# Patient Record
Sex: MALE | Race: WHITE | Employment: OTHER | URBAN - METROPOLITAN AREA
[De-identification: names, ages, dates, MRNs, and addresses within clinical notes are randomized per-mention and may not be internally consistent; named-entity substitution may affect disease eponyms.]

---

## 2022-02-06 ENCOUNTER — HOSPITAL ENCOUNTER (EMERGENCY)
Age: 77
Discharge: HOME | End: 2022-02-06
Attending: EMERGENCY MEDICINE
Payer: MEDICARE

## 2022-02-06 VITALS
HEIGHT: 66 IN | BODY MASS INDEX: 35.03 KG/M2 | SYSTOLIC BLOOD PRESSURE: 140 MMHG | RESPIRATION RATE: 20 BRPM | DIASTOLIC BLOOD PRESSURE: 75 MMHG | WEIGHT: 218 LBS | TEMPERATURE: 97.7 F | OXYGEN SATURATION: 97 % | HEART RATE: 69 BPM

## 2022-02-06 DIAGNOSIS — E11.49 OTHER DIABETIC NEUROLOGICAL COMPLICATION ASSOCIATED WITH TYPE 2 DIABETES MELLITUS (HCC): ICD-10-CM

## 2022-02-06 DIAGNOSIS — Z76.0 ENCOUNTER FOR MEDICATION REFILL: Primary | ICD-10-CM

## 2022-02-06 PROCEDURE — 99283 EMERGENCY DEPT VISIT LOW MDM: CPT

## 2022-02-06 RX ORDER — AMLODIPINE BESYLATE 2.5 MG/1
2.5 TABLET ORAL DAILY
COMMUNITY

## 2022-02-06 RX ORDER — TRAMADOL HYDROCHLORIDE 50 MG/1
50 TABLET ORAL 3 TIMES DAILY PRN
COMMUNITY
End: 2022-02-06 | Stop reason: ALTCHOICE

## 2022-02-06 RX ORDER — TRAMADOL HYDROCHLORIDE 50 MG/1
50 TABLET ORAL EVERY 8 HOURS PRN
Qty: 21 TABLET | Refills: 0 | Status: SHIPPED | OUTPATIENT
Start: 2022-02-06 | End: 2022-02-10 | Stop reason: SDUPTHER

## 2022-02-06 RX ORDER — ASPIRIN 81 MG/1
81 TABLET ORAL DAILY
COMMUNITY

## 2022-02-06 RX ORDER — TIZANIDINE 2 MG/1
2 TABLET ORAL 3 TIMES DAILY PRN
COMMUNITY
End: 2022-02-10 | Stop reason: SDUPTHER

## 2022-02-06 ASSESSMENT — ENCOUNTER SYMPTOMS
EYES NEGATIVE: 1
WHEEZING: 0
TROUBLE SWALLOWING: 0
ALLERGIC/IMMUNOLOGIC NEGATIVE: 1
NAUSEA: 0
VOMITING: 0
RHINORRHEA: 0
SHORTNESS OF BREATH: 0
BACK PAIN: 1
ABDOMINAL PAIN: 0

## 2022-02-06 ASSESSMENT — PAIN SCALES - GENERAL
PAINLEVEL_OUTOF10: 8
PAINLEVEL_OUTOF10: 8

## 2022-02-06 ASSESSMENT — PAIN DESCRIPTION - LOCATION
LOCATION: LEG
LOCATION: LEG

## 2022-02-06 ASSESSMENT — PAIN DESCRIPTION - PAIN TYPE
TYPE: CHRONIC PAIN
TYPE: CHRONIC PAIN

## 2022-02-06 ASSESSMENT — PAIN DESCRIPTION - DESCRIPTORS: DESCRIPTORS: ACHING

## 2022-02-06 ASSESSMENT — PAIN DESCRIPTION - ORIENTATION: ORIENTATION: LEFT;RIGHT

## 2022-02-06 NOTE — ED PROVIDER NOTES
[facility] ED  eMERGENCY dEPARTMENT eNCOUnter      Pt Name: Amrit Romero  MRN: [id number]  [place] 1945  Date of evaluation: 2/6/2022  Provider: Sarika Figueroa MD    [address] [place] [address]       Chief Complaint   Patient presents with    Medication Refill     has leg pain, ran out of his tramadol, wants refill         HISTORY OF PRESENT ILLNESS   (Location/Symptom, Timing/Onset,Context/Setting, Quality, Duration, Modifying Factors, Severity)  Note limiting factors. Amrit Romero is a 77 y.o. male who presents to the emergency department complaint of needing a refill on her tramadol. Patient recently moved with family from [facility]. Patient does take routine tramadol, 50 mg p.o. 3 times daily. Patient admits that he is out in his chronic neuropathic pain is being significantly worse. Patient denies arianna shortness of breath, nausea vomiting. Patient denies acute symptomatologies otherwise. Patient is he has been on a number of different medications for his neuropathic pain patient was diabetes however Ultram seems to be the medication that works the best for him. Patient is seeking refill of medication allowing him time to establish with his new primary care physician tomorrow, and arrange for medication refills and the like. HPI    NursingNotes were reviewed. REVIEW OF SYSTEMS    (2-9 systems for level 4, 10 or more for level 5)     Review of Systems   Constitutional: Negative for activity change and chills. HENT: Negative for congestion, ear pain, rhinorrhea and trouble swallowing. Eyes: Negative. Respiratory: Negative for shortness of breath and wheezing. Cardiovascular: Negative for chest pain and leg swelling. Gastrointestinal: Negative for abdominal pain, nausea and vomiting. Endocrine: Negative. Genitourinary: Negative for dysuria, frequency and hematuria. Musculoskeletal: Positive for arthralgias, back pain and myalgias. Negative for gait problem and neck pain. Skin: Negative. Allergic/Immunologic: Negative. Neurological: Negative for seizures, syncope, speech difficulty and light-headedness. Patient complains of the burning neuropathic pain associate with his diabetes bilateral lower extremities. Hematological: Negative. Psychiatric/Behavioral: Negative for hallucinations, self-injury and suicidal ideas. Except as noted above the remainder of the review of systems was reviewed and negative. PAST MEDICAL HISTORY     Past Medical History:   Diagnosis Date    CAD (coronary artery disease)     Chronic kidney disease     [facility]     Diabetes mellitus [facility]     Diabetic neuropathy (HCC)     Hyperlipidemia     Hypertension          SURGICALHISTORY       Past Surgical History:   Procedure Laterality Date    COLON SURGERY      CORONARY ANGIOPLASTY WITH STENT PLACEMENT      CORONARY ARTERY BYPASS GRAFT      PACEMAKER PLACEMENT           CURRENT MEDICATIONS       Previous Medications    AMLODIPINE (NORVASC) 2.5 MG TABLET    Take 2.5 mg by mouth daily    ASPIRIN 81 MG EC TABLET    Take 81 mg by mouth daily    METFORMIN (GLUCOPHAGE) 500 MG TABLET    Take 500 mg by mouth daily (with breakfast)    TIZANIDINE (ZANAFLEX) 2 MG TABLET    Take 2 mg by mouth 3 times daily as needed       ALLERGIES     Penicillins    FAMILY HISTORY     History reviewed. No pertinent family history.        SOCIAL HISTORY       Social History     Socioeconomic History    Marital status: Legally      Spouse name: None    Number of children: None    Years of education: None    Highest education level: None   Occupational History    None   Tobacco Use    Smoking status: Never Smoker    Smokeless tobacco: Never Used   Vaping Use    Vaping Use: Never used   Substance and Sexual Activity    Alcohol use: Yes     Comment: rare    Drug use: Never    Sexual activity: None   Other Topics Concern    None   Social round, and reactive to light. Cardiovascular:      Rate and Rhythm: Normal rate and regular rhythm. Heart sounds: No friction rub. No gallop. Pulmonary:      Effort: Pulmonary effort is normal. No respiratory distress. Breath sounds: Normal breath sounds. Abdominal:      General: Abdomen is flat. There is no distension. Tenderness: There is no abdominal tenderness. Musculoskeletal:         General: No swelling or tenderness. Normal range of motion. Cervical back: Normal range of motion. No rigidity. Right lower leg: Edema present. Left lower leg: Edema present. Skin:     General: Skin is warm. Capillary Refill: Capillary refill takes less than 2 seconds. Coloration: Skin is not jaundiced or pale. Neurological:      General: No focal deficit present. Mental Status: He is alert and oriented to person, place, and time. Cranial Nerves: No cranial nerve deficit. Sensory: No sensory deficit. Psychiatric:         Mood and Affect: Mood normal.         Behavior: Behavior normal.         Thought Content: Thought content normal.         DIAGNOSTIC RESULTS     EKG: All EKG's are interpreted by the Emergency Department Physician who either signs or Co-signsthis chart in the absence of a cardiologist.    -    RADIOLOGY:   Nanine Hasten such as CT, Ultrasound and MRI are read by the radiologist. Plain radiographic images are visualized and preliminarily interpreted by the emergency physician with the below findings:    -    Interpretation per the Radiologist below, if available at the time ofthis note:    No orders to display         ED BEDSIDE ULTRASOUND:   Performed by ED Physician - none    LABS:  Labs Reviewed - No data to display    All other labs were within normal range or not returned as of this dictation.     EMERGENCY DEPARTMENT COURSE and DIFFERENTIAL DIAGNOSIS/MDM:   Vitals:    Vitals:    02/06/22 1619   BP: (!) 140/75   Pulse: 69   Resp: 20 Temp: 97.7 °F (36.5 °C)   TempSrc: Oral   SpO2: 97%   Weight: 218 lb (98.9 kg)   Height: 5' 6\" (1.676 m)       Given chronic nature patient symptomatologies, recent move, plan close outpatient follow-up, I see no reason not to honor gentleman's request for refill of medication. Will provide 7 days worth of therapy. Precautions given. Patient understands nature of medication very seriously and closely. Patient return should conditions worsen in any capacity. This time patient is denying other acute symptomatology such as chest pain shortness of breath or the like. Patient very appreciative of care and refill. Patient will follow up with primary care physician. Patient return should condition worsen. MDM    CRITICAL CARE TIME   Total Critical Care time was - minutes, excluding separately reportableprocedures. There was a high probability of clinicallysignificant/life threatening deterioration in the patient's condition which required my urgent intervention.  -    CONSULTS:  None    PROCEDURES:  Unless otherwise noted below, none     Procedures    FINAL IMPRESSION      1. Encounter for medication refill    2. Other diabetic neurological complication associated with type 2 diabetes mellitus ([facility]        DISPOSITION/PLAN   DISPOSITION Decision To Discharge 02/06/2022 04:45:54 PM      PATIENT REFERRED TO:  please keep your new PCP follow up appointment tomorrow            DISCHARGE MEDICATIONS:  New Prescriptions    TRAMADOL (ULTRAM) 50 MG TABLET    Take 1 tablet by mouth every 8 hours as needed for Pain for up to 7 days.           (Please note that portions of this note were completed with a voice recognitionprogram.  Efforts were made to edit the dictations but occasionally words are mis-transcribed.)    Anshul Oates MD (electronically signed)  Attending Emergency Physician          Anshul Oates MD  02/06/22 5359

## 2022-02-06 NOTE — ED TRIAGE NOTES
Patient presents with complaints of bilateral leg pain. Patient reports he just moved here and ran out of his tramadol and is having increased pain. Patient has a history of diabetic neuropathy. Patient ambulatory with cane on arrival. No distress noted in triage.

## 2022-02-10 ENCOUNTER — OFFICE VISIT (OUTPATIENT)
Dept: PRIMARY CARE CLINIC | Age: 77
End: 2022-02-10
Payer: MEDICARE

## 2022-02-10 VITALS
HEIGHT: 69 IN | SYSTOLIC BLOOD PRESSURE: 122 MMHG | OXYGEN SATURATION: 98 % | TEMPERATURE: 97.9 F | WEIGHT: 224 LBS | RESPIRATION RATE: 18 BRPM | HEART RATE: 73 BPM | DIASTOLIC BLOOD PRESSURE: 62 MMHG | BODY MASS INDEX: 33.18 KG/M2

## 2022-02-10 DIAGNOSIS — I25.10 ATHEROSCLEROSIS OF CORONARY ARTERY WITHOUT ANGINA PECTORIS, UNSPECIFIED VESSEL OR LESION TYPE, UNSPECIFIED WHETHER NATIVE OR TRANSPLANTED HEART: ICD-10-CM

## 2022-02-10 DIAGNOSIS — Z00.00 HEALTH CARE MAINTENANCE: ICD-10-CM

## 2022-02-10 DIAGNOSIS — E11.42 DIABETIC POLYNEUROPATHY ASSOCIATED WITH TYPE 2 DIABETES MELLITUS (HCC): ICD-10-CM

## 2022-02-10 DIAGNOSIS — M54.9 CHRONIC BACK PAIN, UNSPECIFIED BACK LOCATION, UNSPECIFIED BACK PAIN LATERALITY: Primary | ICD-10-CM

## 2022-02-10 DIAGNOSIS — M47.812 CERVICAL SPONDYLOSIS: ICD-10-CM

## 2022-02-10 DIAGNOSIS — J44.9 CHRONIC OBSTRUCTIVE PULMONARY DISEASE, UNSPECIFIED COPD TYPE (HCC): ICD-10-CM

## 2022-02-10 DIAGNOSIS — K46.1: ICD-10-CM

## 2022-02-10 DIAGNOSIS — G89.29 CHRONIC BACK PAIN, UNSPECIFIED BACK LOCATION, UNSPECIFIED BACK PAIN LATERALITY: Primary | ICD-10-CM

## 2022-02-10 DIAGNOSIS — Z00.00 ROUTINE GENERAL MEDICAL EXAMINATION AT A HEALTH CARE FACILITY: Primary | ICD-10-CM

## 2022-02-10 DIAGNOSIS — B37.2 CANDIDAL INTERTRIGO: ICD-10-CM

## 2022-02-10 PROBLEM — E11.9 TYPE 2 DIABETES MELLITUS WITHOUT COMPLICATION (HCC): Status: ACTIVE | Noted: 2022-02-10

## 2022-02-10 PROBLEM — Z95.0 PRESENCE OF PERMANENT CARDIAC PACEMAKER: Status: ACTIVE | Noted: 2019-09-10

## 2022-02-10 PROBLEM — I45.10 RIGHT BUNDLE BRANCH BLOCK: Status: ACTIVE | Noted: 2022-02-10

## 2022-02-10 PROBLEM — M15.9 GENERALIZED OSTEOARTHRITIS: Status: ACTIVE | Noted: 2019-09-10

## 2022-02-10 PROBLEM — G47.33 OBSTRUCTIVE SLEEP APNEA OF ADULT: Status: ACTIVE | Noted: 2019-09-10

## 2022-02-10 PROBLEM — K76.0 STEATOSIS OF LIVER: Status: ACTIVE | Noted: 2022-02-10

## 2022-02-10 PROBLEM — E66.01 MORBID OBESITY (HCC): Status: ACTIVE | Noted: 2019-09-10

## 2022-02-10 PROBLEM — H91.93 BILATERAL HEARING LOSS: Status: ACTIVE | Noted: 2019-09-10

## 2022-02-10 PROBLEM — I25.2 OLD MYOCARDIAL INFARCTION: Status: ACTIVE | Noted: 2019-09-10

## 2022-02-10 PROCEDURE — 3017F COLORECTAL CA SCREEN DOC REV: CPT | Performed by: INTERNAL MEDICINE

## 2022-02-10 PROCEDURE — 3046F HEMOGLOBIN A1C LEVEL >9.0%: CPT | Performed by: INTERNAL MEDICINE

## 2022-02-10 PROCEDURE — 2022F DILAT RTA XM EVC RTNOPTHY: CPT | Performed by: INTERNAL MEDICINE

## 2022-02-10 PROCEDURE — G8417 CALC BMI ABV UP PARAM F/U: HCPCS | Performed by: INTERNAL MEDICINE

## 2022-02-10 PROCEDURE — G8427 DOCREV CUR MEDS BY ELIG CLIN: HCPCS | Performed by: INTERNAL MEDICINE

## 2022-02-10 PROCEDURE — G8484 FLU IMMUNIZE NO ADMIN: HCPCS | Performed by: INTERNAL MEDICINE

## 2022-02-10 PROCEDURE — 3023F SPIROM DOC REV: CPT | Performed by: INTERNAL MEDICINE

## 2022-02-10 PROCEDURE — G0438 PPPS, INITIAL VISIT: HCPCS | Performed by: INTERNAL MEDICINE

## 2022-02-10 PROCEDURE — 1123F ACP DISCUSS/DSCN MKR DOCD: CPT | Performed by: INTERNAL MEDICINE

## 2022-02-10 PROCEDURE — 4040F PNEUMOC VAC/ADMIN/RCVD: CPT | Performed by: INTERNAL MEDICINE

## 2022-02-10 PROCEDURE — 99204 OFFICE O/P NEW MOD 45 MIN: CPT | Performed by: INTERNAL MEDICINE

## 2022-02-10 PROCEDURE — 1036F TOBACCO NON-USER: CPT | Performed by: INTERNAL MEDICINE

## 2022-02-10 RX ORDER — NYSTATIN 100000 U/G
OINTMENT TOPICAL
COMMUNITY
Start: 2021-11-08 | End: 2022-02-10 | Stop reason: ALTCHOICE

## 2022-02-10 RX ORDER — GLIPIZIDE 5 MG/1
TABLET ORAL
COMMUNITY
Start: 2021-11-08 | End: 2022-02-10

## 2022-02-10 RX ORDER — CLOBETASOL PROPIONATE 0.5 MG/G
OINTMENT TOPICAL
Qty: 45 G | Refills: 3 | Status: SHIPPED | OUTPATIENT
Start: 2022-02-10

## 2022-02-10 RX ORDER — KETOCONAZOLE 20 MG/G
CREAM TOPICAL
Qty: 30 G | Refills: 5 | Status: SHIPPED | OUTPATIENT
Start: 2022-02-10

## 2022-02-10 RX ORDER — NEOMYCIN SULFATE, POLYMYXIN B SULFATE AND HYDROCORTISONE 10; 3.5; 1 MG/ML; MG/ML; [USP'U]/ML
SUSPENSION/ DROPS AURICULAR (OTIC)
COMMUNITY
End: 2022-02-10 | Stop reason: ALTCHOICE

## 2022-02-10 RX ORDER — ATORVASTATIN CALCIUM 40 MG/1
TABLET, FILM COATED ORAL
COMMUNITY
End: 2022-02-10 | Stop reason: SDUPTHER

## 2022-02-10 RX ORDER — TRIAMCINOLONE ACETONIDE 1 MG/G
CREAM TOPICAL
COMMUNITY
Start: 2021-11-19 | End: 2022-02-10 | Stop reason: ALTCHOICE

## 2022-02-10 RX ORDER — LEVOFLOXACIN 750 MG/1
TABLET ORAL
COMMUNITY
Start: 2022-01-21

## 2022-02-10 RX ORDER — TRAMADOL HYDROCHLORIDE 50 MG/1
50 TABLET ORAL EVERY 8 HOURS PRN
Qty: 15 TABLET | Refills: 0 | Status: SHIPPED | OUTPATIENT
Start: 2022-02-10 | End: 2022-02-21 | Stop reason: SDUPTHER

## 2022-02-10 RX ORDER — KETOCONAZOLE 20 MG/G
CREAM TOPICAL
COMMUNITY
Start: 2022-02-04 | End: 2022-02-10 | Stop reason: SDUPTHER

## 2022-02-10 RX ORDER — TIZANIDINE 2 MG/1
2 TABLET ORAL EVERY 8 HOURS PRN
Qty: 30 TABLET | Refills: 0 | Status: SHIPPED | OUTPATIENT
Start: 2022-02-10

## 2022-02-10 RX ORDER — NITROGLYCERIN 0.4 MG/1
TABLET SUBLINGUAL
COMMUNITY
Start: 2022-02-04

## 2022-02-10 RX ORDER — FERROUS SULFATE 325(65) MG
TABLET ORAL
COMMUNITY

## 2022-02-10 RX ORDER — METRONIDAZOLE 500 MG/1
TABLET ORAL
COMMUNITY
Start: 2022-01-21

## 2022-02-10 RX ORDER — ATORVASTATIN CALCIUM 40 MG/1
TABLET, FILM COATED ORAL
Qty: 90 TABLET | Refills: 3 | Status: SHIPPED | OUTPATIENT
Start: 2022-02-10

## 2022-02-10 SDOH — ECONOMIC STABILITY: FOOD INSECURITY: WITHIN THE PAST 12 MONTHS, YOU WORRIED THAT YOUR FOOD WOULD RUN OUT BEFORE YOU GOT MONEY TO BUY MORE.: NEVER TRUE

## 2022-02-10 SDOH — ECONOMIC STABILITY: TRANSPORTATION INSECURITY
IN THE PAST 12 MONTHS, HAS THE LACK OF TRANSPORTATION KEPT YOU FROM MEDICAL APPOINTMENTS OR FROM GETTING MEDICATIONS?: NO

## 2022-02-10 SDOH — ECONOMIC STABILITY: FOOD INSECURITY: WITHIN THE PAST 12 MONTHS, THE FOOD YOU BOUGHT JUST DIDN'T LAST AND YOU DIDN'T HAVE MONEY TO GET MORE.: NEVER TRUE

## 2022-02-10 SDOH — ECONOMIC STABILITY: TRANSPORTATION INSECURITY
IN THE PAST 12 MONTHS, HAS LACK OF TRANSPORTATION KEPT YOU FROM MEETINGS, WORK, OR FROM GETTING THINGS NEEDED FOR DAILY LIVING?: NO

## 2022-02-10 ASSESSMENT — PATIENT HEALTH QUESTIONNAIRE - PHQ9
1. LITTLE INTEREST OR PLEASURE IN DOING THINGS: 0
2. FEELING DOWN, DEPRESSED OR HOPELESS: 0
SUM OF ALL RESPONSES TO PHQ9 QUESTIONS 1 & 2: 0
SUM OF ALL RESPONSES TO PHQ QUESTIONS 1-9: 0
1. LITTLE INTEREST OR PLEASURE IN DOING THINGS: 0
SUM OF ALL RESPONSES TO PHQ9 QUESTIONS 1 & 2: 0
2. FEELING DOWN, DEPRESSED OR HOPELESS: 0
SUM OF ALL RESPONSES TO PHQ QUESTIONS 1-9: 0

## 2022-02-10 ASSESSMENT — LIFESTYLE VARIABLES
AUDIT TOTAL SCORE: INCOMPLETE
AUDIT-C TOTAL SCORE: INCOMPLETE
HOW OFTEN DO YOU HAVE A DRINK CONTAINING ALCOHOL: 0
HOW OFTEN DO YOU HAVE A DRINK CONTAINING ALCOHOL: NEVER

## 2022-02-10 ASSESSMENT — ENCOUNTER SYMPTOMS
DIARRHEA: 0
BACK PAIN: 1
WHEEZING: 0
NAUSEA: 0
SHORTNESS OF BREATH: 0
VOMITING: 0
COUGH: 0
ABDOMINAL PAIN: 0

## 2022-02-10 ASSESSMENT — SOCIAL DETERMINANTS OF HEALTH (SDOH): HOW HARD IS IT FOR YOU TO PAY FOR THE VERY BASICS LIKE FOOD, HOUSING, MEDICAL CARE, AND HEATING?: NOT HARD AT ALL

## 2022-02-10 NOTE — PROGRESS NOTES
Subjective:      Patient ID: Solis Puri is a 77 y.o. male    New pt  Med refills  HPI    Patient presents to establish care with new PCP. Previous PCP was Dr. Robina Abebe in [place]  PMHx: chronic back pain, diabetic neuropathy, abd hernia with gangrene, s/p large bowel resection. Hx CAD s/p CABG, COPD   Current meds: tramadol, metformin, Lipitor  Last colonoscopy: 3 weeks ago at [facility]      Last tetanus shot: 2012         Hx zoster vaccination in 2021 in [place]      CC: just completed antibiotic for appendicitis. Claims nonsurgical alternative due to complicated GI hx.      Back pain controlled with tramadol. Will refer to pain mx. Hx COPD. No longer on Trelegy due to side effect. Preferred use of Advair. Needs o2 more frequently now. No increased SOB or wheezing, no cough.    Past Medical History:   Diagnosis Date    CAD (coronary artery disease)     Chronic kidney disease     [facility]     Diabetes mellitus ([facility]     Diabetic neuropathy ([facility]     Hyperlipidemia     Hypertension      Past Surgical History:   Procedure Laterality Date    COLON SURGERY      CORONARY ANGIOPLASTY WITH STENT PLACEMENT      CORONARY ARTERY BYPASS GRAFT      PACEMAKER PLACEMENT       Social History     Socioeconomic History    Marital status: Legally      Spouse name: Not on file    Number of children: Not on file    Years of education: Not on file    Highest education level: Not on file   Occupational History    Not on file   Tobacco Use    Smoking status: Never Smoker    Smokeless tobacco: Never Used   Vaping Use    Vaping Use: Never used   Substance and Sexual Activity    Alcohol use: Yes     Comment: rare    Drug use: Never    Sexual activity: Not on file   Other Topics Concern    Not on file   Social History Narrative    Not on file     Social Determinants of Health     Financial Resource Strain: Low Risk     Difficulty of Paying Living Expenses: Not hard at all   Food Insecurity: No Food Insecurity    Worried About [address] [place] [address] in the Last Year: Never true    Ciaran of Food in the Last Year: Never true   Transportation Needs: No Transportation Needs    Lack of Transportation (Medical): No    Lack of Transportation (Non-Medical): No   Physical Activity:     Days of Exercise per Week: Not on file    Minutes of Exercise per Session: Not on file   Stress:     Feeling of Stress : Not on file   Social Connections:     Frequency of Communication with Friends and Family: Not on file    Frequency of Social Gatherings with Friends and Family: Not on file    Attends Judaism Services: Not on file    Active Member of [address] [place] [address] or Organizations: Not on file    Attends Club or Organization Meetings: Not on file    Marital Status: Not on file   Intimate Partner Violence:     Fear of Current or Ex-Partner: Not on file    Emotionally Abused: Not on file    Physically Abused: Not on file    Sexually Abused: Not on file   Housing Stability:     Unable to Pay for Housing in the Last Year: Not on file    Number of [place] in the Last Year: Not on file    Unstable Housing in the Last Year: Not on file     History reviewed. No pertinent family history.   Allergies:  Penicillins and Iv [iodides]  Patient Active Problem List   Diagnosis    COPD (chronic obstructive pulmonary disease) ([facility]    Presence of permanent cardiac pacemaker    Atherosclerosis of coronary artery without angina pectoris    Diabetic peripheral neuropathy (HCC)    Obstructive sleep apnea of adult    Type 2 diabetes mellitus without complication (HCC)    Steatosis of liver    Right bundle branch block    Old myocardial infarction    Morbid obesity ([facility]    Generalized osteoarthritis    Bilateral hearing loss     Current Outpatient Medications on File Prior to Visit   Medication Sig Dispense Refill    nitroGLYCERIN (NITROSTAT) 0.4 MG SL tablet       metFORMIN (GLUCOPHAGE) 500 MG tablet Take 500 mg by mouth daily (with breakfast)      amLODIPine (NORVASC) 2.5 MG tablet Take 2.5 mg by mouth daily      aspirin 81 MG EC tablet Take 81 mg by mouth daily      ferrous sulfate (IRON 325) 325 (65 Fe) MG tablet ferrous sulfate 325 mg (65 mg iron) tablet   Take 1 tablet every day by oral route for 30 days.  levoFLOXacin (LEVAQUIN) 750 MG tablet TAKE 1 TABLET BY MOUTH EVERY DAY      metroNIDAZOLE (FLAGYL) 500 MG tablet TAKE 1 TABLET BY MOUTH EVERY 8 HOURS FOR 10 DAYS       No current facility-administered medications on file prior to visit. Review of Systems   Constitutional: Negative for chills, diaphoresis, fatigue and fever. HENT: Negative for congestion, ear discharge and ear pain. Respiratory: Negative for cough, shortness of breath and wheezing. Cardiovascular: Negative for chest pain. Gastrointestinal: Negative for abdominal pain, diarrhea, nausea and vomiting. Endocrine: Negative for cold intolerance and heat intolerance. Genitourinary: Negative for dysuria and frequency. Musculoskeletal: Positive for back pain. Neurological: Negative for dizziness and light-headedness. Objective:   /62   Pulse 73   Temp 97.9 °F (36.6 °C)   Resp 18   Ht 5' 9\" (1.753 m)   Wt 224 lb (101.6 kg)   SpO2 98%   BMI 33.08 kg/m²     Physical Exam  Constitutional:       General: He is not in acute distress. Appearance: He is not diaphoretic. Comments: NC in place   Cardiovascular:      Rate and Rhythm: Normal rate and regular rhythm. Pulses: Normal pulses. Heart sounds: Normal heart sounds, S1 normal and S2 normal.   Pulmonary:      Effort: Pulmonary effort is normal. No respiratory distress. Breath sounds: Normal breath sounds. No wheezing or rales. Comments: Well-healed longitudinal midline scar from to manubrium to xiphoid process    Chest:      Chest wall: No tenderness.    Abdominal:      General: Bowel sounds are normal.      Hernia: A hernia (in the right upper to lower quadrant ) is present. Comments: Well-healed longitudinal abd scar from epigastric to suprapubic region   Neurological:      Mental Status: He is alert. Assessment:       Diagnosis Orders   1. Chronic back pain, unspecified back location, unspecified back pain laterality  Lincoln Mccoy MD, Pain Management, [place]   2. Diabetic polyneuropathy associated with type 2 diabetes mellitus ([facility]  Hemoglobin A1C    Lipid Panel    Veronica Velasquez DPM, Podiatry, [place]    traMADol (ULTRAM) 50 MG tablet    atorvastatin (LIPITOR) 40 MG tablet    await A1c   3. Health care maintenance  CBC Auto Differential    Comprehensive Metabolic Panel    TSH with Reflex    Hepatitis C Antibody    HIV Screen    PSA Screening   4. Non-recurrent abdominal hernia with gangrene, unspecified hernia type  Jarad Peterson7Ranjith MD, Gastroenterology, Mila Cisneros MD, General Surgery, [facility]   5. Candidal intertrigo  clobetasol (TEMOVATE) 0.05 % ointment    ketoconazole (NIZORAL) 2 % cream   6. Cervical spondylosis  tiZANidine (ZANAFLEX) 2 MG tablet   7. Chronic obstructive pulmonary disease, unspecified COPD type ([facility]  Nathaneil Harada, MD, Pulmonology, [facility]   8.  Atherosclerosis of coronary artery without angina pectoris, unspecified vessel or lesion type, unspecified whether native or transplanted heart Stable Veronica Kasper DO, Cardiology, [place]     Plan:      Orders Placed This Encounter   Procedures    CBC Auto Differential     Standing Status:   Future     Standing Expiration Date:   2/10/2023    Comprehensive Metabolic Panel     Standing Status:   Future     Standing Expiration Date:   2/10/2023    TSH with Reflex     Standing Status:   Future     Standing Expiration Date:   2/10/2023    Hemoglobin A1C     Standing Status:   Future     Standing Expiration Date:   2/10/2023    Lipid Panel     Standing Status:   Future Standing Expiration Date:   2/10/2023     Order Specific Question:   Is Patient Fasting?/# of Hours     Answer:   yes    Hepatitis C Antibody     Standing Status:   Future     Standing Expiration Date:   2/10/2023    HIV Screen     Standing Status:   Future     Standing Expiration Date:   2/10/2023    PSA Screening     Standing Status:   Future     Standing Expiration Date:   2/10/2023    Nazario Jones MD, Pain Management, [place]     Referral Priority:   Routine     Referral Type:   Eval and Treat     Referral Reason:   Specialty Services Required     Referred to Provider:   Nyasia Carlos MD     Requested Specialty:   Physical Medicine and Rehab     Number of Visits Requested:   [address] [place], [place], [address]     Referral Priority:   Routine     Referral Type:   Eval and Treat     Referral Reason:   Specialty Services Required     Referred to Provider:   Liz Casanova DPM     Requested Specialty:   Podiatry     Number of Visits Requested:   [facility], [facility], Gastroenterology, [facility]     Referral Priority:   Routine     Referral Type:   Eval and Treat     Referral Reason:   Specialty Services Required     Referred to Provider:   Terrie Haynes MD     Requested Specialty:   Gastroenterology     Number of Visits Requested:   Humphrey Rahman MD, General Surgery, [place]     Referral Priority:   Routine     Referral Type:   Eval and Treat     Referral Reason:   Specialty Services Required     Referred to Provider:   Kaden Butts MD     Requested Specialty:   General Surgery     Number of Visits Requested:   Tom Diallo DO, Cardiology, [place]     Referral Priority:   Routine     Referral Type:   Eval and Treat     Referral Reason:   Specialty Services Required     Referred to Provider:   Julienne Clancy DO     Requested Specialty:   Cardiology     Number of Visits Requested:   Bud Collado MD, Pulmonology, [place]     Referral Priority:   Routine     Referral Type:   Eval and Treat     Referral Reason:   Specialty Services Required     Referred to Provider:   Ryland Sever, MD     Requested Specialty:   Pulmonology     Number of Visits Requested:   1      Return in about 1 month (around 3/10/2022) for follow up, with PCP.

## 2022-02-10 NOTE — PATIENT INSTRUCTIONS
Personalized Preventive Plan for Ana Maria Quinones - 2/10/2022  Medicare offers a range of preventive health benefits. Some of the tests and screenings are paid in full while other may be subject to a deductible, co-insurance, and/or copay. Some of these benefits include a comprehensive review of your medical history including lifestyle, illnesses that may run in your family, and various assessments and screenings as appropriate. After reviewing your medical record and screening and assessments performed today your provider may have ordered immunizations, labs, imaging, and/or referrals for you. A list of these orders (if applicable) as well as your Preventive Care list are included within your After Visit Summary for your review. Other Preventive Recommendations:    · A preventive eye exam performed by an eye specialist is recommended every 1-2 years to screen for glaucoma; cataracts, macular degeneration, and other eye disorders. · A preventive dental visit is recommended every 6 months. · Try to get at least 150 minutes of exercise per week or 10,000 steps per day on a pedometer . · Order or download the FREE \"Exercise & Physical Activity: Your Everyday Guide\" from [employer]. Call 2-813.347.4444 or search [employer] online. · You need 9164-5773 mg of calcium and 6774-7643 IU of vitamin D per day. It is possible to meet your calcium requirement with diet alone, but a vitamin D supplement is usually necessary to meet this goal.  · When exposed to the sun, use a sunscreen that protects against both UVA and UVB radiation with an SPF of 30 or greater. Reapply every 2 to 3 hours or after sweating, drying off with a towel, or swimming. · Always wear a seat belt when traveling in a car. Always wear a helmet when riding a bicycle or motorcycle.

## 2022-02-10 NOTE — PROGRESS NOTES
Medicare Annual Wellness Visit  Name: Ronni Sanchez Date: 2/10/2022   MRN: [id number] Sex: Male   Age: 77 y.o. Ethnicity: Non- / Non    : 1945 Race: White (non-)      Erum Gutierrez is here for Christiano BARAHONA    Screenings for behavioral, psychosocial and functional/safety risks, and cognitive dysfunction are all negative except as indicated below. These results, as well as other patient data from the [address] [facility] [address] [facility] [address] form, are documented in Flowsheets linked to this Encounter. Allergies   Allergen Reactions    Penicillins Hives and Rash     Other reaction(s): Generalized rash, Respiratory distress, Urticaria, Syncope, Syncope, Unknown    Iv [Iodides] Hives, Itching and Rash         Prior to Visit Medications    Medication Sig Taking? Authorizing Provider   ferrous sulfate (IRON 325) 325 (65 Fe) MG tablet ferrous sulfate 325 mg (65 mg iron) tablet   Take 1 tablet every day by oral route for 30 days. Yes Historical Provider, MD   levoFLOXacin (LEVAQUIN) 750 MG tablet TAKE 1 TABLET BY MOUTH EVERY DAY Yes Historical Provider, MD   metroNIDAZOLE (FLAGYL) 500 MG tablet TAKE 1 TABLET BY MOUTH EVERY 8 HOURS FOR 10 DAYS Yes Historical Provider, MD   nitroGLYCERIN (NITROSTAT) 0.4 MG SL tablet  Yes Historical Provider, MD   metFORMIN (GLUCOPHAGE) 500 MG tablet Take 500 mg by mouth daily (with breakfast) Yes Historical Provider, MD   amLODIPine (NORVASC) 2.5 MG tablet Take 2.5 mg by mouth daily Yes Historical Provider, MD   aspirin 81 MG EC tablet Take 81 mg by mouth daily Yes Historical Provider, MD   traMADol (ULTRAM) 50 MG tablet Take 1 tablet by mouth every 8 hours as needed for Pain for up to 7 days. Roseanne Levy MD   tiZANidine (ZANAFLEX) 2 MG tablet Take 1 tablet by mouth every 8 hours as needed (neck pain)  Roseanne Levy MD   clobetasol (TEMOVATE) 0.05 % ointment Apply topically 2 times daily for 2 weeks for rash.  Do not use on face or neck  Roseanne Levy MD   ketoconazole ([place]) clubbing or edema  Musculoskeletal: normal range of motion, no joint swelling, deformity or tenderness  Neurologic: reflexes normal and symmetric, no cranial nerve deficit, gait, coordination and speech normal  Right abd reducible hernia  Abdomen: mass palpable- soft reducible nontender right abd mass  Patient's complete Health Risk Assessment and screening values have been reviewed and are found in Flowsheets. The following problems were reviewed today and where indicated follow up appointments were made and/or referrals ordered. Positive Risk Factor Screenings with Interventions:              General Health and ACP:  General  In general, how would you say your health is?: Very Good  In the past 7 days, have you experienced any of the following?  New or Increased Pain, New or Increased Fatigue, Loneliness, Social Isolation, Stress or Anger?: (!) New or Increased Pain  Do you get the social and emotional support that you need?: Yes  Do you have a Living Will?: (!) No  Advance Directives     Power of  Living Will ACP-Advance Directive ACP-Power of     Not on File Not on File Not on File Not on File      General Health Risk Interventions:  · Pain issues: pain management referral ordered    Health Habits/Nutrition:  Health Habits/Nutrition  Do you exercise for at least 20 minutes 2-3 times per week?: (!) No  Have you lost any weight without trying in the past 3 months?: (!) Yes  Do you eat only one meal per day?: No  Have you seen the dentist within the past year?: N/A - wear dentures     Health Habits/Nutrition Interventions:  · Inadequate physical activity:  back pain prevents     Hearing/Vision:  No exam data present  Hearing/Vision  Do you or your family notice any trouble with your hearing that hasn't been managed with hearing aids?: No  Do you have difficulty driving, watching TV, or doing any of your daily activities because of your eyesight?: No  Have you had an eye exam within the past year?: (!) No  Hearing/Vision Interventions:  · Hearing concerns:  will follow for hearing aid        Personalized Preventive Plan   Current Health Maintenance Status  Immunization History   Administered Date(s) Administered    Influenza Virus Vaccine 10/22/2007, 10/31/2008, 10/01/2009, 09/01/2014, 09/01/2017, 10/01/2020, 07/01/2021    Pneumococcal Conjugate 13-valent ([id number]) 09/01/2016    Pneumococcal Polysaccharide ([id number]) 09/01/2015    Td vaccine (adult) 01/01/2012, 06/01/2012    Zoster Recombinant (Shingrix) 06/11/2018        Health Maintenance   Topic Date Due    Hepatitis C screen  Never done    COVID-19 Vaccine (1) Never done    Lipid screen  Never done    Depression Screen  Never done    Colon cancer screen colonoscopy  Never done    DTaP/Tdap/Td vaccine (1 - Tdap) 06/02/2012    Shingles Vaccine (3 of 3) 08/06/2018    Annual Wellness Visit (AWV)  Never done    Flu vaccine  Completed    Pneumococcal 65+ years Vaccine  Completed    Hepatitis A vaccine  Aged Out    Hepatitis B vaccine  Aged Out    Hib vaccine  Aged Out    Meningococcal (ACWY) vaccine  Aged Out     Recommendations for [employer] Due: see orders and patient instructions/AVS.  . Recommended screening schedule for the next 5-10 years is provided to the patient in written form: see Patient Instructions/AVS.    Nataliia Adam was seen today for medicare awv.     Diagnoses and all orders for this visit:    Routine general medical examination at a health care facility

## 2022-02-21 ENCOUNTER — TELEPHONE (OUTPATIENT)
Dept: PRIMARY CARE CLINIC | Age: 77
End: 2022-02-21

## 2022-02-21 DIAGNOSIS — E11.42 DIABETIC POLYNEUROPATHY ASSOCIATED WITH TYPE 2 DIABETES MELLITUS (HCC): ICD-10-CM

## 2022-02-21 RX ORDER — TRAMADOL HYDROCHLORIDE 50 MG/1
50 TABLET ORAL EVERY 8 HOURS PRN
Qty: 20 TABLET | Refills: 0 | Status: SHIPPED | OUTPATIENT
Start: 2022-02-21 | End: 2022-03-03 | Stop reason: SDUPTHER

## 2022-02-21 NOTE — TELEPHONE ENCOUNTER
Daughter Koki Rodriguez is calling in regards to her father. States they talked with Pain Management (Dr. Wilfred Escalante office) Patient unable to get in to be seen until 3/17/22. They told patient to contact you in regards to refills on his pain medication (Tramadol)  This is the earliest appt they have daughter said. According to looking at the notes and referral, they tried reaching out to the patient numerous times and no answer or call back. They also state that the patient is unable to be seen until this date because he is moving from [place] here.   Please advise if you can refill medication until her is seen by Dr. Oniel Pat on [address] in [facility] [place]

## 2022-02-23 ENCOUNTER — TELEPHONE (OUTPATIENT)
Dept: PAIN MANAGEMENT | Age: 77
End: 2022-02-23

## 2022-02-23 NOTE — TELEPHONE ENCOUNTER
Received call from Dr. Brayden Alicia office asking if Dr. Carolyn Crowder would be able to see patient earlier than scheduled 3/17/22. No available appts, how does Dr. Carolyn Crowder advise?

## 2022-02-23 NOTE — TELEPHONE ENCOUNTER
Call placed to Dr. Lakeshia Eller office, they state no other appt's available with the other physician's but they are sending a message to Dr. Lakeshia lEler in regards to seeing him sooner and than they will reach out to the patient with a sooner appt if so    vm also left for patient in regards to the above

## 2022-02-28 ENCOUNTER — TELEPHONE (OUTPATIENT)
Dept: PRIMARY CARE CLINIC | Age: 77
End: 2022-02-28

## 2022-02-28 NOTE — TELEPHONE ENCOUNTER
PT WAS CALLED AGAIN AND LMVM. DR. Negro Chua OFFICE WAS ALSO CALLED [phone] AND LM WITH  . IF PT CALLS BACK, PLEASE MOVE UP PT'S APPT WITH DR. Marco A Reyes.

## 2022-03-02 DIAGNOSIS — E11.42 DIABETIC POLYNEUROPATHY ASSOCIATED WITH TYPE 2 DIABETES MELLITUS (HCC): ICD-10-CM

## 2022-03-02 NOTE — TELEPHONE ENCOUNTER
His daughter Danna Nieot said he has appointment with pain management on 3/13/22. He will be out of medication tomorrow evening.

## 2022-03-03 RX ORDER — TRAMADOL HYDROCHLORIDE 50 MG/1
50 TABLET ORAL EVERY 8 HOURS PRN
Qty: 20 TABLET | Refills: 0 | Status: SHIPPED | OUTPATIENT
Start: 2022-03-03 | End: 2022-03-10

## 2022-03-15 DIAGNOSIS — E11.49 OTHER DIABETIC NEUROLOGICAL COMPLICATION ASSOCIATED WITH TYPE 2 DIABETES MELLITUS (HCC): ICD-10-CM

## 2022-03-15 NOTE — TELEPHONE ENCOUNTER
requesting medication refill. Please approve or deny this request.    Rx requested:  Requested Prescriptions     Pending Prescriptions Disp Refills    traMADol (ULTRAM) 50 MG tablet 21 tablet 0     Sig: Take 1 tablet by mouth every 8 hours as needed for Pain for up to 7 days.          Last Office Visit:   2/10/2022      Next Visit Date:  Future Appointments   Date Time Provider Jin Mercadoi   3/17/2022 10:00 AM Robin De La Torre MD [facility] [place]   3/22/2022  3:00 PM Brianna Mann MD [address]   3/29/2022  1:45 PM Dalila Chen MD [address]   4/29/2022  9:00 AM Trevor Parker DO [facility]

## 2022-03-16 RX ORDER — TRAMADOL HYDROCHLORIDE 50 MG/1
50 TABLET ORAL EVERY 8 HOURS PRN
Qty: 21 TABLET | Refills: 0 | OUTPATIENT
Start: 2022-03-16 | End: 2022-03-23

## 2022-03-16 NOTE — TELEPHONE ENCOUNTER
Patient's daughter Fawad Bowling is calling back regarding this refill. He has appointment with pain clinic tomorrow.

## 2022-03-16 NOTE — TELEPHONE ENCOUNTER
family called back again and when told message that needs to get this medication from pain management become highly upset on the phone. States he has been without this medication for 2 days and doesn't have an appt until 10 am on Thursday 3/17/22. If another script can't get called in for him than they will be looking for another physician.

## 2022-03-16 NOTE — TELEPHONE ENCOUNTER
They told me the pain mx appt was for 3/13  Refill with pain mx.  I have done several refills already

## 2022-03-17 ENCOUNTER — INITIAL CONSULT (OUTPATIENT)
Dept: PAIN MANAGEMENT | Age: 77
End: 2022-03-17
Payer: MEDICARE

## 2022-03-17 VITALS
HEIGHT: 66 IN | BODY MASS INDEX: 35.03 KG/M2 | OXYGEN SATURATION: 96 % | RESPIRATION RATE: 92 BRPM | TEMPERATURE: 98 F | WEIGHT: 218 LBS

## 2022-03-17 DIAGNOSIS — G89.29 CHRONIC BILATERAL LOW BACK PAIN WITHOUT SCIATICA: Primary | ICD-10-CM

## 2022-03-17 DIAGNOSIS — G89.29 CHRONIC PAIN OF LEFT KNEE: ICD-10-CM

## 2022-03-17 DIAGNOSIS — E66.01 SEVERE OBESITY (BMI 35.0-39.9) WITH COMORBIDITY (HCC): ICD-10-CM

## 2022-03-17 DIAGNOSIS — E11.42 DIABETIC PERIPHERAL NEUROPATHY (HCC): ICD-10-CM

## 2022-03-17 DIAGNOSIS — F11.90 CHRONIC, CONTINUOUS USE OF OPIOIDS: ICD-10-CM

## 2022-03-17 DIAGNOSIS — M54.2 NECK PAIN: ICD-10-CM

## 2022-03-17 DIAGNOSIS — M79.605 BILATERAL LEG PAIN: ICD-10-CM

## 2022-03-17 DIAGNOSIS — M54.50 CHRONIC BILATERAL LOW BACK PAIN WITHOUT SCIATICA: Primary | ICD-10-CM

## 2022-03-17 DIAGNOSIS — Z51.81 ENCOUNTER FOR MONITORING OPIOID MAINTENANCE THERAPY: ICD-10-CM

## 2022-03-17 DIAGNOSIS — Z79.891 ENCOUNTER FOR MONITORING OPIOID MAINTENANCE THERAPY: ICD-10-CM

## 2022-03-17 DIAGNOSIS — R20.0 BILATERAL HAND NUMBNESS: ICD-10-CM

## 2022-03-17 DIAGNOSIS — M25.562 CHRONIC PAIN OF LEFT KNEE: ICD-10-CM

## 2022-03-17 DIAGNOSIS — F11.99 OPIOID USE, UNSPECIFIED WITH UNSPECIFIED OPIOID-INDUCED DISORDER (HCC): ICD-10-CM

## 2022-03-17 DIAGNOSIS — M79.604 BILATERAL LEG PAIN: ICD-10-CM

## 2022-03-17 DIAGNOSIS — M40.204 KYPHOSIS OF THORACIC REGION, UNSPECIFIED KYPHOSIS TYPE: ICD-10-CM

## 2022-03-17 PROCEDURE — 3017F COLORECTAL CA SCREEN DOC REV: CPT | Performed by: PHYSICAL MEDICINE & REHABILITATION

## 2022-03-17 PROCEDURE — 1123F ACP DISCUSS/DSCN MKR DOCD: CPT | Performed by: PHYSICAL MEDICINE & REHABILITATION

## 2022-03-17 PROCEDURE — G8484 FLU IMMUNIZE NO ADMIN: HCPCS | Performed by: PHYSICAL MEDICINE & REHABILITATION

## 2022-03-17 PROCEDURE — 99204 OFFICE O/P NEW MOD 45 MIN: CPT | Performed by: PHYSICAL MEDICINE & REHABILITATION

## 2022-03-17 PROCEDURE — 2022F DILAT RTA XM EVC RTNOPTHY: CPT | Performed by: PHYSICAL MEDICINE & REHABILITATION

## 2022-03-17 PROCEDURE — 4040F PNEUMOC VAC/ADMIN/RCVD: CPT | Performed by: PHYSICAL MEDICINE & REHABILITATION

## 2022-03-17 PROCEDURE — 1036F TOBACCO NON-USER: CPT | Performed by: PHYSICAL MEDICINE & REHABILITATION

## 2022-03-17 PROCEDURE — 3046F HEMOGLOBIN A1C LEVEL >9.0%: CPT | Performed by: PHYSICAL MEDICINE & REHABILITATION

## 2022-03-17 PROCEDURE — G8417 CALC BMI ABV UP PARAM F/U: HCPCS | Performed by: PHYSICAL MEDICINE & REHABILITATION

## 2022-03-17 PROCEDURE — G8427 DOCREV CUR MEDS BY ELIG CLIN: HCPCS | Performed by: PHYSICAL MEDICINE & REHABILITATION

## 2022-03-17 RX ORDER — NALOXONE HYDROCHLORIDE 4 MG/.1ML
1 SPRAY NASAL PRN
Qty: 1 EACH | Refills: 0 | Status: SHIPPED | OUTPATIENT
Start: 2022-03-17

## 2022-03-17 RX ORDER — PERPHENAZINE 16 MG
600 TABLET ORAL DAILY
Qty: 30 CAPSULE | Refills: 0 | Status: SHIPPED | OUTPATIENT
Start: 2022-03-17 | End: 2022-07-06 | Stop reason: SDUPTHER

## 2022-03-17 RX ORDER — TRAMADOL HYDROCHLORIDE 50 MG/1
50 TABLET ORAL 3 TIMES DAILY PRN
Qty: 90 TABLET | Refills: 0 | Status: SHIPPED | OUTPATIENT
Start: 2022-03-17 | End: 2022-04-20 | Stop reason: SDUPTHER

## 2022-03-17 RX ORDER — GABAPENTIN 300 MG/1
300 CAPSULE ORAL NIGHTLY
Qty: 30 CAPSULE | Refills: 0 | Status: SHIPPED | OUTPATIENT
Start: 2022-03-17 | End: 2022-07-06 | Stop reason: SDUPTHER

## 2022-03-17 RX ORDER — TRAMADOL HYDROCHLORIDE 50 MG/1
50 TABLET ORAL 3 TIMES DAILY PRN
Qty: 30 TABLET | Refills: 0 | Status: SHIPPED | OUTPATIENT
Start: 2022-03-17 | End: 2022-03-17

## 2022-03-17 ASSESSMENT — ENCOUNTER SYMPTOMS
CONSTIPATION: 0
DIARRHEA: 0
SHORTNESS OF BREATH: 0
NAUSEA: 0
BACK PAIN: 1

## 2022-03-17 NOTE — PROGRESS NOTES
Marisela Ge  ([id number])    3/17/2022    Subjective:     Marisela Ge is 77 y.o. male who complains today of:    Chief Complaint   Patient presents with    Back Pain       Marisela Ge is a 77 y.o. male who presents for evaluation by request of Dr. Pia Carbajal for chronic back pain. He is accompanied by Pilar Mitchell, his daughter, whom he permits to be present during the history and exam.    He has struggled with pain for over 10 years. He denies any immediately-preceding traumatic or inciting events. He has been previously evaluated by Dr Aj Frost whose records are reviewed below. He describes pain located in both sides of his low back. Pain is a constant ache and is currently a 4/10 and gets up to a 8/10 at its worst and goes down to a 3/10 at its best. Pain is worse with poor sleep on his old mattress. Pain is better with pain medicine. Pain is located 50% on the right and 50% on the left. Pain is located 20% in the back and 80% in the legs. His worse pain is in both feet. \"The bottom of my feet feel like I'm walking on cardboard. \" He has pain in both feet which he attributes to his diabetic neuropathy pain. His pain gets to a 10/10. Worse with walking. Better with rest. Constant ache for over 10 years. Left knee pain has been a constant ache for over 1 year, worse with \"turning it the wrong way\", is a 8/10, gets to a 10/10, better with a brace. Neck pain is located in both sides of his neck, gets to a 8/10, worse with turning his neck, better with rest, constant ache for over 1 year. There are no other associated symptoms or contextual factors. He denies any classic radicular symptoms, new weakness, saddle anesthesia, bowel or bladder dysfunction, or falls.         He denies any numbness, tingling, weakness, bowel or bladder dysfunction, saddle anesthesia, falls, history of cancer, unexplained weight loss, persistent night pain and sweats, fever, IV drug abuse, immunocompromise, chronic prednisone or antibiotic use, or any other red flag symptoms. Mood is down, denies any suicidal or homicidal ideation. Sleep is poor, awakes fatigued. He has tried:  Home exercise program with minimal relief    Diagnostic testing previously performed includes XRs    Medications tried include:  Acetaminophen with minimal relief for over 3 months  Ibuprofen with minimal relief for over 3 months  Tramadol 50 mg prescribed 11/19/2021 [facility], 6 prescriptions sent 11/19/2021. \"With the Tramadol I feel like a new man. \" The patient clarifies he has been on Tramadol 50 mg TID chronically over the last 2 years while living in [place]. Gabapentin min relief, doesn't recall dose. Lyrica smallest dose, min relief  Never tried Cymbalta    Allergies, Medications, Past Medical History, Family History, Social History, Work History, and Review of Systems reviewed below     + Coronary disease status post coronary artery bypass grafting, 7 stents, pacemaker, on Aspirin 81 mg  + COPD  + Abdominal hernia with gangrene status post large bowel resection  + Diabetes mellitus   + Liver and kidney cysts  + MORENA on CPAP    No Seizures, Epilepsy or Brain Surgery     Spends his time:  He moved from [place], got stuck there right before the pandemic, was paying $3,000/month for two furnished apartment. Gloria's  has family here. Spent 34 years in the [employer], he is tired of moving. He served in the [employer]. He used to enjoy playing softball, ride motorcycles [employer].      Allergies:  Penicillins and Iv [iodides]    Past Medical History:   Diagnosis Date    CAD (coronary artery disease)     Chronic kidney disease     [facility]     Diabetes mellitus [facility]     Diabetic neuropathy [facility] [facility]     Hyperlipidemia     Hypertension      Past Surgical History:   Procedure Laterality Date    COLON SURGERY      CORONARY ANGIOPLASTY WITH STENT PLACEMENT      CORONARY ARTERY BYPASS GRAFT      PACEMAKER PLACEMENT       History reviewed. No pertinent family history. Social History     Socioeconomic History    Marital status: Legally      Spouse name: Not on file    Number of children: Not on file    Years of education: Not on file    Highest education level: Not on file   Occupational History    Not on file   Tobacco Use    Smoking status: Never Smoker    Smokeless tobacco: Never Used   Vaping Use    Vaping Use: Never used   Substance and Sexual Activity    Alcohol use: Yes     Comment: rare    Drug use: Never    Sexual activity: Not on file   Other Topics Concern    Not on file   Social History Narrative    Not on file     Social Determinants of Health     Financial Resource Strain: Low Risk     Difficulty of Paying Living Expenses: Not hard at all   Food Insecurity: No Food Insecurity    Worried About Running Out of Food in the Last Year: Never true    Ciaran of Food in the Last Year: Never true   Transportation Needs: No Transportation Needs    Lack of Transportation (Medical): No    Lack of Transportation (Non-Medical):  No   Physical Activity:     Days of Exercise per Week: Not on file    Minutes of Exercise per Session: Not on file   Stress:     Feeling of Stress : Not on file   Social Connections:     Frequency of Communication with Friends and Family: Not on file    Frequency of Social Gatherings with Friends and Family: Not on file    Attends Evangelical Services: Not on file    Active Member of Clubs or Organizations: Not on file    Attends Club or Organization Meetings: Not on file    Marital Status: Not on file   Intimate Partner Violence:     Fear of Current or Ex-Partner: Not on file    Emotionally Abused: Not on file    Physically Abused: Not on file    Sexually Abused: Not on file   Housing Stability:     Unable to Pay for Housing in the Last Year: Not on file    Number of [place] in the Last Year: Not on file    Unstable Housing in the Last Year: Not on file       Current Outpatient Medications on File Prior to Visit   Medication Sig Dispense Refill    ferrous sulfate (IRON 325) 325 (65 Fe) MG tablet ferrous sulfate 325 mg (65 mg iron) tablet   Take 1 tablet every day by oral route for 30 days.  levoFLOXacin (LEVAQUIN) 750 MG tablet TAKE 1 TABLET BY MOUTH EVERY DAY      metroNIDAZOLE (FLAGYL) 500 MG tablet TAKE 1 TABLET BY MOUTH EVERY 8 HOURS FOR 10 DAYS      nitroGLYCERIN (NITROSTAT) 0.4 MG SL tablet       tiZANidine (ZANAFLEX) 2 MG tablet Take 1 tablet by mouth every 8 hours as needed (neck pain) 30 tablet 0    clobetasol (TEMOVATE) 0.05 % ointment Apply topically 2 times daily for 2 weeks for rash. Do not use on face or neck 45 g 3    ketoconazole (NIZORAL) 2 % cream APPLY TO THE skin rash twice daily FOR 4 weeks 30 g 5    atorvastatin (LIPITOR) 40 MG tablet TAKE 1 TABLET BY MOUTH ONCE DAILY AT BEDTIME 90 tablet 3    metFORMIN (GLUCOPHAGE) 500 MG tablet Take 500 mg by mouth daily (with breakfast)      amLODIPine (NORVASC) 2.5 MG tablet Take 2.5 mg by mouth daily      aspirin 81 MG EC tablet Take 81 mg by mouth daily       No current facility-administered medications on file prior to visit. Review of Systems   Constitutional: Negative for fever. HENT: Negative for hearing loss. Respiratory: Negative for shortness of breath. Gastrointestinal: Negative for constipation, diarrhea and nausea. Genitourinary: Negative for difficulty urinating. Musculoskeletal: Positive for back pain. Negative for neck pain. Skin: Negative for rash. Neurological: Negative for headaches. Hematological: Does not bruise/bleed easily. Psychiatric/Behavioral: Negative for sleep disturbance.          Objective:     Vitals:  Temp 98 °F (36.7 °C) (Infrared)   Resp (!) 92   Ht 5' 6\" (1.676 m)   Wt 218 lb (98.9 kg)   SpO2 96%   BMI 35.19 kg/m² Pain Score:   9      Exam performed under Coronavirus precautions  Gen: No acute distress  Neck: Grossly symmetric without any significant thyromegaly or masses appreciated. Eyes: No scleral icterus or lid lag appreciated bilaterally. Irises without gross defects bilaterally. HEENT: Hearing impaired bilaterally. Normocephalic, external ears and visible portions of nose and mouth atraumatic. Lymph: No gross neck or axillary lymphadenopathy  Cardio: No significant lower extremity edema, pulses intact without significant digit ischemia. Abd: No gross masses or large hernias appreciated. Skin: Visualized skin without any dermatomal rashes or sores. Palpation free of any tightening or subcutaneous nodules. MSK: Gait is antalgic. No significant upper limb digit ischemia appreciated. Psych: Pleasant and cooperative with the history and exam. Mood and Affect normal. Appropriately dressed with good eye contact. Judgement and insight normal. Recent and remote memory intact. Alert and Oriented x3. Neuro: Cranial nerves II-XII grossly intact. No significant pathologic reflexes appreciated. Rises from a seated to standing position with moderate difficulty. Gait is antalgic. +sp cane    Heel and toe walk intact. Lumbar flexion to 90 degrees, extension to 0 degrees. Limited lumbar spine range of motion. Rotation and extension reproduces axial low back pain. Other facet provocative maneuvers are positive. No gross step offs noted. Tenderness to palpation over the mid to low lumbar spinous processes and bilateral lumbar paraspinals from L2 down to the sacrum. No tenderness over bilateral PSIS. No tenderness over bilateral greater trochanters. No tenderness over bilateral deep gluteal regions. Sensation grossly intact in both legs except for stocking paresthesias  Reflexes and strength functional for ambulation, no abnormal reflexes appreciated on exam today  Strength greater than 3/5 bilateral legs  Straight leg raise negative bilaterally.     Sensation intact in both arms except for glove paresthesias  Reflexes and strength functional for arm use, no abnormal reflexes appreciated on exam today  Strength greater than 3/5 in both arms  Spurling's negative on exam today      Thoracic kyphosis  Stocking paresthesias  Glove paresthesias  Tender left knee medial joint line with varus laxity on exam today  Limited cervical spine range of motion          Outside record review:  Review of the original consultation request reveals no specific diagnostic requests or clinical concerns aside from chronic back pain that require particular attention. There are no suggested, requested, or specified tests to be ordered or any prior diagnostics performed that require follow-up or further investigation. Dr. Heri Shelton 2/10/2022: New patient. Previously established in [place]. Past medical history chronic back pain, diabetic neuropathy, abdominal hernia with gangrene status post large bowel resection, coronary artery disease status post bypass, COPD. Medications tramadol. Neck pain control with tramadol, refer to pain management. Labs 1/21/2022:  Platelets 048 normal  Creatinine 1.58 high    No spine or joint imaging available for review at the time of the clinical encounter      -After careful consideration, treatment with opioid medications was offered. Pt has comorbidities that limit the safe use of non-opioid analgesics. Discussed the risks, side effects, and symptoms that would warrant urgent or emergent physician evaluation. Discussed that we will only prescribe opioids at the lowest effective dose for the shortest duration required to reduce pain while focusing on maintaining function. Appropriate diagnoses for treatment with opioids will be listed in the full assessment note in diagnosis section. Duration of opioid treatment will be for the shortest duration as possible or for one month, whichever is shorter.  Discussed the principles of opioid tolerance as well as the concerns regarding opioid diversion, misuse, and abuse. Discussed the risks of respiratory depression and death while on opioid medications, especially when combined with other sedative substances. Discussed the patient's responsibility to safely store and dispose of opioid pain medications, preferably store in a locked and secure location and dispose of at routine law enforcement supervised prescription medication disposal events. Opioid prescriptions will be accompanied by prescription of Naloxone. Discussed the patient's responsibility to obtain evaluation with a specialist or surgeon in the area of the body affected by the pain. Discussed the elevated risks of morbidity and mortality while on opioid analgesics.  -Discussed the clinic's controlled substance agreement/NDP in great detail. All questions were answered. Pt expressed complete understanding and explicit agreement. He will sign this form today.  -Will obtain a urine drug screen today. Pt denies any illicit substances. Tramadol used 2 days ago  -OARRS 3/17/2022 was reviewed    Controlled Substances Monitoring: Periodic Controlled Substance Monitoring: Possible medication side effects, risk of tolerance/dependence & alternative treatments discussed. ,No signs of potential drug abuse or diversion identified. ,Assessed functional status. ,Obtaining appropriate analgesic effect of treatment. ,Random urine drug screen sent today.  Gopi Sandy MD)     Family history of alcohol abuse 0  Family history of illegal drug abuse 0  Family history of prescription drug abuse 0    Personal history of alcohol abuse/DUI 0  Personal history of illegal drug abuse 0  Personal history of prescription drug abuse 0    Age between 17-45 0    History of preadolescent sexual abuse 0    Personal history of obsessive compulsive disorder 0  Personal history of attention deficit disorder 0  Personal history of bipolar disorder 0  Personal history of schizophrenia 0  Personal history of depression 0    Score = 0, low risk  Assessment:      Diagnosis Orders   1. Chronic bilateral low back pain without sciatica  Ambulatory referral to Family Practice    XR LUMBAR SPINE (2-3 VIEWS)    [facility]    Urine Drug Screen    traMADol (ULTRAM) 50 MG tablet    gabapentin (NEURONTIN) 300 MG capsule    DISCONTINUED: traMADol (ULTRAM) 50 MG tablet   2. Severe obesity (BMI 35.0-39. 9) with comorbidity ([address]     3. Neck pain  XR CERVICAL SPINE (2-3 VIEWS)    [facility]    traMADol (ULTRAM) 50 MG tablet    gabapentin (NEURONTIN) 300 MG capsule    DISCONTINUED: traMADol (ULTRAM) 50 MG tablet   4. Chronic pain of left knee  XR KNEE LEFT (3 VIEWS)    [facility]    traMADol (ULTRAM) 50 MG tablet    MN ARTHROCENTESIS ASPIR&/INJ MAJOR JT/BURSA W/O US    gabapentin (NEURONTIN) 300 MG capsule    DISCONTINUED: traMADol (ULTRAM) 50 MG tablet   5. Bilateral leg pain  EMG    gabapentin (NEURONTIN) 300 MG capsule   6. Bilateral hand numbness  EMG    gabapentin (NEURONTIN) 300 MG capsule   7. Kyphosis of thoracic region, unspecified kyphosis type  XR THORACIC SPINE (3 VIEWS)    traMADol (ULTRAM) 50 MG tablet    DISCONTINUED: traMADol (ULTRAM) 50 MG tablet   8. Chronic, continuous use of opioids  naloxone 4 MG/0.1ML LIQD nasal spray    traMADol (ULTRAM) 50 MG tablet    DISCONTINUED: traMADol (ULTRAM) 50 MG tablet   9. Encounter for monitoring opioid maintenance therapy  traMADol (ULTRAM) 50 MG tablet    DISCONTINUED: traMADol (ULTRAM) 50 MG tablet   10. Opioid use, unspecified with unspecified opioid-induced disorder     11. Diabetic peripheral neuropathy (HCC)  gabapentin (NEURONTIN) 300 MG capsule    Alpha-Lipoic Acid 600 MG CAPS       Plan:     Periodic Controlled Substance Monitoring: Possible medication side effects, risk of tolerance/dependence & alternative treatments discussed. ,No signs of potential drug abuse or diversion identified. ,Assessed functional status. ,Obtaining appropriate analgesic effect of treatment. ,Random urine drug screen sent today. Saud Theodore MD)    Orders Placed This Encounter   Medications    DISCONTD: traMADol (ULTRAM) 50 MG tablet     Sig: Take 1 tablet by mouth 3 times daily as needed for Pain for up to 30 days. Dispense:  30 tablet     Refill:  0     Reduce doses taken as pain becomes manageable    naloxone 4 MG/0.1ML LIQD nasal spray     Si spray by Nasal route as needed for Opioid Reversal     Dispense:  1 each     Refill:  0    traMADol (ULTRAM) 50 MG tablet     Sig: Take 1 tablet by mouth 3 times daily as needed for Pain for up to 30 days. Dispense:  90 tablet     Refill:  0     Reduce doses taken as pain becomes manageable    gabapentin (NEURONTIN) 300 MG capsule     Sig: Take 1 capsule by mouth nightly for 30 days.      Dispense:  30 capsule     Refill:  0    Alpha-Lipoic Acid 600 MG CAPS     Sig: Take 600 mg by mouth daily     Dispense:  30 capsule     Refill:  0       Orders Placed This Encounter   Procedures    XR KNEE LEFT (3 VIEWS)     Standing Status:   Future     Standing Expiration Date:   6/15/2022     Order Specific Question:   Reason for exam:     Answer:   Evaluate for osteoarthritis    XR CERVICAL SPINE (2-3 VIEWS)     Standing Status:   Future     Standing Expiration Date:   3/17/2023     Order Specific Question:   Reason for exam:     Answer:   Please evaluate for the presence of cervical facet arthropathy    XR LUMBAR SPINE (2-3 VIEWS)     Standing Status:   Future     Standing Expiration Date:   3/17/2023     Order Specific Question:   Reason for exam:     Answer:   Please evaluate for the presence of lumbar facet arthropathy    XR THORACIC SPINE (3 VIEWS)     Standing Status:   Future     Standing Expiration Date:   3/17/2023     Order Specific Question:   Reason for exam:     Answer:   Evaluate for facet arthropathy or fracture    Urine Drug Screen    Ambulatory referral to Family Practice     Referral Priority:   Routine     Referral Type:   Eval and Treat     Referred to Provider:   Mary Carmen Madera MD     Number of Visits Requested:   [address] [facility]     Referral Priority:   Routine     Referral Type:   Eval and Treat     Referral Reason:   Specialty Services Required     Requested Specialty:   Physical Therapy     Number of Visits Requested:   1    EMG     Standing Status:   Future     Standing Expiration Date:   3/17/2023     Order Specific Question:   Which body part? Answer:   Bilateral lower extremities    EMG     Standing Status:   Future     Standing Expiration Date:   3/17/2023     Order Specific Question:   Which body part? Answer:   Bilateral Upper Extremities    DC ARTHROCENTESIS ASPIR&/INJ MAJOR JT/BURSA W/O US     -Left knee corticosteroid joint inj under XR with Dr Niecy Lovelace. +IV DYE ALLERGY, +Asa 81 mg okay, no antibiotics, +diabetes mellitus, no osteoporosis, no bleeding or platelet dysfunction, allergies reviewed, 15 minute procedure.      Standing Status:   Future     Standing Expiration Date:   6/15/2022       -PT for low back pain, left knee pain, neck pain  -XR LS Spine AP LAT to evaluate for lumbar facet arthropathy  -XR C Spine ap lat eval facet arthropathy  -XR L Knee eval osteoarthritis   -XR T Spine ap lat eval kyphosis ?fracture  -EMG B LE eval bilateral leg pain  -EMG B UE eval bilateral hand numbness  -Consideration for MRI C/T/LS Spine without contrast may be given if his symptoms do not improve with conservative treatment  -Lidocaine 4% ointment topical BID prn #1 tube one refill start 3/17/2022   -No NSAIDs given coronary artery disease history  -Re trial Gabapentin 300 mg by mouth every evening #30 no refills start 3/17/2022.  -Consideration for Lyrica and/or Cymbalta may be given  -Alpha Lipoic acid 600 mg daily #30 no ref start 3/17/2022   -Will hold on Tizanidine at this time  -Continue Tramadol 50 mg TID prn #90 no ref start 3/17-4/16/22. OARRS reviewed on 3/17/2022   -Naloxone sent on 3/17/22. MME 15  -Consideration for Bilateral Lumbar L3/4/5 and Cervical C5/6/7 medial branch blocks may be given after image review.   -Left knee corticosteroid joint inj under XR with Dr Brett Franklin. +IV DYE ALLERGY, +Asa 81 mg okay, no antibiotics, +diabetes mellitus, no osteoporosis, no bleeding or platelet dysfunction, allergies reviewed, 15 minute procedure. Consider 3 mg betamethasone.   -Benefit of pain relief outweigh risks of elevated sugars given diabetic status      Controlled Substance Monitoring:    Acute and Chronic Pain Monitoring:   RX Monitoring 3/17/2022   Periodic Controlled Substance Monitoring Possible medication side effects, risk of tolerance/dependence & alternative treatments discussed. ;No signs of potential drug abuse or diversion identified. ;Assessed functional status. ;Obtaining appropriate analgesic effect of treatment. ;Random urine drug screen sent today. Discussed the risks, side effects, and symptoms that would warrant urgent or emergent physician evaluation of all medications prescribed today. Discussed the risks of the above recommended procedures including but not limited to bleeding, infection, worsened pain, damage to surrounding structures, side effects, toxicity, allergic reactions to medications used, immune and stress-response dysfunction, fat necrosis, decreased bone mineralization, cartilage loss, increased fracture risk, avascular necrosis, skin pigmentation changes, blood sugar elevation, need for surgery, premature damage or degeneration of the joint, as well as catastrophic injury such as vision loss, paralysis, stroke, bowel or bladder incontinence, ventilator dependence, loss of use of the joint and/or extremity, and death. Discussed the risks, benefits, alternative procedures, and alternatives to the procedure including no procedure at all.   Discussed that we cannot undo any permanent neurologic or orthopaedic damage or change the course of any underlying disease. The patient appears to be a good candidate for the above recommended procedures, but no guarantees expressed or implied are given regarding the outcome of any procedure. After thorough discussion, patient expressed complete understanding and willingness to proceed. Discussed the risks of the above recommended procedures including but not limited to bleeding, infection, worsened pain, damage to surrounding structures, side effects, toxicity, allergic reactions to medications used, immune and stress-response dysfunction, fat necrosis, skin pigmentation changes, blood sugar elevation, headache, vision changes, need for surgery, as well as catastrophic injury such as vision loss, paralysis, stroke, spinal cord and/or plexus infarction or injury, intrathecal injection, spinal cord puncture, arachnoiditis, discitis, bowel or bladder incontinence, ventilator dependence, loss of use of the arms and/or legs, and death. Discussed the risks, benefits, alternative procedures, and alternatives to the procedure including no procedure at all. Discussed that we cannot undo any permanent neurologic damage or change the course of any underlying disease. The patient appears to be a good candidate for the above recommended procedures, but no guarantees expressed or implied are given regarding the outcome of any procedure. After thorough discussion, patient expressed understanding and willingness to proceed. Provided education and counseling regarding the diagnosis, prognosis, and treatment options. All questions were answered. Encouraged him to follow-up with his primary care physician and/or specialists as required for his overall health and management of his comorbidities as well as any new positive symptoms mentioned in review of systems above. Care was provided within the definitions and limitations of our specialty practice.  Encouraged lifestyle interventions including healthy habits, lifestyle changes, regular aerobic exercise and appropriate weight maintenance as advised by their primary care physician or cardiovascular health provider. Discussed well care and disease prevention/maintenance. Anatomic spine model was used to illustrate pathology. All recommendations for therapy are provided to improve function with activities of daily living, decrease pain, and help develop an exercise program. All recommendations for medications are meant to help decrease pain, improve function with activities of daily living, maintain compliance with home exercise program, and improve quality of life. All recommendations for therapeutic injections are meant to help decrease pain, improve function with activities of daily living, maintain compliance with home exercise program, improve quality of life, and decrease reliance upon oral medications. All recommendations for diagnostic injections are meant to help assess the hypothesis that the targeted structure is a significant pain generator that limits the patient's function, causes pain, and reduces his quality of life. Encouraged compliance with his home exercise program. Recommended compliance with physical therapy program as outlined above. Discussed the elevated risks of excessive sedation while on pain medications. Advised him against driving or operating heavy machinery or performing any activities where he may harm himself or others while on pain medications. Particular caution was emphasized especially during dose adjustments and medication changes. Discussed the elevated risks of respiratory depression and death while on opioid medications, especially when combined with other sedative substances. Discussed side effects of opioids including, but not limited to, itching, constipation, nausea, and vomiting.  The patient does not demonstrate any overt signs of alcohol or drug abuse, and there are no potential contraindications to the use of controlled substances. The relevant previous medical records were reviewed. The patient continues to make good-aki efforts to reduce and eliminate use of opioids through our comprehensive multidisciplinary pain treatment program.    Discussed the risks of temporary disability, permanent disability, morbidity, and mortality with poorly-managed or undiagnosed medical conditions and comorbidities. Emphasized the importance of timely medical evaluation and treatment as previously recommended by us or other medical professionals. Risks of not pursing these recommendations were emphasized. The patient was offered a treatment at our facility. The physician and patient have discussed in detail the risk of exposure to and/or potential harm posed by the COVID-19 virus with having office visits and procedures at this time versus the risk of delaying the visits and procedures. It is not possible to know either the risk of delaying the visits or procedure or chance of getting an infection with perfect accuracy, but a joint decision was made between the patient and the physician to proceed at this time with the scheduled visits and procedures. Advised him that any lab testing, imaging, or other diagnostic test results are best discussed in person in the office so that we can provide a clear explanation of their significance and best treatment based upon these results. It is his responsibility to make and keep a follow up appointment to discuss these test results in person to discuss the significance of the findings and appropriate follow-up steps. He expressed complete understanding and agreement with the entire plan as outlined above. Portions of this note may have been typed, auto-populated, dictated or transcribed by voice recognition resulting in errors, omissions, or close substitutions which may be missed despite careful proofreading.  Please contact the author for any questions or concerns. Thank you Dr. Anay Cheema for the opportunity to participate in this patient's care. If you have any questions or concerns, please do not hesitate to contact us. Follow up:  Return in about 1 month (around 4/17/2022) for reassessment of pain and symptoms, EMG Internal, P.T. Internal Ref.     Kari Amaya MD

## 2022-03-17 NOTE — TELEPHONE ENCOUNTER
I have given him this medication more time than I wanted to.    Each time they promised there was an appt coming up with pain management  Last time they said the pain mx appt was on 3/13, now it's 3/17  Let them look for another physician as they choose

## 2022-03-18 NOTE — TELEPHONE ENCOUNTER
Behavior. I have refilled tramadol 3 times an he keeps asking for more. He changed had told me the pain mx appt was for 3/13, then asked me to refill tramadol because appt was 3/17. He threatened to find another provider.

## 2022-03-19 LAB
6-ACETYLMORPHINE, UR: NORMAL
AMPHETAMINE SCREEN, URINE: NORMAL
BARBITURATE SCREEN, URINE: NORMAL
BENZODIAZEPINE SCREEN, URINE: NORMAL
CANNABINOID SCREEN URINE: NORMAL
COCAINE METABOLITE, URINE: NORMAL
CREATININE URINE: NORMAL
EDDP, URINE: NORMAL
ETHANOL URINE: NORMAL
MDMA URINE: NORMAL
METHADONE SCREEN, URINE: NORMAL
METHAMPHETAMINE, URINE: NORMAL
OPIATES, URINE: NORMAL
OXYCODONE: NORMAL
PCP: NORMAL
PH, URINE: NORMAL
PHENCYCLIDINE, URINE: NORMAL
PROPOXYPHENE, URINE: NORMAL
TRICYCLIC ANTIDEPRESSANTS, UR: NORMAL

## 2022-04-20 DIAGNOSIS — M54.50 CHRONIC BILATERAL LOW BACK PAIN WITHOUT SCIATICA: ICD-10-CM

## 2022-04-20 DIAGNOSIS — Z51.81 ENCOUNTER FOR MONITORING OPIOID MAINTENANCE THERAPY: ICD-10-CM

## 2022-04-20 DIAGNOSIS — M54.2 NECK PAIN: ICD-10-CM

## 2022-04-20 DIAGNOSIS — M40.204 KYPHOSIS OF THORACIC REGION, UNSPECIFIED KYPHOSIS TYPE: ICD-10-CM

## 2022-04-20 DIAGNOSIS — F11.90 CHRONIC, CONTINUOUS USE OF OPIOIDS: ICD-10-CM

## 2022-04-20 DIAGNOSIS — G89.29 CHRONIC BILATERAL LOW BACK PAIN WITHOUT SCIATICA: ICD-10-CM

## 2022-04-20 DIAGNOSIS — G89.29 CHRONIC PAIN OF LEFT KNEE: ICD-10-CM

## 2022-04-20 DIAGNOSIS — Z79.891 ENCOUNTER FOR MONITORING OPIOID MAINTENANCE THERAPY: ICD-10-CM

## 2022-04-20 DIAGNOSIS — M25.562 CHRONIC PAIN OF LEFT KNEE: ICD-10-CM

## 2022-04-20 NOTE — TELEPHONE ENCOUNTER
Requested Prescriptions     Pending Prescriptions Disp Refills    traMADol (ULTRAM) 50 MG tablet 90 tablet 0     Sig: Take 1 tablet by mouth 3 times daily as needed for Pain for up to 30 days.        Patient last seen on:  3/17  Date of last surgery:  n/a  Date of last refill:  3/17  Pain level:  n/a  Patient complaining of:  PT is asking for refill  Future appts: 5/23

## 2022-04-21 RX ORDER — TRAMADOL HYDROCHLORIDE 50 MG/1
50 TABLET ORAL 3 TIMES DAILY PRN
Qty: 90 TABLET | Refills: 0 | Status: SHIPPED | OUTPATIENT
Start: 2022-04-21 | End: 2022-06-01 | Stop reason: SDUPTHER

## 2022-05-10 ENCOUNTER — TELEPHONE (OUTPATIENT)
Dept: PRIMARY CARE CLINIC | Age: 77
End: 2022-05-10

## 2022-05-31 ENCOUNTER — COMMUNITY OUTREACH (OUTPATIENT)
Dept: PRIMARY CARE CLINIC | Age: 77
End: 2022-05-31

## 2022-05-31 NOTE — PROGRESS NOTES
Patient's HM shows they are overdue for Colorectal Screening. Apozy and  files searched without success. No results to attach to order nor HM updated. No upcoming apt. Sent pt [employer] message  Called pt twice; No answer; LDVM in regards to scheduling for annual testing.

## 2022-06-01 DIAGNOSIS — Z79.891 ENCOUNTER FOR MONITORING OPIOID MAINTENANCE THERAPY: ICD-10-CM

## 2022-06-01 DIAGNOSIS — M40.204 KYPHOSIS OF THORACIC REGION, UNSPECIFIED KYPHOSIS TYPE: ICD-10-CM

## 2022-06-01 DIAGNOSIS — Z51.81 ENCOUNTER FOR MONITORING OPIOID MAINTENANCE THERAPY: ICD-10-CM

## 2022-06-01 DIAGNOSIS — G89.29 CHRONIC BILATERAL LOW BACK PAIN WITHOUT SCIATICA: ICD-10-CM

## 2022-06-01 DIAGNOSIS — M54.50 CHRONIC BILATERAL LOW BACK PAIN WITHOUT SCIATICA: ICD-10-CM

## 2022-06-01 DIAGNOSIS — F11.90 CHRONIC, CONTINUOUS USE OF OPIOIDS: ICD-10-CM

## 2022-06-01 DIAGNOSIS — G89.29 CHRONIC PAIN OF LEFT KNEE: ICD-10-CM

## 2022-06-01 DIAGNOSIS — M25.562 CHRONIC PAIN OF LEFT KNEE: ICD-10-CM

## 2022-06-01 DIAGNOSIS — M54.2 NECK PAIN: ICD-10-CM

## 2022-06-01 RX ORDER — TRAMADOL HYDROCHLORIDE 50 MG/1
50 TABLET ORAL 3 TIMES DAILY PRN
Qty: 90 TABLET | Refills: 0 | Status: SHIPPED | OUTPATIENT
Start: 2022-06-01 | End: 2022-07-06 | Stop reason: SDUPTHER

## 2022-06-01 NOTE — TELEPHONE ENCOUNTER
Please call patient and inform she needs to be seen monthly if she wishes to continue Tramadol RX. No further RF until OV. Thanks.

## 2022-06-01 NOTE — TELEPHONE ENCOUNTER
Requested Prescriptions     Pending Prescriptions Disp Refills    traMADol (ULTRAM) 50 MG tablet 90 tablet 0     Sig: Take 1 tablet by mouth 3 times daily as needed for Pain for up to 30 days.        Patient last seen on:  3/17/22  Date of last surgery:  n/a  Date of last refill:  4/21/22  Pain level:  n/a  Patient complaining of:  Pt asks for rx  Future appts: 7/6/22

## 2022-06-14 ENCOUNTER — COMMUNITY OUTREACH (OUTPATIENT)
Dept: PRIMARY CARE CLINIC | Age: 77
End: 2022-06-14

## 2022-07-06 ENCOUNTER — OFFICE VISIT (OUTPATIENT)
Dept: PAIN MANAGEMENT | Age: 77
End: 2022-07-06
Payer: MEDICARE

## 2022-07-06 VITALS
SYSTOLIC BLOOD PRESSURE: 138 MMHG | DIASTOLIC BLOOD PRESSURE: 82 MMHG | TEMPERATURE: 97.1 F | WEIGHT: 217 LBS | HEIGHT: 66 IN | BODY MASS INDEX: 34.87 KG/M2

## 2022-07-06 DIAGNOSIS — E11.42 DIABETIC PERIPHERAL NEUROPATHY (HCC): ICD-10-CM

## 2022-07-06 DIAGNOSIS — G89.29 CHRONIC PAIN OF LEFT KNEE: ICD-10-CM

## 2022-07-06 DIAGNOSIS — M54.2 NECK PAIN: ICD-10-CM

## 2022-07-06 DIAGNOSIS — G89.29 CHRONIC BILATERAL LOW BACK PAIN WITHOUT SCIATICA: ICD-10-CM

## 2022-07-06 DIAGNOSIS — F11.90 CHRONIC, CONTINUOUS USE OF OPIOIDS: ICD-10-CM

## 2022-07-06 DIAGNOSIS — M79.605 BILATERAL LEG PAIN: ICD-10-CM

## 2022-07-06 DIAGNOSIS — M79.604 BILATERAL LEG PAIN: ICD-10-CM

## 2022-07-06 DIAGNOSIS — M40.204 KYPHOSIS OF THORACIC REGION, UNSPECIFIED KYPHOSIS TYPE: ICD-10-CM

## 2022-07-06 DIAGNOSIS — M54.50 CHRONIC BILATERAL LOW BACK PAIN WITHOUT SCIATICA: ICD-10-CM

## 2022-07-06 DIAGNOSIS — R20.0 BILATERAL HAND NUMBNESS: ICD-10-CM

## 2022-07-06 DIAGNOSIS — M25.562 CHRONIC PAIN OF LEFT KNEE: ICD-10-CM

## 2022-07-06 DIAGNOSIS — Z79.891 ENCOUNTER FOR MONITORING OPIOID MAINTENANCE THERAPY: ICD-10-CM

## 2022-07-06 DIAGNOSIS — Z51.81 ENCOUNTER FOR MONITORING OPIOID MAINTENANCE THERAPY: ICD-10-CM

## 2022-07-06 PROCEDURE — 2022F DILAT RTA XM EVC RTNOPTHY: CPT | Performed by: NURSE PRACTITIONER

## 2022-07-06 PROCEDURE — 1036F TOBACCO NON-USER: CPT | Performed by: NURSE PRACTITIONER

## 2022-07-06 PROCEDURE — G8427 DOCREV CUR MEDS BY ELIG CLIN: HCPCS | Performed by: NURSE PRACTITIONER

## 2022-07-06 PROCEDURE — 3017F COLORECTAL CA SCREEN DOC REV: CPT | Performed by: NURSE PRACTITIONER

## 2022-07-06 PROCEDURE — 99214 OFFICE O/P EST MOD 30 MIN: CPT | Performed by: NURSE PRACTITIONER

## 2022-07-06 PROCEDURE — G8417 CALC BMI ABV UP PARAM F/U: HCPCS | Performed by: NURSE PRACTITIONER

## 2022-07-06 PROCEDURE — 1123F ACP DISCUSS/DSCN MKR DOCD: CPT | Performed by: NURSE PRACTITIONER

## 2022-07-06 PROCEDURE — 3046F HEMOGLOBIN A1C LEVEL >9.0%: CPT | Performed by: NURSE PRACTITIONER

## 2022-07-06 RX ORDER — GABAPENTIN 300 MG/1
300 CAPSULE ORAL NIGHTLY
Qty: 30 CAPSULE | Refills: 0 | Status: SHIPPED | OUTPATIENT
Start: 2022-07-06 | End: 2022-08-05

## 2022-07-06 RX ORDER — PERPHENAZINE 16 MG
600 TABLET ORAL DAILY
Qty: 30 CAPSULE | Refills: 0 | Status: SHIPPED | OUTPATIENT
Start: 2022-07-06 | End: 2022-08-05

## 2022-07-06 RX ORDER — TRAMADOL HYDROCHLORIDE 50 MG/1
50 TABLET ORAL 3 TIMES DAILY PRN
Qty: 90 TABLET | Refills: 0 | Status: SHIPPED | OUTPATIENT
Start: 2022-07-06 | End: 2022-08-05

## 2022-07-06 ASSESSMENT — ENCOUNTER SYMPTOMS: BACK PAIN: 1

## 2022-07-06 NOTE — PROGRESS NOTES
Patient: Tracy Lynn  YOB: 1945  Date: 7/6/22        Subjective:     Tracy Lynn is a 77 y.o. male who complains today of:    Chief Complaint   Patient presents with    Hand Pain    Knee Pain     Left     Neck Pain         Allergies:  Penicillins and Iv [iodides]    Past Medical History:   Diagnosis Date    CAD (coronary artery disease)     Chronic kidney disease     [facility]     [facility]     Diabetic neuropathy ([facility]     Hyperlipidemia     Hypertension      Past Surgical History:   Procedure Laterality Date    COLON SURGERY      CORONARY ANGIOPLASTY WITH STENT PLACEMENT      CORONARY ARTERY BYPASS GRAFT      PACEMAKER PLACEMENT       History reviewed. No pertinent family history. Social History     Socioeconomic History    Marital status: Legally      Spouse name: Not on file    Number of children: Not on file    Years of education: Not on file    Highest education level: Not on file   Occupational History    Not on file   Tobacco Use    Smoking status: Former Smoker    Smokeless tobacco: Never Used   Vaping Use    Vaping Use: Never used   Substance and Sexual Activity    Alcohol use: Yes     Comment: rare    Drug use: Never    Sexual activity: Not on file   Other Topics Concern    Not on file   Social History Narrative    Not on file     Social Determinants of Health     Financial Resource Strain: Low Risk     Difficulty of Paying Living Expenses: Not hard at all   Food Insecurity: No Food Insecurity    Worried About Running Out of Food in the Last Year: Never true    Ciaran of Food in the Last Year: Never true   Transportation Needs: No Transportation Needs    Lack of Transportation (Medical): No    Lack of Transportation (Non-Medical):  No   Physical Activity:     Days of Exercise per Week: Not on file    Minutes of Exercise per Session: Not on file   Stress:     Feeling of Stress : Not on file   Social Connections:     Frequency of Communication with Friends and Family: Not on file    Frequency of Social Gatherings with Friends and Family: Not on file    Attends Church Services: Not on file    Active Member of Clubs or Organizations: Not on file    Attends Club or Organization Meetings: Not on file    Marital Status: Not on file   Intimate Partner Violence:     Fear of Current or Ex-Partner: Not on file    Emotionally Abused: Not on file    Physically Abused: Not on file    Sexually Abused: Not on file   Housing Stability:     Unable to Pay for Housing in the Last Year: Not on file    Number of [place] in the Last Year: Not on file    Unstable Housing in the Last Year: Not on file       Current Outpatient Medications on File Prior to Visit   Medication Sig Dispense Refill    naloxone 4 MG/0.1ML LIQD nasal spray 1 spray by Nasal route as needed for Opioid Reversal 1 each 0    ferrous sulfate (IRON 325) 325 (65 Fe) MG tablet ferrous sulfate 325 mg (65 mg iron) tablet   Take 1 tablet every day by oral route for 30 days.  levoFLOXacin (LEVAQUIN) 750 MG tablet TAKE 1 TABLET BY MOUTH EVERY DAY      metroNIDAZOLE (FLAGYL) 500 MG tablet TAKE 1 TABLET BY MOUTH EVERY 8 HOURS FOR 10 DAYS      nitroGLYCERIN (NITROSTAT) 0.4 MG SL tablet       tiZANidine (ZANAFLEX) 2 MG tablet Take 1 tablet by mouth every 8 hours as needed (neck pain) 30 tablet 0    clobetasol (TEMOVATE) 0.05 % ointment Apply topically 2 times daily for 2 weeks for rash.  Do not use on face or neck 45 g 3    ketoconazole (NIZORAL) 2 % cream APPLY TO THE skin rash twice daily FOR 4 weeks 30 g 5    atorvastatin (LIPITOR) 40 MG tablet TAKE 1 TABLET BY MOUTH ONCE DAILY AT BEDTIME 90 tablet 3    metFORMIN (GLUCOPHAGE) 500 MG tablet Take 500 mg by mouth daily (with breakfast)      amLODIPine (NORVASC) 2.5 MG tablet Take 2.5 mg by mouth daily      aspirin 81 MG EC tablet Take 81 mg by mouth daily       No current facility-administered medications on file prior to visit. Milligrams twice daily 42-year-old male here for follow-up for chronic back pain. He is accompanied by Amina hKan, his daughter, whom he permits to be present during the history and exam. He had an initial evaluation with Dr. Tracie Shields on 3/17/2022. His pain has been for more than 10 years on both sides of his low back. He does have leg pain. Complains of left knee pain, no surgery. Neck pain and hand pain. He has shrapnel to his left great toe. He and his daughter state they were not aware that they had to come every month and also the appointments for the knee injection and EMG were unknown. They say they have never been to pain management and does not not understand how it works. I reviewed and they have full understanding. He takes tramadol 50 mg 3 times daily. He states without this he would not be able to function. He was also prescribed gabapentin at his initial consult as well as alpha lipoic acid but they were not aware these prescriptions were sent so have not been tried. However, he states gabapentin has not previously worked for him. He walks with a cane. Medications tried include:  Acetaminophen with minimal relief for over 3 months  Ibuprofen with minimal relief for over 3 months  [facility] sent [facility]/19/2021. \"With the Tramadol I feel like a new man. \" The patient clarifies he has been on Tramadol 50 mg TID chronically over the last 2 years while living in [place]. Gabapentin min relief, doesn't recall dose.    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 Gloria's  has family here. Spent 34 years in the [employer], he is tired of moving. He served in the [employer]. He used to enjoy playing softball, ride motorcycles [employer].        Hand Pain     Knee Pain     Neck Pain         Review of Systems   Musculoskeletal: Positive for arthralgias, back pain and neck pain. Objective:     Vitals:  /82 (Site: Right Upper Arm, Position: Sitting)   Temp 97.1 °F (36.2 °C)   Ht 5' 6\" (1.676 m)   Wt 217 lb (98.4 kg)   BMI 35.02 kg/m² Pain Score:   7    Physical Exam  Vitals reviewed. Constitutional:       Appearance: He is well-developed. HENT:      Head: Normocephalic. Nose: Nose normal.   Pulmonary:      Effort: Pulmonary effort is normal.   Musculoskeletal:      Cervical back: Neck supple. Tenderness present. Pain with movement present. Decreased range of motion. Lumbar back: No tenderness. Decreased range of motion. Negative right straight leg raise test and negative left straight leg raise test.   Lymphadenopathy:      Cervical: No cervical adenopathy. Skin:     General: Skin is warm and dry. Findings: No erythema or rash. Neurological:      Mental Status: He is alert and oriented to person, place, and time. Cranial Nerves: No cranial nerve deficit. Deep Tendon Reflexes: Reflexes are normal and symmetric. Reflexes normal.   Psychiatric:         Mood and Affect: Mood normal.         Behavior: Behavior normal.         Thought Content: Thought content normal.         Judgment: Judgment normal.            Assessment:        Diagnosis Orders   1.  Chronic bilateral low back pain without sciatica  traMADol (ULTRAM) 50 MG tablet    gabapentin (NEURONTIN) 300 MG capsule   2. Neck pain  traMADol (ULTRAM) 50 MG tablet    gabapentin (NEURONTIN) 300 MG capsule   3. Chronic pain of left knee  traMADol (ULTRAM) 50 MG tablet    gabapentin (NEURONTIN) 300 MG capsule   4. Kyphosis of thoracic region, unspecified kyphosis type  traMADol (ULTRAM) 50 MG tablet   5. Chronic, continuous use of opioids  traMADol (ULTRAM) 50 MG tablet   6. Encounter for monitoring opioid maintenance therapy  traMADol (ULTRAM) 50 MG tablet   7. Bilateral leg pain  gabapentin (NEURONTIN) 300 MG capsule   8. Bilateral hand numbness  gabapentin (NEURONTIN) 300 MG capsule   9. Diabetic peripheral neuropathy (HCC)  gabapentin (NEURONTIN) 300 MG capsule    Alpha-Lipoic Acid 600 MG CAPS       Plan:     Periodic Controlled Substance Monitoring: Possible medication side effects, risk of tolerance/dependence & alternative treatments discussed. ,No signs of potential drug abuse or diversion identified. ,Assessed functional status. Mayte Dowell, APRN - CNP)    Orders Placed This Encounter   Medications    traMADol (ULTRAM) 50 MG tablet     Sig: Take 1 tablet by mouth 3 times daily as needed for Pain for up to 30 days. Dispense:  90 tablet     Refill:  0     Reduce doses taken as pain becomes manageable    gabapentin (NEURONTIN) 300 MG capsule     Sig: Take 1 capsule by mouth nightly for 30 days. Dispense:  30 capsule     Refill:  0    Alpha-Lipoic Acid 600 MG CAPS     Sig: Take 600 mg by mouth daily     Dispense:  30 capsule     Refill:  0       No orders of the defined types were placed in this encounter.    -he has previously failed knee steroids to left knee, consider gel  -he will get xrays today that were ordered in March. We will also schedule his EMGs and left knee inejction. He states his diabetes is well controlled.   -Refill tramadol 50 mg 3 times daily as needed pain, #90, fill date 7/6/2022  -We will send in gabapentin 300 mg nightly, #30  -Resend alpha lipoic acid 600 mg daily, #30    Discussed options with the patient today. Anatomic model pathology was shown and reviewed with pt. All questions were answered. Relevant imaging reviewed, NDP reviewed and pain generators reviewed. Pt verbalized understanding and agrees with above plan. Will continue medications as they do help pt function with ADL and improve quality of life. Pt understands potential side effects from opioids such as constipation, dry mouth, dizziness, opioid use disorder, and overdose. Pt has failed non opioid therapy and decision was made to prescribe opioids to help with daily function and improve quality of life. Discussed the principles of opioid tolerance as well as the concerns regarding opioid diversion, misuse, and abuse. Discussed the risks of respiratory depression and death while on pain medications, especially when combined with other sedative substances. Discussed the elevated risks of excessive sedation while on pain medications. Advised him against driving or operating heavy machinery or performing any activities where he may harm himself or others while on pain medications. Particular caution was emphasized especially during dose adjustments and medication changes. [place]ARRS was reviewed. UTOX from 3/17/22 appropriate; ORT score = 0  Daily MME 15  Narcan prescribed 3/2022 and understands the proper use in the event of an overdose. This NP saw pt under direct supervision of Dr Laith Raymundo. Controlled Substances Monitoring: Periodic Controlled Substance Monitoring: Possible medication side effects, risk of tolerance/dependence & alternative treatments discussed. ,No signs of potential drug abuse or diversion identified. ,Assessed functional status. LUI Brown - CNP)        Follow up:  Return in about 4 weeks (around 8/3/2022) for review meds and reassess pain.     LUI Miller - CNP

## 2022-07-07 DIAGNOSIS — M54.2 NECK PAIN: ICD-10-CM

## 2022-07-07 DIAGNOSIS — M54.50 CHRONIC BILATERAL LOW BACK PAIN WITHOUT SCIATICA: ICD-10-CM

## 2022-07-07 DIAGNOSIS — M40.204 KYPHOSIS OF THORACIC REGION, UNSPECIFIED KYPHOSIS TYPE: ICD-10-CM

## 2022-07-07 DIAGNOSIS — G89.29 CHRONIC PAIN OF LEFT KNEE: ICD-10-CM

## 2022-07-07 DIAGNOSIS — G89.29 CHRONIC BILATERAL LOW BACK PAIN WITHOUT SCIATICA: ICD-10-CM

## 2022-07-07 DIAGNOSIS — M25.562 CHRONIC PAIN OF LEFT KNEE: ICD-10-CM

## 2022-07-21 ENCOUNTER — TELEPHONE (OUTPATIENT)
Dept: PAIN MANAGEMENT | Age: 77
End: 2022-07-21

## 2022-07-21 NOTE — TELEPHONE ENCOUNTER
PER SM Discharged from practice due to excessive No shows- Patient Dc letter mailed. All future appointments cancelled.

## 2022-07-28 ENCOUNTER — TELEPHONE (OUTPATIENT)
Dept: PRIMARY CARE CLINIC | Age: 77
End: 2022-07-28

## 2022-08-27 ENCOUNTER — HOSPITAL ENCOUNTER (EMERGENCY)
Age: 77
Discharge: HOME | End: 2022-08-27
Payer: MEDICARE

## 2022-08-27 VITALS
BODY MASS INDEX: 34.87 KG/M2 | HEIGHT: 66 IN | TEMPERATURE: 98.8 F | DIASTOLIC BLOOD PRESSURE: 85 MMHG | OXYGEN SATURATION: 95 % | SYSTOLIC BLOOD PRESSURE: 155 MMHG | RESPIRATION RATE: 18 BRPM | HEART RATE: 72 BPM | WEIGHT: 217 LBS

## 2022-08-27 DIAGNOSIS — Z76.0 ENCOUNTER FOR MEDICATION REFILL: Primary | ICD-10-CM

## 2022-08-27 PROCEDURE — 99283 EMERGENCY DEPT VISIT LOW MDM: CPT

## 2022-08-27 PROCEDURE — 6370000000 HC RX 637 (ALT 250 FOR IP): Performed by: STUDENT IN AN ORGANIZED HEALTH CARE EDUCATION/TRAINING PROGRAM

## 2022-08-27 RX ORDER — TRAMADOL HYDROCHLORIDE 50 MG/1
50 TABLET ORAL ONCE
Status: COMPLETED | OUTPATIENT
Start: 2022-08-27 | End: 2022-08-27

## 2022-08-27 RX ORDER — TRAMADOL HYDROCHLORIDE 50 MG/1
50 TABLET ORAL EVERY 4 HOURS PRN
Qty: 30 TABLET | Refills: 0 | Status: SHIPPED | OUTPATIENT
Start: 2022-08-27 | End: 2022-09-01

## 2022-08-27 RX ADMIN — TRAMADOL HYDROCHLORIDE 50 MG: 50 TABLET ORAL at 21:34

## 2022-08-27 ASSESSMENT — PAIN DESCRIPTION - DESCRIPTORS: DESCRIPTORS: ACHING

## 2022-08-27 ASSESSMENT — PAIN DESCRIPTION - LOCATION: LOCATION: GENERALIZED

## 2022-08-27 ASSESSMENT — PAIN SCALES - GENERAL
PAINLEVEL_OUTOF10: 7
PAINLEVEL_OUTOF10: 8

## 2022-08-27 ASSESSMENT — PAIN DESCRIPTION - ONSET: ONSET: ON-GOING

## 2022-08-27 ASSESSMENT — PAIN DESCRIPTION - PAIN TYPE: TYPE: CHRONIC PAIN

## 2022-08-27 ASSESSMENT — PAIN DESCRIPTION - FREQUENCY: FREQUENCY: CONTINUOUS

## 2022-08-27 ASSESSMENT — PAIN - FUNCTIONAL ASSESSMENT: PAIN_FUNCTIONAL_ASSESSMENT: 0-10

## 2022-08-28 ASSESSMENT — ENCOUNTER SYMPTOMS
SHORTNESS OF BREATH: 1
EYE PAIN: 0
CHEST TIGHTNESS: 0
NAUSEA: 0
DIARRHEA: 0
BACK PAIN: 0
SORE THROAT: 0

## 2022-08-28 NOTE — ED NOTES
Patient D/C instructions have been reviewed, patient is to follow up with PCP  Patient has Rx to  at pharmacy,   Patient voiced understanding, no questions or concerns noted at this time.      [facility]  08/27/22 [id number]

## 2022-08-28 NOTE — ED PROVIDER NOTES
[address] [facility] ED  eMERGENCYdEPARTMENT eNCOUnter      Pt Name: Den Love  MRN: [id number]  Cr [birth year]  Date of evaluation: 8/27/2022  Provider:Ivan Qureshi PA-C    CHIEF COMPLAINT           HPI  Den Love is a 77 y.o. male presenting for medication refill. Patient reports gradual onset, worsening leg pain for which she normally gets Ultram for but he is currently in between doctors as he is moving to the [place]. He states he has an upcoming appointment with them but needs some in the meantime because when his legs hurt it causes him some shortness of breath as well. He denies chest pain, fever, chills, acute injuries today. ROS  Review of Systems   Constitutional:  Negative for chills, fatigue and fever. HENT:  Negative for ear pain, hearing loss and sore throat. Eyes:  Negative for pain and visual disturbance. Respiratory:  Positive for shortness of breath. Negative for chest tightness. Cardiovascular:  Negative for chest pain. Gastrointestinal:  Negative for diarrhea and nausea. Endocrine: Negative for cold intolerance. Genitourinary:  Negative for hematuria. Musculoskeletal:  Negative for back pain. Skin:  Negative for rash and wound. Neurological:  Negative for dizziness and headaches. Psychiatric/Behavioral:  Negative for behavioral problems and confusion. Except as noted above the remainder of the review of systems was reviewed and negative.        PAST MEDICAL HISTORY     Past Medical History:   Diagnosis Date    CAD (coronary artery disease)     Chronic kidney disease     COPD (chronic obstructive pulmonary disease) ([facility]     Diabetes mellitus ([facility]     Diabetic neuropathy ([facility]     Hyperlipidemia     Hypertension          SURGICAL HISTORY       Past Surgical History:   Procedure Laterality Date    COLON SURGERY      CORONARY ANGIOPLASTY WITH STENT PLACEMENT      CORONARY ARTERY BYPASS GRAFT      PACEMAKER PLACEMENT           Wendy Deluca Discharge Medication List as of 8/27/2022  9:29 PM        CONTINUE these medications which have NOT CHANGED    Details   gabapentin (NEURONTIN) 300 MG capsule Take 1 capsule by mouth nightly for 30 days. , Disp-30 capsule, R-0Normal      Alpha-Lipoic Acid 600 MG CAPS Take 600 mg by mouth daily, Disp-30 capsule, R-0Normal      naloxone 4 MG/0.1ML LIQD nasal spray 1 spray by Nasal route as needed for Opioid Reversal, Disp-1 each, R-0Normal      ferrous sulfate (IRON 325) 325 (65 Fe) MG tablet ferrous sulfate 325 mg (65 mg iron) tablet   Take 1 tablet every day by oral route for 30 days. Historical Med      levoFLOXacin (LEVAQUIN) 750 MG tablet TAKE 1 TABLET BY MOUTH EVERY DAYHistorical Med      metroNIDAZOLE (FLAGYL) 500 MG tablet TAKE 1 TABLET BY MOUTH EVERY 8 HOURS FOR 10 DAYSHistorical Med      nitroGLYCERIN (NITROSTAT) 0.4 MG SL tablet Historical Med      tiZANidine (ZANAFLEX) 2 MG tablet Take 1 tablet by mouth every 8 hours as needed (neck pain), Disp-30 tablet, R-0Normal      clobetasol (TEMOVATE) 0.05 % ointment Apply topically 2 times daily for 2 weeks for rash. Do not use on face or neck, Disp-45 g, R-3, Normal      ketoconazole (NIZORAL) 2 % cream APPLY TO THE skin rash twice daily FOR 4 weeks, Disp-30 g, R-5, Normal      atorvastatin (LIPITOR) 40 MG tablet TAKE 1 TABLET BY MOUTH ONCE DAILY AT BEDTIME, Disp-90 tablet, R-3Normal      metFORMIN (GLUCOPHAGE) 500 MG tablet Take 500 mg by mouth daily (with breakfast)Historical Med      amLODIPine (NORVASC) 2.5 MG tablet Take 2.5 mg by mouth dailyHistorical Med      aspirin 81 MG EC tablet Take 81 mg by mouth dailyHistorical Med             ALLERGIES     Penicillins and Iv [iodides]    FAMILY HISTORY     History reviewed. No pertinent family history.        SOCIAL HISTORY       Social History     Socioeconomic History    Marital status: Legally      Spouse name: None    Number of children: None    Years of education: None    Highest education level: None Tobacco Use    Smoking status: Former    Smokeless tobacco: Never   Vaping Use    Vaping Use: Never used   Substance and Sexual Activity    Alcohol use: Yes     Comment: rare    Drug use: Never     Social Determinants of Health     Financial Resource Strain: Low Risk     Difficulty of Paying Living Expenses: Not hard at all   Food Insecurity: No Food Insecurity    Worried About Running Out of Food in the Last Year: Never true    [address] in the Last Year: Never true   Transportation Needs: No Transportation Needs    Lack of Transportation (Medical): No    Lack of Transportation (Non-Medical): No         PHYSICAL EXAM       ED Triage Vitals [08/27/22 2052]   BP Temp Temp Source Heart Rate Resp SpO2 Height Weight   (!) 155/85 98.8 °F (37.1 °C) Oral 72 18 95 % 5' 6\" (1.676 m) 217 lb (98.4 kg)       Physical Exam  Constitutional:       Appearance: Normal appearance. HENT:      Head: Normocephalic and atraumatic. Nose: Nose normal.      Mouth/Throat:      Mouth: Mucous membranes are moist.      Pharynx: No oropharyngeal exudate or posterior oropharyngeal erythema. Eyes:      Extraocular Movements: Extraocular movements intact. Conjunctiva/sclera: Conjunctivae normal.      Pupils: Pupils are equal, round, and reactive to light. Cardiovascular:      Rate and Rhythm: Normal rate and regular rhythm. Heart sounds: Normal heart sounds. Pulmonary:      Effort: Pulmonary effort is normal.      Breath sounds: Examination of the left-middle field reveals wheezing. Wheezing present. No rhonchi. Abdominal:      General: Bowel sounds are normal.      Palpations: Abdomen is soft. Tenderness: There is no abdominal tenderness. There is no guarding. Musculoskeletal:         General: No deformity. Normal range of motion. Cervical back: Normal range of motion and neck supple. Skin:     General: Skin is warm and dry. Coloration: Skin is not jaundiced.    Neurological:      General: No focal deficit present. Mental Status: He is alert and oriented to person, place, and time. Psychiatric:         Mood and Affect: Mood normal.         Behavior: Behavior normal.         MDM  Is a 52-year-old male presenting for medication refills. Patient is afebrile and hemodynamically stable with an SPO2 of 95% on room air. Patient states he does use oxygen at home but he did not bring it with him as he did not think he needed it. Patient declines any medical work-up for his shortness of breath. [facility]. FINAL IMPRESSION      1.  Encounter for medication refill          DISPOSITION/PLAN   DISPOSITION Decision To Discharge 08/27/2022 09:21:03 PM        DISCHARGE MEDICATIONS:  [unfilled]         Mei Lopez PA-C(electronically signed)  Attending Emergency Physician           Mei Lopez PA-C  08/28/22 [id number]

## 2022-08-28 NOTE — ED NOTES
Patient present to the ED for a Rx refill for Tramadol R/T being out since this past Monday.   Patient denies the need for any blood work or scans, patient report he just need his Rx refilled     Luke Deluna RN  08/27/22 2120

## 2022-08-28 NOTE — ED TRIAGE NOTES
The patient came to the ED for SOB since Monday. Pt states he ran out of his tramadol that is prescribed for his generalized neuropathy. A&Ox4. Respirations even and unlabored.

## 2022-09-13 ENCOUNTER — HOSPITAL ENCOUNTER (EMERGENCY)
Age: 77
Discharge: HOME | End: 2022-09-13
Payer: MEDICARE

## 2022-09-13 VITALS
OXYGEN SATURATION: 97 % | WEIGHT: 215 LBS | SYSTOLIC BLOOD PRESSURE: 145 MMHG | HEIGHT: 66 IN | RESPIRATION RATE: 18 BRPM | DIASTOLIC BLOOD PRESSURE: 64 MMHG | HEART RATE: 61 BPM | BODY MASS INDEX: 34.55 KG/M2 | TEMPERATURE: 97.8 F

## 2022-09-13 DIAGNOSIS — Z76.0 ENCOUNTER FOR MEDICATION REFILL: Primary | ICD-10-CM

## 2022-09-13 DIAGNOSIS — E11.42 DIABETIC PERIPHERAL NEUROPATHY (HCC): ICD-10-CM

## 2022-09-13 DIAGNOSIS — M15.9 GENERALIZED OSTEOARTHRITIS: ICD-10-CM

## 2022-09-13 PROCEDURE — 6370000000 HC RX 637 (ALT 250 FOR IP): Performed by: PHYSICIAN ASSISTANT

## 2022-09-13 PROCEDURE — 99283 EMERGENCY DEPT VISIT LOW MDM: CPT

## 2022-09-13 RX ORDER — TRAMADOL HYDROCHLORIDE 50 MG/1
50 TABLET ORAL ONCE
Status: COMPLETED | OUTPATIENT
Start: 2022-09-13 | End: 2022-09-13

## 2022-09-13 RX ORDER — TRAMADOL HYDROCHLORIDE 50 MG/1
50 TABLET ORAL EVERY 4 HOURS PRN
Qty: 18 TABLET | Refills: 0 | Status: SHIPPED | OUTPATIENT
Start: 2022-09-13 | End: 2022-09-16

## 2022-09-13 RX ADMIN — TRAMADOL HYDROCHLORIDE 50 MG: 50 TABLET ORAL at 12:59

## 2022-09-13 ASSESSMENT — ENCOUNTER SYMPTOMS
SINUS PAIN: 0
VOMITING: 0
SORE THROAT: 0
NAUSEA: 0
ABDOMINAL PAIN: 0
PHOTOPHOBIA: 0
SHORTNESS OF BREATH: 0
DIARRHEA: 0
SINUS PRESSURE: 0
TROUBLE SWALLOWING: 0
COUGH: 0
BACK PAIN: 1
RHINORRHEA: 0

## 2022-09-13 ASSESSMENT — PAIN DESCRIPTION - LOCATION
LOCATION: GENERALIZED
LOCATION: GENERALIZED

## 2022-09-13 ASSESSMENT — PAIN DESCRIPTION - DESCRIPTORS: DESCRIPTORS: ACHING;THROBBING

## 2022-09-13 ASSESSMENT — PAIN DESCRIPTION - PAIN TYPE: TYPE: ACUTE PAIN

## 2022-09-13 ASSESSMENT — PAIN SCALES - GENERAL
PAINLEVEL_OUTOF10: 7
PAINLEVEL_OUTOF10: 8

## 2022-09-13 ASSESSMENT — PAIN - FUNCTIONAL ASSESSMENT: PAIN_FUNCTIONAL_ASSESSMENT: 0-10

## 2022-09-13 NOTE — ED PROVIDER NOTES
[address] [facility] ED  eMERGENCY dEPARTMENTeNCOUnter      Pt Name: Karolyn Stephenson  MRN: [id number]  Chrystalgflorrie [birth year]  Date ofevaluation: 9/13/2022  Provider: Elijah Bain PA-C    CHIEF COMPLAINT       Chief Complaint   Patient presents with    Medication Refill     Patient states he needs a script for Tramadol and was sent here by [place]. HISTORY OF PRESENT ILLNESS   (Location/Symptom, Timing/Onset,Context/Setting, Quality, Duration, Modifying Factors, Severity)  Note limiting factors. This is a 77 y.o. male with PMHx of multiple chronic medical conditions, specifically diabetic neuropathy and chronic arthralgia presenting to the ED with request for medication refills. He was told by his physician at the [facility] given the to get his medications refilled at the ER as he is not able to get appointment for a few weeks. He has prescription that he is requesting refills for is tramadol. He states he has not had a dose in 3 days and has not been able to sleep due to the pain. Denies any changes or recent trauma. No further concerns. NursingNotes were reviewed. REVIEW OF SYSTEMS    (2-9 systems for level 4, 10 or more for level 5)     Review of Systems   Constitutional:  Negative for chills, fatigue and fever. HENT:  Negative for congestion, ear discharge, ear pain, rhinorrhea, sinus pressure, sinus pain, sore throat and trouble swallowing. Eyes:  Negative for photophobia and visual disturbance. Respiratory:  Negative for cough and shortness of breath. Cardiovascular:  Negative for chest pain. Gastrointestinal:  Negative for abdominal pain, diarrhea, nausea and vomiting. Genitourinary:  Negative for dysuria, flank pain, frequency, hematuria and testicular pain. Musculoskeletal:  Positive for arthralgias (Unchanged chronic) and back pain. Negative for neck pain and neck stiffness. Skin:  Negative for rash and wound.    Allergic/Immunologic: Negative for immunocompromised state. Neurological:  Negative for dizziness, weakness, light-headedness and headaches. Hematological:  Negative for adenopathy. Does not bruise/bleed easily. Psychiatric/Behavioral:  Negative for confusion. The patient is not nervous/anxious. Except as noted above the remainder of the review of systems was reviewed and negative. PAST MEDICAL HISTORY     Past Medical History:   Diagnosis Date    CAD (coronary artery disease)     Chronic kidney disease     COPD (chronic obstructive pulmonary disease) ([facility])     Diabetes mellitus ([facility])     Diabetic neuropathy ([facility])     Hyperlipidemia     Hypertension          SURGICALHISTORY       Past Surgical History:   Procedure Laterality Date    COLON SURGERY      CORONARY ANGIOPLASTY WITH STENT PLACEMENT      CORONARY ARTERY BYPASS GRAFT      PACEMAKER PLACEMENT           CURRENT MEDICATIONS       Discharge Medication List as of 9/13/2022  1:11 PM        CONTINUE these medications which have NOT CHANGED    Details   gabapentin (NEURONTIN) 300 MG capsule Take 1 capsule by mouth nightly for 30 days. , Disp-30 capsule, R-0Normal      Alpha-Lipoic Acid 600 MG CAPS Take 600 mg by mouth daily, Disp-30 capsule, R-0Normal      naloxone 4 MG/0.1ML LIQD nasal spray 1 spray by Nasal route as needed for Opioid Reversal, Disp-1 each, R-0Normal      ferrous sulfate (IRON 325) 325 (65 Fe) MG tablet ferrous sulfate 325 mg (65 mg iron) tablet   Take 1 tablet every day by oral route for 30 days. Historical Med      levoFLOXacin (LEVAQUIN) 750 MG tablet TAKE 1 TABLET BY MOUTH EVERY DAYHistorical Med      metroNIDAZOLE (FLAGYL) 500 MG tablet TAKE 1 TABLET BY MOUTH EVERY 8 HOURS FOR 10 DAYSHistorical Med      nitroGLYCERIN (NITROSTAT) 0.4 MG SL tablet Historical Med      tiZANidine (ZANAFLEX) 2 MG tablet Take 1 tablet by mouth every 8 hours as needed (neck pain), Disp-30 tablet, R-0Normal      clobetasol (TEMOVATE) 0.05 % ointment Apply topically 2 times daily for 2 weeks for rash. Do not use on face or neck, Disp-45 g, R-3, Normal      ketoconazole (NIZORAL) 2 % cream APPLY TO THE skin rash twice daily FOR 4 weeks, Disp-30 g, R-5, Normal      atorvastatin (LIPITOR) 40 MG tablet TAKE 1 TABLET BY MOUTH ONCE DAILY AT BEDTIME, Disp-90 tablet, R-3Normal      metFORMIN (GLUCOPHAGE) 500 MG tablet Take 500 mg by mouth daily (with breakfast)Historical Med      amLODIPine (NORVASC) 2.5 MG tablet Take 2.5 mg by mouth dailyHistorical Med      aspirin 81 MG EC tablet Take 81 mg by mouth dailyHistorical Med                  Penicillins, Gadolinium, Iodine, and Iv [iodides]    FAMILY HISTORY     History reviewed. No pertinent family history. SOCIAL HISTORY       Social History     Socioeconomic History    Marital status: Legally      Spouse name: None    Number of children: None    Years of education: None    Highest education level: None   Tobacco Use    Smoking status: Former    Smokeless tobacco: Never   Vaping Use    Vaping Use: Never used   Substance and Sexual Activity    Alcohol use: Yes     Comment: rare    Drug use: Never    Sexual activity: Not Currently     Social Determinants of Health     Financial Resource Strain: Low Risk     Difficulty of Paying Living Expenses: Not hard at all   Food Insecurity: No Food Insecurity    Worried About Running Out of Food in the Last Year: Never true    [address] in the Last Year: Never true   Transportation Needs: No Transportation Needs    Lack of Transportation (Medical): No    Lack of Transportation (Non-Medical):  No       SCREENINGS    [place] Coma Scale  Eye Opening: Spontaneous  Best Verbal Response: Oriented  Best Motor Response: Obeys commands  Dipesh Coma Scale Score: 15        PHYSICAL EXAM    (up to 7 for level 4, 8 or more for level 5)     ED Triage Vitals [09/13/22 1200]   BP Temp Temp Source Heart Rate Resp SpO2 Height Weight   (!) 145/64 97.8 °F (36.6 °C) Oral 61 18 97 % 5' 6\" (1.676 m) 215 lb (97.5 kg) Physical Exam  Vitals and nursing note reviewed. Constitutional:       General: He is not in acute distress. Appearance: Normal appearance. He is normal weight. He is not ill-appearing, toxic-appearing or diaphoretic. HENT:      Head: Normocephalic and atraumatic. Nose: Nose normal.      Mouth/Throat:      Mouth: Mucous membranes are moist.      Pharynx: Oropharynx is clear. Eyes:      Extraocular Movements: Extraocular movements intact. Conjunctiva/sclera: Conjunctivae normal.   Cardiovascular:      Rate and Rhythm: Normal rate and regular rhythm. Heart sounds: Normal heart sounds. Pulmonary:      Effort: Pulmonary effort is normal. No respiratory distress. Breath sounds: Normal breath sounds. Abdominal:      General: Abdomen is flat. Palpations: Abdomen is soft. Tenderness: There is no abdominal tenderness. Musculoskeletal:         General: No swelling, tenderness, deformity or signs of injury. Normal range of motion. Cervical back: Normal range of motion and neck supple. Skin:     General: Skin is warm and dry. Capillary Refill: Capillary refill takes less than 2 seconds. Neurological:      General: No focal deficit present. Mental Status: He is alert and oriented to person, place, and time. Psychiatric:         Mood and Affect: Mood normal.         Behavior: Behavior normal.       RESULTS           No orders to display       LABS:  Labs Reviewed - No data to display    All other labs were within normal range or not returned as of this dictation. EMERGENCY DEPARTMENT COURSE and DIFFERENTIAL DIAGNOSIS/MDM:   Vitals:    Vitals:    09/13/22 1200   BP: (!) 145/64   Pulse: 61   Resp: 18   Temp: 97.8 °F (36.6 °C)   TempSrc: Oral   SpO2: 97%   Weight: 215 lb (97.5 kg)   Height: 5' 6\" (1.676 m)             MDM        REASSESSMENT              PROCEDURES:  Unless otherwise noted below, none     Procedures    FINAL IMPRESSION      1.  Encounter for medication refill    2. Diabetic peripheral neuropathy ([facility]    3. Generalized osteoarthritis          DISPOSITION/PLAN   DISPOSITION Decision To Discharge 09/13/2022 01:13:38 PM      PATIENT REFERREDTO:  Elizabeth Flower MD  [address] [place], [place] [address]s [place]  [phone]    Schedule an appointment as soon as possible for a visit   As needed      DISCHARGEMEDICATIONS:  Discharge Medication List as of 9/13/2022  1:11 PM        START taking these medications    Details   traMADol (ULTRAM) 50 MG tablet Take 1 tablet by mouth every 4 hours as needed for Pain for up to 3 days. Intended supply: 3 days.  Take lowest dose possible to manage pain, Disp-18 tablet, R-0Print                (Please note that portions of this note were completed with a voice recognition program.  Efforts were made to edit the dictations but occasionally words are mis-transcribed.)    Tigre Moore PA-C (electronically signed)  Attending Emergency Physician       Tigre Moore PA-C  09/13/22 4676

## 2023-02-10 ENCOUNTER — HOSPITAL ENCOUNTER (INPATIENT)
Age: 78
LOS: 4 days | Discharge: NURSING, REHAB OR LONG-TERM CARE | DRG: 189 | End: 2023-02-17
Attending: EMERGENCY MEDICINE | Admitting: INTERNAL MEDICINE
Payer: MEDICARE

## 2023-02-10 ENCOUNTER — APPOINTMENT (OUTPATIENT)
Dept: GENERAL RADIOLOGY | Age: 78
DRG: 189 | End: 2023-02-10
Payer: MEDICARE

## 2023-02-10 DIAGNOSIS — R77.8 ELEVATED TROPONIN: ICD-10-CM

## 2023-02-10 DIAGNOSIS — I50.9 ACUTE DECOMPENSATED HEART FAILURE (HCC): ICD-10-CM

## 2023-02-10 DIAGNOSIS — J44.1 COPD WITH ACUTE EXACERBATION (HCC): Primary | ICD-10-CM

## 2023-02-10 PROBLEM — J44.9 COPD (CHRONIC OBSTRUCTIVE PULMONARY DISEASE) (HCC): Status: RESOLVED | Noted: 2022-02-10 | Resolved: 2023-02-10

## 2023-02-10 LAB
ALBUMIN SERPL-MCNC: 3.2 G/DL (ref 3.5–4.6)
ALP BLD-CCNC: 138 U/L (ref 35–104)
ALT SERPL-CCNC: 11 U/L (ref 0–41)
ANION GAP SERPL CALCULATED.3IONS-SCNC: 8 MEQ/L (ref 9–15)
AST SERPL-CCNC: 18 U/L (ref 0–40)
BASOPHILS ABSOLUTE: 0 K/UL (ref 0–0.2)
BASOPHILS RELATIVE PERCENT: 0.6 %
BILIRUB SERPL-MCNC: 0.7 MG/DL (ref 0.2–0.7)
BUN BLDV-MCNC: 27 MG/DL (ref 8–23)
CALCIUM SERPL-MCNC: 8.6 MG/DL (ref 8.5–9.9)
CHLORIDE BLD-SCNC: 93 MEQ/L (ref 95–107)
CO2: 29 MEQ/L (ref 20–31)
CREAT SERPL-MCNC: 1.76 MG/DL (ref 0.7–1.2)
EOSINOPHILS ABSOLUTE: 0.1 K/UL (ref 0–0.7)
EOSINOPHILS RELATIVE PERCENT: 1.4 %
GFR SERPL CREATININE-BSD FRML MDRD: 39.5 ML/MIN/{1.73_M2}
GLOBULIN: 3.8 G/DL (ref 2.3–3.5)
GLUCOSE BLD-MCNC: 138 MG/DL (ref 70–99)
HCT VFR BLD CALC: 34.3 % (ref 42–52)
HEMOGLOBIN: 10.7 G/DL (ref 14–18)
LYMPHOCYTES ABSOLUTE: 0.7 K/UL (ref 1–4.8)
LYMPHOCYTES RELATIVE PERCENT: 11.2 %
MAGNESIUM: 2.1 MG/DL (ref 1.7–2.4)
MCH RBC QN AUTO: 24.8 PG (ref 27–31.3)
MCHC RBC AUTO-ENTMCNC: 31.2 % (ref 33–37)
MCV RBC AUTO: 79.5 FL (ref 79–92.2)
MONOCYTES ABSOLUTE: 0.5 K/UL (ref 0.2–0.8)
MONOCYTES RELATIVE PERCENT: 8.2 %
NEUTROPHILS ABSOLUTE: 5.2 K/UL (ref 1.4–6.5)
NEUTROPHILS RELATIVE PERCENT: 78.6 %
PDW BLD-RTO: 19.5 % (ref 11.5–14.5)
PLATELET # BLD: 271 K/UL (ref 130–400)
POTASSIUM SERPL-SCNC: 4.9 MEQ/L (ref 3.4–4.9)
PRO-BNP: 1855 PG/ML
RBC # BLD: 4.32 M/UL (ref 4.7–6.1)
SODIUM BLD-SCNC: 130 MEQ/L (ref 135–144)
TOTAL PROTEIN: 7 G/DL (ref 6.3–8)
TROPONIN: 0.01 NG/ML (ref 0–0.01)
WBC # BLD: 6.7 K/UL (ref 4.8–10.8)

## 2023-02-10 PROCEDURE — 94640 AIRWAY INHALATION TREATMENT: CPT

## 2023-02-10 PROCEDURE — 99285 EMERGENCY DEPT VISIT HI MDM: CPT

## 2023-02-10 PROCEDURE — 71045 X-RAY EXAM CHEST 1 VIEW: CPT

## 2023-02-10 PROCEDURE — 6360000002 HC RX W HCPCS: Performed by: EMERGENCY MEDICINE

## 2023-02-10 PROCEDURE — G0378 HOSPITAL OBSERVATION PER HR: HCPCS | Performed by: INTERNAL MEDICINE

## 2023-02-10 PROCEDURE — 83735 ASSAY OF MAGNESIUM: CPT

## 2023-02-10 PROCEDURE — 83880 ASSAY OF NATRIURETIC PEPTIDE: CPT

## 2023-02-10 PROCEDURE — 36415 COLL VENOUS BLD VENIPUNCTURE: CPT

## 2023-02-10 PROCEDURE — 96375 TX/PRO/DX INJ NEW DRUG ADDON: CPT

## 2023-02-10 PROCEDURE — 84145 PROCALCITONIN (PCT): CPT

## 2023-02-10 PROCEDURE — 6370000000 HC RX 637 (ALT 250 FOR IP): Performed by: EMERGENCY MEDICINE

## 2023-02-10 PROCEDURE — 94664 DEMO&/EVAL PT USE INHALER: CPT

## 2023-02-10 PROCEDURE — 80053 COMPREHEN METABOLIC PANEL: CPT

## 2023-02-10 PROCEDURE — 96365 THER/PROPH/DIAG IV INF INIT: CPT

## 2023-02-10 PROCEDURE — 93005 ELECTROCARDIOGRAM TRACING: CPT | Performed by: EMERGENCY MEDICINE

## 2023-02-10 PROCEDURE — G0378 HOSPITAL OBSERVATION PER HR: HCPCS

## 2023-02-10 PROCEDURE — 84484 ASSAY OF TROPONIN QUANT: CPT

## 2023-02-10 PROCEDURE — 85025 COMPLETE CBC W/AUTO DIFF WBC: CPT

## 2023-02-10 PROCEDURE — 94761 N-INVAS EAR/PLS OXIMETRY MLT: CPT

## 2023-02-10 PROCEDURE — 2700000000 HC OXYGEN THERAPY PER DAY

## 2023-02-10 RX ORDER — FUROSEMIDE 10 MG/ML
20 INJECTION INTRAMUSCULAR; INTRAVENOUS 2 TIMES DAILY
Status: DISCONTINUED | OUTPATIENT
Start: 2023-02-11 | End: 2023-02-13

## 2023-02-10 RX ORDER — SODIUM CHLORIDE 0.9 % (FLUSH) 0.9 %
5-40 SYRINGE (ML) INJECTION EVERY 12 HOURS SCHEDULED
Status: DISCONTINUED | OUTPATIENT
Start: 2023-02-11 | End: 2023-02-17 | Stop reason: HOSPADM

## 2023-02-10 RX ORDER — SODIUM CHLORIDE 0.9 % (FLUSH) 0.9 %
5-40 SYRINGE (ML) INJECTION PRN
Status: DISCONTINUED | OUTPATIENT
Start: 2023-02-10 | End: 2023-02-17 | Stop reason: HOSPADM

## 2023-02-10 RX ORDER — HYDRALAZINE HYDROCHLORIDE 20 MG/ML
10 INJECTION INTRAMUSCULAR; INTRAVENOUS EVERY 4 HOURS PRN
Status: DISCONTINUED | OUTPATIENT
Start: 2023-02-10 | End: 2023-02-17 | Stop reason: HOSPADM

## 2023-02-10 RX ORDER — METHYLPREDNISOLONE SODIUM SUCCINATE 125 MG/2ML
125 INJECTION, POWDER, LYOPHILIZED, FOR SOLUTION INTRAMUSCULAR; INTRAVENOUS ONCE
Status: COMPLETED | OUTPATIENT
Start: 2023-02-10 | End: 2023-02-10

## 2023-02-10 RX ORDER — ACETAMINOPHEN 650 MG/1
650 SUPPOSITORY RECTAL EVERY 6 HOURS PRN
Status: DISCONTINUED | OUTPATIENT
Start: 2023-02-10 | End: 2023-02-17 | Stop reason: HOSPADM

## 2023-02-10 RX ORDER — ENOXAPARIN SODIUM 100 MG/ML
30 INJECTION SUBCUTANEOUS 2 TIMES DAILY
Status: DISCONTINUED | OUTPATIENT
Start: 2023-02-11 | End: 2023-02-13

## 2023-02-10 RX ORDER — AZITHROMYCIN 250 MG/1
250 TABLET, FILM COATED ORAL ONCE
Status: COMPLETED | OUTPATIENT
Start: 2023-02-10 | End: 2023-02-10

## 2023-02-10 RX ORDER — POLYETHYLENE GLYCOL 3350 17 G/17G
17 POWDER, FOR SOLUTION ORAL DAILY PRN
Status: DISCONTINUED | OUTPATIENT
Start: 2023-02-10 | End: 2023-02-17 | Stop reason: HOSPADM

## 2023-02-10 RX ORDER — ALBUTEROL SULFATE 2.5 MG/3ML
2.5 SOLUTION RESPIRATORY (INHALATION) EVERY 6 HOURS PRN
Status: DISCONTINUED | OUTPATIENT
Start: 2023-02-10 | End: 2023-02-14

## 2023-02-10 RX ORDER — LISINOPRIL 5 MG/1
5 TABLET ORAL DAILY
Status: DISCONTINUED | OUTPATIENT
Start: 2023-02-11 | End: 2023-02-12

## 2023-02-10 RX ORDER — MAGNESIUM SULFATE IN WATER 40 MG/ML
2000 INJECTION, SOLUTION INTRAVENOUS ONCE
Status: COMPLETED | OUTPATIENT
Start: 2023-02-10 | End: 2023-02-10

## 2023-02-10 RX ORDER — ONDANSETRON 2 MG/ML
4 INJECTION INTRAMUSCULAR; INTRAVENOUS EVERY 6 HOURS PRN
Status: DISCONTINUED | OUTPATIENT
Start: 2023-02-10 | End: 2023-02-17 | Stop reason: HOSPADM

## 2023-02-10 RX ORDER — ACETAMINOPHEN 325 MG/1
650 TABLET ORAL EVERY 6 HOURS PRN
Status: DISCONTINUED | OUTPATIENT
Start: 2023-02-10 | End: 2023-02-17 | Stop reason: HOSPADM

## 2023-02-10 RX ORDER — ATORVASTATIN CALCIUM 40 MG/1
40 TABLET, FILM COATED ORAL NIGHTLY
Status: DISCONTINUED | OUTPATIENT
Start: 2023-02-11 | End: 2023-02-17 | Stop reason: HOSPADM

## 2023-02-10 RX ORDER — SODIUM CHLORIDE 9 MG/ML
25 INJECTION, SOLUTION INTRAVENOUS PRN
Status: DISCONTINUED | OUTPATIENT
Start: 2023-02-10 | End: 2023-02-17 | Stop reason: HOSPADM

## 2023-02-10 RX ORDER — ONDANSETRON 4 MG/1
4 TABLET, ORALLY DISINTEGRATING ORAL EVERY 8 HOURS PRN
Status: DISCONTINUED | OUTPATIENT
Start: 2023-02-10 | End: 2023-02-17 | Stop reason: HOSPADM

## 2023-02-10 RX ADMIN — METHYLPREDNISOLONE SODIUM SUCCINATE 125 MG: 125 INJECTION, POWDER, FOR SOLUTION INTRAMUSCULAR; INTRAVENOUS at 17:35

## 2023-02-10 RX ADMIN — AZITHROMYCIN MONOHYDRATE 250 MG: 250 TABLET ORAL at 17:35

## 2023-02-10 RX ADMIN — ALBUTEROL SULFATE 2.5 MG: 2.5 SOLUTION RESPIRATORY (INHALATION) at 17:27

## 2023-02-10 RX ADMIN — MAGNESIUM SULFATE HEPTAHYDRATE 2000 MG: 40 INJECTION, SOLUTION INTRAVENOUS at 17:38

## 2023-02-10 ASSESSMENT — ENCOUNTER SYMPTOMS
ABDOMINAL PAIN: 0
BACK PAIN: 0
NAUSEA: 0
SORE THROAT: 0
DIARRHEA: 0
VOMITING: 0
SHORTNESS OF BREATH: 1
COUGH: 1

## 2023-02-10 NOTE — ED TRIAGE NOTES
Pt arrives c/o mechanical fall yesterday, general weakness, and SOB. Pt report PMHx of COPD. Pt 79% on RA, placed on 3LPM with improvement to 94%. Pt A&Ox4, ABCs intact, GCS15, skin, pws. Bruising noted to R forearm.

## 2023-02-10 NOTE — ED PROVIDER NOTES
[facility] ED  eMERGENCYdEPARTMENT eNCOUnter      Pt Name: Hunter Hdz  MRN: [id number]  Birthdate 1945  Date of evaluation: 2/10/2023  Provider:Serjio Salinas MD    CHIEF COMPLAINT           HPI  Hunter Hdz is a 77 y.o. male per chart review has a h/o CAD, CKD, COPD on 2.5 L NC, DM II, HTN, hpl presents to the ED with sob.  Pt notes gradual onset moderate, intermittent, sob x 3 months.  +Cough.  Feels like his COPD.  Pt denies fever, n/v, cp, ab pain, dysuria, diarrhea.     ROS  Review of Systems   Constitutional:  Negative for activity change, chills and fever.   HENT:  Negative for ear pain and sore throat.    Eyes:  Negative for visual disturbance.   Respiratory:  Positive for cough and shortness of breath.    Cardiovascular:  Negative for chest pain, palpitations and leg swelling.   Gastrointestinal:  Negative for abdominal pain, diarrhea, nausea and vomiting.   Genitourinary:  Negative for dysuria.   Musculoskeletal:  Negative for back pain.   Skin:  Negative for rash.   Neurological:  Negative for dizziness and weakness.     Except as noted above the remainder of the review of systems was reviewed and negative.       PAST MEDICAL HISTORY     Past Medical History:   Diagnosis Date    CAD (coronary artery disease)     Chronic kidney disease     COPD (chronic obstructive pulmonary disease) (HCC)     Diabetes mellitus (HCC)     Diabetic neuropathy (HCC)     Hyperlipidemia     Hypertension          SURGICAL HISTORY       Past Surgical History:   Procedure Laterality Date    COLON SURGERY      CORONARY ANGIOPLASTY WITH STENT PLACEMENT      CORONARY ARTERY BYPASS GRAFT      PACEMAKER PLACEMENT           CURRENTMEDICATIONS       Previous Medications    ALPHA-LIPOIC ACID 600 MG CAPS    Take 600 mg by mouth daily    AMLODIPINE (NORVASC) 2.5 MG TABLET    Take 2.5 mg by mouth daily    ASPIRIN 81 MG EC TABLET    Take 81 mg by mouth daily    ATORVASTATIN (LIPITOR) 40 MG TABLET    TAKE 1 TABLET BY  MOUTH ONCE DAILY AT BEDTIME    CLOBETASOL (TEMOVATE) 0.05 % OINTMENT    Apply topically 2 times daily for 2 weeks for rash. Do not use on face or neck    FERROUS SULFATE (IRON 325) 325 (65 FE) MG TABLET    ferrous sulfate 325 mg (65 mg iron) tablet   Take 1 tablet every day by oral route for 30 days. GABAPENTIN (NEURONTIN) 300 MG CAPSULE    Take 1 capsule by mouth nightly for 30 days. KETOCONAZOLE (NIZORAL) 2 % CREAM    APPLY TO THE skin rash twice daily FOR 4 weeks    LEVOFLOXACIN (LEVAQUIN) 750 MG TABLET    TAKE 1 TABLET BY MOUTH EVERY DAY    METFORMIN (GLUCOPHAGE) 500 MG TABLET    Take 500 mg by mouth daily (with breakfast)    METRONIDAZOLE (FLAGYL) 500 MG TABLET    TAKE 1 TABLET BY MOUTH EVERY 8 HOURS FOR 10 DAYS    NALOXONE 4 MG/0.1ML LIQD NASAL SPRAY    1 spray by Nasal route as needed for Opioid Reversal    NITROGLYCERIN (NITROSTAT) 0.4 MG SL TABLET        TIZANIDINE (ZANAFLEX) 2 MG TABLET    Take 1 tablet by mouth every 8 hours as needed (neck pain)       ALLERGIES     Penicillins, Gadolinium, Iodine, and Iv [iodides]    FAMILY HISTORY     History reviewed. No pertinent family history.        SOCIAL HISTORY       Social History     Socioeconomic History    Marital status: Legally      Spouse name: None    Number of children: None    Years of education: None    Highest education level: None   Tobacco Use    Smoking status: Former    Smokeless tobacco: Never   Vaping Use    Vaping Use: Never used   Substance and Sexual Activity    Alcohol use: Yes     Comment: rare    Drug use: Never    Sexual activity: Not Currently     Social Determinants of Health     Financial Resource Strain: Low Risk     Difficulty of Paying Living Expenses: Not hard at all   Food Insecurity: No Food Insecurity    Worried About [address] in the Last Year: Never true    [facility] in the Last Year: Never true   Transportation Needs: No Transportation Needs    Lack of Transportation (Medical): No    Lack of Transportation (Non-Medical): No         PHYSICAL EXAM       ED Triage Vitals [02/10/23 1700]   BP Temp Temp Source Pulse Resp SpO2 Height Weight   (!) 147/72 99.2 °F (37.3 °C) Oral -- 20 96 % -- --       Physical Exam  Vitals and nursing note reviewed. Constitutional:       Appearance: He is well-developed. HENT:      Head: Normocephalic. Right Ear: External ear normal.      Left Ear: External ear normal.   Eyes:      Conjunctiva/sclera: Conjunctivae normal.      Pupils: Pupils are equal, round, and reactive to light. Cardiovascular:      Rate and Rhythm: Normal rate and regular rhythm. Heart sounds: Normal heart sounds. Pulmonary:      Effort: Pulmonary effort is normal.      Breath sounds: Examination of the right-lower field reveals wheezing. Examination of the left-lower field reveals wheezing. Wheezing present. Abdominal:      General: Bowel sounds are normal. There is no distension. Palpations: Abdomen is soft. Tenderness: There is no abdominal tenderness. Musculoskeletal:         General: Normal range of motion. Cervical back: Normal range of motion and neck supple. Skin:     General: Skin is warm and dry. Neurological:      Mental Status: He is alert and oriented to person, place, and time. Psychiatric:         Mood and Affect: Mood normal.         MDM  68 yo male presents to the ED with sob, cough. Pt is afebrile, hemodynamically stable. Pt is 94% on his home 2.5 L. Pt given albuterol nebs x 3, IV solumedrol, IV Mg, PO azithro in the ED. EKG shows NSR with HR 75, normal axis, normal intervals, no ST changes. Labs remarkable for Hb 10.7. CXR negative. Pt reassessed and took 2 steps and with significant sob. Pt requesting admission. Given COPD exacerbation, case discussed with Dr. Sandro Ledezma and pt admitted to medicine in stable condition.              FINAL IMPRESSION      1. COPD with acute exacerbation (HCC)    2. Elevated troponin          DISPOSITION/PLAN DISPOSITION Decision To Admit 02/10/2023 06:58:30 PM        DISCHARGE MEDICATIONS:  [unfilled]         Cinthia Rutledge MD(electronically signed)  Attending Emergency Physician            Cinthia Rutledge MD  02/10/23 2115       Cinthia Rutledge MD  02/10/23 2115

## 2023-02-10 NOTE — CARE COORDINATION
I was asked to see pt for c/o having issues obtaining bi-pap machine to replace his broken one. He has worked with Dr. Chika Yates office and apparently [employer] was assigned to him originally in [facility] and now he has moved here about a year ago. The pt has gone as far as calling the [employer]" and some other issues were escalated but this was not. I called [facility] and spoke with Madyson Henriquez ([phone] ext [phone]) and he took clinical info and he will contact pulmonology \"on call\" and will give the message to them and they will call the pt at home. (To note when I called [employer] earlier \"on call\" Jose Ramon Hunt unable to pass this information onto anyone or even look up pt's demographic to see what pt needed. ) I informed pt & pt's dtr & provided him with pulmonology phone contact number and [facility] and what transpired, he was thankful and appreciative. Pt states his 02 condenser was working. Dr. Vielka Harley was notified.  Electronically signed by Arvind Jung RN on 2/10/2023 at 6:57 PM

## 2023-02-11 LAB
ADENOVIRUS BY PCR: NOT DETECTED
ALBUMIN SERPL-MCNC: 2.8 G/DL (ref 3.5–4.6)
ALP BLD-CCNC: 135 U/L (ref 35–104)
ALT SERPL-CCNC: 12 U/L (ref 0–41)
ANION GAP SERPL CALCULATED.3IONS-SCNC: 12 MEQ/L (ref 9–15)
ANION GAP SERPL CALCULATED.3IONS-SCNC: 5 MEQ/L (ref 9–15)
AST SERPL-CCNC: 29 U/L (ref 0–40)
BASOPHILS ABSOLUTE: 0 K/UL (ref 0–0.2)
BASOPHILS RELATIVE PERCENT: 0.2 %
BILIRUB SERPL-MCNC: 0.3 MG/DL (ref 0.2–0.7)
BORDETELLA PARAPERTUSSIS BY PCR: NOT DETECTED
BORDETELLA PERTUSSIS BY PCR: NOT DETECTED
BUN BLDV-MCNC: 27 MG/DL (ref 8–23)
BUN BLDV-MCNC: 27 MG/DL (ref 8–23)
CALCIUM SERPL-MCNC: 8.7 MG/DL (ref 8.5–9.9)
CALCIUM SERPL-MCNC: 8.8 MG/DL (ref 8.5–9.9)
CHLAMYDOPHILIA PNEUMONIAE BY PCR: NOT DETECTED
CHLORIDE BLD-SCNC: 96 MEQ/L (ref 95–107)
CHLORIDE BLD-SCNC: 98 MEQ/L (ref 95–107)
CO2: 25 MEQ/L (ref 20–31)
CO2: 31 MEQ/L (ref 20–31)
CORONAVIRUS 229E BY PCR: NOT DETECTED
CORONAVIRUS HKU1 BY PCR: NOT DETECTED
CORONAVIRUS NL63 BY PCR: NOT DETECTED
CORONAVIRUS OC43 BY PCR: NOT DETECTED
CREAT SERPL-MCNC: 1.58 MG/DL (ref 0.7–1.2)
CREAT SERPL-MCNC: 1.74 MG/DL (ref 0.7–1.2)
EOSINOPHILS ABSOLUTE: 0 K/UL (ref 0–0.7)
EOSINOPHILS RELATIVE PERCENT: 0 %
GFR SERPL CREATININE-BSD FRML MDRD: 40.1 ML/MIN/{1.73_M2}
GFR SERPL CREATININE-BSD FRML MDRD: 45 ML/MIN/{1.73_M2}
GLOBULIN: 4.4 G/DL (ref 2.3–3.5)
GLUCOSE BLD-MCNC: 160 MG/DL (ref 70–99)
GLUCOSE BLD-MCNC: 250 MG/DL (ref 70–99)
HCT VFR BLD CALC: 34.4 % (ref 42–52)
HEMOGLOBIN: 10.8 G/DL (ref 14–18)
HUMAN METAPNEUMOVIRUS BY PCR: NOT DETECTED
HUMAN RHINOVIRUS/ENTEROVIRUS BY PCR: NOT DETECTED
INFLUENZA A BY PCR: NOT DETECTED
INFLUENZA B BY PCR: NOT DETECTED
LV EF: 65 %
LVEF MODALITY: NORMAL
LYMPHOCYTES ABSOLUTE: 0.3 K/UL (ref 1–4.8)
LYMPHOCYTES RELATIVE PERCENT: 7.9 %
MCH RBC QN AUTO: 25.2 PG (ref 27–31.3)
MCHC RBC AUTO-ENTMCNC: 31.6 % (ref 33–37)
MCV RBC AUTO: 79.9 FL (ref 79–92.2)
MONOCYTES ABSOLUTE: 0.2 K/UL (ref 0.2–0.8)
MONOCYTES RELATIVE PERCENT: 4.9 %
MYCOPLASMA PNEUMONIAE BY PCR: NOT DETECTED
NEUTROPHILS ABSOLUTE: 2.8 K/UL (ref 1.4–6.5)
NEUTROPHILS RELATIVE PERCENT: 87 %
PARAINFLUENZA VIRUS 1 BY PCR: NOT DETECTED
PARAINFLUENZA VIRUS 2 BY PCR: NOT DETECTED
PARAINFLUENZA VIRUS 3 BY PCR: NOT DETECTED
PARAINFLUENZA VIRUS 4 BY PCR: NOT DETECTED
PDW BLD-RTO: 19.4 % (ref 11.5–14.5)
PLATELET # BLD: 258 K/UL (ref 130–400)
POTASSIUM REFLEX MAGNESIUM: 5.3 MEQ/L (ref 3.4–4.9)
POTASSIUM SERPL-SCNC: 4.7 MEQ/L (ref 3.4–4.9)
PROCALCITONIN: 0.14 NG/ML (ref 0–0.15)
RBC # BLD: 4.3 M/UL (ref 4.7–6.1)
RESPIRATORY SYNCYTIAL VIRUS BY PCR: NOT DETECTED
SARS-COV-2, PCR: NOT DETECTED
SODIUM BLD-SCNC: 133 MEQ/L (ref 135–144)
SODIUM BLD-SCNC: 134 MEQ/L (ref 135–144)
TOTAL PROTEIN: 7.2 G/DL (ref 6.3–8)
TROPONIN: <0.01 NG/ML (ref 0–0.01)
WBC # BLD: 3.2 K/UL (ref 4.8–10.8)

## 2023-02-11 PROCEDURE — 6370000000 HC RX 637 (ALT 250 FOR IP): Performed by: INTERNAL MEDICINE

## 2023-02-11 PROCEDURE — 96375 TX/PRO/DX INJ NEW DRUG ADDON: CPT

## 2023-02-11 PROCEDURE — 80053 COMPREHEN METABOLIC PANEL: CPT

## 2023-02-11 PROCEDURE — 0202U NFCT DS 22 TRGT SARS-COV-2: CPT

## 2023-02-11 PROCEDURE — 96376 TX/PRO/DX INJ SAME DRUG ADON: CPT

## 2023-02-11 PROCEDURE — G0378 HOSPITAL OBSERVATION PER HR: HCPCS | Performed by: INTERNAL MEDICINE

## 2023-02-11 PROCEDURE — G0378 HOSPITAL OBSERVATION PER HR: HCPCS

## 2023-02-11 PROCEDURE — 6360000002 HC RX W HCPCS: Performed by: INTERNAL MEDICINE

## 2023-02-11 PROCEDURE — 94640 AIRWAY INHALATION TREATMENT: CPT

## 2023-02-11 PROCEDURE — 99221 1ST HOSP IP/OBS SF/LOW 40: CPT | Performed by: INTERNAL MEDICINE

## 2023-02-11 PROCEDURE — 93005 ELECTROCARDIOGRAM TRACING: CPT | Performed by: INTERNAL MEDICINE

## 2023-02-11 PROCEDURE — 93306 TTE W/DOPPLER COMPLETE: CPT

## 2023-02-11 PROCEDURE — 6360000002 HC RX W HCPCS: Performed by: EMERGENCY MEDICINE

## 2023-02-11 PROCEDURE — 36415 COLL VENOUS BLD VENIPUNCTURE: CPT

## 2023-02-11 PROCEDURE — 85025 COMPLETE CBC W/AUTO DIFF WBC: CPT

## 2023-02-11 PROCEDURE — 2700000000 HC OXYGEN THERAPY PER DAY

## 2023-02-11 PROCEDURE — 94761 N-INVAS EAR/PLS OXIMETRY MLT: CPT

## 2023-02-11 PROCEDURE — 96372 THER/PROPH/DIAG INJ SC/IM: CPT

## 2023-02-11 PROCEDURE — 2580000003 HC RX 258: Performed by: INTERNAL MEDICINE

## 2023-02-11 RX ORDER — IPRATROPIUM BROMIDE AND ALBUTEROL SULFATE 2.5; .5 MG/3ML; MG/3ML
1 SOLUTION RESPIRATORY (INHALATION) EVERY 4 HOURS PRN
Status: DISCONTINUED | OUTPATIENT
Start: 2023-02-11 | End: 2023-02-14

## 2023-02-11 RX ORDER — CHLORAL HYDRATE 500 MG
CAPSULE ORAL DAILY
COMMUNITY

## 2023-02-11 RX ORDER — ASPIRIN 81 MG/1
81 TABLET ORAL DAILY
Status: DISCONTINUED | OUTPATIENT
Start: 2023-02-11 | End: 2023-02-17 | Stop reason: HOSPADM

## 2023-02-11 RX ORDER — AMLODIPINE BESYLATE 2.5 MG/1
2.5 TABLET ORAL DAILY
Status: DISCONTINUED | OUTPATIENT
Start: 2023-02-11 | End: 2023-02-12

## 2023-02-11 RX ADMIN — IPRATROPIUM BROMIDE AND ALBUTEROL SULFATE 1 AMPULE: 2.5; .5 SOLUTION RESPIRATORY (INHALATION) at 19:51

## 2023-02-11 RX ADMIN — METOPROLOL TARTRATE 25 MG: 25 TABLET, FILM COATED ORAL at 20:29

## 2023-02-11 RX ADMIN — METOPROLOL TARTRATE 25 MG: 25 TABLET, FILM COATED ORAL at 10:57

## 2023-02-11 RX ADMIN — ENOXAPARIN SODIUM 30 MG: 100 INJECTION SUBCUTANEOUS at 20:30

## 2023-02-11 RX ADMIN — ASPIRIN 81 MG: 81 TABLET, COATED ORAL at 10:57

## 2023-02-11 RX ADMIN — AMLODIPINE BESYLATE 2.5 MG: 2.5 TABLET ORAL at 10:57

## 2023-02-11 RX ADMIN — LISINOPRIL 5 MG: 5 TABLET ORAL at 10:57

## 2023-02-11 RX ADMIN — ACETAMINOPHEN 650 MG: 325 TABLET ORAL at 16:00

## 2023-02-11 RX ADMIN — ATORVASTATIN CALCIUM 40 MG: 40 TABLET, FILM COATED ORAL at 20:29

## 2023-02-11 RX ADMIN — FUROSEMIDE 20 MG: 10 INJECTION, SOLUTION INTRAMUSCULAR; INTRAVENOUS at 11:08

## 2023-02-11 RX ADMIN — Medication 10 ML: at 11:16

## 2023-02-11 RX ADMIN — ALBUTEROL SULFATE 2.5 MG: 2.5 SOLUTION RESPIRATORY (INHALATION) at 03:00

## 2023-02-11 RX ADMIN — ENOXAPARIN SODIUM 30 MG: 100 INJECTION SUBCUTANEOUS at 11:21

## 2023-02-11 RX ADMIN — Medication 10 ML: at 20:30

## 2023-02-11 RX ADMIN — FUROSEMIDE 20 MG: 10 INJECTION, SOLUTION INTRAMUSCULAR; INTRAVENOUS at 19:39

## 2023-02-11 ASSESSMENT — PAIN SCALES - GENERAL
PAINLEVEL_OUTOF10: 6
PAINLEVEL_OUTOF10: 6
PAINLEVEL_OUTOF10: 4
PAINLEVEL_OUTOF10: 5

## 2023-02-11 ASSESSMENT — PAIN DESCRIPTION - PAIN TYPE
TYPE: CHRONIC PAIN
TYPE: CHRONIC PAIN

## 2023-02-11 ASSESSMENT — PAIN DESCRIPTION - LOCATION
LOCATION: HEAD
LOCATION: HEAD
LOCATION: NECK
LOCATION: BACK

## 2023-02-11 ASSESSMENT — PAIN DESCRIPTION - ORIENTATION
ORIENTATION: POSTERIOR;LOWER
ORIENTATION: POSTERIOR

## 2023-02-11 ASSESSMENT — PAIN DESCRIPTION - DESCRIPTORS
DESCRIPTORS: ACHING

## 2023-02-11 NOTE — CONSULTS
Inpatient consult to Cardiology  Consult performed by: Pool Koroma MD  Consult ordered by: Gama Rodriguez MD        Patient Name: Elizabeth Christensen Date: 2/10/2023  4:55 PM  MR #: [id number]  : 1945    Attending Physician: Amanda Mesa DO  Reason for consult: Heart failure    History of Presenting Illness:      Karolyn Stephenson is a 69 y.o. male on hospital day 0 with a history of CAD status post CABG, ischemic cardiomyopathy status post ICD placement, hypertension, hyperlipidemia, diabetes, COPD obese who presents to the ER for shortness of breath. History Obtained From:  patient, electronic medical record    BNP 1800  EKG showing ventricular paced  History:      Past Medical History:   Diagnosis Date    CAD (coronary artery disease)     Chronic kidney disease     COPD (chronic obstructive pulmonary disease) (HCC)     Diabetes mellitus ([facility]     Diabetic neuropathy ([facility]     Hyperlipidemia     Hypertension      Past Surgical History:   Procedure Laterality Date    COLON SURGERY      CORONARY ANGIOPLASTY WITH STENT PLACEMENT      CORONARY ARTERY BYPASS GRAFT      PACEMAKER PLACEMENT       Family History  History reviewed. No pertinent family history.     Social History     Socioeconomic History    Marital status: Legally      Spouse name: Not on file    Number of children: Not on file    Years of education: Not on file    Highest education level: Not on file   Occupational History    Not on file   Tobacco Use    Smoking status: Former    Smokeless tobacco: Never   Vaping Use    Vaping Use: Never used   Substance and Sexual Activity    Alcohol use: Yes     Comment: rare    Drug use: Never    Sexual activity: Not Currently   Other Topics Concern    Not on file   Social History Narrative    Not on file     Social Determinants of Health     Financial Resource Strain: Not on file   Food Insecurity: Not on file   Transportation Needs: Not on file   Physical Activity: Not on file   Stress: Not on file Social Connections: Not on file   Intimate Partner Violence: Not on file   Housing Stability: Not on file     Home Medications:      Medications Prior to Admission: Omega-3 Fatty Acids (FISH OIL) 1000 MG capsule, Take by mouth daily  gabapentin (NEURONTIN) 300 MG capsule, Take 1 capsule by mouth nightly for 30 days. Alpha-Lipoic Acid 600 MG CAPS, Take 600 mg by mouth daily (Patient not taking: Reported on 2/11/2023)  naloxone 4 MG/0.1ML LIQD nasal spray, 1 spray by Nasal route as needed for Opioid Reversal (Patient not taking: Reported on 2/11/2023)  ferrous sulfate (IRON 325) 325 (65 Fe) MG tablet, ferrous sulfate 325 mg (65 mg iron) tablet  Take 1 tablet every day by oral route for 30 days. metroNIDAZOLE (FLAGYL) 500 MG tablet, TAKE 1 TABLET BY MOUTH EVERY 8 HOURS FOR 10 DAYS (Patient not taking: Reported on 2/11/2023)  nitroGLYCERIN (NITROSTAT) 0.4 MG SL tablet,   tiZANidine (ZANAFLEX) 2 MG tablet, Take 1 tablet by mouth every 8 hours as needed (neck pain)  clobetasol (TEMOVATE) 0.05 % ointment, Apply topically 2 times daily for 2 weeks for rash.  Do not use on face or neck  ketoconazole (NIZORAL) 2 % cream, APPLY TO THE skin rash twice daily FOR 4 weeks  atorvastatin (LIPITOR) 40 MG tablet, TAKE 1 TABLET BY MOUTH ONCE DAILY AT BEDTIME  [DISCONTINUED] levoFLOXacin (LEVAQUIN) 750 MG tablet, TAKE 1 TABLET BY MOUTH EVERY DAY  metFORMIN (GLUCOPHAGE) 500 MG tablet, Take 500 mg by mouth daily (with breakfast)  amLODIPine (NORVASC) 2.5 MG tablet, Take 2.5 mg by mouth daily  aspirin 81 MG EC tablet, Take 81 mg by mouth daily    Current Hospital Medications:   Scheduled Meds:   amLODIPine  2.5 mg Oral Daily    aspirin  81 mg Oral Daily    sodium chloride flush  5-40 mL IntraVENous 2 times per day    enoxaparin  30 mg SubCUTAneous BID    furosemide  20 mg IntraVENous BID    atorvastatin  40 mg Oral Nightly    lisinopril  5 mg Oral Daily    metoprolol tartrate  25 mg Oral BID     Continuous Infusions:   sodium chloride PRN Meds:.ipratropium-albuterol, albuterol, sodium chloride flush, sodium chloride, ondansetron **OR** ondansetron, polyethylene glycol, acetaminophen **OR** acetaminophen, hydrALAZINE   sodium chloride        Allergies: Allergies   Allergen Reactions    Penicillins Hives, Rash and Other (See Comments)     Other reaction(s): Generalized rash, Respiratory distress, Urticaria, Syncope, Syncope, Unknown    Gadolinium Hives    Iodine Hives    Iv [Iodides] Hives, Itching and Rash      Review of Systems:       [x] CV, Resp, Neuro, , and all other systems reviewed and negative other than listed in HPI. Objective Findings:     Vitals:   Vitals:    02/11/23 0653 02/11/23 0716 02/11/23 1045 02/11/23 1054   BP:  (!) 121/57  130/64   Pulse:  62     Resp:       Temp:       TempSrc:       SpO2:  100%     Weight: 235 lb 14.3 oz (107 kg)  235 lb (106.6 kg)    Height:   5' 6\" (1.676 m)         Physical Examination:  General: Alert and oriented x4 not acute distress  HEENT: Normocephalic, no scleral icterus. Neck: No JVD. Heart: Regular, no murmur, no rub/gallop. No RV heave. Lungs: Clear to ascultation, no rales/wheezing/rhonchi. Good chest wall excursion. Abdomen: Soft nontender nondistended  Extremities: No clubbing/cyanosis, trace edema. Skin: Warm, dry, normal turgor, no rash, no bruise, no petichiae. Neuro: No myoclonus or tremor.    Psych: Normal affect    Results/ Medications reviewed 2/11/2023, 1:58 PM     Laboratory, Microbiology, Pathology, Radiology, Cardiology, Medications and Transcriptions reviewed  Scheduled Meds:   amLODIPine  2.5 mg Oral Daily    aspirin  81 mg Oral Daily    sodium chloride flush  5-40 mL IntraVENous 2 times per day    enoxaparin  30 mg SubCUTAneous BID    furosemide  20 mg IntraVENous BID    atorvastatin  40 mg Oral Nightly    lisinopril  5 mg Oral Daily    metoprolol tartrate  25 mg Oral BID     Continuous Infusions:   sodium chloride         Recent Labs     02/10/23  1800 02/11/23 0449   WBC 6.7 3.2*   HGB 10.7* 10.8*   HCT 34.3* 34.4*   MCV 79.5 79.9    258     Recent Labs     02/10/23  1800 02/11/23 0449 02/11/23  0919   * 133* 134*   K 4.9 5.3* 4.7   CL 93* 96 98   CO2 29 25 31   BUN 27* 27* 27*   CREATININE 1.76* 1.58* 1.74*     Recent Labs     02/10/23  1800 02/11/23 0449   PROT 7.0 7.2     No results found for this or any previous visit. Impression:   Acute on chronic heart failure with preserved ejection fraction EF 65% by TTE 2/11/2023  CAD status post CABG  COPD  Hypertension  Hyperlipidemia  Obesity  CKD  TTE 2/11/2023 EF 65%  Plan:   Continue telemetry  Continue aspirin and high intensity statin for secondary prevention of CAD  Continue Lasix 20 mg IV twice daily. Please trend creatinine daily  Strict ins and outs and daily weights  Management of COPD as per primary team  Continue metoprolol 25 mg p.o. daily  No ACE or ARB in the setting of CKD  After discharge patient can follow-up with me in clinic in 2 weeks  Comments: Thank you for allowing us to participate in the care of this patient. Will continue to follow. Please call if questions or concerns arise. Of note patient was examined and evaluated on 2/11/2023, note was completed on 2/12/2023    Electronically signed by Fabio Sierra MD on 2/11/2023 at 1:58 PM    Please note this report has been partially produced using speech recognition software and may cause contain errors related to that system including grammar, punctuation and spelling as well as words and phrases that may seem inappropriate. If there are questions or concerns please feel free to contact me to clarify.

## 2023-02-11 NOTE — PROGRESS NOTES
Admission assessment completed. PT AO4, skin with large amount of ecchymosis to RUE and moderate to LUE. PT with noted abrasions to bilat knees, this is all related to pt fall, per pt. Pt resps even and unlabored, lungs diminished throughout and rhonchi present. Infrequent and productive cough noted with grey phlegm present. Pt reports increased amount of coughing over last few days and an increase in dyspnea with rest and exertion over last several months. Pt does wear home o2 @ 2.5L but states he needs new supplies at home. Pt also requires CPAP use at home and states he needs a new unit as well and has not been able to use it. Pt medications reviewed over phone with pt daughter, Sarah Mcgregor, pt's POA. She is also asked to bring copies of ACP documents, she verbalized understanding of this. Pt given food and beverage per request, at the ok of provider. Call light within reach, urinal bedside.

## 2023-02-11 NOTE — PLAN OF CARE
Problem: Discharge Planning  Goal: Discharge to home or other facility with appropriate resources  Outcome: Progressing  Flowsheets (Taken 2/11/2023 0038)  Discharge to home or other facility with appropriate resources:   Identify barriers to discharge with patient and caregiver   Arrange for needed discharge resources and transportation as appropriate   Identify discharge learning needs (meds, wound care, etc)   Arrange for interpreters to assist at discharge as needed   Refer to discharge planning if patient needs post-hospital services based on physician order or complex needs related to functional status, cognitive ability or social support system     Problem: Safety - Adult  Goal: Free from fall injury  Outcome: Progressing     Problem: Skin/Tissue Integrity  Goal: Absence of new skin breakdown  Description: 1. Monitor for areas of redness and/or skin breakdown  2. Assess vascular access sites hourly  3. Every 4-6 hours minimum:  Change oxygen saturation probe site  4. Every 4-6 hours:  If on nasal continuous positive airway pressure, respiratory therapy assess nares and determine need for appliance change or resting period.   Outcome: Progressing

## 2023-02-11 NOTE — H&P
Hospitalist Group   History and Physical      CHIEF COMPLAINT: Shortness of breath    History of Present Illness:  69 y.o. male with a history of CAD, diabetes, hypertension, hyperlipidemia, COPD, presents with shortness of breath. Patient said that his CPAP was broken 4 months ago and since then has been getting progressively worsening shortness of breath. He also has been having more cough less productive with gray phlegm. For the past 3 days his symptoms worsened significantly. Has been having exertional dyspnea and orthopnea. He also has noticed for the past week increased lower extremity edema. Patient denies chest pain or abdominal pain. No nausea, vomiting, bowel movement changes, urinary symptoms. Denies any fever. REVIEW OF SYSTEMS:  no fevers, chills, cp, has sob, n/v, ha, vision/hearing changes, wt changes, hot/cold flashes, other open skin lesions, diarrhea, constipation, dysuria/hematuria unless noted in HPI. Complete ROS performed with the patient and is otherwise negative. PMH:  Past Medical History:   Diagnosis Date    CAD (coronary artery disease)     Chronic kidney disease     COPD (chronic obstructive pulmonary disease) ([facility]     Diabetes mellitus ([facility] [facility]     Diabetic neuropathy ([facility] [address]     Hyperlipidemia     Hypertension        Surgical History:  Past Surgical History:   Procedure Laterality Date    COLON SURGERY      CORONARY ANGIOPLASTY WITH STENT PLACEMENT      CORONARY ARTERY BYPASS GRAFT      PACEMAKER PLACEMENT         Medications Prior to Admission:    Prior to Admission medications    Medication Sig Start Date End Date Taking? Authorizing Provider   gabapentin (NEURONTIN) 300 MG capsule Take 1 capsule by mouth nightly for 30 days.  7/6/22 8/5/22  Clista Hymen, APRN - CNP   Alpha-Lipoic Acid 600 MG CAPS Take 600 mg by mouth daily 7/6/22 8/5/22  Clista Hymen, APRN - CNP   naloxone 4 MG/0.1ML LIQD nasal spray 1 spray by Nasal route as needed for Opioid Reversal 3/17/22 Maria Del Carmen Bower MD   ferrous sulfate (IRON 325) 325 (65 Fe) MG tablet ferrous sulfate 325 mg (65 mg iron) tablet   Take 1 tablet every day by oral route for 30 days. Historical Provider, MD   levoFLOXacin (LEVAQUIN) 750 MG tablet TAKE 1 TABLET BY MOUTH EVERY DAY 1/21/22   Historical Provider, MD   metroNIDAZOLE (FLAGYL) 500 MG tablet TAKE 1 TABLET BY MOUTH EVERY 8 HOURS FOR 10 DAYS 1/21/22   Historical Provider, MD   nitroGLYCERIN (NITROSTAT) 0.4 MG SL tablet  2/4/22   Historical Provider, MD   tiZANidine (ZANAFLEX) 2 MG tablet Take 1 tablet by mouth every 8 hours as needed (neck pain) 2/10/22   Elizabeth Flower MD   clobetasol (TEMOVATE) 0.05 % ointment Apply topically 2 times daily for 2 weeks for rash. Do not use on face or neck 2/10/22   Elizabeth Flower MD   ketoconazole (NIZORAL) 2 % cream APPLY TO THE skin rash twice daily FOR 4 weeks 2/10/22   Elizabeth Flower MD   atorvastatin (LIPITOR) 40 MG tablet TAKE 1 TABLET BY MOUTH ONCE DAILY AT BEDTIME 2/10/22   Elizabeth Flower MD   metFORMIN (GLUCOPHAGE) 500 MG tablet Take 500 mg by mouth daily (with breakfast)    Historical Provider, MD   amLODIPine (NORVASC) 2.5 MG tablet Take 2.5 mg by mouth daily    Historical Provider, MD   aspirin 81 MG EC tablet Take 81 mg by mouth daily    Historical Provider, MD       Allergies:    Penicillins, Gadolinium, Iodine, and Iv [iodides]    Social History:    reports that he has quit smoking. He has never used smokeless tobacco. He reports current alcohol use. He reports that he does not use drugs. Family History:   History reviewed. No pertinent family history.     PHYSICAL EXAM:  Vitals:  BP (!) 141/82   Pulse 75   Temp 99.2 °F (37.3 °C) (Oral)   Resp 25   SpO2 94%   General Appearance: alert and oriented to person, place and time, well developed and well- nourished, in no acute distress  Skin: warm and dry, no rash or erythema  Head: normocephalic and atraumatic  Eyes: pupils equal, round, and reactive to light, extraocular eye movements intact, conjunctivae normal  ENT: tympanic membrane, external ear and ear canal normal bilaterally, nose without deformity, nasal mucosa and turbinates normal without polyps  Neck: supple and non-tender without mass, no thyromegaly or thyroid nodules, no cervical lymphadenopathy  Pulmonary/Chest: clear to auscultation bilaterally- no wheezes, some rhonchi and crackles bilaterally  Cardiovascular: normal rate, regular rhythm, normal S1 and S2, no murmurs, 3+ pitting edema in the lower extremities  Abdomen: soft, non-tender, non-distended, normal bowel sounds, no masses or organomegaly  Extremities: no cyanosis, clubbing   Musculoskeletal: normal range of motion, no joint swelling, deformity or tenderness  Neurologic: reflexes normal and symmetric, no cranial nerve deficit     LABS:  Recent Labs     02/10/23  1800   *   K 4.9   CL 93*   CO2 29   BUN 27*   CREATININE 1.76*   GLUCOSE 138*   CALCIUM 8.6       Recent Labs     02/10/23  1800   WBC 6.7   RBC 4.32*   HGB 10.7*   HCT 34.3*   MCV 79.5   MCH 24.8*   MCHC 31.2*   RDW 19.5*          No results for input(s): POCGLU in the last 72 hours.     CBC with Differential:    Lab Results   Component Value Date/Time    WBC 6.7 02/10/2023 06:00 PM    RBC 4.32 02/10/2023 06:00 PM    HGB 10.7 02/10/2023 06:00 PM    HCT 34.3 02/10/2023 06:00 PM     02/10/2023 06:00 PM    MCV 79.5 02/10/2023 06:00 PM    MCH 24.8 02/10/2023 06:00 PM    MCHC 31.2 02/10/2023 06:00 PM    RDW 19.5 02/10/2023 06:00 PM    LYMPHOPCT 11.2 02/10/2023 06:00 PM    MONOPCT 8.2 02/10/2023 06:00 PM    BASOPCT 0.6 02/10/2023 06:00 PM    MONOSABS 0.5 02/10/2023 06:00 PM    LYMPHSABS 0.7 02/10/2023 06:00 PM    EOSABS 0.1 02/10/2023 06:00 PM    BASOSABS 0.0 02/10/2023 06:00 PM     CMP:    Lab Results   Component Value Date/Time     02/10/2023 06:00 PM    K 4.9 02/10/2023 06:00 PM    CL 93 02/10/2023 06:00 PM    CO2 29 02/10/2023 06:00 PM    BUN 27 02/10/2023 06:00 PM    CREATININE 1.76 02/10/2023 06:00 PM    LABGLOM 39.5 02/10/2023 06:00 PM    GLUCOSE 138 02/10/2023 06:00 PM    PROT 7.0 02/10/2023 06:00 PM    LABALBU 3.2 02/10/2023 06:00 PM    CALCIUM 8.6 02/10/2023 06:00 PM    BILITOT 0.7 02/10/2023 06:00 PM    ALKPHOS 138 02/10/2023 06:00 PM    AST 18 02/10/2023 06:00 PM    ALT 11 02/10/2023 06:00 PM       Radiology: XR CHEST PORTABLE    Result Date: 2/10/2023  EXAMINATION: ONE XRAY VIEW OF THE CHEST 2/10/2023 5:17 pm COMPARISON: None. HISTORY: ORDERING SYSTEM PROVIDED HISTORY: SOB TECHNOLOGIST PROVIDED HISTORY: Reason for exam:->SOB What reading provider will be dictating this exam?->CRC FINDINGS: The lungs are without acute focal process. There is no effusion or pneumothorax. Cardiomegaly. Sternotomy wires noted. Right-sided transvenous pacer device. The osseous structures are without acute process. No acute process. EKG: Ventricular paced rhythm    ASSESSMENT/ PLAN[de-identified]      Principal Problem:    COPD exacerbation ([address]  Resolved Problems:    * No resolved hospital problems. *      Acute decompensated heart failure   Exertional dyspnea, orthopnea, 3+ pitting edema in the lower extremities, bilateral rhonchi and crackles   Mild elevated proBNP   Clinically patient is in heart failure   History of COPD but unlikely his presentation is due to the   Will treat for acute decompensated heart failure with Lasix. Start ACE and beta-blocker. Continue Lipitor   Echo ordered   Cardiology consult  Elevated troponin   No known baseline-we will cycle on repeat EKG  CKD versus SHEKHAR   Creatinine 1.76, GFR 39-no known baseline   Monitor renal function closely for now  Hypertension   Resume home medication   Lasix, ACE and BB added   Monitor blood pressure closely    Code Status: Full  DVT prophylaxis: Lovenox       Electronically signed by Lillian Mercado MD on 2/10/2023 at 11:38 PM      NOTE: This report was transcribed using voice recognition software.  Every effort was made to ensure accuracy; however, inadvertent computerized transcription errors may be present.

## 2023-02-11 NOTE — CARE COORDINATION
Case Management Assessment  Initial Evaluation    Date/Time of Evaluation: 2/11/2023 10:41 AM  Assessment Completed by: Paula Enriquez RN    If patient is discharged prior to next notation, then this note serves as note for discharge by case management. Patient Name: Bacilio Villegas                   YOB: 1945  Diagnosis: Elevated troponin [R77.8]  COPD exacerbation ([facility] [J44.1]  COPD with acute exacerbation ([facility] [J44.1]                   Date / Time: 2/10/2023  4:55 PM    Patient Admission Status: Observation   Readmission Risk (Low < 19, Mod (19-27), High > 27): No data recorded  Current PCP: Siva Marti MD  PCP verified by ? Yes    Chart Reviewed: Yes      History Provided by: Patient  Patient Orientation: Alert and Oriented    Patient Cognition: Alert    Hospitalization in the last 30 days (Readmission):  No    If yes, Readmission Assessment in CM Navigator will be completed. Advance Directives:      Code Status: Full Code   Patient's Primary Decision Maker is:      Primary Decision Maker: Nicolasa Noriega - Child - [phone]    Discharge Planning:    Patient lives with: Children, Spouse/Significant Other Type of Home: House, Other (Comment) (side by side duplex)  Primary Care Giver: Family (Clark Lion is hired CG)  Patient Support Systems include: Spouse/Significant Other, Children   Current Financial resources:    Current community resources:    Current services prior to admission: C-pap, Durable Medical Equipment, Oxygen Therapy            Current DME: Cane, Cpap, Oxygen Therapy (Comment)            Type of Home Care services:       ADLS  Prior functional level: Assistance with the following:, Bathing, Cooking, Housework, Shopping, Toileting, Dressing  Current functional level: Assistance with the following:, Bathing, Dressing, Toileting, Cooking, Housework, Shopping    PT AM-PAC:   /24  OT AM-PAC:   /24    Family can provide assistance at DC:  Yes  Would you like Case Management to discuss the discharge plan with any other family members/significant others, and if so, who? Yes (dtr Andrew Whittaker)  Plans to Return to Present Housing: Yes  Other Identified Issues/Barriers to RETURNING to current housing: YES  Potential Assistance needed at discharge:              Potential DME:    Patient expects to discharge to: [address] [place] [address] for transportation at discharge:      Financial    Payor: Josselin Board / Plan: MEDICARE PART A AND B / Product Type: *No Product type* /     Does insurance require precert for SNF: No    Potential assistance Purchasing Medications: No  Meds-to-Beds request:        Stevenson Mann [address] [phone] - F [phone]  Brittany Julio [address] [address]  Phone: [phone] Fax: [address] [place] [address] #Liz Lino [address] [phone] [facility] [phone]  [address] [place] [address]  Phone: [phone] Fax: [phone]      Notes:    Factors facilitating achievement of predicted outcomes: Family support and Pleasant    Barriers to discharge: SOB ON EXERT    Additional Case Management Notes: MET WITH PT AT BEDSIDE TO [address] [place] [address]. PT PLANS TO RETURN HOME WHERE HIS DTR LIVES IN [place] ABOVE/NEXT TO HIM. SHE IS HIS CAREGIVER AND GETS PAID BY [facility]. PT IS A  BUT DOES NOT WANT TO TRANSFER TO [address] [place] [address]. PT HAS ONGOING ISSUES OF TRYING TO OBTAIN NEW CPAP MACHINE AND OXYGEN CONCENTRATOR THROUGH [facility]. ED CM HAS MADE SOME CALLS AND WILL NEED FOLLOWED UP ON Monday. PT USES A CANE FOR AMBULATION AND WEARS OXYGEN 2.5L CONTINUOUS. The Plan for Transition of Care is related to the following treatment goals of Elevated troponin [R77.8]  COPD exacerbation (HCC) [J44.1]  COPD with acute exacerbation ([address] [Z36.7]    IF APPLICABLE: The Patient and/or patient representative Eduardo Saenz and his family were provided with a choice of provider and agrees with the discharge plan.  Freedom of choice list with basic dialogue that supports the patient's individualized plan of care/goals and shares the quality data associated with the providers was provided to:     Patient Representative Name:       The Patient and/or Patient Representative Agree with the Discharge Plan?       Cele Elder RN  Case Management Department  Ph: [phone]

## 2023-02-11 NOTE — PROGRESS NOTES
Progress Note  Date:2023       Room:W166/W166-01  Patient Theodora Read     YOB: 1945     Age:77 y.o. Subjective      No acute events since admission. Still more dyspnea than is his baseline, but some improvement since admission with treatments provided. Reports that many of his current symptoms became exacerbated and significant after his CPAP machine broke, and they have been having difficulty having it replaced through the [facility] since that time. He reports that his pacemaker is right-sided because he has \"heart was moved\", though he is unsure if this is related to his prior combat trauma from [employer] experiences in [place] or for some other cause. Reports that he urinates fairly frequently at home, more so in recent days. Reports that his blood sugars at home are usually very well controlled, often ranging 100-150, very rarely high. Complains of baseline neuropathy in both legs. Historically on tramadol and gabapentin through the [place], doses/intervals currently being verified by nursing. Objective         Vitals Last 24 Hours:  TEMPERATURE:  Temp  Av.3 °F (36.8 °C)  Min: 97.3 °F (36.3 °C)  Max: 99.2 °F (37.3 °C)  RESPIRATIONS RANGE: Resp  Av.8  Min: 17  Max: 25  PULSE OXIMETRY RANGE: SpO2  Av.6 %  Min: 94 %  Max: 100 %  PULSE RANGE: Pulse  Av.2  Min: 62  Max: 76  BLOOD PRESSURE RANGE: Systolic (88LEG), ABO:156 , Min:121 , PSO:331   ; Diastolic (30PVQ), GBD:31, Min:57, Max:91    I/O (24Hr): Intake/Output Summary (Last 24 hours) at 2023 1418  Last data filed at 2023 0930  Gross per 24 hour   Intake --   Output 500 ml   Net -500 ml     Objective    Constitutional: Obese elderly adult male reclining in bed with family present at bedside. Head: NCAT  Eyes: PERRLA, EOMI  Neck: Supple, trachea midline, phonation normal  Cardiovascular: RRR. No significant murmurs appreciated. Warm well perfused peripherally.   1+ pretibial edema.  Pulmonary: Mildly increased rate and effort of respiration on O2 by NC. Bilateral moderate basilar predominant rhonchi, with intermittent wet nonproductive cough. Unable to lay more flat due to orthopnea. Abdomen: Obese, soft, nontense. Bowel sounds appreciated. Nontender to palpation. Evidence of prior surgeries. Reducible hernias noted. Neurologic: Alert, grossly oriented. Nonfocal.  Psychiatric: Pleasant and cooperative. Labs/Imaging/Diagnostics    Labs:  CBC:  Recent Labs     02/10/23  1800 02/11/23 0449   WBC 6.7 3.2*   RBC 4.32* 4.30*   HGB 10.7* 10.8*   HCT 34.3* 34.4*   MCV 79.5 79.9   RDW 19.5* 19.4*    258     CHEMISTRIES:  Recent Labs     02/10/23  1800 02/11/23 0449 02/11/23 0919   * 133* 134*   K 4.9 5.3* 4.7   CL 93* 96 98   CO2 29 25 31   BUN 27* 27* 27*   CREATININE 1.76* 1.58* 1.74*   GLUCOSE 138* 250* 160*   MG 2.1  --   --      PT/INR:No results for input(s): PROTIME, INR in the last 72 hours. APTT:No results for input(s): APTT in the last 72 hours. LIVER PROFILE:  Recent Labs     02/10/23  1800 02/11/23 0449   AST 18 29   ALT 11 12   BILITOT 0.7 0.3   ALKPHOS 138* 135*       Imaging Last 24 Hours:  XR CHEST PORTABLE    Result Date: 2/10/2023  EXAMINATION: ONE XRAY VIEW OF THE CHEST 2/10/2023 5:17 pm COMPARISON: None. HISTORY: ORDERING SYSTEM PROVIDED HISTORY: SOB TECHNOLOGIST PROVIDED HISTORY: Reason for exam:->SOB What reading provider will be dictating this exam?->CRC FINDINGS: The lungs are without acute focal process. There is no effusion or pneumothorax. Cardiomegaly. Sternotomy wires noted. Right-sided transvenous pacer device. The osseous structures are without acute process. No acute process.      Assessment//Plan           Hospital Problems             Last Modified POA    * (Principal) Acute decompensated heart failure ([address] 2/10/2023 Yes     Assessment & Plan    Acute decompensated heart failure  Exertional dyspnea, orthopnea, pitting edema in the lower extremities, bilateral rhonchi and crackles, mild elevated proBNP. Clinically patient is in heart failure on admission. History of COPD but unlikely primary etiology. Will treat for acute decompensated heart failure with Lasix. Start ACE and beta-blocker. Continue Lipitor. Echo ordered. Cardiology consult. 2/11: Continues to diurese. Appreciate Cardiology recs when available. Elevated troponin  No known baseline-we will cycle on repeat EKG  2/11: Repeats trops negative    CKD with/without SHEKHAR (TBD)  Creatinine 1.76, GFR 39 on admission. Reported Hx prior HD x 6 months until \"kidneys improved\" and discharged from HD. Unknown baseline Cr/GFR. Monitor renal function closely for now    Hypertension  Resume home medication. Lasix, ACE and BB added. Monitor blood pressure closely. 2/11: Improved    DM2  Sliding scale insulin. Goal -180 while admitted.          Electronically signed by Nannette Dalal DO on 2/11/23 at 2:18 PM EST

## 2023-02-11 NOTE — PROGRESS NOTES
[facility] Respiratory Therapy Evaluation   Current Order:  ALBUTEROL Q6 PRN       Home Regimen: NONE PER PT      Ordering Physician: ROSA   Re-evaluation Date:  EVAL DONE     Diagnosis: HEART FAILURE       Patient Status: Stable / Unstable + Physician notified    The following MDI Criteria must be met in order to convert aerosol to MDI with spacer.  If unable to meet, MDI will be converted to aerosol:  []  Patient able to demonstrate the ability to use MDI effectively  []  Patient alert and cooperative  []  Patient able to take deep breath with 5-10 second hold  []  Medication(s) available in this delivery method   []  Peak flow greater than or equal to 200 ml/min            Current Order Substituted To  (same drug, same frequency)   Aerosol to MDI [] Albuterol Sulfate 0.083% unit dose by aerosol Albuterol Sulfate MDI 2 puffs by inhalation with spacer    [] Levalbuterol 1.25 mg unit dose by aerosol Levalbuterol MDI 2 puffs by inhalation with spacer    [] Levalbuterol 0.63 mg unit dose by aerosol Levalbuterol MDI 2 puffs by inhalation with spacer    [] Ipratropium Bromide 0.02% unit dose by aerosol Ipratropium Bromide MDI 2 puffs by inhalation with spacer    [] Duoneb (Ipratropium + Albuterol) unit dose by aerosol Ipratropium MDI + Albuterol MDI 2 puffs by inhalation w/spacer   MDI to Aerosol [] Albuterol Sulfate MDI Albuterol Sulfate 0.083% unit dose by aerosol    [] Levalbuterol MDI 2 puffs by inhalation Levalbuterol 1.25 mg unit dose by aerosol    [] Ipratropium Bromide MDI by inhalation Ipratropium Bromide 0.02% unit dose by aerosol    [] Combivent (Ipratropium + Albuterol) MDI by inhalation Duoneb (Ipratropium + Albuterol) unit dose by aerosol       Treatment Assessment [Frequency/Schedule]:  Change frequency to: _________________NO CHANGES_________________________________per Protocol, P&T, MEC      Points 0 1 2 3 4   Pulmonary Status  Non-Smoker  []   Smoking history   < 20 pack years  []   Smoking history  ?  20 pack years  []   Pulmonary Disorder  (acute or chronic)  [x]   Severe or Chronic w/ Exacerbation  []     Surgical Status No [x]   Surgeries     General []   Surgery Lower []   Abdominal Thoracic or []   Upper Abdominal Thoracic with  PulmonaryDisorder  []     Chest X-ray Clear/Not  Ordered     [x]  Chronic Changes  Results Pending  []  Infiltrates, atelectasis, pleural effusion, or edema  []  Infiltrates in more than one lobe []  Infiltrate + Atelectasis, &/or pleural effusion  []    Respiratory Pattern Regular,  RR = 12-20 [x]  Increased,  RR = 21-25 []  CORRIGAN, irregular,  or RR = 26-30 []  Decreased FEV1  or RR = 31-35 []  Severe SOB, use  of accessory muscles, or RR ? 35  []    Mental Status Alert, oriented,  Cooperative [x]  Confused but Follows commands []  Lethargic or unable to follow commands []  Obtunded  []  Comatose  []    Breath Sounds Clear to  auscultation  []  Decreased unilaterally or  in bases only [x]  Decreased  bilaterally  []  Crackles or intermittent wheezes []  Wheezes []    Cough Strong, Spontan., & nonproductive [x]  Strong,  spontaneous, &  productive []  Weak,  Nonproductive []  Weak, productive or  with wheezes []  No spontaneous  cough or may require suctioning []    Level of Activity Ambulatory []  Ambulatory w/ Assist  [x]  Non-ambulatory []  Paraplegic []  Quadriplegic []    Total    Score:______5_     Triage Score:_____5___      Tri       Triage:     1. (>20) Freq: Q3    2. (16-20) Freq: Q4   3. (11-15) Freq: QID & Albuterol Q2 PRN    4. (6-10) Freq: TID & Albuterol Q2 PRN    5. (0-5) Freq Q4prn

## 2023-02-11 NOTE — PROGRESS NOTES
DVT / VTE PROPHYLAXIS EVALUATION    Estimated Creatinine Clearance: 41 mL/min (A) (based on SCr of 1.74 mg/dL (H)). Recent Labs     02/10/23  1800 02/11/23  0449 02/11/23  0919   BUN 27* 27* 27*   CREATININE 1.76* 1.58* 1.74*    258  --    HGB 10.7* 10.8*  --    HCT 34.3* 34.4*  --      ADMITTING DX OR CHIEF COMPLAINT? Acute decompensated HF  WARFARIN? DOAC'S? no  ANY APPARENT BLEEDING? no  SCHEDULED SURGERY? no     Current order:  Enoxaparin 40 mg SUBQ once daily      Plan:  Pharmacologic VTE prophylaxis modified based on patient weight per [facility]/P&T approved protocol     Patient Weight (kg)      50.9 and below .9 101-150.9 151-174.9 175 or greater   Estimated   CrCl  (ml/min) 30 or greater []   30 mg   SUBQ daily   []   40 mg   SUBQ daily [x]  30 mg SUBQ   BID  []  40 mg   SUBQ   BID []  60mg SUBQ BID    15-29.9 []  UFH 5000   units SUBQ BID []  30 mg   SUBQ daily [] 30 mg SUBQ   daily []  40 mg SUBQ   daily [] 60 mg SUBQ   daily    Less than 15 or dialysis []  UFH 5000   units SUBQ BID [] UFH 5000 units SUBQ TID []  UFH 7500   units   SUBQ TID     Dose adjusted to enoxaparin 30 mg BID. Please reach out to pharmacy with questions.        Thanks,     Chaz Gomez, PharmD  PGY1 Pharmacy Resident  2/11/2023 11:06 AM

## 2023-02-11 NOTE — FLOWSHEET NOTE
Spoke with daughter Kitty Peraza upon pt admission, she reported she will bring a copy of both POA and Living will

## 2023-02-12 LAB
ALBUMIN SERPL-MCNC: 3.1 G/DL (ref 3.5–4.6)
ALP BLD-CCNC: 125 U/L (ref 35–104)
ALT SERPL-CCNC: 9 U/L (ref 0–41)
ANION GAP SERPL CALCULATED.3IONS-SCNC: 8 MEQ/L (ref 9–15)
AST SERPL-CCNC: 13 U/L (ref 0–40)
BASE EXCESS ARTERIAL: 10 (ref -3–3)
BASE EXCESS ARTERIAL: 7 (ref -3–3)
BASOPHILS ABSOLUTE: 0.1 K/UL (ref 0–0.2)
BASOPHILS RELATIVE PERCENT: 0.9 %
BILIRUB SERPL-MCNC: 0.3 MG/DL (ref 0.2–0.7)
BUN BLDV-MCNC: 33 MG/DL (ref 8–23)
CALCIUM IONIZED: 1.19 MMOL/L (ref 1.12–1.32)
CALCIUM IONIZED: 1.27 MMOL/L (ref 1.12–1.32)
CALCIUM SERPL-MCNC: 8.5 MG/DL (ref 8.5–9.9)
CHLORIDE BLD-SCNC: 96 MEQ/L (ref 95–107)
CO2: 31 MEQ/L (ref 20–31)
CREAT SERPL-MCNC: 1.81 MG/DL (ref 0.7–1.2)
EOSINOPHILS ABSOLUTE: 0.2 K/UL (ref 0–0.7)
EOSINOPHILS RELATIVE PERCENT: 2.8 %
GFR SERPL CREATININE-BSD FRML MDRD: 29 ML/MIN/{1.73_M2}
GFR SERPL CREATININE-BSD FRML MDRD: 29 ML/MIN/{1.73_M2}
GFR SERPL CREATININE-BSD FRML MDRD: 38.2 ML/MIN/{1.73_M2}
GLOBULIN: 3.8 G/DL (ref 2.3–3.5)
GLUCOSE BLD-MCNC: 122 MG/DL (ref 70–99)
GLUCOSE BLD-MCNC: 122 MG/DL (ref 70–99)
GLUCOSE BLD-MCNC: 136 MG/DL (ref 70–99)
GLUCOSE BLD-MCNC: 137 MG/DL (ref 70–99)
GLUCOSE BLD-MCNC: 176 MG/DL (ref 70–99)
HCO3 ARTERIAL: 34.4 MMOL/L (ref 21–29)
HCO3 ARTERIAL: 38.2 MMOL/L (ref 21–29)
HCT VFR BLD CALC: 36.4 % (ref 42–52)
HEMOGLOBIN: 11.1 G/DL (ref 14–18)
HEMOGLOBIN: 12.3 GM/DL (ref 13.5–17.5)
HEMOGLOBIN: 12.3 GM/DL (ref 13.5–17.5)
LACTATE: 0.54 MMOL/L (ref 0.4–2)
LACTATE: 0.57 MMOL/L (ref 0.4–2)
LYMPHOCYTES ABSOLUTE: 0.9 K/UL (ref 1–4.8)
LYMPHOCYTES RELATIVE PERCENT: 12.5 %
MCH RBC QN AUTO: 24.8 PG (ref 27–31.3)
MCHC RBC AUTO-ENTMCNC: 30.5 % (ref 33–37)
MCV RBC AUTO: 81.3 FL (ref 79–92.2)
MONOCYTES ABSOLUTE: 0.6 K/UL (ref 0.2–0.8)
MONOCYTES RELATIVE PERCENT: 8.7 %
NEUTROPHILS ABSOLUTE: 5.3 K/UL (ref 1.4–6.5)
NEUTROPHILS RELATIVE PERCENT: 75.1 %
O2 SAT, ARTERIAL: 84 % (ref 93–100)
O2 SAT, ARTERIAL: 96 % (ref 93–100)
PCO2 ARTERIAL: 83 MM HG (ref 35–45)
PCO2 ARTERIAL: 96 MM HG (ref 35–45)
PDW BLD-RTO: 19.3 % (ref 11.5–14.5)
PERFORMED ON: ABNORMAL
PH ARTERIAL: 7.21 (ref 7.35–7.45)
PH ARTERIAL: 7.23 (ref 7.35–7.45)
PLATELET # BLD: 280 K/UL (ref 130–400)
PO2 ARTERIAL: 109 MM HG (ref 75–108)
PO2 ARTERIAL: 61 MM HG (ref 75–108)
POC CHLORIDE: 95 MEQ/L (ref 99–110)
POC CHLORIDE: 96 MEQ/L (ref 99–110)
POC CREATININE: 2.3 MG/DL (ref 0.8–1.3)
POC CREATININE: 2.3 MG/DL (ref 0.8–1.3)
POC FIO2: 3
POC FIO2: 50
POC HEMATOCRIT: 36 % (ref 41–53)
POC HEMATOCRIT: 36 % (ref 41–53)
POC POTASSIUM: 4.9 MEQ/L (ref 3.5–5.1)
POC POTASSIUM: 5 MEQ/L (ref 3.5–5.1)
POC SAMPLE TYPE: ABNORMAL
POC SAMPLE TYPE: ABNORMAL
POC SODIUM: 134 MEQ/L (ref 136–145)
POC SODIUM: 138 MEQ/L (ref 136–145)
POTASSIUM REFLEX MAGNESIUM: 4 MEQ/L (ref 3.4–4.9)
RBC # BLD: 4.48 M/UL (ref 4.7–6.1)
SODIUM BLD-SCNC: 135 MEQ/L (ref 135–144)
TCO2 ARTERIAL: 37 MMOL/L (ref 21–32)
TCO2 ARTERIAL: 41 MMOL/L (ref 21–32)
TOTAL PROTEIN: 6.9 G/DL (ref 6.3–8)
WBC # BLD: 7 K/UL (ref 4.8–10.8)

## 2023-02-12 PROCEDURE — 6370000000 HC RX 637 (ALT 250 FOR IP): Performed by: INTERNAL MEDICINE

## 2023-02-12 PROCEDURE — 96372 THER/PROPH/DIAG INJ SC/IM: CPT

## 2023-02-12 PROCEDURE — 96376 TX/PRO/DX INJ SAME DRUG ADON: CPT

## 2023-02-12 PROCEDURE — 99223 1ST HOSP IP/OBS HIGH 75: CPT | Performed by: INTERNAL MEDICINE

## 2023-02-12 PROCEDURE — 36415 COLL VENOUS BLD VENIPUNCTURE: CPT

## 2023-02-12 PROCEDURE — 82330 ASSAY OF CALCIUM: CPT

## 2023-02-12 PROCEDURE — 2580000003 HC RX 258: Performed by: INTERNAL MEDICINE

## 2023-02-12 PROCEDURE — 85014 HEMATOCRIT: CPT

## 2023-02-12 PROCEDURE — 36600 WITHDRAWAL OF ARTERIAL BLOOD: CPT

## 2023-02-12 PROCEDURE — 82803 BLOOD GASES ANY COMBINATION: CPT

## 2023-02-12 PROCEDURE — 2500000003 HC RX 250 WO HCPCS: Performed by: INTERNAL MEDICINE

## 2023-02-12 PROCEDURE — 82948 REAGENT STRIP/BLOOD GLUCOSE: CPT

## 2023-02-12 PROCEDURE — G0378 HOSPITAL OBSERVATION PER HR: HCPCS

## 2023-02-12 PROCEDURE — 94660 CPAP INITIATION&MGMT: CPT

## 2023-02-12 PROCEDURE — 84295 ASSAY OF SERUM SODIUM: CPT

## 2023-02-12 PROCEDURE — 82565 ASSAY OF CREATININE: CPT

## 2023-02-12 PROCEDURE — 99233 SBSQ HOSP IP/OBS HIGH 50: CPT | Performed by: INTERNAL MEDICINE

## 2023-02-12 PROCEDURE — 84132 ASSAY OF SERUM POTASSIUM: CPT

## 2023-02-12 PROCEDURE — 80053 COMPREHEN METABOLIC PANEL: CPT

## 2023-02-12 PROCEDURE — 82435 ASSAY OF BLOOD CHLORIDE: CPT

## 2023-02-12 PROCEDURE — 83605 ASSAY OF LACTIC ACID: CPT

## 2023-02-12 PROCEDURE — 2700000000 HC OXYGEN THERAPY PER DAY

## 2023-02-12 PROCEDURE — 85025 COMPLETE CBC W/AUTO DIFF WBC: CPT

## 2023-02-12 PROCEDURE — 6360000002 HC RX W HCPCS: Performed by: INTERNAL MEDICINE

## 2023-02-12 RX ORDER — CARVEDILOL 12.5 MG/1
12.5 TABLET ORAL 2 TIMES DAILY
Status: DISCONTINUED | OUTPATIENT
Start: 2023-02-12 | End: 2023-02-17 | Stop reason: HOSPADM

## 2023-02-12 RX ORDER — DEXTROSE MONOHYDRATE 100 MG/ML
INJECTION, SOLUTION INTRAVENOUS CONTINUOUS PRN
Status: DISCONTINUED | OUTPATIENT
Start: 2023-02-12 | End: 2023-02-17 | Stop reason: HOSPADM

## 2023-02-12 RX ORDER — INSULIN LISPRO 100 [IU]/ML
0-4 INJECTION, SOLUTION INTRAVENOUS; SUBCUTANEOUS
Status: DISCONTINUED | OUTPATIENT
Start: 2023-02-12 | End: 2023-02-17 | Stop reason: HOSPADM

## 2023-02-12 RX ORDER — INSULIN LISPRO 100 [IU]/ML
0-4 INJECTION, SOLUTION INTRAVENOUS; SUBCUTANEOUS NIGHTLY
Status: DISCONTINUED | OUTPATIENT
Start: 2023-02-12 | End: 2023-02-17 | Stop reason: HOSPADM

## 2023-02-12 RX ADMIN — ENOXAPARIN SODIUM 30 MG: 100 INJECTION SUBCUTANEOUS at 08:22

## 2023-02-12 RX ADMIN — MICONAZOLE NITRATE: 2 POWDER TOPICAL at 21:40

## 2023-02-12 RX ADMIN — ASPIRIN 81 MG: 81 TABLET, COATED ORAL at 08:22

## 2023-02-12 RX ADMIN — AMLODIPINE BESYLATE 2.5 MG: 2.5 TABLET ORAL at 08:22

## 2023-02-12 RX ADMIN — ENOXAPARIN SODIUM 30 MG: 100 INJECTION SUBCUTANEOUS at 21:41

## 2023-02-12 RX ADMIN — ATORVASTATIN CALCIUM 40 MG: 40 TABLET, FILM COATED ORAL at 21:41

## 2023-02-12 RX ADMIN — MICONAZOLE NITRATE: 2 POWDER TOPICAL at 14:44

## 2023-02-12 RX ADMIN — METOPROLOL TARTRATE 25 MG: 25 TABLET, FILM COATED ORAL at 08:22

## 2023-02-12 RX ADMIN — CARVEDILOL 12.5 MG: 12.5 TABLET, FILM COATED ORAL at 11:25

## 2023-02-12 RX ADMIN — Medication 10 ML: at 08:22

## 2023-02-12 RX ADMIN — FUROSEMIDE 20 MG: 10 INJECTION, SOLUTION INTRAMUSCULAR; INTRAVENOUS at 17:03

## 2023-02-12 RX ADMIN — LISINOPRIL 5 MG: 5 TABLET ORAL at 08:22

## 2023-02-12 RX ADMIN — Medication 10 ML: at 21:41

## 2023-02-12 ASSESSMENT — PAIN SCALES - GENERAL: PAINLEVEL_OUTOF10: 0

## 2023-02-12 NOTE — FLOWSHEET NOTE
Called and spoke with the patient's daughter Samanta Hirsch per patient request and updated her. Electronically signed by Jose Kent on 2/12/2023 at 6:49 PM

## 2023-02-12 NOTE — PROGRESS NOTES
Cardiology progress note    Patient Name: Ivan Arboleda Date: 2/10/2023  4:55 PM  MR #: [id number]  : 1945    Attending Physician: Cipriano Patino DO  Reason for consult: Heart failure    History of Presenting Illness:      Juan José Ceja is a 69 y.o. male on hospital day 0 with a history of CAD status post CABG, ischemic cardiomyopathy status post ICD placement, hypertension, hyperlipidemia, diabetes, COPD on 3 L of oxygen at home obese who presents to the ER for shortness of breath. History Obtained From:  patient, electronic medical record    BNP 1800  EKG showing ventricular paced  ==============  Hospital course  2023  Patient laying in bed looks comfortable  Denies chest pain  Reports that shortness of breath is improving  Patient was net -2 L last 24 hours  History:      Past Medical History:   Diagnosis Date    CAD (coronary artery disease)     Chronic kidney disease     COPD (chronic obstructive pulmonary disease) (HCC)     Diabetes mellitus [address] [address]     Diabetic neuropathy ([facility] [address]     Hyperlipidemia     Hypertension      Past Surgical History:   Procedure Laterality Date    COLON SURGERY      CORONARY ANGIOPLASTY WITH STENT PLACEMENT      CORONARY ARTERY BYPASS GRAFT      PACEMAKER PLACEMENT       Family History  History reviewed. No pertinent family history.     Social History     Socioeconomic History    Marital status: Legally      Spouse name: Not on file    Number of children: Not on file    Years of education: Not on file    Highest education level: Not on file   Occupational History    Not on file   Tobacco Use    Smoking status: Former    Smokeless tobacco: Never   Vaping Use    Vaping Use: Never used   Substance and Sexual Activity    Alcohol use: Yes     Comment: rare    Drug use: Never    Sexual activity: Not Currently   Other Topics Concern    Not on file   Social History Narrative    Not on file     Social Determinants of Health     Financial Resource Strain: Not on file Food Insecurity: Not on file   Transportation Needs: Not on file   Physical Activity: Not on file   Stress: Not on file   Social Connections: Not on file   Intimate Partner Violence: Not on file   Housing Stability: Not on file     Home Medications:      Medications Prior to Admission: Omega-3 Fatty Acids (FISH OIL) 1000 MG capsule, Take by mouth daily  gabapentin (NEURONTIN) 300 MG capsule, Take 1 capsule by mouth nightly for 30 days. Alpha-Lipoic Acid 600 MG CAPS, Take 600 mg by mouth daily (Patient not taking: Reported on 2/11/2023)  naloxone 4 MG/0.1ML LIQD nasal spray, 1 spray by Nasal route as needed for Opioid Reversal (Patient not taking: Reported on 2/11/2023)  ferrous sulfate (IRON 325) 325 (65 Fe) MG tablet, ferrous sulfate 325 mg (65 mg iron) tablet  Take 1 tablet every day by oral route for 30 days. metroNIDAZOLE (FLAGYL) 500 MG tablet, TAKE 1 TABLET BY MOUTH EVERY 8 HOURS FOR 10 DAYS (Patient not taking: Reported on 2/11/2023)  nitroGLYCERIN (NITROSTAT) 0.4 MG SL tablet,   tiZANidine (ZANAFLEX) 2 MG tablet, Take 1 tablet by mouth every 8 hours as needed (neck pain)  clobetasol (TEMOVATE) 0.05 % ointment, Apply topically 2 times daily for 2 weeks for rash.  Do not use on face or neck  ketoconazole (NIZORAL) 2 % cream, APPLY TO THE skin rash twice daily FOR 4 weeks  atorvastatin (LIPITOR) 40 MG tablet, TAKE 1 TABLET BY MOUTH ONCE DAILY AT BEDTIME  [DISCONTINUED] levoFLOXacin (LEVAQUIN) 750 MG tablet, TAKE 1 TABLET BY MOUTH EVERY DAY  metFORMIN (GLUCOPHAGE) 500 MG tablet, Take 500 mg by mouth daily (with breakfast)  amLODIPine (NORVASC) 2.5 MG tablet, Take 2.5 mg by mouth daily  aspirin 81 MG EC tablet, Take 81 mg by mouth daily    Current Hospital Medications:   Scheduled Meds:   amLODIPine  2.5 mg Oral Daily    aspirin  81 mg Oral Daily    sodium chloride flush  5-40 mL IntraVENous 2 times per day    enoxaparin  30 mg SubCUTAneous BID    furosemide  20 mg IntraVENous BID    atorvastatin  40 mg Oral Nightly    metoprolol tartrate  25 mg Oral BID     Continuous Infusions:   sodium chloride       PRN Meds:.ipratropium-albuterol, albuterol, sodium chloride flush, sodium chloride, ondansetron **OR** ondansetron, polyethylene glycol, acetaminophen **OR** acetaminophen, hydrALAZINE   sodium chloride        Allergies: Allergies   Allergen Reactions    Penicillins Hives, Rash and Other (See Comments)     Other reaction(s): Generalized rash, Respiratory distress, Urticaria, Syncope, Syncope, Unknown    Gadolinium Hives    Iodine Hives    Iv [Iodides] Hives, Itching and Rash      Review of Systems:       [x] CV, Resp, Neuro, , and all other systems reviewed and negative other than listed in HPI. Objective Findings:     Vitals:   Vitals:    02/11/23 1919 02/11/23 1951 02/12/23 0303 02/12/23 0818   BP: 122/63  127/65 124/61   Pulse: 59 92 61 70   Resp: 20 18  20   Temp: 97.2 °F (36.2 °C)   97.3 °F (36.3 °C)   TempSrc: Oral   Oral   SpO2: 96% 96% (!) 88% 94%   Weight:       Height:            Physical Examination:  General: Alert and oriented x4 not acute distress  HEENT: Normocephalic, no scleral icterus. Neck: No JVD. Heart: Regular, no murmur, no rub/gallop. No RV heave. Lungs: Clear to ascultation, no rales/wheezing/rhonchi. Good chest wall excursion. Abdomen: Soft nontender nondistended  Extremities: No clubbing/cyanosis, trace edema. Skin: Warm, dry, normal turgor, no rash, no bruise, no petichiae. Neuro: No myoclonus or tremor.    Psych: Normal affect    Results/ Medications reviewed 2/12/2023, 9:19 AM     Laboratory, Microbiology, Pathology, Radiology, Cardiology, Medications and Transcriptions reviewed  Scheduled Meds:   amLODIPine  2.5 mg Oral Daily    aspirin  81 mg Oral Daily    sodium chloride flush  5-40 mL IntraVENous 2 times per day    enoxaparin  30 mg SubCUTAneous BID    furosemide  20 mg IntraVENous BID    atorvastatin  40 mg Oral Nightly    metoprolol tartrate  25 mg Oral BID Continuous Infusions:   sodium chloride         Recent Labs     02/10/23  1800 02/11/23  0449 02/12/23 0449   WBC 6.7 3.2* 7.0   HGB 10.7* 10.8* 11.1*   HCT 34.3* 34.4* 36.4*   MCV 79.5 79.9 81.3    258 280       Recent Labs     02/11/23  0449 02/11/23  0919 02/12/23 0449   * 134* 135   K 5.3* 4.7 4.0   CL 96 98 96   CO2 25 31 31   BUN 27* 27* 33*   CREATININE 1.58* 1.74* 1.81*       Recent Labs     02/10/23  1800 02/11/23 0449 02/12/23 0449   PROT 7.0 7.2 6.9       No results found for this or any previous visit. Impression:   Acute on chronic heart failure with preserved ejection fraction EF 65% by TTE 2/11/2023. BNP 1800  CAD status post CABG  COPD  Hypertension  Hyperlipidemia  Obesity  CKD  TTE 2/11/2023 EF 65%  Plan:   Continue telemetry  Continue aspirin and high intensity statin for secondary prevention of CAD  Follow-up device interrogation  Continue Lasix 20 mg IV twice daily. Please trend creatinine daily  Strict ins and outs and daily weights  Management of COPD as per primary team  Switch metoprolol to Coreg 12.5 mg p.o. twice daily  No ACE or ARB in the setting of CKD  After discharge patient can follow-up with me in clinic in 2 weeks  Comments: Thank you for allowing us to participate in the care of this patient. Will continue to follow. Please call if questions or concerns arise. Of note patient was examined and evaluated on 2/11/2023, note was completed on 2/12/2023    Electronically signed by Mira Seymour MD on 2/12/2023 at 9:19 AM    Please note this report has been partially produced using speech recognition software and may cause contain errors related to that system including grammar, punctuation and spelling as well as words and phrases that may seem inappropriate. If there are questions or concerns please feel free to contact me to clarify.

## 2023-02-12 NOTE — CONSULTS
Pulmonary Medicine  Consult Note      Reason for consultation: COPD    Consulting physician: Dr. Helena Garcia:      This is 71-year-old male with history of coronary artery disease, diabetes mellitus, hypertension, hyperlipidemia, COPD, sleep apnea, he was presented with increased shortness of breath. He had CPAP therapy but his CPAP broke about 4 months ago and since then he has been having progressively worsening shortness of breath. He denies any chest pain or pleuritic pain. No nausea vomiting diarrhea or abdominal pain. He said he is feeling tired. He has increased swelling in the lower extremity. No fever or chills. Past Medical History:        Diagnosis Date    CAD (coronary artery disease)     Chronic kidney disease     COPD (chronic obstructive pulmonary disease) ([facility] [address]     Diabetes mellitus ([facility] [address]     Diabetic neuropathy ([facility] [address]     Hyperlipidemia     Hypertension        Past Surgical History:        Procedure Laterality Date    COLON SURGERY      CORONARY ANGIOPLASTY WITH STENT PLACEMENT      CORONARY ARTERY BYPASS GRAFT      PACEMAKER PLACEMENT         Social History:     reports that he has quit smoking. He has never used smokeless tobacco. He reports current alcohol use. He reports that he does not use drugs. Family History:   History reviewed. No pertinent family history.     Allergies:  Penicillins, Gadolinium, Iodine, and Iv [iodides]        MEDICATIONS during current hospitalization:    Continuous Infusions:   dextrose      sodium chloride         Scheduled Meds:   carvedilol  12.5 mg Oral BID    insulin lispro  0-4 Units SubCUTAneous TID WC    insulin lispro  0-4 Units SubCUTAneous Nightly    miconazole   Topical BID    aspirin  81 mg Oral Daily    sodium chloride flush  5-40 mL IntraVENous 2 times per day    enoxaparin  30 mg SubCUTAneous BID    furosemide  20 mg IntraVENous BID    atorvastatin  40 mg Oral Nightly       PRN Meds:glucose, dextrose bolus **OR** dextrose bolus, glucagon (rDNA), dextrose, ipratropium-albuterol, albuterol, sodium chloride flush, sodium chloride, ondansetron **OR** ondansetron, polyethylene glycol, acetaminophen **OR** acetaminophen, hydrALAZINE    REVIEW OF SYSTEMS:  As in history of present illness  Other 14 point review of system is negative. PHYSICAL EXAM:    Vitals:  /66   Pulse 61   Temp 97.3 °F (36.3 °C)   Resp 18   Ht 5' 6\" (1.676 m)   Wt 235 lb (106.6 kg)   SpO2 100%   BMI 37.93 kg/m²   General:   Alert, awake, Oriented x3  .comfortable in bed, No distress. Head: Atraumatic , Normocephalic   Eyes: PERRL. No sclera icterus. No conjunctival injection. No discharge   ENT: No nasal  discharge. Pharynx clear. Neck:  Trachea midline. No thyromegaly, no JVD, No cervical adenopathy. Chest : Bilaterally symmetrical ,Normal effort,  No accessory muscle use  Lung : Diminished BS bilateraly. No Rales. Few scattered wheezing. No rhonchi. Heart[de-identified] Normal  rate. Regular rhythm. No mumur ,  Rub or gallop  ABD: Distended due to obesity. No masses. No organmegaly. Normal bowel sounds. No hernia. Extremities: 1+ pitting in both lower leg , No Cyanosis ,No clubbing  Neuro: no cranial nerve abnormality, normal reflex and sensation, no focal weakness   Skin: Warm and dry. No erythema rash on exposed extremities. Data Review  Recent Labs     02/10/23  1800 02/11/23 0449 02/12/23 0449   WBC 6.7 3.2* 7.0   HGB 10.7* 10.8* 11.1*   HCT 34.3* 34.4* 36.4*    258 280      Recent Labs     02/11/23  0449 02/11/23  0919 02/12/23 0449   * 134* 135   K 5.3* 4.7 4.0   CL 96 98 96   CO2 25 31 31   BUN 27* 27* 33*   CREATININE 1.58* 1.74* 1.81*   GLUCOSE 250* 160* 122*       MV Settings: ABGs: No results for input(s): PHART, EPO9IMT, PO2ART, YIA0DLI, BEART, C1WZSFLU, ZLE2QGQ in the last 72 hours.   O2 Device: Nasal cannula  O2 Flow Rate (L/min): 3.5 L/min  No results found for: [address]    Radiology  XR CHEST PORTABLE    Result Date: 2/10/2023  EXAMINATION: ONE XRAY VIEW OF THE CHEST 2/10/2023 5:17 pm COMPARISON: None. HISTORY: ORDERING SYSTEM PROVIDED HISTORY: SOB TECHNOLOGIST PROVIDED HISTORY: Reason for exam:->SOB What reading provider will be dictating this exam?->CRC FINDINGS: The lungs are without acute focal process. There is no effusion or pneumothorax. Cardiomegaly. Sternotomy wires noted. Right-sided transvenous pacer device. The osseous structures are without acute process. No acute process. Assessment and  plan:     Acute decompensated congestive heart failure   COPD with mild exacerbation  Obesity, MORENA, currently not on CPAP  Coronary artery disease status post CABG  Cardiomyopathy status post ICD  Hypertension  Hyperlipidemia  Chronic back pain  Diabetes mellitus    Continue nebulizer with DuoNeb every 4 hours as needed albuterol every 2 hours as needed. He is on 3.5 L O2 via nasal cannula and his O2 saturation 100%. He is not having any wheezing at this time. Chest x-ray showing no acute process. He does have a swelling in lower extremity and likely gradually worsening shortness of breath due to decompensated heart failure and mild COPD exacerbation. Will use auto CPAP 10 to 15 cm tonight with 3 L O2 bleed. Diuretic therapy as per cardiology. We will follow    Thank you for this consult    NOTE: This report was transcribed using voice recognition software. Every effort was made to ensure accuracy; however, inadvertent computerized transcription errors may be present.     Electronically signed by Eb Tilley MD, FCCP on 2/12/2023 at 1:45 PM

## 2023-02-13 ENCOUNTER — APPOINTMENT (OUTPATIENT)
Dept: GENERAL RADIOLOGY | Age: 78
DRG: 189 | End: 2023-02-13
Payer: MEDICARE

## 2023-02-13 PROBLEM — J96.02 ACUTE HYPERCAPNIC RESPIRATORY FAILURE (HCC): Status: ACTIVE | Noted: 2023-02-13

## 2023-02-13 LAB
ALBUMIN SERPL-MCNC: 2.7 G/DL (ref 3.5–4.6)
ALP BLD-CCNC: 117 U/L (ref 35–104)
ALT SERPL-CCNC: 9 U/L (ref 0–41)
ANION GAP SERPL CALCULATED.3IONS-SCNC: 9 MEQ/L (ref 9–15)
AST SERPL-CCNC: 12 U/L (ref 0–40)
BASE EXCESS ARTERIAL: 10 (ref -3–3)
BASE EXCESS ARTERIAL: 10 (ref -3–3)
BASE EXCESS ARTERIAL: 8 (ref -3–3)
BASOPHILS ABSOLUTE: 0 K/UL (ref 0–0.2)
BASOPHILS RELATIVE PERCENT: 0.7 %
BILIRUB SERPL-MCNC: 0.4 MG/DL (ref 0.2–0.7)
BUN BLDV-MCNC: 42 MG/DL (ref 8–23)
CALCIUM IONIZED: 1.24 MMOL/L (ref 1.12–1.32)
CALCIUM IONIZED: 1.25 MMOL/L (ref 1.12–1.32)
CALCIUM IONIZED: 1.28 MMOL/L (ref 1.12–1.32)
CALCIUM SERPL-MCNC: 8.5 MG/DL (ref 8.5–9.9)
CHLORIDE BLD-SCNC: 96 MEQ/L (ref 95–107)
CO2: 30 MEQ/L (ref 20–31)
CREAT SERPL-MCNC: 1.83 MG/DL (ref 0.7–1.2)
EKG ATRIAL RATE: 375 BPM
EKG ATRIAL RATE: 75 BPM
EKG P AXIS: 69 DEGREES
EKG P-R INTERVAL: 234 MS
EKG Q-T INTERVAL: 422 MS
EKG Q-T INTERVAL: 428 MS
EKG QRS DURATION: 100 MS
EKG QRS DURATION: 152 MS
EKG QTC CALCULATION (BAZETT): 464 MS
EKG QTC CALCULATION (BAZETT): 477 MS
EKG R AXIS: -32 DEGREES
EKG R AXIS: -33 DEGREES
EKG T AXIS: 78 DEGREES
EKG T AXIS: 9 DEGREES
EKG VENTRICULAR RATE: 73 BPM
EKG VENTRICULAR RATE: 75 BPM
EOSINOPHILS ABSOLUTE: 0.3 K/UL (ref 0–0.7)
EOSINOPHILS RELATIVE PERCENT: 4.2 %
GFR SERPL CREATININE-BSD FRML MDRD: 26 ML/MIN/{1.73_M2}
GFR SERPL CREATININE-BSD FRML MDRD: 26 ML/MIN/{1.73_M2}
GFR SERPL CREATININE-BSD FRML MDRD: 29 ML/MIN/{1.73_M2}
GFR SERPL CREATININE-BSD FRML MDRD: 37.7 ML/MIN/{1.73_M2}
GLOBULIN: 3.8 G/DL (ref 2.3–3.5)
GLUCOSE BLD-MCNC: 104 MG/DL (ref 70–99)
GLUCOSE BLD-MCNC: 116 MG/DL (ref 70–99)
GLUCOSE BLD-MCNC: 143 MG/DL (ref 70–99)
GLUCOSE BLD-MCNC: 165 MG/DL (ref 70–99)
GLUCOSE BLD-MCNC: 201 MG/DL (ref 70–99)
GLUCOSE BLD-MCNC: 204 MG/DL (ref 70–99)
GLUCOSE BLD-MCNC: 204 MG/DL (ref 70–99)
HCO3 ARTERIAL: 35.8 MMOL/L (ref 21–29)
HCO3 ARTERIAL: 36 MMOL/L (ref 21–29)
HCO3 ARTERIAL: 36.1 MMOL/L (ref 21–29)
HCT VFR BLD CALC: 34.4 % (ref 42–52)
HEMOGLOBIN: 10.5 G/DL (ref 14–18)
HEMOGLOBIN: 11.6 GM/DL (ref 13.5–17.5)
HEMOGLOBIN: 12.1 GM/DL (ref 13.5–17.5)
HEMOGLOBIN: 12.5 GM/DL (ref 13.5–17.5)
LACTATE: 0.41 MMOL/L (ref 0.4–2)
LACTATE: 0.54 MMOL/L (ref 0.4–2)
LACTATE: 0.74 MMOL/L (ref 0.4–2)
LYMPHOCYTES ABSOLUTE: 0.5 K/UL (ref 1–4.8)
LYMPHOCYTES RELATIVE PERCENT: 8.3 %
MCH RBC QN AUTO: 24.7 PG (ref 27–31.3)
MCHC RBC AUTO-ENTMCNC: 30.5 % (ref 33–37)
MCV RBC AUTO: 81.1 FL (ref 79–92.2)
MONOCYTES ABSOLUTE: 0.6 K/UL (ref 0.2–0.8)
MONOCYTES RELATIVE PERCENT: 9.6 %
NEUTROPHILS ABSOLUTE: 5 K/UL (ref 1.4–6.5)
NEUTROPHILS RELATIVE PERCENT: 77.2 %
O2 SAT, ARTERIAL: 95 % (ref 93–100)
O2 SAT, ARTERIAL: 97 % (ref 93–100)
O2 SAT, ARTERIAL: 98 % (ref 93–100)
PCO2 ARTERIAL: 69 MM HG (ref 35–45)
PCO2 ARTERIAL: 75 MM HG (ref 35–45)
PCO2 ARTERIAL: 89 MM HG (ref 35–45)
PDW BLD-RTO: 19 % (ref 11.5–14.5)
PERFORMED ON: ABNORMAL
PH ARTERIAL: 7.22 (ref 7.35–7.45)
PH ARTERIAL: 7.29 (ref 7.35–7.45)
PH ARTERIAL: 7.33 (ref 7.35–7.45)
PLATELET # BLD: 247 K/UL (ref 130–400)
PO2 ARTERIAL: 119 MM HG (ref 75–108)
PO2 ARTERIAL: 125 MM HG (ref 75–108)
PO2 ARTERIAL: 86 MM HG (ref 75–108)
POC CHLORIDE: 95 MEQ/L (ref 99–110)
POC CHLORIDE: 96 MEQ/L (ref 99–110)
POC CHLORIDE: 96 MEQ/L (ref 99–110)
POC CREATININE: 2.3 MG/DL (ref 0.8–1.3)
POC CREATININE: 2.5 MG/DL (ref 0.8–1.3)
POC CREATININE: 2.5 MG/DL (ref 0.8–1.3)
POC FIO2: 40
POC FIO2: 50
POC FIO2: 50
POC HEMATOCRIT: 34 % (ref 41–53)
POC HEMATOCRIT: 36 % (ref 41–53)
POC HEMATOCRIT: 37 % (ref 41–53)
POC POTASSIUM: 4.3 MEQ/L (ref 3.5–5.1)
POC POTASSIUM: 4.4 MEQ/L (ref 3.5–5.1)
POC POTASSIUM: 4.6 MEQ/L (ref 3.5–5.1)
POC SAMPLE TYPE: ABNORMAL
POC SODIUM: 135 MEQ/L (ref 136–145)
POC SODIUM: 136 MEQ/L (ref 136–145)
POC SODIUM: 137 MEQ/L (ref 136–145)
POTASSIUM REFLEX MAGNESIUM: 4.6 MEQ/L (ref 3.4–4.9)
PROCALCITONIN: 0.14 NG/ML (ref 0–0.15)
RBC # BLD: 4.24 M/UL (ref 4.7–6.1)
SODIUM BLD-SCNC: 135 MEQ/L (ref 135–144)
TCO2 ARTERIAL: 38 MMOL/L (ref 21–32)
TCO2 ARTERIAL: 38 MMOL/L (ref 21–32)
TCO2 ARTERIAL: 39 MMOL/L (ref 21–32)
TOTAL PROTEIN: 6.5 G/DL (ref 6.3–8)
WBC # BLD: 6.4 K/UL (ref 4.8–10.8)

## 2023-02-13 PROCEDURE — 2700000000 HC OXYGEN THERAPY PER DAY

## 2023-02-13 PROCEDURE — 82565 ASSAY OF CREATININE: CPT

## 2023-02-13 PROCEDURE — 82435 ASSAY OF BLOOD CHLORIDE: CPT

## 2023-02-13 PROCEDURE — 93280 PM DEVICE PROGR EVAL DUAL: CPT

## 2023-02-13 PROCEDURE — 85025 COMPLETE CBC W/AUTO DIFF WBC: CPT

## 2023-02-13 PROCEDURE — 2580000003 HC RX 258: Performed by: INTERNAL MEDICINE

## 2023-02-13 PROCEDURE — 99233 SBSQ HOSP IP/OBS HIGH 50: CPT | Performed by: INTERNAL MEDICINE

## 2023-02-13 PROCEDURE — 83605 ASSAY OF LACTIC ACID: CPT

## 2023-02-13 PROCEDURE — 6360000002 HC RX W HCPCS: Performed by: INTERNAL MEDICINE

## 2023-02-13 PROCEDURE — 6370000000 HC RX 637 (ALT 250 FOR IP): Performed by: INTERNAL MEDICINE

## 2023-02-13 PROCEDURE — 36592 COLLECT BLOOD FROM PICC: CPT

## 2023-02-13 PROCEDURE — 82948 REAGENT STRIP/BLOOD GLUCOSE: CPT

## 2023-02-13 PROCEDURE — 84132 ASSAY OF SERUM POTASSIUM: CPT

## 2023-02-13 PROCEDURE — 84295 ASSAY OF SERUM SODIUM: CPT

## 2023-02-13 PROCEDURE — 71045 X-RAY EXAM CHEST 1 VIEW: CPT

## 2023-02-13 PROCEDURE — 94660 CPAP INITIATION&MGMT: CPT

## 2023-02-13 PROCEDURE — 99212 OFFICE O/P EST SF 10 MIN: CPT

## 2023-02-13 PROCEDURE — 82803 BLOOD GASES ANY COMBINATION: CPT

## 2023-02-13 PROCEDURE — 82330 ASSAY OF CALCIUM: CPT

## 2023-02-13 PROCEDURE — 80053 COMPREHEN METABOLIC PANEL: CPT

## 2023-02-13 PROCEDURE — 96376 TX/PRO/DX INJ SAME DRUG ADON: CPT

## 2023-02-13 PROCEDURE — 36600 WITHDRAWAL OF ARTERIAL BLOOD: CPT

## 2023-02-13 PROCEDURE — 36415 COLL VENOUS BLD VENIPUNCTURE: CPT

## 2023-02-13 PROCEDURE — 84145 PROCALCITONIN (PCT): CPT

## 2023-02-13 PROCEDURE — 85014 HEMATOCRIT: CPT

## 2023-02-13 PROCEDURE — 2000000000 HC ICU R&B

## 2023-02-13 RX ORDER — LIDOCAINE 40 MG/G
CREAM TOPICAL PRN
Status: DISCONTINUED | OUTPATIENT
Start: 2023-02-13 | End: 2023-02-17 | Stop reason: HOSPADM

## 2023-02-13 RX ORDER — SODIUM CHLORIDE, SODIUM LACTATE, POTASSIUM CHLORIDE, AND CALCIUM CHLORIDE .6; .31; .03; .02 G/100ML; G/100ML; G/100ML; G/100ML
250 INJECTION, SOLUTION INTRAVENOUS ONCE
Status: COMPLETED | OUTPATIENT
Start: 2023-02-13 | End: 2023-02-13

## 2023-02-13 RX ORDER — FUROSEMIDE 40 MG/1
40 TABLET ORAL 2 TIMES DAILY
Status: DISCONTINUED | OUTPATIENT
Start: 2023-02-13 | End: 2023-02-17 | Stop reason: HOSPADM

## 2023-02-13 RX ADMIN — CARVEDILOL 12.5 MG: 12.5 TABLET, FILM COATED ORAL at 08:55

## 2023-02-13 RX ADMIN — FUROSEMIDE 40 MG: 40 TABLET ORAL at 18:01

## 2023-02-13 RX ADMIN — CARVEDILOL 12.5 MG: 12.5 TABLET, FILM COATED ORAL at 20:41

## 2023-02-13 RX ADMIN — Medication 10 ML: at 20:43

## 2023-02-13 RX ADMIN — FUROSEMIDE 20 MG: 10 INJECTION, SOLUTION INTRAMUSCULAR; INTRAVENOUS at 08:55

## 2023-02-13 RX ADMIN — SODIUM CHLORIDE, POTASSIUM CHLORIDE, SODIUM LACTATE AND CALCIUM CHLORIDE 250 ML: 600; 310; 30; 20 INJECTION, SOLUTION INTRAVENOUS at 22:18

## 2023-02-13 RX ADMIN — APIXABAN 5 MG: 5 TABLET, FILM COATED ORAL at 20:41

## 2023-02-13 RX ADMIN — ATORVASTATIN CALCIUM 40 MG: 40 TABLET, FILM COATED ORAL at 20:41

## 2023-02-13 RX ADMIN — ASPIRIN 81 MG: 81 TABLET, COATED ORAL at 08:55

## 2023-02-13 ASSESSMENT — PAIN SCALES - GENERAL
PAINLEVEL_OUTOF10: 4
PAINLEVEL_OUTOF10: 0
PAINLEVEL_OUTOF10: 0

## 2023-02-13 ASSESSMENT — PAIN DESCRIPTION - DESCRIPTORS: DESCRIPTORS: TENDER

## 2023-02-13 ASSESSMENT — PAIN DESCRIPTION - PAIN TYPE: TYPE: ACUTE PAIN

## 2023-02-13 ASSESSMENT — PAIN DESCRIPTION - LOCATION: LOCATION: FACE

## 2023-02-13 NOTE — FLOWSHEET NOTE
Nurse in because pt had his call light on. Pt awake and alert. Respiratory in and dealing with pts bipap. Pt is to have repeat blood gases this am but is not happy about being poked again. Staff attempted to reassure the pt. Pt repositioned in bed for breakfast.  Respiratory has been messaging the hospitalist about the pts resp status. Pt in no resp distress at this time. 0930  Pt assisted to the bsc to void. Pt tolerated the activity well with bipap on. 1030   Pt resting off and on. Is wearing his bipap for the most part. Pt does call freq for multiple requests. 1115  Repeat abg's done. Bipap settings were changed earlier. Pt complains about discomfort of mask but does wear it. Mask currently off as pt is eating lunch. 1330 Pt transferred to icu. Nurse did speak with the pts dtr and updated her on pts status.

## 2023-02-13 NOTE — FLOWSHEET NOTE
1340 pt to ICU from 1W report at bedside. On BIPAP 16/8 40% pt a&ox4 expresses dislike of BIPAP but willing to wear it. LS dim moist NP cough. Abd s/nt bs+. Excoriation noted to R groin. Bruising on arms, abrasions to knees. Skin inspected with [facility], and Erika Hudson. 1500 assisted per PCA up to [facility], pt tolerated well no desaturations minimal SOB. 1620 removed from BIPAP per pt request, given water. On 4L NC. Pt awaiting dinner, vss. No needs. Pt daughter in , updated. Electronically signed by Micky Roque RN on 2/13/2023 at 4:38 PM    1730 up to [facility], + BM. Back to bed. 1830 up to recliner per request. VSS. Chair alarm in place.  Electronically signed by Micky Roque RN on 2/13/2023 at 7:40 PM

## 2023-02-13 NOTE — PROGRESS NOTES
Cardiology progress note    Patient Name: Navya Estimable Date: 2/10/2023  4:55 PM  MR #: [id number]  : 1945    Attending Physician: Antelmo Rae MD  Reason for consult: Heart failure    History of Presenting Illness:      Liliana Calzada is a 69 y.o. male on hospital day 0 with a history of CAD status post CABG, ischemic cardiomyopathy status post ICD placement, hypertension, hyperlipidemia, diabetes, COPD on 3 L of oxygen at home obese who presents to the ER for shortness of breath. History Obtained From:  patient, electronic medical record    BNP 1800  EKG showing ventricular paced  ==============  Hospital course  2023  Patient laying in bed looks comfortable    2023  Patient laying in bed looks comfortable  Denies chest pain  Reports that shortness of breath is improving  Patient was net -2 L last 24 hours  History:      Past Medical History:   Diagnosis Date    CAD (coronary artery disease)     Chronic kidney disease     COPD (chronic obstructive pulmonary disease) (HCC)     Diabetes mellitus ([facility] [address]     Diabetic neuropathy ([facility] [address]     Hyperlipidemia     Hypertension      Past Surgical History:   Procedure Laterality Date    COLON SURGERY      CORONARY ANGIOPLASTY WITH STENT PLACEMENT      CORONARY ARTERY BYPASS GRAFT      PACEMAKER PLACEMENT       Family History  History reviewed. No pertinent family history.     Social History     Socioeconomic History    Marital status: Legally      Spouse name: Not on file    Number of children: Not on file    Years of education: Not on file    Highest education level: Not on file   Occupational History    Not on file   Tobacco Use    Smoking status: Former    Smokeless tobacco: Never   Vaping Use    Vaping Use: Never used   Substance and Sexual Activity    Alcohol use: Yes     Comment: rare    Drug use: Never    Sexual activity: Not Currently   Other Topics Concern    Not on file   Social History Narrative    Not on file     Social Determinants of Health     Financial Resource Strain: Not on file   Food Insecurity: Not on file   Transportation Needs: Not on file   Physical Activity: Not on file   Stress: Not on file   Social Connections: Not on file   Intimate Partner Violence: Not on file   Housing Stability: Not on file     Home Medications:      Medications Prior to Admission: Omega-3 Fatty Acids (FISH OIL) 1000 MG capsule, Take by mouth daily  gabapentin (NEURONTIN) 300 MG capsule, Take 1 capsule by mouth nightly for 30 days. Alpha-Lipoic Acid 600 MG CAPS, Take 600 mg by mouth daily (Patient not taking: Reported on 2/11/2023)  naloxone 4 MG/0.1ML LIQD nasal spray, 1 spray by Nasal route as needed for Opioid Reversal (Patient not taking: Reported on 2/11/2023)  ferrous sulfate (IRON 325) 325 (65 Fe) MG tablet, ferrous sulfate 325 mg (65 mg iron) tablet  Take 1 tablet every day by oral route for 30 days. metroNIDAZOLE (FLAGYL) 500 MG tablet, TAKE 1 TABLET BY MOUTH EVERY 8 HOURS FOR 10 DAYS (Patient not taking: Reported on 2/11/2023)  nitroGLYCERIN (NITROSTAT) 0.4 MG SL tablet,   tiZANidine (ZANAFLEX) 2 MG tablet, Take 1 tablet by mouth every 8 hours as needed (neck pain)  clobetasol (TEMOVATE) 0.05 % ointment, Apply topically 2 times daily for 2 weeks for rash.  Do not use on face or neck  ketoconazole (NIZORAL) 2 % cream, APPLY TO THE skin rash twice daily FOR 4 weeks  atorvastatin (LIPITOR) 40 MG tablet, TAKE 1 TABLET BY MOUTH ONCE DAILY AT BEDTIME  [DISCONTINUED] levoFLOXacin (LEVAQUIN) 750 MG tablet, TAKE 1 TABLET BY MOUTH EVERY DAY  metFORMIN (GLUCOPHAGE) 500 MG tablet, Take 500 mg by mouth daily (with breakfast)  amLODIPine (NORVASC) 2.5 MG tablet, Take 2.5 mg by mouth daily  aspirin 81 MG EC tablet, Take 81 mg by mouth daily    Current Hospital Medications:   Scheduled Meds:   carvedilol  12.5 mg Oral BID    insulin lispro  0-4 Units SubCUTAneous TID WC    insulin lispro  0-4 Units SubCUTAneous Nightly    miconazole   Topical BID    aspirin  81 mg Oral Daily    sodium chloride flush  5-40 mL IntraVENous 2 times per day    enoxaparin  30 mg SubCUTAneous BID    furosemide  20 mg IntraVENous BID    atorvastatin  40 mg Oral Nightly     Continuous Infusions:   dextrose      sodium chloride       PRN Meds:.lidocaine, glucose, dextrose bolus **OR** dextrose bolus, glucagon (rDNA), dextrose, ipratropium-albuterol, albuterol, sodium chloride flush, sodium chloride, ondansetron **OR** ondansetron, polyethylene glycol, acetaminophen **OR** acetaminophen, hydrALAZINE   dextrose      sodium chloride        Allergies: Allergies   Allergen Reactions    Penicillins Hives, Rash and Other (See Comments)     Other reaction(s): Generalized rash, Respiratory distress, Urticaria, Syncope, Syncope, Unknown    Gadolinium Hives    Iodine Hives    Iv [Iodides] Hives, Itching and Rash      Review of Systems:       [x] CV, Resp, Neuro, , and all other systems reviewed and negative other than listed in HPI. Objective Findings:     Vitals:   Vitals:    02/12/23 2029 02/12/23 2140 02/13/23 0015 02/13/23 0508   BP:  125/80     Pulse:  59     Resp: 23 22 24   Temp:       TempSrc:       SpO2:       Weight:       Height:            Physical Examination:  General: Alert and oriented x4 not acute distress  HEENT: Normocephalic, no scleral icterus. Neck: No JVD. Heart: Regular, no murmur, no rub/gallop. No RV heave. Lungs: Clear to ascultation, no rales/wheezing/rhonchi. Good chest wall excursion. Abdomen: Soft nontender nondistended  Extremities: No clubbing/cyanosis, trace edema. Skin: Warm, dry, normal turgor, no rash, no bruise, no petichiae. Neuro: No myoclonus or tremor.    Psych: Normal affect    Results/ Medications reviewed 2/13/2023, 10:13 AM     Laboratory, Microbiology, Pathology, Radiology, Cardiology, Medications and Transcriptions reviewed  Scheduled Meds:   carvedilol  12.5 mg Oral BID    insulin lispro  0-4 Units SubCUTAneous TID     insulin lispro  0-4 Units SubCUTAneous Nightly    miconazole   Topical BID    aspirin  81 mg Oral Daily    sodium chloride flush  5-40 mL IntraVENous 2 times per day    enoxaparin  30 mg SubCUTAneous BID    furosemide  20 mg IntraVENous BID    atorvastatin  40 mg Oral Nightly     Continuous Infusions:   dextrose      sodium chloride         Recent Labs     02/11/23 0449 02/12/23 0449 02/12/23 1647 02/12/23 2352 02/13/23  0500 02/13/23  0812   WBC 3.2* 7.0  --   --  6.4  --    HGB 10.8* 11.1*   < > 12.1* 10.5* 12.5*   HCT 34.4* 36.4*  --   --  34.4*  --    MCV 79.9 81.3  --   --  81.1  --     280  --   --  247  --     < > = values in this interval not displayed. Recent Labs     02/11/23  0919 02/12/23 0449 02/12/23 1647 02/12/23 2352 02/13/23  0500 02/13/23  0812   * 135  --   --  135  --    K 4.7 4.0  --   --  4.6  --    CL 98 96  --   --  96  --    CO2 31 31  --   --  30  --    BUN 27* 33*  --   --  42*  --    CREATININE 1.74* 1.81*   < > 2.3* 1.83* 2.5*    < > = values in this interval not displayed. Recent Labs     02/11/23 0449 02/12/23 0449 02/13/23  0500   PROT 7.2 6.9 6.5     No results found for this or any previous visit. Impression:   Acute on chronic heart failure with preserved ejection fraction EF 65% by TTE 2/11/2023.   BNP 1800  Atrial fibrillation found on pacemaker interrogation on 2/13/2023  Sick sinus syndrome status post pacemaker placement  CAD status post CABG  COPD  Hypertension  Hyperlipidemia  Obesity  CKD  TTE 2/11/2023 EF 65%  Pacemaker interrogation no major arrhythmias  Plan:   Continue telemetry  Continue aspirin and high intensity statin for secondary prevention of CAD  Switch Lasix IV to p.o. preparation to discharge  Please trend creatinine daily  Strict ins and outs and daily weights  Management of COPD as per primary team  Continue Coreg  Start Eliquis for cardioembolic prophylaxis  No ACE or ARB in the setting of CKD  Follow-up with me in clinic in 2 weeks  Comments: Thank you for allowing us to participate in the care of this patient. Will continue to follow. Please call if questions or concerns arise. Of note patient was examined and evaluated on 2/11/2023, note was completed on 2/12/2023    Electronically signed by Mila Carrillo MD on 2/13/2023 at 10:13 AM    Please note this report has been partially produced using speech recognition software and may cause contain errors related to that system including grammar, punctuation and spelling as well as words and phrases that may seem inappropriate. If there are questions or concerns please feel free to contact me to clarify.

## 2023-02-13 NOTE — PROGRESS NOTES
Wound Ostomy Continence Nurse  Consult Note       NAME:  Annabel Puente  MEDICAL RECORD NUMBER:  [id number]  AGE: 69 y.o. GENDER: male  : 1945  TODAY'S DATE:  2023    Subjective   Reason for [address] Nurse Evaluation and Assessment: rash to abdominal folds      Annabel Puente is a 69 y.o. male referred by:   [x] Physician  [] Nursing  [] Other:     Wound Identification:  Wound Type:  moisture related skin breakdown  Contributing Factors: decreased mobility and obesity    Wound History: Patient admitted to [facility] on 2/10 with existing moisture related skin breakdown to lower right abdominal fold. Patient states he uses antifungal ointment at home which he feels works better for him than the powder. Current Wound Care Treatment:  1)) continue antifungal cream or powder to areas of skin breakdown to abdominal folds twice daily. 2) Pad and separate right lower abdominal skin fold with Inter dry or ABD pads. Patient Goal of Care:  [x] Wound Healing  [] Odor Control  [] Palliative Care  [] Pain Control   [] Other:         PAST MEDICAL HISTORY        Diagnosis Date    CAD (coronary artery disease)     Chronic kidney disease     COPD (chronic obstructive pulmonary disease) (HCC)     Diabetes mellitus ([facility] [address]     Diabetic neuropathy ([facility] [address]     Hyperlipidemia     Hypertension        PAST SURGICAL HISTORY    Past Surgical History:   Procedure Laterality Date    COLON SURGERY      CORONARY ANGIOPLASTY WITH STENT PLACEMENT      CORONARY ARTERY BYPASS GRAFT      PACEMAKER PLACEMENT         FAMILY HISTORY    History reviewed. No pertinent family history.     SOCIAL HISTORY    Social History     Tobacco Use    Smoking status: Former    Smokeless tobacco: Never   Vaping Use    Vaping Use: Never used   Substance Use Topics    Alcohol use: Yes     Comment: rare    Drug use: Never       ALLERGIES    Allergies   Allergen Reactions    Penicillins Hives, Rash and Other (See Comments)     Other reaction(s): Generalized rash, Respiratory distress, Urticaria, Syncope, Syncope, Unknown    Gadolinium Hives    Iodine Hives    Iv [Iodides] Hives, Itching and Rash       MEDICATIONS    No current facility-administered medications on file prior to encounter. Current Outpatient Medications on File Prior to Encounter   Medication Sig Dispense Refill    Omega-3 Fatty Acids (FISH OIL) 1000 MG capsule Take by mouth daily      gabapentin (NEURONTIN) 300 MG capsule Take 1 capsule by mouth nightly for 30 days. 30 capsule 0    Alpha-Lipoic Acid 600 MG CAPS Take 600 mg by mouth daily (Patient not taking: Reported on 2/11/2023) 30 capsule 0    naloxone 4 MG/0.1ML LIQD nasal spray 1 spray by Nasal route as needed for Opioid Reversal (Patient not taking: Reported on 2/11/2023) 1 each 0    ferrous sulfate (IRON 325) 325 (65 Fe) MG tablet ferrous sulfate 325 mg (65 mg iron) tablet   Take 1 tablet every day by oral route for 30 days. metroNIDAZOLE (FLAGYL) 500 MG tablet TAKE 1 TABLET BY MOUTH EVERY 8 HOURS FOR 10 DAYS (Patient not taking: Reported on 2/11/2023)      nitroGLYCERIN (NITROSTAT) 0.4 MG SL tablet       tiZANidine (ZANAFLEX) 2 MG tablet Take 1 tablet by mouth every 8 hours as needed (neck pain) 30 tablet 0    clobetasol (TEMOVATE) 0.05 % ointment Apply topically 2 times daily for 2 weeks for rash.  Do not use on face or neck 45 g 3    ketoconazole (NIZORAL) 2 % cream APPLY TO THE skin rash twice daily FOR 4 weeks 30 g 5    atorvastatin (LIPITOR) 40 MG tablet TAKE 1 TABLET BY MOUTH ONCE DAILY AT BEDTIME 90 tablet 3    metFORMIN (GLUCOPHAGE) 500 MG tablet Take 500 mg by mouth daily (with breakfast)      amLODIPine (NORVASC) 2.5 MG tablet Take 2.5 mg by mouth daily      aspirin 81 MG EC tablet Take 81 mg by mouth daily         Objective    /80   Pulse 59   Temp 97.9 °F (36.6 °C) (Axillary)   Resp 24   Ht 5' 6\" (1.676 m)   Wt 235 lb (106.6 kg)   SpO2 98%   BMI 37.93 kg/m²     LABS:  WBC:    Lab Results   Component Value Date/Time    WBC 6.4 02/13/2023 05:00 AM     H/H:    Lab Results   Component Value Date/Time    HGB 12.5 02/13/2023 08:12 AM    HCT 34.4 02/13/2023 05:00 AM     PTT:  No results found for: APTT, PTT[APTT}  PT/INR:  No results found for: PROTIME, INR  HgBA1c:  No results found for: LABA1C    Assessment   Reji Risk Score: Reji Scale Score: 20    Patient Active Problem List   Diagnosis    Presence of permanent cardiac pacemaker    Atherosclerosis of coronary artery without angina pectoris    Diabetic peripheral neuropathy (HCC)    Obstructive sleep apnea of adult    Type 2 diabetes mellitus without complication (HCC)    Steatosis of liver    Right bundle branch block    Old myocardial infarction    Morbid obesity ([address]    Generalized osteoarthritis    Bilateral hearing loss    Opioid use, unspecified with unspecified opioid-induced disorder    COPD with acute exacerbation ([address]    Acute decompensated heart failure ([address]       Assessment: Patient alert, oriented for this visit. Patient currently has Bipap mask in place  but able to communicate needs. Noted large area of redness with some intermittent excoriated to right lower abdominal fold. Patient reports this skin breakdown to be chronic. Discussed importance of keeping skin dry and clean. Patient has orders for miconazole power but states he prefers antifungal ointment. Ointment at bedside applied and area padded with inter dry. Plan   Plan of Care: Continue antifungal cream or powder to moisture associated skin breakdown in abdominal folds and pad folds with inter dry or ABD pads. Specialty Bed Required : No   [] Low Air Loss   [] Pressure Redistribution  [] Fluid Immersion  [] Bariatric  [] Other:     Current Diet: ADULT DIET;  Regular; 4 carb choices (60 gm/meal)  Dietician consult:  No    Discharge Plan:  Placement for patient upon discharge: home with support    Patient appropriate for Outpatient [address]: No    Referrals:  [] Social Worker  [] [facility]  [] Supplies  [] Other    Patient/Caregiver Teaching:  Level of patient/caregiver understanding able to:   [] Indicates understanding       [] Needs reinforcement  [] Unsuccessful      [x] Verbal Understanding  [] Demonstrated understanding       [] No evidence of learning  [] Refused teaching         [] N/A       Electronically signed by  Mulugeta Grimaldo RN (covering Wound/Ostomy Nurse)

## 2023-02-13 NOTE — PROGRESS NOTES
INPATIENT PROGRESS NOTES    PATIENT NAME: Janeth Bansal  MRN: [id number]  SERVICE DATE:  February 13, 2023   SERVICE TIME:  8:40 AM      PRIMARY SERVICE: Pulmonary Disease    CHIEF COMPLAIN: COPD, sleep apnea      INTERVAL HPI: Patient seen and examined at bedside, Interval Notes, orders reviewed. Nursing notes noted  Patient is on BiPAP therapy during sleep. He had ABG done this morning shows pH 7.289 PCO2 of 75 PO2 125 saturation 98 bicarb 36.1. Patient said he was on BiPAP not on CPAP which has not been working for last many months and he needs new machine but he said [place] is not helping to get new one. He has cough. He does feel short of breath. Denies having any chest pain. No fever or chills. No nausea vomiting diarrhea abdominal pain. He has swelling in lower extremity. OBJECTIVE    Body mass index is 37.93 kg/m². PHYSICAL EXAM:  Vitals:  /80   Pulse 59   Temp 97.9 °F (36.6 °C) (Axillary)   Resp 24   Ht 5' 6\" (1.676 m)   Wt 235 lb (106.6 kg)   SpO2 98%   BMI 37.93 kg/m²   General: Obese, alert, awake . comfortable in bed, No distress. Head: Atraumatic , Normocephalic   Eyes: PERRL. No sclera icterus. No conjunctival injection. No discharge   ENT: No nasal  discharge. Pharynx clear. Neck:  Trachea midline. No thyromegaly, no JVD, No cervical adenopathy. Chest : Bilaterally symmetrical ,Normal effort,  No accessory muscle use  Lung : . Fair BS bilateral, decreased BS at bases. No Rales. No wheezing. No rhonchi. Heart[de-identified] Normal  rate. Regular rhythm. No mumur ,  Rub or gallop  ABD: Non-tender. Non-distended. No masses. No organmegaly. Normal bowel sounds. No hernia.   Ext : 1+ pitting both leg , No Cyanosis No clubbing  Neuro: no focal weakness          DATA:   Recent Labs     02/12/23  0449 02/12/23  1647 02/13/23  0500 02/13/23  0812   WBC 7.0  --  6.4  --    HGB 11.1*   < > 10.5* 12.5*   HCT 36.4*  --  34.4*  --    MCV 81.3  --  81.1  --      --  247  --     < > = values in this interval not displayed. Recent Labs     02/12/23  0449 02/12/23  1647 02/13/23  0500 02/13/23  0812     --  135  --    K 4.0  --  4.6  --    CL 96  --  96  --    CO2 31  --  30  --    BUN 33*  --  42*  --    CREATININE 1.81*   < > 1.83* 2.5*   GLUCOSE 122*  --  116*  --    CALCIUM 8.5  --  8.5  --    PROT 6.9  --  6.5  --    LABALBU 3.1*  --  2.7*  --    BILITOT 0.3  --  0.4  --    ALKPHOS 125*  --  117*  --    AST 13  --  12  --    ALT 9  --  9  --    LABGLOM 38.2*   < > 37.7* 26*   GLOB 3.8*  --  3.8*  --     < > = values in this interval not displayed. MV Settings:     FiO2 : 50 %    Recent Labs     02/13/23  0812   PHART 7.289*   [id number] 75*   PO2ART 125*   [id number] 36.1*   BEART 10*   [id number] 98*       O2 Device: PAP (positive airway pressure)  O2 Flow Rate (L/min): 3.5 L/min    ADULT DIET; Regular; 4 carb choices (60 gm/meal)     MEDICATIONS during current hospitalization:    Continuous Infusions:   dextrose      sodium chloride         Scheduled Meds:   carvedilol  12.5 mg Oral BID    insulin lispro  0-4 Units SubCUTAneous TID WC    insulin lispro  0-4 Units SubCUTAneous Nightly    miconazole   Topical BID    aspirin  81 mg Oral Daily    sodium chloride flush  5-40 mL IntraVENous 2 times per day    enoxaparin  30 mg SubCUTAneous BID    furosemide  20 mg IntraVENous BID    atorvastatin  40 mg Oral Nightly       PRN Meds:lidocaine, glucose, dextrose bolus **OR** dextrose bolus, glucagon (rDNA), dextrose, ipratropium-albuterol, albuterol, sodium chloride flush, sodium chloride, ondansetron **OR** ondansetron, polyethylene glycol, acetaminophen **OR** acetaminophen, hydrALAZINE    Radiology  XR CHEST PORTABLE    Result Date: 2/10/2023  EXAMINATION: ONE XRAY VIEW OF THE CHEST 2/10/2023 5:17 pm COMPARISON: None.  HISTORY: ORDERING SYSTEM PROVIDED HISTORY: SOB TECHNOLOGIST PROVIDED HISTORY: Reason for exam:->SOB What reading provider will be dictating this exam?->CRC FINDINGS: The lungs are without acute focal process. There is no effusion or pneumothorax. Cardiomegaly. Sternotomy wires noted. Right-sided transvenous pacer device. The osseous structures are without acute process. No acute process. IMPRESSION AND SUGGESTION:  Acute decompensated congestive heart failure   COPD with mild exacerbation  Acute respiratory failure with hypercapnia  Obesity, MORENA, currently not on BiPAP at home  Coronary artery disease status post CABG  Cardiomyopathy status post ICD  Hypertension  Hyperlipidemia  Chronic back pain  Diabetes mellitus    Since she was on BiPAP at home which is not working for last 4 months and he needs new one. He said he is doing better with using BiPAP here. ABG done this morning shows pH 7.289 PCO2 75 PO2 125 saturation 90 and bicarb 36. He does have decompensated heart failure cardiology is following. He will be started on BiPAP with IPAP of 16 and EPAP of 8. .  Continue bronchodilator therapy. Diuretic therapy as per cardiology. Continue present treatment plan    NOTE: This report was transcribed using voice recognition software. Every effort was made to ensure accuracy; however, inadvertent computerized transcription errors may be present.       Electronically signed by Kay Luz MD, FCCP on 2/13/2023 at 8:40 AM

## 2023-02-13 NOTE — DISCHARGE INSTRUCTIONS
PLEASE CALL MARY ANN TO CHANGE SERVICES FROM [place] TO [facility]  [phone]    PLEASE FOLLOW UP WITH DR Tam Rothman, AND ALSO FOLLOW UP WITH [facility].

## 2023-02-13 NOTE — PROGRESS NOTES
Progress Note  Date:2023       Room:[place] [address]-  Patient Aidan Do     YOB: 1945     Age:77 y.o. Subjective      Patient is requesting to be intubated rather than wear his bipap; he is very upset about needing bipap and ABGs    Objective         Vitals Last 24 Hours:  TEMPERATURE:  Temp  Av.9 °F (36.6 °C)  Min: 97.9 °F (36.6 °C)  Max: 97.9 °F (36.6 °C)  RESPIRATIONS RANGE: Resp  Av.2  Min: 20  Max: 30  PULSE OXIMETRY RANGE: SpO2  Av %  Min: 98 %  Max: 98 %  PULSE RANGE: Pulse  Av  Min: 59  Max: 61  BLOOD PRESSURE RANGE: Systolic (46QCG), FFV:685 , Min:99 , CUJ:670   ; Diastolic (20HSN), ATN:13, Min:44, Max:80    I/O (24Hr): Intake/Output Summary (Last 24 hours) at 2023 1206  Last data filed at 2023 0015  Gross per 24 hour   Intake 175 ml   Output --   Net 175 ml       Objective:  Vital signs: (most recent): Blood pressure 125/80, pulse 59, temperature 97.9 °F (36.6 °C), temperature source Axillary, resp. rate 26, height 5' 6\" (1.676 m), weight 235 lb (106.6 kg), SpO2 98 %. Constitutional: On bipap  Head: NCAT  Neck: Supple, trachea midline  Cardiovascular: RRR. Pulmonary: Bilateral basilar rhonchi   Abdomen: Obese, soft, nontense. Neurologic: Nonfocal.    Labs/Imaging/Diagnostics    Labs:  CBC:  Recent Labs     23  0449 23  0449 23  1647 23  0500 23  0812 23  1059   WBC 3.2* 7.0  --  6.4  --   --    RBC 4.30* 4.48*  --  4.24*  --   --    HGB 10.8* 11.1*   < > 10.5* 12.5* 11.6*   HCT 34.4* 36.4*  --  34.4*  --   --    MCV 79.9 81.3  --  81.1  --   --    RDW 19.4* 19.3*  --  19.0*  --   --     280  --  247  --   --     < > = values in this interval not displayed.        CHEMISTRIES:  Recent Labs     02/10/23  1800 23  0449 23  0919 23  0449 23  1647 23  0500 23  0812 23  1059   *   < > 134* 135  --  135  --   --    K 4.9   < > 4.7 4.0  --  4.6  --   -- CL 93*   < > 98 96  --  96  --   --    CO2 29   < > 31 31  --  30  --   --    BUN 27*   < > 27* 33*  --  42*  --   --    CREATININE 1.76*   < > 1.74* 1.81*   < > 1.83* 2.5* 2.5*   GLUCOSE 138*   < > 160* 122*  --  116*  --   --    MG 2.1  --   --   --   --   --   --   --     < > = values in this interval not displayed. PT/INR:No results for input(s): PROTIME, INR in the last 72 hours. APTT:No results for input(s): APTT in the last 72 hours. LIVER PROFILE:  Recent Labs     02/11/23  0449 02/12/23  0449 02/13/23  0500   AST 29 13 12   ALT 12 9 9   BILITOT 0.3 0.3 0.4   ALKPHOS 135* 125* 117*         Imaging Last 24 Hours:  XR CHEST PORTABLE    Result Date: 2/10/2023  EXAMINATION: ONE XRAY VIEW OF THE CHEST 2/10/2023 5:17 pm COMPARISON: None. HISTORY: ORDERING SYSTEM PROVIDED HISTORY: SOB TECHNOLOGIST PROVIDED HISTORY: Reason for exam:->SOB What reading provider will be dictating this exam?->CRC FINDINGS: The lungs are without acute focal process. There is no effusion or pneumothorax. Cardiomegaly. Sternotomy wires noted. Right-sided transvenous pacer device. The osseous structures are without acute process. No acute process. Assessment//Plan           Hospital Problems             Last Modified POA    * (Principal) Acute decompensated heart failure ([address] 2/10/2023 Yes    COPD with acute exacerbation ([address] 2/12/2023 Yes    Acute hypercapnic respiratory failure ([address] 2/13/2023 Yes   Assessment & Plan    Acute decompensated heart failure  Exertional dyspnea, orthopnea, pitting edema in the lower extremities, bilateral rhonchi and crackles, mild elevated proBNP. Clinically patient is in heart failure on admission. History of COPD but unlikely primary etiology. Will treat for acute decompensated heart failure with Lasix. Start ACE and beta-blocker. Continue Lipitor. Echo ordered. Cardiology consult. 2/11: Continues to kofi. Appreciate Cardiology recs when available.     2/12: Continuing to diurese. Diuretic regimen and beta-blockers adjusted by cardiology. 2/13: On rescue bipap; patient is not tolerating it well; discussed with intensivist and transfer to ICU for precedex vs intubation    Elevated troponin  No known baseline-we will cycle on repeat EKG  2/11: Repeats trops negative    CKD with/without SHEKHAR (TBD)  Creatinine 1.76, GFR 39 on admission. Reported Hx prior HD x 6 months until \"kidneys improved\" and discharged from HD. Slightly elevated Cr from admit but minimally    Hypertension  Resume home medication. Lasix, ACE and BB added. Monitor blood pressure closely. 2/11: Improved    DM2  Sliding scale insulin. Goal -180 while admitted.        Blue Menjivar

## 2023-02-13 NOTE — CARE COORDINATION
SPOKE WITH MARY ANN, PT DOES NOT HAVE BIPAP THRU THIS COMPANY, HIS O2 CONCENTRATOR IS THRU [facility] IN [place]. PT WILL NEED TO CALL AND TRANSFER SERVICES TO [place]. CALLED KENDALL PEDERSEN [facility] PT WAS TOLD IN November THAT HE WOULD NEED [facility]. PT WAS GIVEN THIS INFO IN ADDITION TO ER CM NOTE ON 2/13. MET WITH PATIENT, CURRENTLY ON BIPAP BUT ALERT, EXPLAINED ABOVE AND NOTIFIED OF CHANGE TO INPATIENT, IMM EXPLAINED AND VERBAL CONSENT OBTAINED. WILL FOLLOW UP WITH THE PT CLOSER TO DC TO REEXPLAIN ABOVE. WILL ALSO NEED PT/OT ONCE ABLE TO DETERMINE NEEDS.   PT CURRENTLY IS HOME WITH [facility].

## 2023-02-13 NOTE — PROGRESS NOTES
Bedside device check completed at this time. Pt has a [employer] dual lead pacemaker, presenting EGM displays AS/  at 60bpm in DDD mode. Pacing AP: 11% : 65% . Pt voicing he does not have a device clinic following him, last interrogation was 5/22/2017. 3546 AT/AF episodes (183-279 bpm / 6 sec.-00:01:33:00) most recent AT/AF episode was 2/9/23, AF Lyle < 1%.  1955 PMT episodes , most recent  episodes 2/4/23 - 2/10/23. Observations: Exceeds AT/AF Lyle , PMT has occurred , and device cyber upgrade available. Battery has 4.6 years, RA/RV lead impedance, sensing and capture threshold WNL.   Paper copy of report faxed to [address] [place] [address].

## 2023-02-14 LAB
ALBUMIN SERPL-MCNC: 2.9 G/DL (ref 3.5–4.6)
ALP BLD-CCNC: 115 U/L (ref 35–104)
ALT SERPL-CCNC: 9 U/L (ref 0–41)
ANION GAP SERPL CALCULATED.3IONS-SCNC: 6 MEQ/L (ref 9–15)
AST SERPL-CCNC: 14 U/L (ref 0–40)
BASOPHILS ABSOLUTE: 0 K/UL (ref 0–0.2)
BASOPHILS RELATIVE PERCENT: 0.5 %
BILIRUB SERPL-MCNC: 0.5 MG/DL (ref 0.2–0.7)
BUN BLDV-MCNC: 37 MG/DL (ref 8–23)
CALCIUM SERPL-MCNC: 8.6 MG/DL (ref 8.5–9.9)
CHLORIDE BLD-SCNC: 95 MEQ/L (ref 95–107)
CO2: 34 MEQ/L (ref 20–31)
CREAT SERPL-MCNC: 1.67 MG/DL (ref 0.7–1.2)
EOSINOPHILS ABSOLUTE: 0.2 K/UL (ref 0–0.7)
EOSINOPHILS RELATIVE PERCENT: 3.4 %
GFR SERPL CREATININE-BSD FRML MDRD: 42.1 ML/MIN/{1.73_M2}
GLOBULIN: 3.8 G/DL (ref 2.3–3.5)
GLUCOSE BLD-MCNC: 123 MG/DL (ref 70–99)
GLUCOSE BLD-MCNC: 130 MG/DL (ref 70–99)
GLUCOSE BLD-MCNC: 162 MG/DL (ref 70–99)
GLUCOSE BLD-MCNC: 199 MG/DL (ref 70–99)
HCT VFR BLD CALC: 32.4 % (ref 42–52)
HEMOGLOBIN: 10.1 G/DL (ref 14–18)
LYMPHOCYTES ABSOLUTE: 0.4 K/UL (ref 1–4.8)
LYMPHOCYTES RELATIVE PERCENT: 7.3 %
MCH RBC QN AUTO: 24.9 PG (ref 27–31.3)
MCHC RBC AUTO-ENTMCNC: 31 % (ref 33–37)
MCV RBC AUTO: 80.4 FL (ref 79–92.2)
MONOCYTES ABSOLUTE: 0.6 K/UL (ref 0.2–0.8)
MONOCYTES RELATIVE PERCENT: 10.3 %
NEUTROPHILS ABSOLUTE: 4.3 K/UL (ref 1.4–6.5)
NEUTROPHILS RELATIVE PERCENT: 78.5 %
PDW BLD-RTO: 19.2 % (ref 11.5–14.5)
PERFORMED ON: ABNORMAL
PLATELET # BLD: 224 K/UL (ref 130–400)
POTASSIUM REFLEX MAGNESIUM: 4.5 MEQ/L (ref 3.4–4.9)
RBC # BLD: 4.03 M/UL (ref 4.7–6.1)
REASON FOR REJECTION: NORMAL
REJECTED TEST: NORMAL
SODIUM BLD-SCNC: 135 MEQ/L (ref 135–144)
TOTAL PROTEIN: 6.7 G/DL (ref 6.3–8)
WBC # BLD: 5.5 K/UL (ref 4.8–10.8)

## 2023-02-14 PROCEDURE — 2580000003 HC RX 258: Performed by: INTERNAL MEDICINE

## 2023-02-14 PROCEDURE — 2700000000 HC OXYGEN THERAPY PER DAY

## 2023-02-14 PROCEDURE — 6370000000 HC RX 637 (ALT 250 FOR IP): Performed by: INTERNAL MEDICINE

## 2023-02-14 PROCEDURE — 85025 COMPLETE CBC W/AUTO DIFF WBC: CPT

## 2023-02-14 PROCEDURE — 2000000000 HC ICU R&B

## 2023-02-14 PROCEDURE — 2500000003 HC RX 250 WO HCPCS: Performed by: INTERNAL MEDICINE

## 2023-02-14 PROCEDURE — 36415 COLL VENOUS BLD VENIPUNCTURE: CPT

## 2023-02-14 PROCEDURE — 6370000000 HC RX 637 (ALT 250 FOR IP)

## 2023-02-14 PROCEDURE — 94761 N-INVAS EAR/PLS OXIMETRY MLT: CPT

## 2023-02-14 PROCEDURE — 6360000002 HC RX W HCPCS: Performed by: INTERNAL MEDICINE

## 2023-02-14 PROCEDURE — 94640 AIRWAY INHALATION TREATMENT: CPT

## 2023-02-14 PROCEDURE — 6360000002 HC RX W HCPCS: Performed by: EMERGENCY MEDICINE

## 2023-02-14 PROCEDURE — 99233 SBSQ HOSP IP/OBS HIGH 50: CPT | Performed by: INTERNAL MEDICINE

## 2023-02-14 PROCEDURE — 80053 COMPREHEN METABOLIC PANEL: CPT

## 2023-02-14 RX ORDER — TRAMADOL HYDROCHLORIDE 50 MG/1
50 TABLET ORAL EVERY 6 HOURS PRN
Status: DISCONTINUED | OUTPATIENT
Start: 2023-02-14 | End: 2023-02-14

## 2023-02-14 RX ORDER — IPRATROPIUM BROMIDE AND ALBUTEROL SULFATE 2.5; .5 MG/3ML; MG/3ML
1 SOLUTION RESPIRATORY (INHALATION)
Status: DISCONTINUED | OUTPATIENT
Start: 2023-02-14 | End: 2023-02-14

## 2023-02-14 RX ORDER — SODIUM CHLORIDE, SODIUM LACTATE, POTASSIUM CHLORIDE, AND CALCIUM CHLORIDE .6; .31; .03; .02 G/100ML; G/100ML; G/100ML; G/100ML
250 INJECTION, SOLUTION INTRAVENOUS ONCE
Status: COMPLETED | OUTPATIENT
Start: 2023-02-14 | End: 2023-02-14

## 2023-02-14 RX ORDER — IPRATROPIUM BROMIDE AND ALBUTEROL SULFATE 2.5; .5 MG/3ML; MG/3ML
1 SOLUTION RESPIRATORY (INHALATION) EVERY 4 HOURS PRN
Status: DISCONTINUED | OUTPATIENT
Start: 2023-02-14 | End: 2023-02-17 | Stop reason: HOSPADM

## 2023-02-14 RX ORDER — KETOCONAZOLE 20 MG/G
CREAM TOPICAL DAILY
Status: DISCONTINUED | OUTPATIENT
Start: 2023-02-14 | End: 2023-02-17 | Stop reason: HOSPADM

## 2023-02-14 RX ORDER — BUDESONIDE 0.5 MG/2ML
0.5 INHALANT ORAL 2 TIMES DAILY
Status: DISCONTINUED | OUTPATIENT
Start: 2023-02-14 | End: 2023-02-17 | Stop reason: HOSPADM

## 2023-02-14 RX ADMIN — Medication 10 ML: at 21:00

## 2023-02-14 RX ADMIN — APIXABAN 5 MG: 5 TABLET, FILM COATED ORAL at 20:06

## 2023-02-14 RX ADMIN — ALBUTEROL SULFATE 2.5 MG: 2.5 SOLUTION RESPIRATORY (INHALATION) at 21:34

## 2023-02-14 RX ADMIN — IPRATROPIUM BROMIDE AND ALBUTEROL SULFATE 1 AMPULE: 2.5; .5 SOLUTION RESPIRATORY (INHALATION) at 10:08

## 2023-02-14 RX ADMIN — IPRATROPIUM BROMIDE AND ALBUTEROL SULFATE 1 AMPULE: 2.5; .5 SOLUTION RESPIRATORY (INHALATION) at 14:23

## 2023-02-14 RX ADMIN — BUDESONIDE 500 MCG: 0.5 SUSPENSION RESPIRATORY (INHALATION) at 21:34

## 2023-02-14 RX ADMIN — SODIUM CHLORIDE, POTASSIUM CHLORIDE, SODIUM LACTATE AND CALCIUM CHLORIDE 250 ML: 600; 310; 30; 20 INJECTION, SOLUTION INTRAVENOUS at 21:56

## 2023-02-14 RX ADMIN — ATORVASTATIN CALCIUM 40 MG: 40 TABLET, FILM COATED ORAL at 20:07

## 2023-02-14 RX ADMIN — BUDESONIDE 500 MCG: 0.5 SUSPENSION RESPIRATORY (INHALATION) at 10:08

## 2023-02-14 RX ADMIN — MICONAZOLE NITRATE: 2 POWDER TOPICAL at 10:21

## 2023-02-14 RX ADMIN — KETOCONAZOLE: 20 CREAM TOPICAL at 10:21

## 2023-02-14 RX ADMIN — CARVEDILOL 12.5 MG: 12.5 TABLET, FILM COATED ORAL at 20:06

## 2023-02-14 RX ADMIN — SODIUM CHLORIDE, POTASSIUM CHLORIDE, SODIUM LACTATE AND CALCIUM CHLORIDE 250 ML: 600; 310; 30; 20 INJECTION, SOLUTION INTRAVENOUS at 01:04

## 2023-02-14 RX ADMIN — ASPIRIN 81 MG: 81 TABLET, COATED ORAL at 08:24

## 2023-02-14 RX ADMIN — Medication 10 ML: at 08:24

## 2023-02-14 RX ADMIN — APIXABAN 5 MG: 5 TABLET, FILM COATED ORAL at 08:24

## 2023-02-14 RX ADMIN — SODIUM CHLORIDE, POTASSIUM CHLORIDE, SODIUM LACTATE AND CALCIUM CHLORIDE 250 ML: 600; 310; 30; 20 INJECTION, SOLUTION INTRAVENOUS at 06:40

## 2023-02-14 ASSESSMENT — PAIN SCALES - GENERAL
PAINLEVEL_OUTOF10: 0
PAINLEVEL_OUTOF10: 6

## 2023-02-14 ASSESSMENT — PAIN DESCRIPTION - LOCATION: LOCATION: BACK

## 2023-02-14 NOTE — PROGRESS NOTES
BP 73/33 MAP 46 after admin of coreg, prious 76/42. Pt given 250 bolus LR. Holding parameters added to coreg. Pt responding to bolus, if BP remains MAP >65 x 2 hours can resume transfer to TCU.

## 2023-02-14 NOTE — PROGRESS NOTES
Cardiology progress note    Patient Name: Ana Chaudhary Date: 2/10/2023  4:55 PM  MR #: [id number]  : 1945    Attending Physician: Marylou Brush MD  Reason for consult: Heart failure    History of Presenting Illness:      Annabel Puente is a 69 y.o. male on hospital day 1 with a history of CAD status post CABG, ischemic cardiomyopathy status post ICD placement, hypertension, hyperlipidemia, diabetes, COPD on 3 L of oxygen at home obese who presents to the ER for shortness of breath. History Obtained From:  patient, electronic medical record    BNP 1800  EKG showing ventricular paced  ==============  Hospital course  2023  Currently patient in ICU requiring BiPAP  Yesterday patient had to be transferred to the ICU due to shortness of breath. Patient refusing to wear a BiPAP  Overnight patient was found hypotensive and he was found taking home dose tramadol    2023  Patient laying in bed looks comfortable    2023  Patient laying in bed looks comfortable  Denies chest pain  Reports that shortness of breath is improving  Patient was net -2 L last 24 hours  History:      Past Medical History:   Diagnosis Date    CAD (coronary artery disease)     Chronic kidney disease     COPD (chronic obstructive pulmonary disease) (HCC)     Diabetes mellitus [facility]     Diabetic neuropathy ([facility]     Hyperlipidemia     Hypertension      Past Surgical History:   Procedure Laterality Date    COLON SURGERY      CORONARY ANGIOPLASTY WITH STENT PLACEMENT      CORONARY ARTERY BYPASS GRAFT      PACEMAKER PLACEMENT       Family History  History reviewed. No pertinent family history.     Social History     Socioeconomic History    Marital status: Legally      Spouse name: Not on file    Number of children: Not on file    Years of education: Not on file    Highest education level: Not on file   Occupational History    Not on file   Tobacco Use    Smoking status: Former    Smokeless tobacco: Never   Vaping Use Vaping Use: Never used   Substance and Sexual Activity    Alcohol use: Yes     Comment: rare    Drug use: Never    Sexual activity: Not Currently   Other Topics Concern    Not on file   Social History Narrative    Not on file     Social Determinants of Health     Financial Resource Strain: Not on file   Food Insecurity: Not on file   Transportation Needs: Not on file   Physical Activity: Not on file   Stress: Not on file   Social Connections: Not on file   Intimate Partner Violence: Not on file   Housing Stability: Not on file     Home Medications:      Medications Prior to Admission: Omega-3 Fatty Acids (FISH OIL) 1000 MG capsule, Take by mouth daily  gabapentin (NEURONTIN) 300 MG capsule, Take 1 capsule by mouth nightly for 30 days. Alpha-Lipoic Acid 600 MG CAPS, Take 600 mg by mouth daily (Patient not taking: Reported on 2/11/2023)  naloxone 4 MG/0.1ML LIQD nasal spray, 1 spray by Nasal route as needed for Opioid Reversal (Patient not taking: Reported on 2/11/2023)  ferrous sulfate (IRON 325) 325 (65 Fe) MG tablet, ferrous sulfate 325 mg (65 mg iron) tablet  Take 1 tablet every day by oral route for 30 days. metroNIDAZOLE (FLAGYL) 500 MG tablet, TAKE 1 TABLET BY MOUTH EVERY 8 HOURS FOR 10 DAYS (Patient not taking: Reported on 2/11/2023)  nitroGLYCERIN (NITROSTAT) 0.4 MG SL tablet,   tiZANidine (ZANAFLEX) 2 MG tablet, Take 1 tablet by mouth every 8 hours as needed (neck pain)  clobetasol (TEMOVATE) 0.05 % ointment, Apply topically 2 times daily for 2 weeks for rash.  Do not use on face or neck  ketoconazole (NIZORAL) 2 % cream, APPLY TO THE skin rash twice daily FOR 4 weeks  atorvastatin (LIPITOR) 40 MG tablet, TAKE 1 TABLET BY MOUTH ONCE DAILY AT BEDTIME  [DISCONTINUED] levoFLOXacin (LEVAQUIN) 750 MG tablet, TAKE 1 TABLET BY MOUTH EVERY DAY  metFORMIN (GLUCOPHAGE) 500 MG tablet, Take 500 mg by mouth daily (with breakfast)  amLODIPine (NORVASC) 2.5 MG tablet, Take 2.5 mg by mouth daily  aspirin 81 MG EC tablet, Take 81 mg by mouth daily    Current Hospital Medications:   Scheduled Meds:   ketoconazole   Topical Daily    budesonide  0.5 mg Nebulization BID    ipratropium-albuterol  1 ampule Inhalation Q6H WA    [Held by provider] furosemide  40 mg Oral BID    apixaban  5 mg Oral BID    carvedilol  12.5 mg Oral BID    insulin lispro  0-4 Units SubCUTAneous TID WC    insulin lispro  0-4 Units SubCUTAneous Nightly    miconazole   Topical BID    aspirin  81 mg Oral Daily    sodium chloride flush  5-40 mL IntraVENous 2 times per day    atorvastatin  40 mg Oral Nightly     Continuous Infusions:   dextrose      sodium chloride       PRN Meds:.lidocaine, glucose, dextrose bolus **OR** dextrose bolus, glucagon (rDNA), dextrose, ipratropium-albuterol, albuterol, sodium chloride flush, sodium chloride, ondansetron **OR** ondansetron, polyethylene glycol, acetaminophen **OR** acetaminophen, hydrALAZINE   dextrose      sodium chloride        Allergies: Allergies   Allergen Reactions    Penicillins Hives, Rash and Other (See Comments)     Other reaction(s): Generalized rash, Respiratory distress, Urticaria, Syncope, Syncope, Unknown    Gadolinium Hives    Iodine Hives    Iv [Iodides] Hives, Itching and Rash      Review of Systems:       [x] CV, Resp, Neuro, , and all other systems reviewed and negative other than listed in HPI. Objective Findings:     Vitals:   Vitals:    02/14/23 0600 02/14/23 0615 02/14/23 0630 02/14/23 1009   BP: (!) 104/48  (!) 72/34    Pulse: 66 74 67 65   Resp: 20 26 18 16   Temp:       TempSrc:       SpO2: 99% 99% 100% 98%   Weight:       Height:            Physical Examination:  General: Alert and oriented x4 not acute distress  HEENT: Normocephalic, no scleral icterus. Neck: No JVD. Heart: Regular, no murmur, no rub/gallop. No RV heave. Lungs: Clear to ascultation, no rales/wheezing/rhonchi. Good chest wall excursion.   Abdomen: Soft nontender nondistended  Extremities: No clubbing/cyanosis, trace edema.   Skin: Warm, dry, normal turgor, no rash, no bruise, no petichiae. Neuro: No myoclonus or tremor. Psych: Normal affect    Results/ Medications reviewed 2/14/2023, 11:30 AM     Laboratory, Microbiology, Pathology, Radiology, Cardiology, Medications and Transcriptions reviewed  Scheduled Meds:   ketoconazole   Topical Daily    budesonide  0.5 mg Nebulization BID    ipratropium-albuterol  1 ampule Inhalation Q6H WA    [Held by provider] furosemide  40 mg Oral BID    apixaban  5 mg Oral BID    carvedilol  12.5 mg Oral BID    insulin lispro  0-4 Units SubCUTAneous TID     insulin lispro  0-4 Units SubCUTAneous Nightly    miconazole   Topical BID    aspirin  81 mg Oral Daily    sodium chloride flush  5-40 mL IntraVENous 2 times per day    atorvastatin  40 mg Oral Nightly     Continuous Infusions:   dextrose      sodium chloride         Recent Labs     02/12/23 0449 02/12/23 1647 02/13/23 0500 02/13/23 0812 02/13/23 1059 02/14/23  0555   WBC 7.0  --  6.4  --   --  5.5   HGB 11.1*   < > 10.5* 12.5* 11.6* 10.1*   HCT 36.4*  --  34.4*  --   --  32.4*   MCV 81.3  --  81.1  --   --  80.4     --  247  --   --  224    < > = values in this interval not displayed. Recent Labs     02/12/23 0449 02/12/23 1647 02/13/23 0500 02/13/23 0812 02/13/23 1059 02/14/23  0419     --  135  --   --  135   K 4.0  --  4.6  --   --  4.5   CL 96  --  96  --   --  95   CO2 31  --  30  --   --  34*   BUN 33*  --  42*  --   --  37*   CREATININE 1.81*   < > 1.83* 2.5* 2.5* 1.67*    < > = values in this interval not displayed. Recent Labs     02/12/23 0449 02/13/23  0500 02/14/23 0419   PROT 6.9 6.5 6.7     No results found for this or any previous visit. Impression:   Acute on chronic heart failure with preserved ejection fraction EF 65% by TTE 2/11/2023.   BNP 1800  Atrial fibrillation found on pacemaker interrogation on 2/13/2023  Sick sinus syndrome status post pacemaker placement  CAD status post CABG  COPD  Hypertension  Hyperlipidemia  Obesity  CKD  TTE 2/11/2023 EF 65%  Pacemaker interrogation atrial fibrillation 2/13/2023  Plan:   Continue ICU  Continue aspirin and high intensity statin for secondary prevention of CAD  Okay to hold Lasix while patient is hypotensive. Please resume once blood pressure allows  Please trend creatinine daily  Strict ins and outs and daily weights  Management of COPD as per primary team  Continue Coreg, okay to call if patient still hypotensive  Continue Eliquis for cardioembolic prophylaxis  No ACE or ARB in the setting of CKD  Follow-up with me in clinic in 2 weeks after discharge  Comments: Thank you for allowing us to participate in the care of this patient. Will continue to follow. Please call if questions or concerns arise. Electronically signed by Vin Hillman MD on 2/14/2023 at 11:30 AM    Please note this report has been partially produced using speech recognition software and may cause contain errors related to that system including grammar, punctuation and spelling as well as words and phrases that may seem inappropriate. If there are questions or concerns please feel free to contact me to clarify.

## 2023-02-14 NOTE — CARE COORDINATION
Met with patient and dtr Samra Fallonjose to discuss dc needs. Pts dtr has spoken to Normal from [place] and states she is getting O2 transferred to [place]. They also lived in [facility] a while but it did not tx there. She is aware he needs to do testing at [place] sleep lab for new Cpap. I gave her SNF list and we have new orders for PT/OT eval to determine dc needs. I discussed [facility] and [facility] with both of them. She has list and will let us know where she may want him to go for SNF if PT recommends that.

## 2023-02-14 NOTE — PROGRESS NOTES
PHARMACY NOTE:   ICU Rounds Attended (10-15 minutes in patient room):    Pt diagnosis: heart failure    IV Fluids: none   Renal:   Recent Labs     02/13/23  0812 02/13/23  1059 02/14/23  0419   CREATININE 2.5* 2.5* 1.67*    Estimated Creatinine Clearance: 43 mL/min (A) (based on SCr of 1.67 mg/dL (H)). Antimicrobial Therapy:   none    Recent Labs     02/12/23  0449 02/13/23  0500 02/14/23  0555   WBC 7.0 6.4 5.5        Insulin Therapy (goal: 140-180): low  0 units given past 24 hours     Recent Labs     02/12/23  0449 02/13/23  0500 02/14/23  0419   GLUCOSE 122* 116* 123*     Stress Ulcer Prophylaxis:   none    DVT Prophylaxis/Anticoagulant Therapy: eliquis  Recent Labs     02/12/23  0449 02/13/23  0500 02/14/23  0555    247 224     No results for input(s): INR in the last 72 hours.       Bowel Regimen:   Miralax prn    Follow up/Changes:   -budesonide nebs BID added per verbal  -duoneb q6h added per verbal   -home meds reviewed no recent fills since 9/22    [place] Ambrosia, VAL[facility] [place] PharmD

## 2023-02-14 NOTE — PROGRESS NOTES
INPATIENT PROGRESS NOTES    PATIENT NAME: Poornima Morrison  MRN: [id number]  SERVICE DATE:  February 14, 2023   SERVICE TIME:  8:43 AM      PRIMARY SERVICE: Pulmonary Disease    CHIEF COMPLAINTS: Acute on chronic respiratory failure    INTERVAL HPI: Patient seen and examined at bedside, Interval Notes, orders reviewed. Nursing notes noted    Patient report tolerated BiPAP overnight, has no complaint, sitting in chair, denies chest pain, has cough productive of clear phlegm, shortness of breath exertional and at baseline, no chest pain, no nausea no vomiting, he is on 4 L O2 saturation 99%, temperature 98.6. Urine output 1600 cc -1.5 L    New information updated in the note today, rest of the examination did not change compared to yesterday. Review of system:     GI Abdominal pain No  Skin Rash No    Social History     Tobacco Use    Smoking status: Former    Smokeless tobacco: Never   Substance Use Topics    Alcohol use: Yes     Comment: rare     History reviewed. No pertinent family history. OBJECTIVE    Body mass index is 37.93 kg/m². PHYSICAL EXAM:  Vitals:  BP (!) 72/34   Pulse 67   Temp 98.6 °F (37 °C) (Oral)   Resp 18   Ht 5' 6\" (1.676 m)   Wt 235 lb (106.6 kg)   SpO2 100%   BMI 37.93 kg/m²     General: alert, cooperative, no distress  Head: normocephalic, atraumatic  Eyes:No gross abnormalities. ENT:  MMM no lesions  Neck:  supple and no masses  Chest : Minimal basal rales, good air movement, no wheezing, nontender, tympanic  Heart[de-identified] Heart sounds are normal.  Regular rate and rhythm without murmur, gallop or rub. ABD:  symmetric, soft, non-tender, no guarding or rebound  Musculoskeletal : no cyanosis, no clubbing, and no edema  Neuro:  Grossly normal  Skin: No rashes or nodules noted.   Lymph node:  no cervical nodes  Urology: No Tate   Psychiatric: appropriate    DATA:   Recent Labs     02/13/23  0500 02/13/23  0812 02/13/23  1059 02/14/23  0555   WBC 6.4  --   --  5.5   HGB 10.5*   < > 11.6* 10.1*   HCT 34.4*  --   --  32.4*   MCV 81.1  --   --  80.4     --   --  224    < > = values in this interval not displayed. Recent Labs     02/13/23  0500 02/13/23  0812 02/13/23  1059 02/14/23  0419     --   --  135   K 4.6  --   --  4.5   CL 96  --   --  95   CO2 30  --   --  34*   BUN 42*  --   --  37*   CREATININE 1.83*   < > 2.5* 1.67*   GLUCOSE 116*  --   --  123*   CALCIUM 8.5  --   --  8.6   PROT 6.5  --   --  6.7   LABALBU 2.7*  --   --  2.9*   BILITOT 0.4  --   --  0.5   ALKPHOS 117*  --   --  115*   AST 12  --   --  14   ALT 9  --   --  9   LABGLOM 37.7*   < > 26* 42.1*   GLOB 3.8*  --   --  3.8*    < > = values in this interval not displayed. MV Settings:     FiO2 : 40 %    Recent Labs     02/13/23  1059   PHART 7.325*   SNT2QIK 69*   PO2ART 86*   XID4SOJ 35.8*   BEART 10*   [id number] 95*       O2 Device: Nasal cannula  O2 Flow Rate (L/min): 4 L/min    ADULT DIET;  Regular; 4 carb choices (60 gm/meal)     MEDICATIONS during current hospitalization:    Continuous Infusions:   dextrose      sodium chloride         Scheduled Meds:   ketoconazole   Topical Daily    [Held by provider] furosemide  40 mg Oral BID    apixaban  5 mg Oral BID    carvedilol  12.5 mg Oral BID    insulin lispro  0-4 Units SubCUTAneous TID     insulin lispro  0-4 Units SubCUTAneous Nightly    miconazole   Topical BID    aspirin  81 mg Oral Daily    sodium chloride flush  5-40 mL IntraVENous 2 times per day    atorvastatin  40 mg Oral Nightly       PRN Meds:lidocaine, glucose, dextrose bolus **OR** dextrose bolus, glucagon (rDNA), dextrose, ipratropium-albuterol, albuterol, sodium chloride flush, sodium chloride, ondansetron **OR** ondansetron, polyethylene glycol, acetaminophen **OR** acetaminophen, hydrALAZINE    Radiology  XR CHEST PORTABLE    Result Date: 2/13/2023  EXAMINATION: ONE XRAY VIEW OF THE CHEST 2/13/2023 10:08 am COMPARISON: 02/10/2023 HISTORY: ORDERING SYSTEM PROVIDED HISTORY: Worsening hypercapnia TECHNOLOGIST PROVIDED HISTORY: Reason for exam:->worsening hypercapnia What reading provider will be dictating this exam?->CRC FINDINGS: Mild cardiomegaly is unchanged.  Dual chamber cardiac pacemaker is stable. Pulmonary vasculature is upper normal limits. The lungs are clear. No pneumothorax is identified. There is stable blunting of the left costophrenic sulcus, likely from pleural thickening.  Post sternotomy changes are noted.     Stable cardiomegaly.  No acute cardiopulmonary process.     XR CHEST PORTABLE    Result Date: 2/10/2023  EXAMINATION: ONE XRAY VIEW OF THE CHEST 2/10/2023 5:17 pm COMPARISON: None. HISTORY: ORDERING SYSTEM PROVIDED HISTORY: SOB TECHNOLOGIST PROVIDED HISTORY: Reason for exam:->SOB What reading provider will be dictating this exam?->CRC FINDINGS: The lungs are without acute focal process.  There is no effusion or pneumothorax.  Cardiomegaly.  Sternotomy wires noted.  Right-sided transvenous pacer device.  The osseous structures are without acute process.     No acute process.     Chest x-ray reviewed by me shows congestion and cardiomegaly        IMPRESSION AND SUGGESTION:  This is a critically ill patient      Acute on chronic hypoxic and hypercapnic respiratory failure  Acute decompensated congestive heart failure  COPD with acute exacerbation  MORENA, not on treatment at home  Obesity  Coronary artery disease/cardiomyopathy status post ICD and CABG  Diabetes and hyperglycemia  Acute   kidney failure with probable underlying chronic kidney disease    Recommendation  BiPAP while asleep and as needed during the day  Continue diuretics  Budesonide nebs twice daily   DuoNeb every 6 hours while awake  Continue cardioprotective medication  Maintain euvolemic  Watch volume status and avoid overload  Watch renal function and urine output  Target blood sugar 140-180   Transfer to floor    I spent 40 min with this patient, greater the 50% of this time was spent in counseling and/or  coordinating of care.     Electronically signed by Madie Warren MD,  FCCP   on 2/14/2023 at 8:43 AM

## 2023-02-14 NOTE — PROGRESS NOTES
Cardiology progress note    Patient Name: Maude Longoria Date: 2/10/2023  4:55 PM  MR #: [id number]  : 1945    Attending Physician: Anali Hawk MD  Reason for consult: Heart failure    History of Presenting Illness:      Carmela Toribio is a 69 y.o. male on hospital day 1 with a history of CAD status post CABG, ischemic cardiomyopathy status post ICD placement, hypertension, hyperlipidemia, diabetes, COPD on 3 L of oxygen at home obese who presents to the ER for shortness of breath. History Obtained From:  patient, electronic medical record    BNP 1800  EKG showing ventricular paced  ==============  Hospital course  2023  Patient sitting in a chair looks comfortable  On nasal canula  Denies chest pain    2023  Patient laying in bed looks comfortable  Reports that shortness of breath has significantly improved      2023  Patient laying in bed looks comfortable  Denies chest pain  Reports that shortness of breath is improving  Patient was net -2 L last 24 hours  History:      Past Medical History:   Diagnosis Date    CAD (coronary artery disease)     Chronic kidney disease     COPD (chronic obstructive pulmonary disease) (HCC)     Diabetes mellitus ([facility] [address]     Diabetic neuropathy ([facility] [address]     Hyperlipidemia     Hypertension      Past Surgical History:   Procedure Laterality Date    COLON SURGERY      CORONARY ANGIOPLASTY WITH STENT PLACEMENT      CORONARY ARTERY BYPASS GRAFT      PACEMAKER PLACEMENT       Family History  History reviewed. No pertinent family history.     Social History     Socioeconomic History    Marital status: Legally      Spouse name: Not on file    Number of children: Not on file    Years of education: Not on file    Highest education level: Not on file   Occupational History    Not on file   Tobacco Use    Smoking status: Former    Smokeless tobacco: Never   Vaping Use    Vaping Use: Never used   Substance and Sexual Activity    Alcohol use: Yes     Comment: rare Drug use: Never    Sexual activity: Not Currently   Other Topics Concern    Not on file   Social History Narrative    Not on file     Social Determinants of Health     Financial Resource Strain: Not on file   Food Insecurity: Not on file   Transportation Needs: Not on file   Physical Activity: Not on file   Stress: Not on file   Social Connections: Not on file   Intimate Partner Violence: Not on file   Housing Stability: Not on file     Home Medications:      Medications Prior to Admission: Omega-3 Fatty Acids (FISH OIL) 1000 MG capsule, Take by mouth daily  gabapentin (NEURONTIN) 300 MG capsule, Take 1 capsule by mouth nightly for 30 days. Alpha-Lipoic Acid 600 MG CAPS, Take 600 mg by mouth daily (Patient not taking: Reported on 2/11/2023)  naloxone 4 MG/0.1ML LIQD nasal spray, 1 spray by Nasal route as needed for Opioid Reversal (Patient not taking: Reported on 2/11/2023)  ferrous sulfate (IRON 325) 325 (65 Fe) MG tablet, ferrous sulfate 325 mg (65 mg iron) tablet  Take 1 tablet every day by oral route for 30 days. metroNIDAZOLE (FLAGYL) 500 MG tablet, TAKE 1 TABLET BY MOUTH EVERY 8 HOURS FOR 10 DAYS (Patient not taking: Reported on 2/11/2023)  nitroGLYCERIN (NITROSTAT) 0.4 MG SL tablet,   tiZANidine (ZANAFLEX) 2 MG tablet, Take 1 tablet by mouth every 8 hours as needed (neck pain)  clobetasol (TEMOVATE) 0.05 % ointment, Apply topically 2 times daily for 2 weeks for rash.  Do not use on face or neck  ketoconazole (NIZORAL) 2 % cream, APPLY TO THE skin rash twice daily FOR 4 weeks  atorvastatin (LIPITOR) 40 MG tablet, TAKE 1 TABLET BY MOUTH ONCE DAILY AT BEDTIME  [DISCONTINUED] levoFLOXacin (LEVAQUIN) 750 MG tablet, TAKE 1 TABLET BY MOUTH EVERY DAY  metFORMIN (GLUCOPHAGE) 500 MG tablet, Take 500 mg by mouth daily (with breakfast)  amLODIPine (NORVASC) 2.5 MG tablet, Take 2.5 mg by mouth daily  aspirin 81 MG EC tablet, Take 81 mg by mouth daily    Current Hospital Medications:   Scheduled Meds:   ketoconazole Topical Daily    budesonide  0.5 mg Nebulization BID    ipratropium-albuterol  1 ampule Inhalation Q6H WA    [Held by provider] furosemide  40 mg Oral BID    apixaban  5 mg Oral BID    carvedilol  12.5 mg Oral BID    insulin lispro  0-4 Units SubCUTAneous TID     insulin lispro  0-4 Units SubCUTAneous Nightly    miconazole   Topical BID    aspirin  81 mg Oral Daily    sodium chloride flush  5-40 mL IntraVENous 2 times per day    atorvastatin  40 mg Oral Nightly     Continuous Infusions:   dextrose      sodium chloride       PRN Meds:.lidocaine, glucose, dextrose bolus **OR** dextrose bolus, glucagon (rDNA), dextrose, ipratropium-albuterol, albuterol, sodium chloride flush, sodium chloride, ondansetron **OR** ondansetron, polyethylene glycol, acetaminophen **OR** acetaminophen, hydrALAZINE   dextrose      sodium chloride        Allergies: Allergies   Allergen Reactions    Penicillins Hives, Rash and Other (See Comments)     Other reaction(s): Generalized rash, Respiratory distress, Urticaria, Syncope, Syncope, Unknown    Gadolinium Hives    Iodine Hives    Iv [Iodides] Hives, Itching and Rash      Review of Systems:       [x] CV, Resp, Neuro, , and all other systems reviewed and negative other than listed in HPI. Objective Findings:     Vitals:   Vitals:    02/14/23 0600 02/14/23 0615 02/14/23 0630 02/14/23 1009   BP: (!) 104/48  (!) 72/34    Pulse: 66 74 67 65   Resp: 20 26 18 16   Temp:       TempSrc:       SpO2: 99% 99% 100% 98%   Weight:       Height:            Physical Examination:  General: Alert and oriented x4 not acute distress  HEENT: Normocephalic, no scleral icterus. Neck: No JVD. Heart: Regular, no murmur, no rub/gallop. No RV heave. Lungs: Clear to ascultation, no rales/wheezing/rhonchi. Good chest wall excursion. Abdomen: Soft nontender nondistended  Extremities: No clubbing/cyanosis, trace edema. Skin: Warm, dry, normal turgor, no rash, no bruise, no petichiae.   Neuro: No myoclonus or tremor. Psych: Normal affect    Results/ Medications reviewed 2/14/2023, 11:18 AM     Laboratory, Microbiology, Pathology, Radiology, Cardiology, Medications and Transcriptions reviewed  Scheduled Meds:   ketoconazole   Topical Daily    budesonide  0.5 mg Nebulization BID    ipratropium-albuterol  1 ampule Inhalation Q6H WA    [Held by provider] furosemide  40 mg Oral BID    apixaban  5 mg Oral BID    carvedilol  12.5 mg Oral BID    insulin lispro  0-4 Units SubCUTAneous TID     insulin lispro  0-4 Units SubCUTAneous Nightly    miconazole   Topical BID    aspirin  81 mg Oral Daily    sodium chloride flush  5-40 mL IntraVENous 2 times per day    atorvastatin  40 mg Oral Nightly     Continuous Infusions:   dextrose      sodium chloride         Recent Labs     02/12/23 0449 02/12/23 1647 02/13/23 0500 02/13/23 0812 02/13/23 1059 02/14/23  0555   WBC 7.0  --  6.4  --   --  5.5   HGB 11.1*   < > 10.5* 12.5* 11.6* 10.1*   HCT 36.4*  --  34.4*  --   --  32.4*   MCV 81.3  --  81.1  --   --  80.4     --  247  --   --  224    < > = values in this interval not displayed. Recent Labs     02/12/23 0449 02/12/23 1647 02/13/23 0500 02/13/23 0812 02/13/23 1059 02/14/23  0419     --  135  --   --  135   K 4.0  --  4.6  --   --  4.5   CL 96  --  96  --   --  95   CO2 31  --  30  --   --  34*   BUN 33*  --  42*  --   --  37*   CREATININE 1.81*   < > 1.83* 2.5* 2.5* 1.67*    < > = values in this interval not displayed. Recent Labs     02/12/23 0449 02/13/23 0500 02/14/23 0419   PROT 6.9 6.5 6.7     No results found for this or any previous visit. Impression:   Acute on chronic heart failure with preserved ejection fraction EF 65% by TTE 2/11/2023.   BNP 1800  Atrial fibrillation found on pacemaker interrogation on 2/13/2023  Sick sinus syndrome status post pacemaker placement  CAD status post CABG  COPD  Hypertension  Hyperlipidemia  Obesity  CKD  TTE 2/11/2023 EF 65%  Pacemaker interrogation no major arrhythmias  Plan:   Continue telemetry  Continue aspirin and high intensity statin for secondary prevention of CAD  Switch Lasix IV to p.o. preparation to discharge  Please trend creatinine daily  Strict ins and outs and daily weights  Management of COPD as per primary team  Continue Coreg  Continue Eliquis for cardioembolic prophylaxis  No ACE or ARB in the setting of CKD  Follow-up with me in clinic in 2 weeks or he can follow up at the [facility] if he prefers  Comments: Thank you for allowing us to participate in the care of this patient. Will continue to follow. Please call if questions or concerns arise. Electronically signed by Radha Paige MD on 2/14/2023 at 11:18 AM    Please note this report has been partially produced using speech recognition software and may cause contain errors related to that system including grammar, punctuation and spelling as well as words and phrases that may seem inappropriate. If there are questions or concerns please feel free to contact me to clarify.

## 2023-02-14 NOTE — PLAN OF CARE
Problem: Discharge Planning  Goal: Discharge to home or other facility with appropriate resources  Outcome: Progressing  Flowsheets (Taken 2/13/2023 2000)  Discharge to home or other facility with appropriate resources:   Identify barriers to discharge with patient and caregiver   Arrange for needed discharge resources and transportation as appropriate   Identify discharge learning needs (meds, wound care, etc)   Arrange for interpreters to assist at discharge as needed   Refer to discharge planning if patient needs post-hospital services based on physician order or complex needs related to functional status, cognitive ability or social support system     Problem: Safety - Adult  Goal: Free from fall injury  Outcome: Progressing     Problem: Skin/Tissue Integrity  Goal: Absence of new skin breakdown  Description: 1. Monitor for areas of redness and/or skin breakdown  2. Assess vascular access sites hourly  3. Every 4-6 hours minimum:  Change oxygen saturation probe site  4. Every 4-6 hours:  If on nasal continuous positive airway pressure, respiratory therapy assess nares and determine need for appliance change or resting period.   Outcome: Progressing     Problem: Pain  Goal: Verbalizes/displays adequate comfort level or baseline comfort level  Outcome: Progressing  Flowsheets (Taken 2/13/2023 2000)  Verbalizes/displays adequate comfort level or baseline comfort level:   Encourage patient to monitor pain and request assistance   Assess pain using appropriate pain scale   Administer analgesics based on type and severity of pain and evaluate response   Implement non-pharmacological measures as appropriate and evaluate response   Notify Licensed Independent Practitioner if interventions unsuccessful or patient reports new pain   Consider cultural and social influences on pain and pain management     Problem: Chronic Conditions and Co-morbidities  Goal: Patient's chronic conditions and co-morbidity symptoms are Subjective   Dee Hsu is a 22 year old female.    Chief Complaint   Patient presents with   • STI Screening     Patient presenting to the clinic today with complaints of wanting to have STD checked today    -no vaginal discharge  -no pain or complaints in abdomen or vagina  -just want to have everything checked today  -no complaints of fevers, chills, nausea, vomiting, diarrhea, CP, SOB  -LMP last period stopped mid December 12/17/18  -saw Dr. Lao 1/9/19 and switched IUD to Mirena    -sexually active with 2 males  -no condom use  -no vaginal pain or pelvic pain noted          History  Allergies, Medications, Medical History, Surgical History, Social History and Family History were reviewed and updated.  ALLERGIES:  No Known Allergies  No current outpatient medications on file.     No current facility-administered medications for this visit.        Review of Systems   All other systems reviewed and are negative.      Objective  Visit Vitals  /60 ([place] [facility] [place]: Sitting, Cuff Size: Regular)   Pulse 93   Resp 18   Ht 5' 4\" (1.626 m)   Wt 54.4 kg (119 lb 14.9 oz)   BMI 20.59 kg/m²       Physical Exam   Constitutional: She is oriented to person, place, and time. She appears well-developed and well-nourished.   HENT:   Head: Normocephalic and atraumatic.   Right Ear: External ear normal.   Left Ear: External ear normal.   Nose: Nose normal.   Eyes: Conjunctivae and EOM are normal. Pupils are equal, round, and reactive to light. Right eye exhibits no discharge. Left eye exhibits no discharge.   Neck: Normal range of motion. Neck supple.   Cardiovascular: Normal rate, regular rhythm, normal heart sounds and intact distal pulses.   Pulmonary/Chest: Effort normal and breath sounds normal.   Abdominal: Soft. Bowel sounds are normal.   Musculoskeletal: Normal range of motion.   Neurological: She is alert and oriented to person, place, and time.   Skin: Skin is warm and dry.   Psychiatric: She  has a normal mood and affect. Her behavior is normal. Judgment and thought content normal.       Labs  Lab Results Reviewed      Assessment and Plan  Problem List Items Addressed This Visit        Other    Screen for STD (sexually transmitted disease) - Primary     -STD tests ordered HIV and RPR, chlamydia, gonorrhea, and vaginal pathogens  -counseled on safe sex practices  -use contraception or abstinence for safety           Relevant Orders    CHLAMYDIA/GC BY NUCLEIC ACID AMPLIFICATION    VAGINAL PATHOGENS    HIV ANTIGEN/ANTIBODY SCREEN    RPR    POCT URINE PREGNANCY          1/17/2019 April L Henry Sam, CNP     monitored and maintained or improved  Outcome: Progressing  Flowsheets (Taken 2/13/2023 2000)  Care Plan - Patient's Chronic Conditions and Co-Morbidity Symptoms are Monitored and Maintained or Improved:   Monitor and assess patient's chronic conditions and comorbid symptoms for stability, deterioration, or improvement   Collaborate with multidisciplinary team to address chronic and comorbid conditions and prevent exacerbation or deterioration   Update acute care plan with appropriate goals if chronic or comorbid symptoms are exacerbated and prevent overall improvement and discharge

## 2023-02-14 NOTE — PROGRESS NOTES
Shift summary [id number]:    Handoff report received from [facility] at patients bedside. Skin check completed, no changes noted. Head to toe completed. Pt A&Ox4 & follows commands. See flow sheets for details. Pt was suppose to transfer to TCU, Pts BP before transfer was 73/33 MAP 46. Dr. Sampson cervantes served, 250ml Bolus of LR ordered & to continue to monitor BP for 2 hours. Dr. Sampson cervantes served after 2 hour chuy, pts BP stable with MAP in 62s and 70s. Another 250ml LR blous ordered. Pt to be transferred to TCU one hour after. Pt staying in ICU. This RN went into pts room, pt found taking home tramadol. Pt states \"you can't take away my meds, I need these meds\" and refuses to put the pills in the lock box. Dr. Sampson cervantes severed. Dr. Sampson Bob in room to talk with patient. Patient agrees to put pills in lock box. Patient did end up taking one tramadol before I found out about patient having these pills. Approximately taken at 0500. Pt admits to taking 2-3 tramadol per day during his hospital stay. Pt educated on medication interactions and that this could be causing a drop in his blood pressure. 0630: Pts BP dropped to 72/34. Dr. Sampson cervantes served, 250 LR bolus ordered. Handoff report given to Prosper Allen RN at patients bedside.      Electronically signed by Niels Kate RN on 2/14/2023 at 7:50 AM

## 2023-02-14 NOTE — PROGRESS NOTES
Progress Note  Date:2023       Room:[place] [address]  Patient Serge Mccord     YOB: 1945     Age:77 y.o. Subjective      Patient is doing much better today; happy to be off of bipap; denies fevers, chills, sweats    Objective         Vitals Last 24 Hours:  TEMPERATURE:  Temp  Av.5 °F (36.9 °C)  Min: 98.4 °F (36.9 °C)  Max: 98.6 °F (37 °C)  RESPIRATIONS RANGE: Resp  Av.8  Min: 14  Max: 29  PULSE OXIMETRY RANGE: SpO2  Av.7 %  Min: 94 %  Max: 100 %  PULSE RANGE: Pulse  Av.6  Min: 60  Max: 104  BLOOD PRESSURE RANGE: Systolic (75JZO), JU , Min:72 , PNR:289   ; Diastolic (72OFR), MRI:77, Min:28, Max:98    I/O (24Hr): Intake/Output Summary (Last 24 hours) at 2023 1607  Last data filed at 2023 0545  Gross per 24 hour   Intake 2180.57 ml   Output 1650 ml   Net 530.57 ml       Objective:  Vital signs: (most recent): Blood pressure (!) 72/34, pulse (!) 104, temperature 98.6 °F (37 °C), temperature source Oral, resp. rate 23, height 5' 6\" (1.676 m), weight 235 lb (106.6 kg), SpO2 94 %. Constitutional: On NC  Head: NCAT  Neck: Supple, trachea midline  Cardiovascular: RRR. Pulmonary: Bilateral basilar rhonchi   Abdomen: Obese, soft, nontense. Neurologic: Nonfocal.    Labs/Imaging/Diagnostics    Labs:  CBC:  Recent Labs     23  0449 23  1647 23  0500 23  0812 23  1059 23  0555   WBC 7.0  --  6.4  --   --  5.5   RBC 4.48*  --  4.24*  --   --  4.03*   HGB 11.1*   < > 10.5* 12.5* 11.6* 10.1*   HCT 36.4*  --  34.4*  --   --  32.4*   MCV 81.3  --  81.1  --   --  80.4   RDW 19.3*  --  19.0*  --   --  19.2*     --  247  --   --  224    < > = values in this interval not displayed.        CHEMISTRIES:  Recent Labs     23  0449 23  1647 23  0500 23  0812 23  1059 23  0419     --  135  --   --  135   K 4.0  --  4.6  --   --  4.5   CL 96  --  96  --   --  95   CO2 31  --  30  --   -- 34*   BUN 33*  --  42*  --   --  37*   CREATININE 1.81*   < > 1.83* 2.5* 2.5* 1.67*   GLUCOSE 122*  --  116*  --   --  123*    < > = values in this interval not displayed. PT/INR:No results for input(s): PROTIME, INR in the last 72 hours. APTT:No results for input(s): APTT in the last 72 hours. LIVER PROFILE:  Recent Labs     02/12/23  0449 02/13/23  0500 02/14/23  0419   AST 13 12 14   ALT 9 9 9   BILITOT 0.3 0.4 0.5   ALKPHOS 125* 117* 115*         Imaging Last 24 Hours:  XR CHEST PORTABLE    Result Date: 2/10/2023  EXAMINATION: ONE XRAY VIEW OF THE CHEST 2/10/2023 5:17 pm COMPARISON: None. HISTORY: ORDERING SYSTEM PROVIDED HISTORY: SOB TECHNOLOGIST PROVIDED HISTORY: Reason for exam:->SOB What reading provider will be dictating this exam?->CRC FINDINGS: The lungs are without acute focal process. There is no effusion or pneumothorax. Cardiomegaly. Sternotomy wires noted. Right-sided transvenous pacer device. The osseous structures are without acute process. No acute process. Assessment//Plan           Hospital Problems             Last Modified POA    * (Principal) Acute decompensated heart failure ([address] 2/10/2023 Yes    COPD with acute exacerbation ([address] 2/12/2023 Yes    Acute hypercapnic respiratory failure ([address] 2/13/2023 Yes   Assessment & Plan    Acute decompensated heart failure; Aucte on chronic hpoxic and hypercapnic resp failure; COPD with acute exacerbation  Exertional dyspnea, orthopnea, pitting edema in the lower extremities, bilateral rhonchi and crackles, mild elevated proBNP. Clinically patient is in heart failure on admission. History of COPD but unlikely primary etiology. Will treat for acute decompensated heart failure with Lasix. Start ACE and beta-blocker. Continue Lipitor. Echo ordered. Cardiology consult. 2/11: Continues to diurese. Appreciate Cardiology recs when available. 2/12: Continuing to diurese.   Diuretic regimen and beta-blockers adjusted by cardiology. 2/13: On rescue bipap; patient is not tolerating it well; discussed with intensivist and transfer to ICU for precedex vs intubation  2/14: no longer on rescue bipap; continue diuresis    Elevated troponin  No known baseline-we will cycle on repeat EKG  2/11: Repeats trops negative    CKD with/without SHEKHAR (TBD)  Creatinine 1.76, GFR 39 on admission. Reported Hx prior HD x 6 months until \"kidneys improved\" and discharged from HD. Slightly improved from admission    Hypertension  Resume home medication. Lasix, ACE and BB added. Monitor blood pressure closely. 2/11: Improved  2/14:  Hypotnesive today; holding ACE    DM2  Sliding scale insulin. Goal -180 while admitted.        Cristy Rodriguez

## 2023-02-14 NOTE — CARE COORDINATION
Quality round completed with care management team. Pt sitting in chair at this time. Pt is alert and oriented. Discuss DC plan with pt. Pt agree with DC plan home with daughter. Will need PT/OT when able. See if need HHC. KENDALL will follow.      Electronically signed by KENDALL Scott on 2/14/2023 at 10:17 AM

## 2023-02-14 NOTE — PROGRESS NOTES
Called because patient was taking his own home dose Ultram from his bag. Patient reports that he has apparently been taking this on and off throughout his hospitalization. I did extensively educate him on the need to only take prescribed medications not to take any further Ultram and to place his home meds in a locked box.

## 2023-02-14 NOTE — CARE COORDINATION
Definition of CHF discussed with patient and daughter. Symptoms of heart failure and decompensation reviewed: weight gain of >3 #, edema, difficulty breathing, cough, issues with appetite, fatigue, or difficulty with sleep. Common causes of CHF reviewed: CAD, arrhythmias, MI, HTN, valve dz., infection,  ETOH or drug abuse, or genetic abnormalities. Importance of daily weight and B/P monitoring discussed. Pt to use a calender or notebook to record daily weight and call physician immediately with 3 # weight gain. Low sodium diet and fluid intake discussed. Pt taught about a fluid restriction and advised to discuss this with the cardiologist prior to limiting oral intake. Shown how to read labels for sodium levels, recommended food list provided. I emphasized the importance of following their physician's orders for medication administration. Importance of flu and pneumonia vaccinations reinforced. Common CHF medications reviewed as well as avoiding certain other meds (decongestants, NSAIDS)  Instructed to discuss activity recommendations with physician. Pt. quit smoking. Sample CHF weight documentation form provided. CHF Zones discussed. Importance of staying in \"green\" area stressed. Pt verbalized understanding to call MD ASAP when he reaches the yellow zone, and to call 911 when reaching the red zone. Booklet and zone pamphlet provided to the pt. Definition of COPD discussed. Lung function explained. Types of COPD differentiated for patient. Symptoms discussed including: difficulty breathing, SOB, coughing, excess mucous, weakness and fatigue, or wheezing. Causes discussed. Pt has been exposed to air pollution or dust.   Different testing for COPD and most common treatments reviewed. Importance of preventing infection including hand hygiene, keeping inhaler mouthpieces clean, and getting the flu and pneumonia vaccinations.   Care Map of what to expect while hospitalized reviewed with patient. Ways to ease SOB explained including pursed lip breathing and diaphragmatic breathing. Energy conservation discussed. Possible medications that may be ordered were reviewed. I stressed that their physician would make that decision and explained the importance of taking the medication as directed. Good nutrition choices discussed. Pt denies losing weight. COPD booklet and zone pamphlet left for patient review. Patient and daughter deny any further questions at this time.    Electronically signed by Raoul Mayorga RN on 2/14/2023 at 10:31 AM

## 2023-02-15 LAB
ALBUMIN SERPL-MCNC: 2.7 G/DL (ref 3.5–4.6)
ALP BLD-CCNC: 117 U/L (ref 35–104)
ALT SERPL-CCNC: 8 U/L (ref 0–41)
ANION GAP SERPL CALCULATED.3IONS-SCNC: 7 MEQ/L (ref 9–15)
AST SERPL-CCNC: 13 U/L (ref 0–40)
BASOPHILS ABSOLUTE: 0 K/UL (ref 0–0.2)
BASOPHILS RELATIVE PERCENT: 0.3 %
BILIRUB SERPL-MCNC: 0.7 MG/DL (ref 0.2–0.7)
BUN BLDV-MCNC: 31 MG/DL (ref 8–23)
CALCIUM SERPL-MCNC: 8.5 MG/DL (ref 8.5–9.9)
CHLORIDE BLD-SCNC: 97 MEQ/L (ref 95–107)
CO2: 33 MEQ/L (ref 20–31)
CREAT SERPL-MCNC: 1.41 MG/DL (ref 0.7–1.2)
EOSINOPHILS ABSOLUTE: 0.2 K/UL (ref 0–0.7)
EOSINOPHILS RELATIVE PERCENT: 3.6 %
GFR SERPL CREATININE-BSD FRML MDRD: 51.6 ML/MIN/{1.73_M2}
GLOBULIN: 3.7 G/DL (ref 2.3–3.5)
GLUCOSE BLD-MCNC: 112 MG/DL (ref 70–99)
GLUCOSE BLD-MCNC: 136 MG/DL (ref 70–99)
GLUCOSE BLD-MCNC: 176 MG/DL (ref 70–99)
GLUCOSE BLD-MCNC: 189 MG/DL (ref 70–99)
GLUCOSE BLD-MCNC: 215 MG/DL (ref 70–99)
HCT VFR BLD CALC: 33.2 % (ref 42–52)
HEMOGLOBIN: 10.3 G/DL (ref 14–18)
LYMPHOCYTES ABSOLUTE: 0.5 K/UL (ref 1–4.8)
LYMPHOCYTES RELATIVE PERCENT: 8.1 %
MCH RBC QN AUTO: 24.7 PG (ref 27–31.3)
MCHC RBC AUTO-ENTMCNC: 31.1 % (ref 33–37)
MCV RBC AUTO: 79.4 FL (ref 79–92.2)
MONOCYTES ABSOLUTE: 0.7 K/UL (ref 0.2–0.8)
MONOCYTES RELATIVE PERCENT: 11.1 %
NEUTROPHILS ABSOLUTE: 4.6 K/UL (ref 1.4–6.5)
NEUTROPHILS RELATIVE PERCENT: 76.9 %
PDW BLD-RTO: 19 % (ref 11.5–14.5)
PERFORMED ON: ABNORMAL
PLATELET # BLD: 229 K/UL (ref 130–400)
POTASSIUM REFLEX MAGNESIUM: 4.2 MEQ/L (ref 3.4–4.9)
RBC # BLD: 4.18 M/UL (ref 4.7–6.1)
SODIUM BLD-SCNC: 137 MEQ/L (ref 135–144)
TOTAL PROTEIN: 6.4 G/DL (ref 6.3–8)
WBC # BLD: 6 K/UL (ref 4.8–10.8)

## 2023-02-15 PROCEDURE — 6370000000 HC RX 637 (ALT 250 FOR IP): Performed by: INTERNAL MEDICINE

## 2023-02-15 PROCEDURE — 94761 N-INVAS EAR/PLS OXIMETRY MLT: CPT

## 2023-02-15 PROCEDURE — 6360000002 HC RX W HCPCS: Performed by: INTERNAL MEDICINE

## 2023-02-15 PROCEDURE — 2580000003 HC RX 258: Performed by: INTERNAL MEDICINE

## 2023-02-15 PROCEDURE — 2700000000 HC OXYGEN THERAPY PER DAY

## 2023-02-15 PROCEDURE — 99233 SBSQ HOSP IP/OBS HIGH 50: CPT | Performed by: INTERNAL MEDICINE

## 2023-02-15 PROCEDURE — 80053 COMPREHEN METABOLIC PANEL: CPT

## 2023-02-15 PROCEDURE — 36415 COLL VENOUS BLD VENIPUNCTURE: CPT

## 2023-02-15 PROCEDURE — 85025 COMPLETE CBC W/AUTO DIFF WBC: CPT

## 2023-02-15 PROCEDURE — 94640 AIRWAY INHALATION TREATMENT: CPT

## 2023-02-15 PROCEDURE — 94660 CPAP INITIATION&MGMT: CPT

## 2023-02-15 PROCEDURE — 97162 PT EVAL MOD COMPLEX 30 MIN: CPT

## 2023-02-15 PROCEDURE — 2000000000 HC ICU R&B

## 2023-02-15 PROCEDURE — 97166 OT EVAL MOD COMPLEX 45 MIN: CPT

## 2023-02-15 RX ORDER — TRAMADOL HYDROCHLORIDE 50 MG/1
50 TABLET ORAL EVERY 6 HOURS PRN
Status: DISCONTINUED | OUTPATIENT
Start: 2023-02-15 | End: 2023-02-17 | Stop reason: HOSPADM

## 2023-02-15 RX ADMIN — APIXABAN 5 MG: 5 TABLET, FILM COATED ORAL at 09:31

## 2023-02-15 RX ADMIN — TRAMADOL HYDROCHLORIDE 50 MG: 50 TABLET ORAL at 21:36

## 2023-02-15 RX ADMIN — BUDESONIDE 500 MCG: 0.5 SUSPENSION RESPIRATORY (INHALATION) at 19:51

## 2023-02-15 RX ADMIN — ATORVASTATIN CALCIUM 40 MG: 40 TABLET, FILM COATED ORAL at 21:36

## 2023-02-15 RX ADMIN — APIXABAN 5 MG: 5 TABLET, FILM COATED ORAL at 21:36

## 2023-02-15 RX ADMIN — Medication 10 ML: at 21:36

## 2023-02-15 RX ADMIN — Medication 10 ML: at 09:00

## 2023-02-15 RX ADMIN — KETOCONAZOLE: 20 CREAM TOPICAL at 09:39

## 2023-02-15 RX ADMIN — BUDESONIDE 500 MCG: 0.5 SUSPENSION RESPIRATORY (INHALATION) at 08:15

## 2023-02-15 RX ADMIN — ASPIRIN 81 MG: 81 TABLET, COATED ORAL at 09:31

## 2023-02-15 RX ADMIN — FUROSEMIDE 40 MG: 40 TABLET ORAL at 17:48

## 2023-02-15 RX ADMIN — CARVEDILOL 12.5 MG: 12.5 TABLET, FILM COATED ORAL at 09:32

## 2023-02-15 RX ADMIN — TRAMADOL HYDROCHLORIDE 50 MG: 50 TABLET ORAL at 13:48

## 2023-02-15 RX ADMIN — CARVEDILOL 12.5 MG: 12.5 TABLET, FILM COATED ORAL at 21:36

## 2023-02-15 NOTE — CARE COORDINATION
LSW meet with pt at bedside. Discuss DC plan with pt and family. Family agree with DC plan home with [facility]. Will need [facility] order. LSW will follow.      Electronically signed by KENDALL Devine on 2/15/2023 at 3:21 PM

## 2023-02-15 NOTE — PROGRESS NOTES
Physical Therapy Med Surg Initial Assessment  Facility/Department: [address] [facility]  Room: [place] [address]       NAME: Braxton Ramirez  :  (58 y.o.)  MRN: [id number]  CODE STATUS: Full Code    Date of Service: 2/15/2023    Patient Diagnosis(es): Elevated troponin [R77.8]  COPD exacerbation (HCC) [J44.1]  COPD with acute exacerbation ([facility] [J44.1]  Acute hypercapnic respiratory failure [facility]) [J96.02]   Chief Complaint   Patient presents with    Fall    Shortness of Breath    Fatigue     Patient Active Problem List    Diagnosis Date Noted    Acute hypercapnic respiratory failure ([facility] 2023    COPD with acute exacerbation ([facility] 02/10/2023    Acute decompensated heart failure ([facility] 02/10/2023    Opioid use, unspecified with unspecified opioid-induced disorder 2022    Type 2 diabetes mellitus without complication ([facility]     Steatosis of liver 02/10/2022    Right bundle branch block 02/10/2022    Presence of permanent cardiac pacemaker 09/10/2019    Atherosclerosis of coronary artery without angina pectoris 09/10/2019    Diabetic peripheral neuropathy ([address] 09/10/2019    Obstructive sleep apnea of adult 09/10/2019    Old myocardial infarction 09/10/2019    Morbid obesity ([address] 09/10/2019    Generalized osteoarthritis 09/10/2019    Bilateral hearing loss 09/10/2019        Past Medical History:   Diagnosis Date    CAD (coronary artery disease)     Chronic kidney disease     COPD (chronic obstructive pulmonary disease) ([address]     Diabetes mellitus ([address]     Diabetic neuropathy ([facility]     Hyperlipidemia     Hypertension      Past Surgical History:   Procedure Laterality Date    COLON SURGERY      CORONARY ANGIOPLASTY WITH STENT PLACEMENT      CORONARY ARTERY BYPASS GRAFT      PACEMAKER PLACEMENT         Restrictions:  Restrictions/Precautions: Fall Risk     SUBJECTIVE: Subjective: Patient is agreeable to PT evaluation.     Pain   0/10    Post Treatment Pain Screenin/10    Prior Level of Function:  Social/Functional History  Lives With: Alone (daughter lives next door)  Type of Home: House  Home Layout: Two level, Bed/Bath upstairs  Home Access: Stairs to enter with rails  Entrance Stairs - Number of Steps: 1  Entrance Stairs - Rails: Left  Bathroom Shower/Tub: Tub/Shower unit  Bathroom Equipment: Grab bars in shower  Home Equipment: Cane, Oxygen (wearing 2-3L O2 at home)  Has the patient had two or more falls in the past year or any fall with injury in the past year?: Yes  ADL Assistance: Independent  Homemaking Assistance: Independent  Ambulation Assistance: Independent  Transfer Assistance: Independent  Active : Yes (daughter mainly drives)  Mode of Transportation: Car  Occupation: Retired  Type of Occupation:     OBJECTIVE:   Vision Exceptions: Wears glasses at all times  Hearing: Exceptions to [facility]  Hearing Exceptions: Hard of hearing/hearing concerns;Bilateral hearing aid    Cognition:  Overall Orientation Status: Within Functional Limits    Observation/Palpation  Observation: pt pleasant, cooperative no acute distress noted on O2 NC 3 1/2L    ROM:  RLE AROM: WFL  LLE AROM : WFL    Bed mobility  Bed Mobility Comments: did not perform this date - patient already in bedside chair & wanted to remain in bedside chair    Transfers  Sit to Stand: Contact guard assistance  Stand to Sit: Contact guard assistance  Comment: use of RW    Ambulation  Surface: Level tile  Device: Rolling Walker  Assistance: Contact guard assistance  Gait Deviations: Slow Yvette; Increased MALICK; Decreased step length;Decreased step height  Distance: 10' x 2    Stairs/Curb  Stairs?: No      Activity Tolerance  Activity Tolerance: Patient tolerated evaluation without incident      ASSESSMENT:   Body Structures, Functions, Activity Limitations Requiring Skilled Therapeutic Intervention: Decreased functional mobility ; Decreased ADL status; Decreased strength;Decreased endurance;Decreased balance  Decision Making: Medium Complexity  History: medium  Exam: medium  Clinical Presentation: medium    Treatment Diagnosis: decreased functional mobility, decreased ADL status, decreased strength, decreased endurance, decreased balance  Therapy Prognosis: Fair;Good    DISCHARGE RECOMMENDATIONS:  Discharge Recommendations: Continue to assess pending progress    Assessment: Patient is a 69year old male who is hospitalized who is currently functioning below reported functional mobility baseline. Patient would benefit from acute PT services in order to work towards improving patient's ability to perform functional mobility tasks & establish most appropriate discharge plan. Requires PT Follow-Up: Yes      PLAN OF CARE:  Physcial Therapy Plan  General Plan: 3-5 times per week  Current Treatment Recommendations: Strengthening, ROM, Balance training, Functional mobility training, Endurance training, Transfer training, ADL/Self-care training, Gait training, Stair training, Neuromuscular re-education, Manual, Pain management, Home exercise program, Safety education & training, Modalities, Positioning, Equipment evaluation, education, & procurement, Patient/Caregiver education & training, Therapeutic activities  Safety Devices  Type of Devices: Call light within reach, All fall risk precautions in place, Nurse notified    Goals:  Long Term Goals  Long Term Goal 1: Patient will perform bed mobility at independent level in order to get in/out of bed safely. Long Term Goal 2: Patient will perform transfers with LRD at modified independent level in order to get to/from varying surfaces. Long Term Goal 3: Patient will ambulate 48' with LRD at modified independent level in order to get room to room safely. Long Term Goal 4: Patient will ascend/descend a flight of stairs with rails with LRD at modified independent level in order to get to/from bathroom upstairs.     [facility] (6 CLICK) BASIC MOBILITY  AM-PAC Inpatient Mobility Raw Score : 17 Therapy Time:   Individual   Time In 1135   Time Out 1153   Minutes 18   Timed Code Treatment Minutes: 0 Minutes       Sona Jarvis PT, 02/15/23 at 1:02 PM         Definitions for assistance levels  Independent = pt does not require any physical supervision or assistance from another person for activity completion. Device may be needed.   Stand by assistance = pt requires verbal cues or instructions from another person, close to but not touching, to perform the activity  Minimal assistance= pt performs 75% or more of the activity; assistance is required to complete the activity  Moderate assistance= pt performs 50% of the activity; assistance is required to complete the activity  Maximal assistance = pt performs 25% of the activity; assistance is required to complete the activity  Dependent = pt requires total physical assistance to accomplish the task

## 2023-02-15 NOTE — PROGRESS NOTES
[facility] [place] Respiratory Therapy Evaluation   Current Order:  duoneb q6      Home Regimen: none      Ordering Physician: kaycee  Re-evaluation Date:  done     Diagnosis: chf      Patient Status: Stable     The following MDI Criteria must be met in order to convert aerosol to MDI with spacer. If unable to meet, MDI will be converted to aerosol:  []  Patient able to demonstrate the ability to use MDI effectively  []  Patient alert and cooperative  []  Patient able to take deep breath with 5-10 second hold  []  Medication(s) available in this delivery method   []  Peak flow greater than or equal to 200 ml/min            Current Order Substituted To  (same drug, same frequency)   Aerosol to MDI [] Albuterol Sulfate 0.083% unit dose by aerosol Albuterol Sulfate MDI 2 puffs by inhalation with spacer    [] Levalbuterol 1.25 mg unit dose by aerosol Levalbuterol MDI 2 puffs by inhalation with spacer    [] Levalbuterol 0.63 mg unit dose by aerosol Levalbuterol MDI 2 puffs by inhalation with spacer    [] Ipratropium Bromide 0.02% unit dose by aerosol Ipratropium Bromide MDI 2 puffs by inhalation with spacer    [] Duoneb (Ipratropium + Albuterol) unit dose by aerosol Ipratropium MDI + Albuterol MDI 2 puffs by inhalation w/spacer   MDI to Aerosol [] Albuterol Sulfate MDI Albuterol Sulfate 0.083% unit dose by aerosol    [] Levalbuterol MDI 2 puffs by inhalation Levalbuterol 1.25 mg unit dose by aerosol    [] Ipratropium Bromide MDI by inhalation Ipratropium Bromide 0.02% unit dose by aerosol    [] Combivent (Ipratropium + Albuterol) MDI by inhalation Duoneb (Ipratropium + Albuterol) unit dose by aerosol       Treatment Assessment [Frequency/Schedule]:  Change frequency to: __________________________________________________per Protocol, P&T, MEC      Points 0 1 2 3 4   Pulmonary Status  Non-Smoker  []   Smoking history   < 20 pack years  []   Smoking history  ?  20 pack years  []   Pulmonary Disorder  (acute or chronic)  [x] Severe or Chronic w/ Exacerbation  []     Surgical Status No [x]   Surgeries     General []   Surgery Lower []   Abdominal Thoracic or []   Upper Abdominal Thoracic with  PulmonaryDisorder  []     Chest X-ray Clear/Not  Ordered     [x]  Chronic Changes  Results Pending  []  Infiltrates, atelectasis, pleural effusion, or edema  []  Infiltrates in more than one lobe []  Infiltrate + Atelectasis, &/or pleural effusion  []    Respiratory Pattern Regular,  RR = 12-20 [x]  Increased,  RR = 21-25 []  CORRIGAN, irregular,  or RR = 26-30 []  Decreased FEV1  or RR = 31-35 []  Severe SOB, use  of accessory muscles, or RR ? 35  []    Mental Status Alert, oriented,  Cooperative [x]  Confused but Follows commands []  Lethargic or unable to follow commands []  Obtunded  []  Comatose  []    Breath Sounds Clear to  auscultation  []  Decreased unilaterally or  in bases only [x]  Decreased  bilaterally  []  Crackles or intermittent wheezes []  Wheezes []    Cough Strong, Spontan., & nonproductive [x]  Strong,  spontaneous, &  productive []  Weak,  Nonproductive []  Weak, productive or  with wheezes []  No spontaneous  cough or may require suctioning []    Level of Activity Ambulatory []  Ambulatory w/ Assist  [x]  Non-ambulatory []  Paraplegic []  Quadriplegic []    Total    Score:_____5__     Triage Score:_____5__      Tri       Triage:     1. (>20) Freq: Q3    2. (16-20) Freq: Q4   3. (11-15) Freq: QID & Albuterol Q2 PRN    4. (6-10) Freq: TID & Albuterol Q2 PRN    5. (0-5) Freq Q4prn

## 2023-02-15 NOTE — PROGRESS NOTES
INPATIENT PROGRESS NOTES    PATIENT NAME: Caremn Roman  MRN: [id number]  SERVICE DATE:  February 15, 2023   SERVICE TIME:  7:24 AM      PRIMARY SERVICE: Pulmonary Disease    CHIEF COMPLAIN: Acute on chronic respiratory failure      INTERVAL HPI: Patient seen and examined at bedside, Interval Notes, orders reviewed. Nursing notes noted  He is transferred out of ICU. He is tolerating BiPAP and using off and on. Every night. He has moist cough and bringing green-colored mucus out. No chest pain. No fever or chills. He has exertional shortness of breath. No nausea, vomiting or diarrhea. Miranda Bedolla He is currently on 3 O2 via nasal cannula O2 saturation 100%. He said he is VPAP is broken and needs to have CPAP or BiPAP at home before discharge. He said his daughter is trying to get it from [address] [facility]. OBJECTIVE    Body mass index is 35.54 kg/m². PHYSICAL EXAM:  Vitals:  BP (!) 104/51   Pulse 60   Temp 97.7 °F (36.5 °C) (Oral)   Resp 22   Ht 5' 6\" (1.676 m)   Wt 220 lb 3.2 oz (99.9 kg)   SpO2 100%   BMI 35.54 kg/m²   General: Obese, alert, awake . comfortable in bed, No distress. Head: Atraumatic , Normocephalic   Eyes: PERRL. No sclera icterus. No conjunctival injection. No discharge   ENT: No nasal  discharge. Pharynx clear. Neck:  Trachea midline. No thyromegaly, no JVD, No cervical adenopathy. Chest : Bilaterally symmetrical ,Normal effort,  No accessory muscle use  Lung : . Fair BS bilateral, decreased BS at bases. Bibasilar Rales. No wheezing. No rhonchi. Heart[de-identified] Normal  rate. Regular rhythm. No mumur ,  Rub or gallop  ABD: Non-tender. Non-distended. No masses. No organmegaly. Normal bowel sounds. No hernia.   Ext : No Pitting both leg , No Cyanosis No clubbing  Neuro: no focal weakness          DATA:   Recent Labs     02/14/23  0555 02/15/23  0536   WBC 5.5 6.0   HGB 10.1* 10.3*   HCT 32.4* 33.2*   MCV 80.4 79.4    229     Recent Labs     02/14/23  0419 02/15/23  0536    137   K 4.5 4.2   CL 95 97   CO2 34* 33*   BUN 37* 31*   CREATININE 1.67* 1.41*   GLUCOSE 123* 112*   CALCIUM 8.6 8.5   PROT 6.7 6.4   LABALBU 2.9* 2.7*   BILITOT 0.5 0.7   ALKPHOS 115* 117*   AST 14 13   ALT 9 8   LABGLOM 42.1* 51.6*   GLOB 3.8* 3.7*       MV Settings:     FiO2 : 40 %    Recent Labs     02/13/23  1059   PHART 7.325*   BUL3XRI 69*   PO2ART 86*   DFZ0INU 35.8*   BEART 10*   A5PVIUIP 95*       O2 Device: Nasal cannula  O2 Flow Rate (L/min): 3 L/min    ADULT DIET; Regular; 4 carb choices (60 gm/meal)     MEDICATIONS during current hospitalization:    Continuous Infusions:   dextrose      sodium chloride         Scheduled Meds:   ketoconazole   Topical Daily    budesonide  0.5 mg Nebulization BID    [Held by provider] furosemide  40 mg Oral BID    apixaban  5 mg Oral BID    carvedilol  12.5 mg Oral BID    insulin lispro  0-4 Units SubCUTAneous TID WC    insulin lispro  0-4 Units SubCUTAneous Nightly    miconazole   Topical BID    aspirin  81 mg Oral Daily    sodium chloride flush  5-40 mL IntraVENous 2 times per day    atorvastatin  40 mg Oral Nightly       PRN Meds:ipratropium-albuterol, lidocaine, glucose, dextrose bolus **OR** dextrose bolus, glucagon (rDNA), dextrose, sodium chloride flush, sodium chloride, ondansetron **OR** ondansetron, polyethylene glycol, acetaminophen **OR** acetaminophen, hydrALAZINE    Radiology  XR CHEST PORTABLE    Result Date: 2/13/2023  EXAMINATION: ONE XRAY VIEW OF THE CHEST 2/13/2023 10:08 am COMPARISON: 02/10/2023 HISTORY: ORDERING SYSTEM PROVIDED HISTORY: Worsening hypercapnia TECHNOLOGIST PROVIDED HISTORY: Reason for exam:->worsening hypercapnia What reading provider will be dictating this exam?->CRC FINDINGS: Mild cardiomegaly is unchanged. Dual chamber cardiac pacemaker is stable. Pulmonary vasculature is upper normal limits. The lungs are clear. No pneumothorax is identified. There is stable blunting of the left costophrenic sulcus, likely from pleural thickening.   Post sternotomy changes are noted. Stable cardiomegaly. No acute cardiopulmonary process. XR CHEST PORTABLE    Result Date: 2/10/2023  EXAMINATION: ONE XRAY VIEW OF THE CHEST 2/10/2023 5:17 pm COMPARISON: None. HISTORY: ORDERING SYSTEM PROVIDED HISTORY: SOB TECHNOLOGIST PROVIDED HISTORY: Reason for exam:->SOB What reading provider will be dictating this exam?->CRC FINDINGS: The lungs are without acute focal process. There is no effusion or pneumothorax. Cardiomegaly. Sternotomy wires noted. Right-sided transvenous pacer device. The osseous structures are without acute process. No acute process. IMPRESSION AND SUGGESTION:  Acute on chronic respiratory failure with hypoxia and hypercapnia  Decompensated congestive heart failure  COPD exacerbation  MORENA currently not on treatment  Coronary disease/cardiomyopathy status post ICD and CABG  Diabetes mellitus and hyperglycemia  Acute kidney failure with probable underlying CKD    Continue BiPAP therapy during sleep and prn. Continue nebulizer with DuoNeb every 6 hourly while awake Pulmicort 0.5 mg twice daily. Diuretic therapy. Maintain euvolemic. Watch renal function and urine output. Target blood sugar 140-180. Last chest x-ray showing cardiomegaly no acute process. Due to hypercapnia may consider trilogy noninvasive ventilator before discharge. NOTE: This report was transcribed using voice recognition software. Every effort was made to ensure accuracy; however, inadvertent computerized transcription errors may be present.       Electronically signed by Franca Valente MD, FCCP on 2/15/2023 at 7:24 AM

## 2023-02-15 NOTE — FLOWSHEET NOTE
2314 Pt arrived to floor    2350 PT assessment complete /51 Map 64. Pt c/o 6/10 back pain, denies tylenol. Assisted pt to bedside commode tolerated well. Pt updated daughter Fern Mcguire on transfer and room number. Breathing even and unlabored 4L NC. Pt belongings in reach, call light within reach, bed alarm on, bed in lowest position. Denies further needs at this time. 0045 pt to bedside commode and back to bed. Bipap on. Call light within reach.

## 2023-02-15 NOTE — PROGRESS NOTES
Progress Note  Date:2/15/2023       Room:[place] [id number]  Patient Orland Najjar     YOB: 1945     Age:77 y.o. Subjective      Patient is doing much better today; happy to be off of bipap; complaining of leg pain requiring tramadol. denies fevers, chills, sweats    Objective         Vitals Last 24 Hours:  TEMPERATURE:  Temp  Av.9 °F (36.6 °C)  Min: 97.7 °F (36.5 °C)  Max: 98.4 °F (36.9 °C)  RESPIRATIONS RANGE: Resp  Av.4  Min: 14  Max: 28  PULSE OXIMETRY RANGE: SpO2  Av.4 %  Min: 89 %  Max: 100 %  PULSE RANGE: Pulse  Av.5  Min: 59  Max: 104  BLOOD PRESSURE RANGE: Systolic (52GSL), FYS:740 , Min:89 , GZF:232   ; Diastolic (28FPU), FEB:28, Min:51, Max:117    I/O (24Hr): Intake/Output Summary (Last 24 hours) at 2/15/2023 1143  Last data filed at 2023 2247  Gross per 24 hour   Intake 254.62 ml   Output --   Net 254.62 ml       Objective:  Vital signs: (most recent): Blood pressure (!) 103/58, pulse 59, temperature 97.7 °F (36.5 °C), temperature source Oral, resp. rate 18, height 5' 6\" (1.676 m), weight 220 lb 3.2 oz (99.9 kg), SpO2 99 %. Constitutional: On NC  Head: NCAT  Neck: Supple, trachea midline  Cardiovascular: RRR. Pulmonary: Bilateral basilar rhonchi   Abdomen: Obese, soft, nontense. Neurologic: Nonfocal.    Labs/Imaging/Diagnostics    Labs:  CBC:  Recent Labs     23  0500 23  0812 23  1059 23  0555 02/15/23  0536   WBC 6.4  --   --  5.5 6.0   RBC 4.24*  --   --  4.03* 4.18*   HGB 10.5*   < > 11.6* 10.1* 10.3*   HCT 34.4*  --   --  32.4* 33.2*   MCV 81.1  --   --  80.4 79.4   RDW 19.0*  --   --  19.2* 19.0*     --   --  224 229    < > = values in this interval not displayed.        CHEMISTRIES:  Recent Labs     23  0500 23  0812 23  1059 23  0419 02/15/23  0536     --   --  135 137   K 4.6  --   --  4.5 4.2   CL 96  --   --  95 97   CO2 30  --   --  34* 33*   BUN 42*  --   --  37* 31* CREATININE 1.83*   < > 2.5* 1.67* 1.41*   GLUCOSE 116*  --   --  123* 112*    < > = values in this interval not displayed. PT/INR:No results for input(s): PROTIME, INR in the last 72 hours. APTT:No results for input(s): APTT in the last 72 hours. LIVER PROFILE:  Recent Labs     02/13/23  0500 02/14/23  0419 02/15/23  0536   AST 12 14 13   ALT 9 9 8   BILITOT 0.4 0.5 0.7   ALKPHOS 117* 115* 117*       Imaging Last 24 Hours:  XR CHEST PORTABLE    Result Date: 2/10/2023  EXAMINATION: ONE XRAY VIEW OF THE CHEST 2/10/2023 5:17 pm COMPARISON: None. HISTORY: ORDERING SYSTEM PROVIDED HISTORY: SOB TECHNOLOGIST PROVIDED HISTORY: Reason for exam:->SOB What reading provider will be dictating this exam?->CRC FINDINGS: The lungs are without acute focal process. There is no effusion or pneumothorax. Cardiomegaly. Sternotomy wires noted. Right-sided transvenous pacer device. The osseous structures are without acute process. No acute process. Assessment//Plan           Hospital Problems             Last Modified POA    * (Principal) Acute decompensated heart failure ([address] 2/10/2023 Yes    COPD with acute exacerbation ([address] 2/12/2023 Yes    Acute hypercapnic respiratory failure ([address] 2/13/2023 Yes   Assessment & Plan    Acute decompensated heart failure; Aucte on chronic hpoxic and hypercapnic resp failure; COPD with acute exacerbation  Exertional dyspnea, orthopnea, pitting edema in the lower extremities, bilateral rhonchi and crackles, mild elevated proBNP. Clinically patient is in heart failure on admission. History of COPD but unlikely primary etiology. Will treat for acute decompensated heart failure with Lasix. Start ACE and beta-blocker. Continue Lipitor. Echo ordered. Cardiology consult. 2/11: Continues to diurese. Appreciate Cardiology recs when available. 2/12: Continuing to diurese. Diuretic regimen and beta-blockers adjusted by cardiology.   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 Reported Hx prior HD x 6 months until \"kidneys improved\" and discharged from HD. Slightly improved from admission     - creatinine is improving now    Hypertension  Resume home medication. Lasix, ACE and BB added. Monitor blood pressure closely. 2/11: Improved  2/14:  Hypotnesive today; cardiology is holding ACE    DM2  Sliding scale insulin. Goal -180 while admitted.        Caralyn Prader

## 2023-02-15 NOTE — PROGRESS NOTES
Hand off report received from [facility] at bedside. Head to toe completed. See flow sheets for details. BP dropped from 120/62 to 89/46 after coreg given. Dr. Eliza Marina notified, 250ml bolus of LR ordered. BP stable. PT transferring to , all patient belongings sent with patient. Medications sent via tube system.      Electronically signed by Maria Goddard RN on 2/14/2023 at 10:51 PM

## 2023-02-15 NOTE — PLAN OF CARE
FAMILY PRACTICE OFFICE VISIT  [address]  [address], [address]      NAME: Candie Perales  AGE: [de-identified] y o  SEX: female  : 1937   MRN: [id number]    DATE: 3/7/2019  TIME: 3:06 PM    Assessment and Plan     Problem List Items Addressed This Visit        Endocrine    Type 2 diabetes mellitus without complication, without long-term current use of insulin ([facility])    Relevant Medications    rosuvastatin (CRESTOR) 5 mg tablet       Cardiovascular and Mediastinum    CVA (cerebral vascular accident) ([facility] [address] )    Relevant Medications    rosuvastatin (CRESTOR) 5 mg tablet          Patient Instructions   She is doing well here today, she will complete out the doxycycline she received from urgent center regarding respiratory infection, continue with twice daily nebulizer treatments along with Flovent  She will use other medication as is and we will see her again in 4 months, redo CMP/TSH/lipids/ A1C after that ( see  BW)  She is tolerating Crestor 5 mg 3 times weekly, she can increase to once daily  Chief Complaint     Chief Complaint   Patient presents with    Follow-up     8 week check up        History of Present Illness   Domi Jha is a [de-identified]y o -year-old female who is in for re-evaluation, I had seen her back in mid January, she restarted Lasix 20 mg then  Overall she has been feeling well other than recent respiratory infection, [facility], received doxycycline 100 twice daily, much improved  No fevers  She has completed 8 weeks of therapy, see prior visits regarding stroke, speech is improved, still notes some imbalance with walking but no focal weakness  Review of Systems   Review of Systems   Constitutional: Negative for activity change, appetite change, fatigue and unexpected weight change     Respiratory:        Using nebulizer Therapy evaluation completed. Please see daily notes and/or progress notes for details related to planned treatment interventions, goals and functional performance.      Electronically signed by Wilman Christian PT on 2/15/2023 at 1:02 PM treatments twice daily, no increased shortness of breath, cough is back to baseline, no hemoptysis  Cardiovascular: Negative for chest pain, palpitations and leg swelling  Gastrointestinal: Negative for abdominal pain, blood in stool, nausea and vomiting  Genitourinary: Negative for dysuria and hematuria  Skin:        Rash on leg resolved   Neurological: Positive for tremors (Had 2nd battery replacement procedure, did well, tremors much improved as before)  Negative for dizziness, facial asymmetry, light-headedness and headaches  No falls   Psychiatric/Behavioral: Negative for behavioral problems and confusion         Active Problem List     Patient Active Problem List   Diagnosis    Essential hypertension    Benign familial tremor    CVA [facility]cerebral vascular accident) ([facility] [address] )    COPD (chronic obstructive pulmonary disease) ([facility])    Ankle swelling    Esophageal reflux    Gait disturbance    Hypercholesterolemia    Sleep disorder    Sciatica    Right shoulder pain    Osteoarthritis of hip    OA (osteoarthritis)    Multiple joint pain    Imbalance    Acquired hypothyroidism    Lumbar degenerative disc disease    Low back pain    Type 2 diabetes mellitus without complication, without long-term current use of insulin ([facility])    History of pancreatitis    Aortic valve stenosis    S/P deep brain stimulator placement    Microscopic hematuria    Preoperative examination    Benign essential tremor       Past Medical History:  Past Medical History:   Diagnosis Date    Arthritis     Asthma     Benign essential tremor 10/15/2018    Added automatically from request for surgery [id number]    Benign neoplasm of large intestine     Cellulitis of leg, right     Closed compression fracture of body of lumbar vertebra ([facility])     L5    COPD (chronic obstructive pulmonary disease) ([facility])     Difficulty waking     post anesthesia    Disease of thyroid gland     GERD (gastroesophageal reflux disease)  High cholesterol     Hypertension     Osteopenia     Osteoporosis     Shortness of breath     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     Urinary leakage     Vitamin D deficiency     Wears glasses     Wears partial dentures     upper       Past Surgical History:  Past Surgical History:   Procedure Laterality Date    CATARACT EXTRACTION W/  INTRAOCULAR LENS IMPLANT Bilateral     COLONOSCOPY      DEEP BRAIN STIMULATOR PLACEMENT      KNEE ARTHROSCOPY      SC IMP STIM,CRANIAL,SUBQ,1 ARRAY Left 2019    Procedure: REPLACEMENT IMPLANTABLE PULSE GENERATOR (IPG) DEEP BRAIN STIMULATION (DBS), LEFT;  Surgeon: Juan Antonio Eduardo MD;  Location: [facility];  Service: Neurosurgery    SC IMP STIM,CRANIAL,SUBQ,>1 ARRAY Right 2018    Procedure: REPLACEMENT RIGHT DBS IMPLANTABLE PULSE GENERATOR;  Surgeon: Juan Antonio Eduardo MD;  [facility];  Service: Neurosurgery    SC TOTAL HIP ARTHROPLASTY Left 2016    Procedure: ARTHROPLASTY HIP TOTAL ANTERIOR;  Surgeon: Lilliam Snell MD;  Location: [facility];  Service: Orthopedics    ROTATOR CUFF REPAIR         Family History:  Family History   Problem Relation Age of Onset    Breast cancer Mother     Throat cancer Family        Social History:  Social History     Tobacco Use    Smoking status: Former Smoker     Last attempt to quit:      Years since quittin 1    Smokeless tobacco: Never Used   Substance Use Topics    Alcohol use: Yes     Comment: 2 a month       Objective     Vitals:    19 1329   BP: 112/66   BP Location: Left arm   Patient Position: Sitting   Cuff Size: Large   Pulse: 67   SpO2: 96%   Weight: 83 9 kg (185 lb)   Height: 5' 3" (1 6 m)     Body mass index is 32 77 kg/m²      BP Readings from Last 3 Encounters:   19 112/66   19 150/70   19 147/71       Wt Readings from Last 3 Encounters:   19 83 9 kg (185 lb)   19 83 5 kg (184 lb)   19 84 4 kg (186 lb)       Physical Exam   Constitutional: She is oriented to person, place, and time  She appears well-developed and well-nourished  No distress  Looks well -  Speech more fluent, arises and seats self on table w/out issue  Eyes: Conjunctivae are normal  No scleral icterus  Cardiovascular: Normal rate and regular rhythm  Murmur heard  Pulmonary/Chest:   Initially with mild rhonchi right mid lower, clear after cough   Musculoskeletal: She exhibits no edema  Neurological: She is alert and oriented to person, place, and time  Psychiatric: She has a normal mood and affect  ALLERGIES:  Allergies   Allergen Reactions    Ciprofloxacin Diarrhea    Simvastatin Myalgia    Advair Diskus [Fluticasone-Salmeterol] Palpitations     Ok w flovent    Tetracycline Rash     Reaction Date: [id number];        Current Medications     Current Outpatient Medications   Medication Sig Dispense Refill    ascorbic acid (VITAMIN C) 250 MG tablet Take 500 mg by mouth daily      aspirin 81 MG tablet Take 1 tablet (81 mg total) by mouth daily YOU MAY RESTART ASPIRIN ON SATURDAY 12/15/2018   0    calcium carbonate (OS-MAUREEN) 1250 (500 Ca) MG chewable tablet Chew 1 tablet daily      Cholecalciferol (VITAMIN D-3 PO) Take 1,000 Units by mouth daily        diltiazem (CARDIZEM CD) 240 mg 24 hr capsule TAKE 1 CAPSULE DAILY 30 capsule 5    doxycycline (ADOXA) 100 MG tablet Take 100 mg by mouth 2 (two) times a day      Fluticasone Propionate, Inhal, (FLOVENT DISKUS) 250 MCG/BLIST AEPB Inhale 1 puff daily as needed        furosemide (LASIX) 20 mg tablet Take 1 tablet (20 mg total) by mouth daily 90 tablet 3    levothyroxine 125 mcg tablet Take 1 tablet (125 mcg total) by mouth daily 30 tablet 5    Multiple Vitamins-Minerals (PRESERVISION AREDS PO) Take 1 Cap by Mouth daily      multivitamin (THERAGRAN) TABS Take 1 tablet by mouth daily      omeprazole (PriLOSEC) 20 mg delayed release capsule Take 1 capsule by mouth as needed        rosuvastatin (CRESTOR) 5 mg tablet Take 1 tablet (5 mg total) by mouth daily 30 tablet 5    albuterol (2 5 mg/3 mL) 0 083 % nebulizer solution Take 2 5 mg by nebulization as needed for wheezing  No current facility-administered medications for this visit  Most recent labs available from [address] [place] [address]   ( others may be available in [facility] / Media sections)  Lab Results   Component Value Date    WBC 10 08 06/16/2017    HGB 14 3 06/16/2017    HCT 44 6 06/16/2017     12/13/2018    CHOL 176 10/29/2014    TRIG 131 10/29/2014    HDL 47 04/12/2016    ALT 32 06/16/2017    AST 25 06/16/2017     10/29/2014    K 4 0 08/09/2018     08/09/2018    CREATININE 0 81 08/09/2018    BUN 25 08/09/2018    CO2 31 08/09/2018    INR 0 96 05/10/2016    GLUF 86 06/16/2017    HGBA1C 6 5 02/14/2019     Lab Results   Component Value Date    LDLCALC 99 10/29/2014     Lab Results   Component Value Date    IKA5YDHFACRV 2 130 06/16/2017         No orders of the defined types were placed in this encounter          Charlene Valentin DO

## 2023-02-15 NOTE — PLAN OF CARE
See OT evaluation for all goals and OT POC.  Electronically signed by Maeve Hawk OT on 2/15/2023 at 12:07 PM

## 2023-02-15 NOTE — PROGRESS NOTES
[facility] OCCUPATIONAL THERAPY EVALUATION - ACUTE     NAME: Carmela Toribio  : [birth year] (78 y.o.)  MRN: [id number]  CODE STATUS: Full Code  Room: X336/E976-60    Date of Service: 2/15/2023    Patient Diagnosis(es): Elevated troponin [R77.8]  COPD exacerbation ([address] [J44.1]  COPD with acute exacerbation ([address] [J44.1]  Acute hypercapnic respiratory failure ([address] [J96.02]   Patient Active Problem List    Diagnosis Date Noted    Acute hypercapnic respiratory failure ([address] 2023    COPD with acute exacerbation ([address] 02/10/2023    Acute decompensated heart failure ([address] 02/10/2023    Opioid use, unspecified with unspecified opioid-induced disorder 2022    Type 2 diabetes mellitus without complication ([address]     Steatosis of liver 02/10/2022    Right bundle branch block 02/10/2022    Presence of permanent cardiac pacemaker 09/10/2019    Atherosclerosis of coronary artery without angina pectoris 09/10/2019    Diabetic peripheral neuropathy ([address] 09/10/2019    Obstructive sleep apnea of adult 09/10/2019    Old myocardial infarction 09/10/2019    Morbid obesity ([address] 09/10/2019    Generalized osteoarthritis 09/10/2019    Bilateral hearing loss 09/10/2019        Past Medical History:   Diagnosis Date    CAD (coronary artery disease)     Chronic kidney disease     COPD (chronic obstructive pulmonary disease) ([address]     Diabetes mellitus ([address]     Diabetic neuropathy ([address]     Hyperlipidemia     Hypertension      Past Surgical History:   Procedure Laterality Date    COLON SURGERY      CORONARY ANGIOPLASTY WITH STENT PLACEMENT      CORONARY ARTERY BYPASS GRAFT      PACEMAKER PLACEMENT          Restrictions  Restrictions/Precautions: Fall Risk              Safety Devices: Safety Devices  Type of Devices: Call light within reach; All fall risk precautions in place;Nurse notified     Patient's date of birth confirmed: Yes    General:       Subjective          Pain at start of treatment: No    Pain at end of treatment: No    Prior Level of Function:  Social/Functional History  Lives With: Alone (duplex with daughter next door)  Type of Home: House  Home Layout: Two level, Bed/Bath upstairs (12 step up with HR)  Home Access: Stairs to enter with rails  Entrance Stairs - Number of Steps: 1  Entrance Stairs - Rails: Left  Bathroom Shower/Tub: Tub/Shower unit  Bathroom Equipment: Grab bars in shower  Home Equipment: Cane, Oxygen  Has the patient had two or more falls in the past year or any fall with injury in the past year?: Yes  ADL Assistance: Independent  Homemaking Assistance: Independent  Ambulation Assistance: Independent  Transfer Assistance: Independent  Active : Yes  Mode of Transportation: Car  Occupation: Retired  Type of Occupation:     OBJECTIVE:     Orientation Status:  Orientation  Overall Orientation Status: Within Functional Limits    Observation:  Observation/Palpation  Observation: pt pleasant, cooperative no acute distress noted on O2 NC    Cognition Status:  Cognition  Overall Cognitive Status: WFL    Perception Status:       Vision and Hearing Status:  Vision  Vision Exceptions: Wears glasses at all times  Hearing  Hearing: Exceptions to [facility]  Hearing Exceptions: Hard of hearing/hearing concerns;Bilateral hearing aid        GROSS ASSESSMENT AROM/PROM:  AROM: Within functional limits       ROM:   LUE AROM (degrees)  LUE AROM : WFL  Left Hand AROM (degrees)  Left Hand AROM: WFL  RUE AROM (degrees)  RUE AROM : WFL  Right Hand AROM (degrees)  Right Hand AROM: WFL    UE STRENGTH:  Strength: Generally decreased, functional    UE COORDINATION:  Coordination: Generally decreased, functional    UE TONE:  Tone: Normal    UE SENSATION:  Sensation: Impaired (numbness on bottom of B feet)    Hand Dominance:  Hand Dominance  Hand Dominance: Left    ADL Status:  ADL  Feeding: Setup  Grooming: Stand by assistance  UE Bathing: Minimal assistance  LE Bathing: Maximum assistance  UE Dressing: Stand by assistance  LE Dressing: Maximum assistance  Toileting: Moderate assistance  Additional Comments: simulated ADL seated in bedside chair. Levels as anticipated  Toilet Transfers  Toilet Transfer: Minimal assistance  Toilet Transfers Comments: anticiapted level    Functional Mobility:    Transfers  Sit to stand: Minimal assistance  Stand to sit: Minimal assistance    Patient ambulated to/from sink with WW at [employer]. Bed Mobility  Bed mobility  Bed Mobility Comments: NT - Pt up in bedside chair and remained up    Seated and Standing Balance:  Balance  Sitting: Impaired (sup)  Standing: Impaired (SBA/CGA)    Functional Endurance:  Activity Tolerance  Activity Tolerance: Patient Tolerated treatment well    D/C Recommendations:  OT D/C RECOMMENDATIONS  REQUIRES OT FOLLOW-UP: Yes    Equipment Recommendations:       OT Education:   Patient Education  Education Given To: Patient  Education Provided: Role of Therapy;Plan of Care  Education Method: Verbal  Education Outcome: Verbalized understanding    OT Follow Up:   OT D/C RECOMMENDATIONS  REQUIRES OT FOLLOW-UP: Yes       Assessment/Discharge Disposition:  Assessment: Pt is a 69 yr old male presenting to [facility] with the above functional deficits. Pt would benefit from occupational therapy services to maximize safety and independence with ADL tasks, impove overall strength/endurance and balance for functional tasks.   Performance deficits / Impairments: Decreased functional mobility , Decreased safe awareness, Decreased balance, Decreased ADL status, Decreased endurance, Decreased strength  Prognosis: Good  Discharge Recommendations: Continue to assess pending progress  Decision Making: Medium Complexity  History: mod complex  Exam: 6 perf deficits  Assistance / Modification: maxA    AMPAC (Six Click) Self care Score   How much help is needed for putting on and taking off regular lower body clothing?: A Lot  How much help is needed for bathing (which includes washing, rinsing, drying)?: A Lot  How much help is needed for toileting (which includes using toilet, bedpan, or urinal)?: A Lot  How much help is needed for putting on and taking off regular upper body clothing?: A Little  How much help is needed for taking care of personal grooming?: A Little  How much help for eating meals?: A Little  AM-[facility] Inpatient Daily Activity Raw Score: 15  AM-PAC Inpatient ADL T-Scale Score : 34.69  ADL Inpatient CMS 0-100% Score: 56.46    Therapy key for assistance levels -   Independent/Mod I = Pt. is able to perform task with no assistance but may require a device   Stand by assistance = Pt. does not perform task at an independent level but does not need physical assistance, requires verbal cues  Minimal, Moderate, Maximal Assistance = Pt. requires physical assistance (25%, 50%, 75% assist from helper) for task but is able to actively participate in task   Dependent = Pt. requires total assistance with task and is not able to actively participate with task completion     Plan:  Occupational Therapy Plan  Times Per Week: 1-4x/wk  Therapy Duration:  (legnth of acute stay)  Current Treatment Recommendations: Strengthening, Balance training, Functional mobility training, Endurance training, Safety education & training, Patient/Caregiver education & training, Self-Care / ADL, Equipment evaluation, education, & procurement    Goals:   Patient will:    - Improve functional endurance to tolerate/complete 30 mins of ADL's  - Be BRAULIO in UB ADLs   - Be BRAULIO in LB ADLs  - Be BRAULIO in ADL transfers without LOB  - Be BRAULIO in toileting tasks  - Access appropriate D/C site with as few architectural barriers as possible.     Patient Goal: Patient goals : to go home when able     Discussed and agreed upon: Yes Comments:       Therapy Time:   Individual   Time In 1135   Time Out 1152   Minutes 17          Eval: 17 minutes     Electronically signed by:    Gabby Bernstein OT,   2/15/2023, 12:06 PM

## 2023-02-16 LAB
ANION GAP SERPL CALCULATED.3IONS-SCNC: 7 MEQ/L (ref 9–15)
BUN BLDV-MCNC: 29 MG/DL (ref 8–23)
CALCIUM SERPL-MCNC: 8.4 MG/DL (ref 8.5–9.9)
CHLORIDE BLD-SCNC: 95 MEQ/L (ref 95–107)
CO2: 33 MEQ/L (ref 20–31)
CREAT SERPL-MCNC: 1.46 MG/DL (ref 0.7–1.2)
GFR SERPL CREATININE-BSD FRML MDRD: 49.5 ML/MIN/{1.73_M2}
GLUCOSE BLD-MCNC: 120 MG/DL (ref 70–99)
GLUCOSE BLD-MCNC: 161 MG/DL (ref 70–99)
GLUCOSE BLD-MCNC: 190 MG/DL (ref 70–99)
GLUCOSE BLD-MCNC: 191 MG/DL (ref 70–99)
GLUCOSE BLD-MCNC: 216 MG/DL (ref 70–99)
PERFORMED ON: ABNORMAL
POTASSIUM REFLEX MAGNESIUM: 4.4 MEQ/L (ref 3.4–4.9)
SODIUM BLD-SCNC: 135 MEQ/L (ref 135–144)

## 2023-02-16 PROCEDURE — 2700000000 HC OXYGEN THERAPY PER DAY

## 2023-02-16 PROCEDURE — 80048 BASIC METABOLIC PNL TOTAL CA: CPT

## 2023-02-16 PROCEDURE — 6370000000 HC RX 637 (ALT 250 FOR IP): Performed by: INTERNAL MEDICINE

## 2023-02-16 PROCEDURE — 6360000002 HC RX W HCPCS: Performed by: INTERNAL MEDICINE

## 2023-02-16 PROCEDURE — 94660 CPAP INITIATION&MGMT: CPT

## 2023-02-16 PROCEDURE — 2580000003 HC RX 258: Performed by: INTERNAL MEDICINE

## 2023-02-16 PROCEDURE — 94761 N-INVAS EAR/PLS OXIMETRY MLT: CPT

## 2023-02-16 PROCEDURE — 36415 COLL VENOUS BLD VENIPUNCTURE: CPT

## 2023-02-16 PROCEDURE — 2000000000 HC ICU R&B

## 2023-02-16 PROCEDURE — 97116 GAIT TRAINING THERAPY: CPT

## 2023-02-16 PROCEDURE — 99232 SBSQ HOSP IP/OBS MODERATE 35: CPT | Performed by: INTERNAL MEDICINE

## 2023-02-16 PROCEDURE — 94640 AIRWAY INHALATION TREATMENT: CPT

## 2023-02-16 RX ADMIN — CARVEDILOL 12.5 MG: 12.5 TABLET, FILM COATED ORAL at 21:01

## 2023-02-16 RX ADMIN — BUDESONIDE 500 MCG: 0.5 SUSPENSION RESPIRATORY (INHALATION) at 19:32

## 2023-02-16 RX ADMIN — ASPIRIN 81 MG: 81 TABLET, COATED ORAL at 08:43

## 2023-02-16 RX ADMIN — TRAMADOL HYDROCHLORIDE 50 MG: 50 TABLET ORAL at 15:11

## 2023-02-16 RX ADMIN — APIXABAN 5 MG: 5 TABLET, FILM COATED ORAL at 21:02

## 2023-02-16 RX ADMIN — TRAMADOL HYDROCHLORIDE 50 MG: 50 TABLET ORAL at 08:44

## 2023-02-16 RX ADMIN — MICONAZOLE NITRATE: 2 POWDER TOPICAL at 21:06

## 2023-02-16 RX ADMIN — ATORVASTATIN CALCIUM 40 MG: 40 TABLET, FILM COATED ORAL at 21:01

## 2023-02-16 RX ADMIN — CARVEDILOL 12.5 MG: 12.5 TABLET, FILM COATED ORAL at 08:45

## 2023-02-16 RX ADMIN — APIXABAN 5 MG: 5 TABLET, FILM COATED ORAL at 08:44

## 2023-02-16 RX ADMIN — FUROSEMIDE 40 MG: 40 TABLET ORAL at 17:08

## 2023-02-16 RX ADMIN — FUROSEMIDE 40 MG: 40 TABLET ORAL at 08:43

## 2023-02-16 RX ADMIN — BUDESONIDE 500 MCG: 0.5 SUSPENSION RESPIRATORY (INHALATION) at 07:27

## 2023-02-16 RX ADMIN — Medication 10 ML: at 08:44

## 2023-02-16 ASSESSMENT — PAIN SCALES - GENERAL: PAINLEVEL_OUTOF10: 0

## 2023-02-16 NOTE — PROGRESS NOTES
INPATIENT PROGRESS NOTES    PATIENT NAME: Braxton Ramirez  MRN: [id number]  SERVICE DATE:  February 16, 2023   SERVICE TIME:  8:32 AM      PRIMARY SERVICE: Pulmonary Disease    CHIEF COMPLAIN: Acute on chronic respiratory failure      INTERVAL HPI: Patient seen and examined at bedside, Interval Notes, orders reviewed. Nursing notes noted  He is sitting in a chair. He has moist cough and bringing green-colored mucus out. No chest pain. No fever or chills. He has exertional shortness of breath which is almost baseline. No nausea, vomiting or diarrhea. Joi Diane He is currently on 3 O2 via nasal cannula O2 saturation 99 %. As per  he may go home with family and home health care at home. He said he is VPAP is broken and needs to have CPAP or BiPAP at home before discharge. He said his daughter is trying to get it from [address] OBJECTIVE    Body mass index is 35.54 kg/m². PHYSICAL EXAM:  Vitals:  BP (!) 141/53   Pulse 62   Temp 97.9 °F (36.6 °C) (Oral)   Resp 28   Ht 5' 6\" (1.676 m)   Wt 220 lb 3.2 oz (99.9 kg)   SpO2 99%   BMI 35.54 kg/m²   General: Obese, alert, awake . comfortable in bed, No distress. Head: Atraumatic , Normocephalic   Eyes: PERRL. No sclera icterus. No conjunctival injection. No discharge   ENT: No nasal  discharge. Pharynx clear. Neck:  Trachea midline. No thyromegaly, no JVD, No cervical adenopathy. Chest : Bilaterally symmetrical ,Normal effort,  No accessory muscle use  Lung : . Fair BS bilateral, decreased BS at bases. Bibasilar Rales. No wheezing. No rhonchi. Heart[de-identified] Normal  rate. Regular rhythm. No mumur ,  Rub or gallop  ABD: Non-tender. Non-distended. No masses. No organmegaly. Normal bowel sounds. No hernia.   Ext : No Pitting both leg , No Cyanosis No clubbing  Neuro: no focal weakness          DATA:   Recent Labs     02/14/23  0555 02/15/23  0536   WBC 5.5 6.0   HGB 10.1* 10.3*   HCT 32.4* 33.2*   MCV 80.4 79.4    229     Recent Labs     02/14/23  0838 02/15/23  0536    137   K 4.5 4.2   CL 95 97   CO2 34* 33*   BUN 37* 31*   CREATININE 1.67* 1.41*   GLUCOSE 123* 112*   CALCIUM 8.6 8.5   PROT 6.7 6.4   LABALBU 2.9* 2.7*   BILITOT 0.5 0.7   ALKPHOS 115* 117*   AST 14 13   ALT 9 8   LABGLOM 42.1* 51.6*   GLOB 3.8* 3.7*       MV Settings:     FiO2 : 40 %    Recent Labs     02/13/23  1059   PHART 7.325*   FHE6LAU 69*   PO2ART 86*   BKP4AYW 35.8*   BEART 10*   F3GBMFKA 95*       O2 Device: Nasal cannula  O2 Flow Rate (L/min): 3 L/min    ADULT DIET; Regular; 4 carb choices (60 gm/meal)     MEDICATIONS during current hospitalization:    Continuous Infusions:   dextrose      sodium chloride         Scheduled Meds:   ketoconazole   Topical Daily    budesonide  0.5 mg Nebulization BID    furosemide  40 mg Oral BID    apixaban  5 mg Oral BID    carvedilol  12.5 mg Oral BID    insulin lispro  0-4 Units SubCUTAneous TID WC    insulin lispro  0-4 Units SubCUTAneous Nightly    miconazole   Topical BID    aspirin  81 mg Oral Daily    sodium chloride flush  5-40 mL IntraVENous 2 times per day    atorvastatin  40 mg Oral Nightly       PRN Meds:traMADol, ipratropium-albuterol, lidocaine, glucose, dextrose bolus **OR** dextrose bolus, glucagon (rDNA), dextrose, sodium chloride flush, sodium chloride, ondansetron **OR** ondansetron, polyethylene glycol, acetaminophen **OR** acetaminophen, hydrALAZINE    Radiology  XR CHEST PORTABLE    Result Date: 2/13/2023  EXAMINATION: ONE XRAY VIEW OF THE CHEST 2/13/2023 10:08 am COMPARISON: 02/10/2023 HISTORY: ORDERING SYSTEM PROVIDED HISTORY: Worsening hypercapnia TECHNOLOGIST PROVIDED HISTORY: Reason for exam:->worsening hypercapnia What reading provider will be dictating this exam?->CRC FINDINGS: Mild cardiomegaly is unchanged. Dual chamber cardiac pacemaker is stable. Pulmonary vasculature is upper normal limits. The lungs are clear. No pneumothorax is identified.  There is stable blunting of the left costophrenic sulcus, likely from pleural thickening. Post sternotomy changes are noted. Stable cardiomegaly. No acute cardiopulmonary process. XR CHEST PORTABLE    Result Date: 2/10/2023  EXAMINATION: ONE XRAY VIEW OF THE CHEST 2/10/2023 5:17 pm COMPARISON: None. HISTORY: ORDERING SYSTEM PROVIDED HISTORY: SOB TECHNOLOGIST PROVIDED HISTORY: Reason for exam:->SOB What reading provider will be dictating this exam?->CRC FINDINGS: The lungs are without acute focal process. There is no effusion or pneumothorax. Cardiomegaly. Sternotomy wires noted. Right-sided transvenous pacer device. The osseous structures are without acute process. No acute process. IMPRESSION AND SUGGESTION:  Acute on chronic respiratory failure with hypoxia and hypercapnia  Decompensated congestive heart failure  COPD exacerbation  MORENA currently not on treatment  Coronary disease/cardiomyopathy status post ICD and CABG  Diabetes mellitus and hyperglycemia  Acute kidney failure with probable underlying CKD    Continue BiPAP therapy during sleep and prn. Continue nebulizer with DuoNeb every 6 hourly while awake Pulmicort 0.5 mg twice daily. Diuretic therapy. Last chest x-ray showing cardiomegaly no acute process. Continue present treatment plan. NOTE: This report was transcribed using voice recognition software. Every effort was made to ensure accuracy; however, inadvertent computerized transcription errors may be present.       Electronically signed by Era Rodas MD, FCCP on 2/16/2023 at 8:32 AM

## 2023-02-16 NOTE — PROGRESS NOTES
Progress Note  Date:2023       Room:[place] [id number]-  Patient Keturah Huynh     YOB: 1945     Age:77 y.o. Subjective      Patient is well today; he states he does not have a BIpap or cpap at home and doesn't feel comfortable michael ghome. denies fevers, chills, sweats    Objective         Vitals Last 24 Hours:  TEMPERATURE:  Temp  Av.8 °F (36.6 °C)  Min: 97 °F (36.1 °C)  Max: 98.4 °F (36.9 °C)  RESPIRATIONS RANGE: Resp  Av.8  Min: 18  Max: 28  PULSE OXIMETRY RANGE: SpO2  Av.6 %  Min: 98 %  Max: 100 %  PULSE RANGE: Pulse  Av.8  Min: 61  Max: 62  BLOOD PRESSURE RANGE: Systolic (19SVM), FYF:530 , Min:102 , ZFY:445   ; Diastolic (01YCN), AXU:38, Min:53, Max:97    I/O (24Hr): Intake/Output Summary (Last 24 hours) at 2023 1305  Last data filed at 2023 0406  Gross per 24 hour   Intake 300 ml   Output --   Net 300 ml       Objective:  Vital signs: (most recent): Blood pressure (!) 102/55, pulse 61, temperature 97.9 °F (36.6 °C), temperature source Oral, resp. rate 20, height 5' 6\" (1.676 m), weight 220 lb 3.2 oz (99.9 kg), SpO2 98 %. Constitutional: On NC  Head: NCAT  Neck: Supple, trachea midline  Cardiovascular: RRR. Pulmonary: Bilateral basilar rhonchi   Abdomen: Obese, soft, nontense. Neurologic: Nonfocal.    Labs/Imaging/Diagnostics    Labs:  CBC:  Recent Labs     23  0555 02/15/23  0536   WBC 5.5 6.0   RBC 4.03* 4.18*   HGB 10.1* 10.3*   HCT 32.4* 33.2*   MCV 80.4 79.4   RDW 19.2* 19.0*    229       CHEMISTRIES:  Recent Labs     23  0419 02/15/23  0536 23  0950    137 135   K 4.5 4.2 4.4   CL 95 97 95   CO2 34* 33* 33*   BUN 37* 31* 29*   CREATININE 1.67* 1.41* 1.46*   GLUCOSE 123* 112* 190*       PT/INR:No results for input(s): PROTIME, INR in the last 72 hours. APTT:No results for input(s): APTT in the last 72 hours.   LIVER PROFILE:  Recent Labs     23  0419 02/15/23  0536   AST 14 13   ALT 9 8   BILITOT 0.5 0.7 ALKPHOS 115* 117*       Imaging Last 24 Hours:  XR CHEST PORTABLE    Result Date: 2/10/2023  EXAMINATION: ONE XRAY VIEW OF THE CHEST 2/10/2023 5:17 pm COMPARISON: None. HISTORY: ORDERING SYSTEM PROVIDED HISTORY: SOB TECHNOLOGIST PROVIDED HISTORY: Reason for exam:->SOB What reading provider will be dictating this exam?->CRC FINDINGS: The lungs are without acute focal process. There is no effusion or pneumothorax. Cardiomegaly. Sternotomy wires noted. Right-sided transvenous pacer device. The osseous structures are without acute process. No acute process. Assessment//Plan           Hospital Problems             Last Modified POA    * (Principal) Acute decompensated heart failure ([address] 2/10/2023 Yes    COPD with acute exacerbation ([address] 2/12/2023 Yes    Acute hypercapnic respiratory failure ([address] 2/13/2023 Yes   Assessment & Plan    Acute decompensated heart failure; Aucte on chronic hpoxic and hypercapnic resp failure; COPD with acute exacerbation  Exertional dyspnea, orthopnea, pitting edema in the lower extremities, bilateral rhonchi and crackles, mild elevated proBNP. Clinically patient is in heart failure on admission. History of COPD but unlikely primary etiology. Will treat for acute decompensated heart failure with Lasix. Start ACE and beta-blocker. Continue Lipitor. Echo ordered. Cardiology consult. 2/11: Continues to diurese. Appreciate Cardiology recs when available. 2/12: Continuing to diurese. Diuretic regimen and beta-blockers adjusted by cardiology.   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 Reported Hx prior HD x 6 months until \"kidneys improved\" and discharged from HD. Slightly improved from admission     - creatinine is improving now    Hypertension  Resume home medication. Lasix, ACE and BB added. Monitor blood pressure closely. 2/11: Improved  2/14:  Hypotnesive today; cardiology is holding ACE  2/16:  ACE has been discontinued now; spb still in the low 100s    DM2  Sliding scale insulin. Goal -180 while admitted.        Arielle Armenta

## 2023-02-16 NOTE — CARE COORDINATION
MET WITH PATIENT, PER DR QUIROZ PT WANTING SNF. SNF LIST GIVEN. PT STATES HIS DTR HAS BEEN [facility]ING [facility]. PT TO DISCUSS SNF WITH DTR, LSW/CM TO FOLLOW UP WITH DECISION. 1445  MET WITH PATIENT AND DTR, WOULD LIKE [facility] PRIOR TO RETURNING HOME. CONFIRMED DTR IS WORKING ON GETTING O2 TRANSFERRED FROM [place] TO [place]. ATTEMPTED TO CALL REFERRAL TO [facility], NO ANSWER.     1500  REFERRAL CALLED TO TANVIR, SUPERVISOR,  AT [facility]    1620  SPOKE WITH [facility]AN[facility], [facility] ACCEPT[facility] PT.  SPOKE TO DR QUIROZ PLAN FOR DC TOMORROW IF OK WITH PULM.

## 2023-02-16 NOTE — PROGRESS NOTES
Physical Therapy Med Surg Daily Treatment Note  Facility/Department: [facility]  [place] [facility] [id number]       NAME: Philomena Mtz  :  (20 y.o.)  MRN: [id number]  CODE STATUS: Full Code    Date of Service: 2023    Patient Diagnosis(es): Elevated troponin [R77.8]  COPD exacerbation (HCC) [J44.1]  COPD with acute exacerbation ([address] [J44.1]  Acute hypercapnic respiratory failure [facility]) [J96.02]   Chief Complaint   Patient presents with    Fall    Shortness of Breath    Fatigue     Patient Active Problem List    Diagnosis Date Noted    Acute hypercapnic respiratory failure ([facility] 2023    COPD with acute exacerbation ([facility] 02/10/2023    Acute decompensated heart failure ([facility] 02/10/2023    Opioid use, unspecified with unspecified opioid-induced disorder 2022    Type 2 diabetes mellitus without complication ([address]     Steatosis of liver 02/10/2022    Right bundle branch block 02/10/2022    Presence of permanent cardiac pacemaker 09/10/2019    Atherosclerosis of coronary artery without angina pectoris 09/10/2019    Diabetic peripheral neuropathy ([address] 09/10/2019    Obstructive sleep apnea of adult 09/10/2019    Old myocardial infarction 09/10/2019    Morbid obesity ([address] 09/10/2019    Generalized osteoarthritis 09/10/2019    Bilateral hearing loss 09/10/2019        Past Medical History:   Diagnosis Date    CAD (coronary artery disease)     Chronic kidney disease     COPD (chronic obstructive pulmonary disease) ([address]     Diabetes mellitus ([address]     Diabetic neuropathy ([address]     Hyperlipidemia     Hypertension      Past Surgical History:   Procedure Laterality Date    COLON SURGERY      CORONARY ANGIOPLASTY WITH STENT PLACEMENT      CORONARY ARTERY BYPASS GRAFT      PACEMAKER PLACEMENT         Chart Reviewed: Yes  Family / Caregiver Present: No    Restrictions:  Restrictions/Precautions: Fall Risk    SUBJECTIVE:   Subjective: \"I can walk, I usually use my cane. \"    Pain  Pain: 6/10 low back pain with mobility, pt reports this is chronic. OBJECTIVE:        Bed mobility  Bed Mobility Comments: did not perform this date - patient already in bedside chair & wanted to remain in bedside chair    Transfers  Sit to Stand: Contact guard assistance  Stand to Sit: Contact guard assistance  Comment: good hand placement and sequencing, good stability noted. Ambulation  Surface: Level tile  Device: Single point cane  Assistance: Contact guard assistance  Quality of Gait: step through pattern,  Gait Deviations: Slow Yvette; Increased MALICK; Decreased step length;Decreased step height  Distance: 50', 70'  Comments: pt able to increase gait distance, c/o low back pain throughout. 3L O2 throughout, SpO2 90% post gait, recovers quickly. Activity Tolerance  Activity Tolerance: Patient tolerated treatment well          ASSESSMENT   Assessment: pt able to complete gait training with [facility] which is what he uses at baseline, pt increases distance but c/o chronic low back pain throughout. Discharge Recommendations:  Continue to assess pending progress         Goals  Long Term Goals  Long Term Goal 1: Patient will perform bed mobility at independent level in order to get in/out of bed safely. Long Term Goal 2: Patient will perform transfers with LRD at modified independent level in order to get to/from varying surfaces. Long Term Goal 3: Patient will ambulate 48' with LRD at modified independent level in order to get room to room safely. Long Term Goal 4: Patient will ascend/descend a flight of stairs with rails with LRD at modified independent level in order to get to/from bathroom upstairs.     PLAN    General Plan: 3-5 times per week  Safety Devices  Type of Devices: Call light within reach, All fall risk precautions in place, Nurse notified     [facility] (6 CLICK) [address] Mobility Raw Score : 18      Therapy Time   Individual   Time In 1030   Time Out 1053 Minutes 23      Gait: [address] Ramana ELODIA, 02/16/23 at 11:20 AM         Definitions for assistance levels  Independent = pt does not require any physical supervision or assistance from another person for activity completion. Device may be needed.   Stand by assistance = pt requires verbal cues or instructions from another person, close to but not touching, to perform the activity  Minimal assistance= pt performs 75% or more of the activity; assistance is required to complete the activity  Moderate assistance= pt performs 50% of the activity; assistance is required to complete the activity  Maximal assistance = pt performs 25% of the activity; assistance is required to complete the activity  Dependent = pt requires total physical assistance to accomplish the task

## 2023-02-16 NOTE — CARE COORDINATION
Narinder Vyas RN [facility] was made aware that we can accept this patient. [facility] informed me the patient will transfer Friday, 2/17/23, morning.

## 2023-02-17 ENCOUNTER — HOSPITAL ENCOUNTER (INPATIENT)
Age: 78
LOS: 8 days | Discharge: HOME | End: 2023-02-25
Attending: INTERNAL MEDICINE | Admitting: INTERNAL MEDICINE
Payer: MEDICARE

## 2023-02-17 VITALS
SYSTOLIC BLOOD PRESSURE: 146 MMHG | TEMPERATURE: 98.1 F | BODY MASS INDEX: 35.15 KG/M2 | RESPIRATION RATE: 18 BRPM | DIASTOLIC BLOOD PRESSURE: 61 MMHG | OXYGEN SATURATION: 91 % | WEIGHT: 218.7 LBS | HEIGHT: 66 IN | HEART RATE: 79 BPM

## 2023-02-17 DIAGNOSIS — E11.42 DIABETIC PERIPHERAL NEUROPATHY (HCC): ICD-10-CM

## 2023-02-17 DIAGNOSIS — G47.33 OBSTRUCTIVE SLEEP APNEA OF ADULT: Primary | ICD-10-CM

## 2023-02-17 PROBLEM — I50.43 CHF (CONGESTIVE HEART FAILURE), NYHA CLASS I, ACUTE ON CHRONIC, COMBINED (HCC): Status: ACTIVE | Noted: 2023-02-17

## 2023-02-17 LAB
ANION GAP SERPL CALCULATED.3IONS-SCNC: 6 MEQ/L (ref 9–15)
BUN BLDV-MCNC: 30 MG/DL (ref 8–23)
CALCIUM SERPL-MCNC: 8.6 MG/DL (ref 8.5–9.9)
CHLORIDE BLD-SCNC: 93 MEQ/L (ref 95–107)
CO2: 36 MEQ/L (ref 20–31)
CREAT SERPL-MCNC: 1.5 MG/DL (ref 0.7–1.2)
GFR SERPL CREATININE-BSD FRML MDRD: 47.9 ML/MIN/{1.73_M2}
GLUCOSE BLD-MCNC: 106 MG/DL (ref 70–99)
GLUCOSE BLD-MCNC: 111 MG/DL (ref 70–99)
GLUCOSE BLD-MCNC: 151 MG/DL (ref 70–99)
GLUCOSE BLD-MCNC: 159 MG/DL (ref 70–99)
GLUCOSE BLD-MCNC: 222 MG/DL (ref 70–99)
PERFORMED ON: ABNORMAL
POTASSIUM REFLEX MAGNESIUM: 4.2 MEQ/L (ref 3.4–4.9)
SODIUM BLD-SCNC: 135 MEQ/L (ref 135–144)

## 2023-02-17 PROCEDURE — 2580000003 HC RX 258: Performed by: INTERNAL MEDICINE

## 2023-02-17 PROCEDURE — 6370000000 HC RX 637 (ALT 250 FOR IP): Performed by: INTERNAL MEDICINE

## 2023-02-17 PROCEDURE — 6360000002 HC RX W HCPCS: Performed by: INTERNAL MEDICINE

## 2023-02-17 PROCEDURE — 99232 SBSQ HOSP IP/OBS MODERATE 35: CPT | Performed by: INTERNAL MEDICINE

## 2023-02-17 PROCEDURE — 2700000000 HC OXYGEN THERAPY PER DAY

## 2023-02-17 PROCEDURE — 94761 N-INVAS EAR/PLS OXIMETRY MLT: CPT

## 2023-02-17 PROCEDURE — 99233 SBSQ HOSP IP/OBS HIGH 50: CPT | Performed by: INTERNAL MEDICINE

## 2023-02-17 PROCEDURE — 94640 AIRWAY INHALATION TREATMENT: CPT

## 2023-02-17 PROCEDURE — 36415 COLL VENOUS BLD VENIPUNCTURE: CPT

## 2023-02-17 PROCEDURE — 80048 BASIC METABOLIC PNL TOTAL CA: CPT

## 2023-02-17 PROCEDURE — 1200000002 HC SEMI PRIVATE SWING BED

## 2023-02-17 PROCEDURE — 94660 CPAP INITIATION&MGMT: CPT

## 2023-02-17 PROCEDURE — 2500000003 HC RX 250 WO HCPCS: Performed by: INTERNAL MEDICINE

## 2023-02-17 RX ORDER — BUDESONIDE 0.5 MG/2ML
0.5 INHALANT ORAL 2 TIMES DAILY
Status: DISCONTINUED | OUTPATIENT
Start: 2023-02-17 | End: 2023-02-25 | Stop reason: HOSPADM

## 2023-02-17 RX ORDER — ASPIRIN 81 MG/1
81 TABLET, CHEWABLE ORAL DAILY
Status: DISCONTINUED | OUTPATIENT
Start: 2023-02-18 | End: 2023-02-25 | Stop reason: HOSPADM

## 2023-02-17 RX ORDER — SODIUM CHLORIDE 0.9 % (FLUSH) 0.9 %
5-40 SYRINGE (ML) INJECTION PRN
Status: DISCONTINUED | OUTPATIENT
Start: 2023-02-17 | End: 2023-02-25 | Stop reason: HOSPADM

## 2023-02-17 RX ORDER — INSULIN LISPRO 100 [IU]/ML
0-8 INJECTION, SOLUTION INTRAVENOUS; SUBCUTANEOUS
Status: DISCONTINUED | OUTPATIENT
Start: 2023-02-17 | End: 2023-02-25 | Stop reason: HOSPADM

## 2023-02-17 RX ORDER — SODIUM CHLORIDE 0.9 % (FLUSH) 0.9 %
5-40 SYRINGE (ML) INJECTION 2 TIMES DAILY
Status: DISCONTINUED | OUTPATIENT
Start: 2023-02-17 | End: 2023-02-21

## 2023-02-17 RX ORDER — TRAMADOL HYDROCHLORIDE 50 MG/1
50 TABLET ORAL EVERY 8 HOURS PRN
Status: DISCONTINUED | OUTPATIENT
Start: 2023-02-17 | End: 2023-02-25 | Stop reason: HOSPADM

## 2023-02-17 RX ORDER — SODIUM CHLORIDE 9 MG/ML
INJECTION, SOLUTION INTRAVENOUS PRN
Status: DISCONTINUED | OUTPATIENT
Start: 2023-02-17 | End: 2023-02-25 | Stop reason: HOSPADM

## 2023-02-17 RX ORDER — ATORVASTATIN CALCIUM 40 MG/1
40 TABLET, FILM COATED ORAL NIGHTLY
Status: DISCONTINUED | OUTPATIENT
Start: 2023-02-17 | End: 2023-02-25 | Stop reason: HOSPADM

## 2023-02-17 RX ORDER — FUROSEMIDE 40 MG/1
40 TABLET ORAL 2 TIMES DAILY
Status: DISCONTINUED | OUTPATIENT
Start: 2023-02-17 | End: 2023-02-25 | Stop reason: HOSPADM

## 2023-02-17 RX ORDER — SODIUM CHLORIDE 0.9 % (FLUSH) 0.9 %
5-40 SYRINGE (ML) INJECTION EVERY 12 HOURS SCHEDULED
Status: DISCONTINUED | OUTPATIENT
Start: 2023-02-17 | End: 2023-02-21

## 2023-02-17 RX ORDER — CARVEDILOL 12.5 MG/1
12.5 TABLET ORAL 2 TIMES DAILY
Status: DISCONTINUED | OUTPATIENT
Start: 2023-02-17 | End: 2023-02-25 | Stop reason: HOSPADM

## 2023-02-17 RX ORDER — ACETAMINOPHEN 650 MG/1
650 SUPPOSITORY RECTAL EVERY 6 HOURS PRN
Status: DISCONTINUED | OUTPATIENT
Start: 2023-02-17 | End: 2023-02-25 | Stop reason: HOSPADM

## 2023-02-17 RX ORDER — INSULIN LISPRO 100 [IU]/ML
0-4 INJECTION, SOLUTION INTRAVENOUS; SUBCUTANEOUS NIGHTLY
Status: DISCONTINUED | OUTPATIENT
Start: 2023-02-17 | End: 2023-02-25 | Stop reason: HOSPADM

## 2023-02-17 RX ORDER — POLYETHYLENE GLYCOL 3350 17 G/17G
17 POWDER, FOR SOLUTION ORAL DAILY PRN
Status: DISCONTINUED | OUTPATIENT
Start: 2023-02-17 | End: 2023-02-25 | Stop reason: HOSPADM

## 2023-02-17 RX ORDER — ONDANSETRON 4 MG/1
4 TABLET, ORALLY DISINTEGRATING ORAL EVERY 8 HOURS PRN
Status: DISCONTINUED | OUTPATIENT
Start: 2023-02-17 | End: 2023-02-25 | Stop reason: HOSPADM

## 2023-02-17 RX ORDER — ONDANSETRON 2 MG/ML
4 INJECTION INTRAMUSCULAR; INTRAVENOUS EVERY 6 HOURS PRN
Status: DISCONTINUED | OUTPATIENT
Start: 2023-02-17 | End: 2023-02-25 | Stop reason: HOSPADM

## 2023-02-17 RX ORDER — KETOCONAZOLE 20 MG/G
CREAM TOPICAL DAILY
Status: DISCONTINUED | OUTPATIENT
Start: 2023-02-18 | End: 2023-02-25 | Stop reason: HOSPADM

## 2023-02-17 RX ORDER — DEXTROSE MONOHYDRATE 100 MG/ML
INJECTION, SOLUTION INTRAVENOUS CONTINUOUS PRN
Status: DISCONTINUED | OUTPATIENT
Start: 2023-02-17 | End: 2023-02-25 | Stop reason: HOSPADM

## 2023-02-17 RX ORDER — ACETAMINOPHEN 325 MG/1
650 TABLET ORAL EVERY 6 HOURS PRN
Status: DISCONTINUED | OUTPATIENT
Start: 2023-02-17 | End: 2023-02-25 | Stop reason: HOSPADM

## 2023-02-17 RX ADMIN — APIXABAN 5 MG: 5 TABLET, FILM COATED ORAL at 10:56

## 2023-02-17 RX ADMIN — FUROSEMIDE 40 MG: 40 TABLET ORAL at 18:11

## 2023-02-17 RX ADMIN — BUDESONIDE 500 MCG: 0.5 SUSPENSION RESPIRATORY (INHALATION) at 18:05

## 2023-02-17 RX ADMIN — APIXABAN 5 MG: 5 TABLET, FILM COATED ORAL at 21:03

## 2023-02-17 RX ADMIN — CARVEDILOL 12.5 MG: 12.5 TABLET, FILM COATED ORAL at 21:03

## 2023-02-17 RX ADMIN — TRAMADOL HYDROCHLORIDE 50 MG: 50 TABLET, COATED ORAL at 22:28

## 2023-02-17 RX ADMIN — KETOCONAZOLE: 20 CREAM TOPICAL at 11:00

## 2023-02-17 RX ADMIN — BUDESONIDE 500 MCG: 0.5 SUSPENSION RESPIRATORY (INHALATION) at 07:55

## 2023-02-17 RX ADMIN — Medication 10 ML: at 11:01

## 2023-02-17 RX ADMIN — ACETAMINOPHEN 650 MG: 325 TABLET ORAL at 21:10

## 2023-02-17 RX ADMIN — FUROSEMIDE 40 MG: 40 TABLET ORAL at 10:56

## 2023-02-17 RX ADMIN — INSULIN LISPRO 1 UNITS: 100 INJECTION, SOLUTION INTRAVENOUS; SUBCUTANEOUS at 13:38

## 2023-02-17 RX ADMIN — ATORVASTATIN CALCIUM 40 MG: 40 TABLET, FILM COATED ORAL at 21:03

## 2023-02-17 RX ADMIN — ASPIRIN 81 MG: 81 TABLET, COATED ORAL at 10:57

## 2023-02-17 ASSESSMENT — PAIN SCALES - GENERAL
PAINLEVEL_OUTOF10: 8
PAINLEVEL_OUTOF10: 4
PAINLEVEL_OUTOF10: 0

## 2023-02-17 ASSESSMENT — PAIN DESCRIPTION - LOCATION: LOCATION: BACK

## 2023-02-17 ASSESSMENT — PAIN DESCRIPTION - DESCRIPTORS
DESCRIPTORS: ACHING
DESCRIPTORS: ACHING

## 2023-02-17 NOTE — PROGRESS NOTES
Cardiology progress note    Patient Name: Wilman Kaufman Date: 2/10/2023  4:55 PM  MR #: [id number]  : 1945    Attending Physician: Yaritza Lutz MD  Reason for consult: Heart failure    History of Presenting Illness:      Nata Randall is a 69 y.o. male on hospital day 4 with a history of CAD status post CABG, ischemic cardiomyopathy status post ICD placement, hypertension, hyperlipidemia, diabetes, COPD on 3 L of oxygen at home obese who presents to the ER for shortness of breath. History Obtained From:  patient, electronic medical record    BNP 1800  EKG showing ventricular paced  ==============  Hospital course  2023  Patient laying in bed looks comfortable  Denies chest pain  Okay to transfer patient to SNF at any time      2023  Currently patient in ICU requiring BiPAP  Yesterday patient had to be transferred to the ICU due to shortness of breath. Patient refusing to wear a BiPAP  Overnight patient was found hypotensive and he was found taking home dose tramadol    2023  Patient laying in bed looks comfortable    2023  Patient laying in bed looks comfortable  Denies chest pain  Reports that shortness of breath is improving  Patient was net -2 L last 24 hours  History:      Past Medical History:   Diagnosis Date    CAD (coronary artery disease)     Chronic kidney disease     COPD (chronic obstructive pulmonary disease) (HCC)     Diabetes mellitus ([facility]     Diabetic neuropathy ([facility]     Hyperlipidemia     Hypertension      Past Surgical History:   Procedure Laterality Date    COLON SURGERY      CORONARY ANGIOPLASTY WITH STENT PLACEMENT      CORONARY ARTERY BYPASS GRAFT      PACEMAKER PLACEMENT       Family History  History reviewed. No pertinent family history.     Social History     Socioeconomic History    Marital status: Legally      Spouse name: Not on file    Number of children: Not on file    Years of education: Not on file    Highest education level: Not on file Occupational History    Not on file   Tobacco Use    Smoking status: Former    Smokeless tobacco: Never   Vaping Use    Vaping Use: Never used   Substance and Sexual Activity    Alcohol use: Yes     Comment: rare    Drug use: Never    Sexual activity: Not Currently   Other Topics Concern    Not on file   Social History Narrative    Not on file     Social Determinants of Health     Financial Resource Strain: Not on file   Food Insecurity: Not on file   Transportation Needs: Not on file   Physical Activity: Not on file   Stress: Not on file   Social Connections: Not on file   Intimate Partner Violence: Not on file   Housing Stability: Not on file     Home Medications:      Medications Prior to Admission: Omega-3 Fatty Acids (FISH OIL) 1000 MG capsule, Take by mouth daily  gabapentin (NEURONTIN) 300 MG capsule, Take 1 capsule by mouth nightly for 30 days. Alpha-Lipoic Acid 600 MG CAPS, Take 600 mg by mouth daily (Patient not taking: Reported on 2/11/2023)  naloxone 4 MG/0.1ML LIQD nasal spray, 1 spray by Nasal route as needed for Opioid Reversal (Patient not taking: Reported on 2/11/2023)  ferrous sulfate (IRON 325) 325 (65 Fe) MG tablet, ferrous sulfate 325 mg (65 mg iron) tablet  Take 1 tablet every day by oral route for 30 days. metroNIDAZOLE (FLAGYL) 500 MG tablet, TAKE 1 TABLET BY MOUTH EVERY 8 HOURS FOR 10 DAYS (Patient not taking: Reported on 2/11/2023)  nitroGLYCERIN (NITROSTAT) 0.4 MG SL tablet,   tiZANidine (ZANAFLEX) 2 MG tablet, Take 1 tablet by mouth every 8 hours as needed (neck pain)  clobetasol (TEMOVATE) 0.05 % ointment, Apply topically 2 times daily for 2 weeks for rash.  Do not use on face or neck  ketoconazole (NIZORAL) 2 % cream, APPLY TO THE skin rash twice daily FOR 4 weeks  atorvastatin (LIPITOR) 40 MG tablet, TAKE 1 TABLET BY MOUTH ONCE DAILY AT BEDTIME  [DISCONTINUED] levoFLOXacin (LEVAQUIN) 750 MG tablet, TAKE 1 TABLET BY MOUTH EVERY DAY  metFORMIN (GLUCOPHAGE) 500 MG tablet, Take 500 mg by mouth daily (with breakfast)  amLODIPine (NORVASC) 2.5 MG tablet, Take 2.5 mg by mouth daily  aspirin 81 MG EC tablet, Take 81 mg by mouth daily    Current Hospital Medications:   Scheduled Meds:   ketoconazole   Topical Daily    budesonide  0.5 mg Nebulization BID    furosemide  40 mg Oral BID    apixaban  5 mg Oral BID    carvedilol  12.5 mg Oral BID    insulin lispro  0-4 Units SubCUTAneous TID WC    insulin lispro  0-4 Units SubCUTAneous Nightly    miconazole   Topical BID    aspirin  81 mg Oral Daily    sodium chloride flush  5-40 mL IntraVENous 2 times per day    atorvastatin  40 mg Oral Nightly     Continuous Infusions:   dextrose      sodium chloride       PRN Meds:.traMADol, ipratropium-albuterol, lidocaine, glucose, dextrose bolus **OR** dextrose bolus, glucagon (rDNA), dextrose, sodium chloride flush, sodium chloride, ondansetron **OR** ondansetron, polyethylene glycol, acetaminophen **OR** acetaminophen, hydrALAZINE   dextrose      sodium chloride        Allergies: Allergies   Allergen Reactions    Penicillins Hives, Rash and Other (See Comments)     Other reaction(s): Generalized rash, Respiratory distress, Urticaria, Syncope, Syncope, Unknown    Gadolinium Hives    Iodine Hives    Iv [Iodides] Hives, Itching and Rash      Review of Systems:       [x] CV, Resp, Neuro, , and all other systems reviewed and negative other than listed in HPI. Objective Findings:     Vitals:   Vitals:    02/16/23 2310 02/17/23 0342 02/17/23 0756 02/17/23 1056   BP:   107/62 107/62   Pulse:   (!) 117 74   Resp: 22      Temp:   97.9 °F (36.6 °C)    TempSrc:   Axillary    SpO2:       Weight:  218 lb 11.2 oz (99.2 kg)     Height:            Physical Examination:  General: Alert and oriented x4 not acute distress  HEENT: Normocephalic, no scleral icterus. Neck: No JVD. Heart: Regular, no murmur, no rub/gallop. No RV heave. Lungs: Clear to ascultation, no rales/wheezing/rhonchi. Good chest wall excursion.   Abdomen: Soft nontender nondistended  Extremities: No clubbing/cyanosis, trace edema. Skin: Warm, dry, normal turgor, no rash, no bruise, no petichiae. Neuro: No myoclonus or tremor. Psych: Normal affect    Results/ Medications reviewed 2/17/2023, 11:21 AM     Laboratory, Microbiology, Pathology, Radiology, Cardiology, Medications and Transcriptions reviewed  Scheduled Meds:   ketoconazole   Topical Daily    budesonide  0.5 mg Nebulization BID    furosemide  40 mg Oral BID    apixaban  5 mg Oral BID    carvedilol  12.5 mg Oral BID    insulin lispro  0-4 Units SubCUTAneous TID WC    insulin lispro  0-4 Units SubCUTAneous Nightly    miconazole   Topical BID    aspirin  81 mg Oral Daily    sodium chloride flush  5-40 mL IntraVENous 2 times per day    atorvastatin  40 mg Oral Nightly     Continuous Infusions:   dextrose      sodium chloride         Recent Labs     02/15/23  0536   WBC 6.0   HGB 10.3*   HCT 33.2*   MCV 79.4        Recent Labs     02/15/23  0536 02/16/23  0950 02/17/23  0542    135 135   K 4.2 4.4 4.2   CL 97 95 93*   CO2 33* 33* 36*   BUN 31* 29* 30*   CREATININE 1.41* 1.46* 1.50*     Recent Labs     02/15/23  0536   PROT 6.4     No results found for this or any previous visit. Impression:   Acute on chronic heart failure with preserved ejection fraction EF 65% by TTE 2/11/2023.   BNP 1800  Atrial fibrillation found on pacemaker interrogation on 2/13/2023  Sick sinus syndrome status post pacemaker placement  CAD status post CABG  COPD  Hypertension  Hyperlipidemia  Obesity  CKD  TTE 2/11/2023 EF 65%  Pacemaker interrogation atrial fibrillation 2/13/2023  Plan:   Continue telemetry  Continue aspirin and high intensity statin for secondary prevention of CAD  Continue Lasix 40 mg p.o. twice daily  Continue Coreg 12.5 mg p.o. twice daily  Please trend creatinine daily  Strict ins and outs and daily weights  Management of COPD as per primary team  Continue Eliquis for cardioembolic prophylaxis  No ACE or ARB in the setting of CKD  Follow-up with me in clinic in 2 weeks after discharge  From cardiology standpoint okay for patient to go to SNF. Cardiology will sign off please reconsult if further questions arise  Comments: Thank you for allowing us to participate in the care of this patient. Will continue to follow. Please call if questions or concerns arise. Electronically signed by Waqar Hull MD on 2/17/2023 at 11:21 AM    Please note this report has been partially produced using speech recognition software and may cause contain errors related to that system including grammar, punctuation and spelling as well as words and phrases that may seem inappropriate. If there are questions or concerns please feel free to contact me to clarify.

## 2023-02-17 NOTE — FLOWSHEET NOTE
Report called to [address] [place] [address] at [facility]. Iv and tele removed for discharge. Care plan completed for discharge. Personal belongings gathered. No complaints. Awaiting physicians ambulance.      Electronically signed by Joshua Cadena RN on 2/17/2023 at 1:57 PM

## 2023-02-17 NOTE — CARE COORDINATION
PHYSICIANS AMBULANCE TO TX PT TO [facility] AROUND 2PM, PER RN. [address] [facility], [address] [place] [address] NOTIFIED.

## 2023-02-17 NOTE — DISCHARGE INSTR - COC
Continuity of Care Form    Patient Name: Janeth    :    MRN:  [id number]    Admit date:  2/10/2023  Discharge date:  23    Code Status Order: Full Code   Advance Directives:     Admitting Physician:  Chidi Porras MD  PCP: Belen Arboleda MD    Discharging Nurse: Araceli Jurado 23 Unit/Room#: O003/T970-72  Discharging Unit Phone Number: [phone]    Emergency Contact:   Extended Emergency Contact Information  Primary Emergency Contact: [address] Phone: [phone]  Relation: Child   needed?  No    Past Surgical History:  Past Surgical History:   Procedure Laterality Date    COLON SURGERY      CORONARY ANGIOPLASTY WITH STENT PLACEMENT      CORONARY ARTERY BYPASS GRAFT      PACEMAKER PLACEMENT         Immunization History:   Immunization History   Administered Date(s) Administered    Influenza Virus Vaccine 10/22/2007, 10/31/2008, 10/01/2009, 2014, 2017, 10/01/2020, 2021    Pneumococcal Conjugate 13-valent (Qhgolnh84) 2016    Pneumococcal Polysaccharide (Fcguuvctn37) 2015    Td vaccine (adult) 2012, 2012    Zoster Recombinant (Shingrix) 2018       Active Problems:  Patient Active Problem List   Diagnosis Code    Presence of permanent cardiac pacemaker Z95.0    Atherosclerosis of coronary artery without angina pectoris I25.10    Diabetic peripheral neuropathy (HCC) E11.42    Obstructive sleep apnea of adult G47.33    Type 2 diabetes mellitus without complication (HCC) R60.4    Steatosis of liver K76.0    Right bundle branch block I45.10    Old myocardial infarction I25.2    Morbid obesity ([facility] [address] E66.01    Generalized osteoarthritis M15.9    Bilateral hearing loss H91.93    Opioid use, 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    COPD with acute exacerbation ([address] J44.1    Acute decompensated heart failure (HCC) I50.9    Acute hypercapnic respiratory failure ([facility] [address] J96.02 Isolation/Infection:   Isolation            No Isolation          Patient Infection Status       Infection Onset Added Last Indicated Last Indicated By Review Planned Expiration Resolved Resolved By    None active    Resolved    COVID-19 (Rule Out) 02/10/23 02/10/23 02/11/23 Respiratory Panel, Molecular, with COVID-19 (Restricted: peds pts or suitable admitted adults) (Ordered)   02/11/23 Rule-Out Test Resulted            Nurse Assessment:  Last Vital Signs: /62   Pulse (!) 117   Temp 97.9 °F (36.6 °C) (Axillary)   Resp 22   Ht 5' 6\" (1.676 m)   Wt 218 lb 11.2 oz (99.2 kg)   SpO2 97%   BMI 35.30 kg/m²     Last documented pain score (0-10 scale): Pain Level: 0  Last Weight:   Wt Readings from Last 1 Encounters:   02/17/23 218 lb 11.2 oz (99.2 kg)     Mental Status:  oriented, alert, coherent, logical, thought processes intact, and able to concentrate and follow conversation    IV Access:  - None    Nursing Mobility/ADLs:  Walking   Assisted  Transfer  Independent  Bathing  Assisted  Dressing  Independent  [address] [place] [address] Delivery   whole    Wound Care Documentation and Therapy:        Elimination:  Continence: Bowel: Yes  Bladder: Yes  Urinary Catheter: None   Colostomy/Ileostomy/Ileal Conduit: No       Date of Last BM: 2/15/23    Intake/Output Summary (Last 24 hours) at 2/17/2023 0918  Last data filed at 2/17/2023 0342  Gross per 24 hour   Intake --   Output 500 ml   Net -500 ml     I/O last 3 completed shifts: In: 300 [P.O.:300]  Out: 500 [Urine:500]    Safety Concerns: At Risk for Falls    Impairments/Disabilities:      Vision and Hearing    Nutrition Therapy:  Current Nutrition Therapy:   - Oral Diet:  Carb Control 4 carbs/meal (1800kcals/day)    Routes of Feeding: Oral  Liquids:  Thin Liquids  Daily Fluid Restriction: no  Last Modified Barium Swallow with Video (Video Swallowing Test): not done    Treatments at the Time of Hospital Discharge:   Respiratory Treatments: yes  Oxygen Therapy:  is on oxygen at 3 L/min per nasal cannula. Ventilator:    - No ventilator support    Rehab Therapies: Physical Therapy and Occupational Therapy  Weight Bearing Status/Restrictions: No weight bearing restrictions  Other Medical Equipment (for information only, NOT a DME order):  walker  Other Treatments: ***    Patient's personal belongings (please select all that are sent with patient):  Werner    RN SIGNATURE:  Electronically signed by Bindu Ocasio RN on 2/17/23 at 1:10 PM EST    CASE MANAGEMENT/SOCIAL WORK SECTION    Inpatient Status Date: ***    Readmission Risk Assessment Score:  Readmission Risk              Risk of Unplanned Readmission:  32           Discharging to Facility/ Agency   Name:   Address:  Phone:  Fax:    Dialysis Facility (if applicable)   Name:  Address:  Dialysis Schedule:  Phone:  Fax:    / signature: {Esignature:747308010}    PHYSICIAN SECTION    Prognosis: {Prognosis:0171845930}    Condition at Discharge: Jatinder Stone Patient Condition:921262065}    Rehab Potential (if transferring to Rehab): {Prognosis:0457499898}    Recommended Labs or Other Treatments After Discharge: ***    Physician Certification: I certify the above information and transfer of Den Love  is necessary for the continuing treatment of the diagnosis listed and that he requires {Admit to Appropriate Level of Care:78702} for {GREATER/LESS:068367516} 30 days.      Update Admission H&P: {CHP DME Changes in ZBMIA:591398632}    PHYSICIAN SIGNATURE:  {Esignature:207333433}

## 2023-02-17 NOTE — PROGRESS NOTES
INPATIENT PROGRESS NOTES    PATIENT NAME: Yousuf Mccrary  MRN: [id number]  SERVICE DATE:  February 17, 2023   SERVICE TIME:  8:38 AM      PRIMARY SERVICE: Pulmonary Disease    CHIEF COMPLAIN: Acute on chronic respiratory failure      INTERVAL HPI: Patient seen and examined at bedside, Interval Notes, orders reviewed. Nursing notes noted  He is sitting in a chair. He is going to skilled nursing facility today. He has cough but no clear mucus. No chest pain. No fever or chills. He has exertional shortness of breath which is almost baseline. No nausea, vomiting or diarrhea. Mukesh Chavez He is currently on 3 O2 via nasal cannula O2 saturation 97 %. If he go home with family and home health care at home. OBJECTIVE    Body mass index is 35.3 kg/m². PHYSICAL EXAM:  Vitals:  /62   Pulse (!) 117   Temp 97.9 °F (36.6 °C) (Axillary)   Resp 22   Ht 5' 6\" (1.676 m)   Wt 218 lb 11.2 oz (99.2 kg)   SpO2 97%   BMI 35.30 kg/m²   General: Obese, alert, awake . comfortable in bed, No distress. Head: Atraumatic , Normocephalic   Eyes: PERRL. No sclera icterus. No conjunctival injection. No discharge   ENT: No nasal  discharge. Pharynx clear. Neck:  Trachea midline. No thyromegaly, no JVD, No cervical adenopathy. Chest : Bilaterally symmetrical ,Normal effort,  No accessory muscle use  Lung : . Fair BS bilateral, decreased BS at bases. Bibasilar Rales. No wheezing. No rhonchi. Heart[de-identified] Normal  rate. Regular rhythm. No mumur ,  Rub or gallop  ABD: Non-tender. Non-distended. No masses. No organmegaly. Normal bowel sounds. No hernia.   Ext : No Pitting both leg , No Cyanosis No clubbing  Neuro: no focal weakness          DATA:   Recent Labs     02/15/23  0536   WBC 6.0   HGB 10.3*   HCT 33.2*   MCV 79.4        Recent Labs     02/15/23  0536 02/16/23  0950 02/17/23  0542    135 135   K 4.2 4.4 4.2   CL 97 95 93*   CO2 33* 33* 36*   BUN 31* 29* 30*   CREATININE 1.41* 1.46* 1.50*   GLUCOSE 112* 190* 111*   CALCIUM 8.5 8.4* 8.6   PROT 6.4  --   --    LABALBU 2.7*  --   --    BILITOT 0.7  --   --    ALKPHOS 117*  --   --    AST 13  --   --    ALT 8  --   --    LABGLOM 51.6* 49.5* 47.9*   GLOB 3.7*  --   --        MV Settings:     FiO2 : 40 %    No results for input(s): PHART, SED6JYU, PO2ART, UCV7NTD, BEART, A7UWZGHC in the last 72 hours. O2 Device: Nasal cannula  O2 Flow Rate (L/min): 3 L/min    ADULT DIET; Regular; 4 carb choices (60 gm/meal)     MEDICATIONS during current hospitalization:    Continuous Infusions:   dextrose      sodium chloride         Scheduled Meds:   ketoconazole   Topical Daily    budesonide  0.5 mg Nebulization BID    furosemide  40 mg Oral BID    apixaban  5 mg Oral BID    carvedilol  12.5 mg Oral BID    insulin lispro  0-4 Units SubCUTAneous TID WC    insulin lispro  0-4 Units SubCUTAneous Nightly    miconazole   Topical BID    aspirin  81 mg Oral Daily    sodium chloride flush  5-40 mL IntraVENous 2 times per day    atorvastatin  40 mg Oral Nightly       PRN Meds:traMADol, ipratropium-albuterol, lidocaine, glucose, dextrose bolus **OR** dextrose bolus, glucagon (rDNA), dextrose, sodium chloride flush, sodium chloride, ondansetron **OR** ondansetron, polyethylene glycol, acetaminophen **OR** acetaminophen, hydrALAZINE    Radiology  XR CHEST PORTABLE    Result Date: 2/13/2023  EXAMINATION: ONE XRAY VIEW OF THE CHEST 2/13/2023 10:08 am COMPARISON: 02/10/2023 HISTORY: ORDERING SYSTEM PROVIDED HISTORY: Worsening hypercapnia TECHNOLOGIST PROVIDED HISTORY: Reason for exam:->worsening hypercapnia What reading provider will be dictating this exam?->CRC FINDINGS: Mild cardiomegaly is unchanged. Dual chamber cardiac pacemaker is stable. Pulmonary vasculature is upper normal limits. The lungs are clear. No pneumothorax is identified. There is stable blunting of the left costophrenic sulcus, likely from pleural thickening. Post sternotomy changes are noted. Stable cardiomegaly.   No acute cardiopulmonary process.     XR CHEST PORTABLE    Result Date: 2/10/2023  EXAMINATION: ONE XRAY VIEW OF THE CHEST 2/10/2023 5:17 pm COMPARISON: None. HISTORY: ORDERING SYSTEM PROVIDED HISTORY: SOB TECHNOLOGIST PROVIDED HISTORY: Reason for exam:->SOB What reading provider will be dictating this exam?->CRC FINDINGS: The lungs are without acute focal process.  There is no effusion or pneumothorax.  Cardiomegaly.  Sternotomy wires noted.  Right-sided transvenous pacer device.  The osseous structures are without acute process.     No acute process.     IMPRESSION AND SUGGESTION:  Acute on chronic respiratory failure with hypoxia and hypercapnia  Decompensated congestive heart failure  COPD exacerbation  MORENA currently not on treatment  Coronary disease/cardiomyopathy status post ICD and CABG  Diabetes mellitus and hyperglycemia  Acute kidney failure with probable underlying CKD    He can go to skilled nursing facility.  Continue nebulizer with DuoNeb every 6 hourly while awake Pulmicort 0.5 mg twice daily.  Diuretic therapy.   Last chest x-ray showing cardiomegaly no acute process.  [facility]  Continue present treatment plan.      NOTE: This report was transcribed using voice recognition software. Every effort was made to ensure accuracy; however, inadvertent computerized transcription errors may be present.      Electronically signed by Eitan Moraes MD, FCCP on 2/17/2023 at 8:38 AM

## 2023-02-17 NOTE — DISCHARGE SUMMARY
Hospital Medicine Discharge Summary    Serafin Thorpe  :    MRN:  [id number]    Admit date:  2/10/2023  Discharge date:  2023    Admitting Physician:  Iveth Epps MD  Primary Care Physician:  Ankita Weiss MD      Discharge Diagnoses:    Acute decompensated heart failure; Aucte on chronic hpoxic and hypercapnic resp failure; COPD with acute exacerbation    Chief Complaint   Patient presents with    Fall    Shortness of Breath    Fatigue     Hospital Course:   Patient presented with acute on chronic hypoxic and hypercapnic respiratory failure secondary to acute decompensated CHF and an acute exacerbation of COPD as well as untreated MORENA. Improved with rescue bipap and diuresis; cardiology adjusted his medications and now he will be discharged to [address] [place] [address] for therapy. Exam on discharge:   BP (!) 146/61   Pulse 79   Temp 98.1 °F (36.7 °C) (Oral)   Resp 18   Ht 5' 6\" (1.676 m)   Wt 218 lb 11.2 oz (99.2 kg)   SpO2 91%   BMI 35.30 kg/m²   Constitutional: On NC  Head: NCAT  Neck: Supple, trachea midline  Cardiovascular: RRR. Pulmonary: Bilateral basilar rhonchi   Abdomen: Obese, soft, nontense. Neurologic: Nonfocal.     Patient was seen by the following consultants   Consults:  IP CONSULT TO CARDIOLOGY  IP CONSULT TO PULMONOLOGY    Significant Diagnostic Studies:    Refer to chart     Please refer to chart if no studies are shown here    No results found. Discharge Medications:         Medication List        ASK your doctor about these medications      Alpha-Lipoic Acid 600 MG Caps  Take 600 mg by mouth daily     amLODIPine 2.5 MG tablet  Commonly known as: NORVASC     aspirin 81 MG EC tablet     atorvastatin 40 MG tablet  Commonly known as: LIPITOR  TAKE 1 TABLET BY MOUTH ONCE DAILY AT BEDTIME     clobetasol 0.05 % ointment  Commonly known as: TEMOVATE  Apply topically 2 times daily for 2 weeks for rash.  Do not use on face or neck     ferrous sulfate 325 (65 Fe) MG tablet  Commonly known as: IRON 325     fish oil 1000 MG capsule     gabapentin 300 MG capsule  Commonly known as: NEURONTIN  Take 1 capsule by mouth nightly for 30 days. ketoconazole 2 % cream  Commonly known as: NIZORAL  APPLY TO THE skin rash twice daily FOR 4 weeks     metFORMIN 500 MG tablet  Commonly known as: GLUCOPHAGE     metroNIDAZOLE 500 MG tablet  Commonly known as: FLAGYL     naloxone 4 MG/0.1ML Liqd nasal spray  1 spray by Nasal route as needed for Opioid Reversal     nitroGLYCERIN 0.4 MG SL tablet  Commonly known as: NITROSTAT     tiZANidine 2 MG tablet  Commonly known as: ZANAFLEX  Take 1 tablet by mouth every 8 hours as needed (neck pain)              Disposition:   If discharged to Home, Any [facility] needs that were indicated and/or required as been addressed and set up by Social Work. Condition at discharge: good     Activity: activity as tolerated    Total time taken for discharging this patient: 40 minutes. Greater than 70% of time was spent focused exclusively on this patient. Time was taken to review chart, discuss plans with consultants, reconciling medications, discussing plan answering questions with patient.      Signed:  Daylin Marquez MD  2/17/2023, 3:10 PM  ----------------------------------------------------------------------------------------------------------------------    Braxton Ramirez

## 2023-02-18 LAB
ANION GAP SERPL CALCULATED.3IONS-SCNC: 7 MEQ/L (ref 9–15)
ANISOCYTOSIS: ABNORMAL
BASOPHILS ABSOLUTE: 0 K/UL (ref 0–0.1)
BASOPHILS RELATIVE PERCENT: 0.5 % (ref 0.2–1.2)
BUN BLDV-MCNC: 29 MG/DL (ref 8–23)
CALCIUM SERPL-MCNC: 8.9 MG/DL (ref 8.5–9.9)
CHLORIDE BLD-SCNC: 90 MEQ/L (ref 95–107)
CO2: 40 MEQ/L (ref 20–31)
CREAT SERPL-MCNC: 1.57 MG/DL (ref 0.7–1.2)
EOSINOPHILS ABSOLUTE: 0.3 K/UL (ref 0–0.5)
EOSINOPHILS RELATIVE PERCENT: 6.2 % (ref 0.8–7)
GFR SERPL CREATININE-BSD FRML MDRD: 45.3 ML/MIN/{1.73_M2}
GLUCOSE BLD-MCNC: 116 MG/DL (ref 70–99)
GLUCOSE BLD-MCNC: 121 MG/DL (ref 70–99)
GLUCOSE BLD-MCNC: 161 MG/DL (ref 70–99)
GLUCOSE BLD-MCNC: 166 MG/DL (ref 70–99)
GLUCOSE BLD-MCNC: 244 MG/DL (ref 70–99)
HBA1C MFR BLD: 6.6 % (ref 4.8–5.9)
HCT VFR BLD CALC: 34.2 % (ref 42–52)
HEMOGLOBIN: 9.8 G/DL (ref 13.7–17.5)
HYPOCHROMIA: ABNORMAL
IMMATURE GRANULOCYTES #: 0 K/UL
IMMATURE GRANULOCYTES %: 0.2 %
LYMPHOCYTES ABSOLUTE: 0.6 K/UL (ref 1.3–3.6)
LYMPHOCYTES RELATIVE PERCENT: 11.5 %
MCH RBC QN AUTO: 24.6 PG (ref 25.7–32.2)
MCHC RBC AUTO-ENTMCNC: 28.7 % (ref 32.3–36.5)
MCV RBC AUTO: 85.7 FL (ref 79–92.2)
MONOCYTES ABSOLUTE: 0.6 K/UL (ref 0.3–0.8)
MONOCYTES RELATIVE PERCENT: 10.8 % (ref 5.3–12.2)
NEUTROPHILS ABSOLUTE: 3.9 K/UL (ref 1.8–5.4)
NEUTROPHILS RELATIVE PERCENT: 70.8 % (ref 34–67.9)
OVALOCYTES: ABNORMAL
PDW BLD-RTO: 17.2 % (ref 11.6–14.4)
PERFORMED ON: ABNORMAL
PLATELET # BLD: 195 K/UL (ref 163–337)
POTASSIUM REFLEX MAGNESIUM: 3.7 MEQ/L (ref 3.4–4.9)
RBC # BLD: 3.99 M/UL (ref 4.63–6.08)
SLIDE REVIEW: ABNORMAL
SODIUM BLD-SCNC: 137 MEQ/L (ref 135–144)
WBC # BLD: 5.5 K/UL (ref 4.2–9)

## 2023-02-18 PROCEDURE — 36415 COLL VENOUS BLD VENIPUNCTURE: CPT

## 2023-02-18 PROCEDURE — 80048 BASIC METABOLIC PNL TOTAL CA: CPT

## 2023-02-18 PROCEDURE — 85025 COMPLETE CBC W/AUTO DIFF WBC: CPT

## 2023-02-18 PROCEDURE — 2700000000 HC OXYGEN THERAPY PER DAY

## 2023-02-18 PROCEDURE — 97530 THERAPEUTIC ACTIVITIES: CPT

## 2023-02-18 PROCEDURE — 97163 PT EVAL HIGH COMPLEX 45 MIN: CPT

## 2023-02-18 PROCEDURE — 6370000000 HC RX 637 (ALT 250 FOR IP): Performed by: INTERNAL MEDICINE

## 2023-02-18 PROCEDURE — 6360000002 HC RX W HCPCS: Performed by: INTERNAL MEDICINE

## 2023-02-18 PROCEDURE — 1200000002 HC SEMI PRIVATE SWING BED

## 2023-02-18 PROCEDURE — 94640 AIRWAY INHALATION TREATMENT: CPT

## 2023-02-18 PROCEDURE — 83036 HEMOGLOBIN GLYCOSYLATED A1C: CPT

## 2023-02-18 PROCEDURE — 97116 GAIT TRAINING THERAPY: CPT

## 2023-02-18 PROCEDURE — 97167 OT EVAL HIGH COMPLEX 60 MIN: CPT

## 2023-02-18 PROCEDURE — 2580000003 HC RX 258: Performed by: INTERNAL MEDICINE

## 2023-02-18 RX ADMIN — FUROSEMIDE 40 MG: 40 TABLET ORAL at 18:34

## 2023-02-18 RX ADMIN — BUDESONIDE 500 MCG: 0.5 SUSPENSION RESPIRATORY (INHALATION) at 06:16

## 2023-02-18 RX ADMIN — ATORVASTATIN CALCIUM 40 MG: 40 TABLET, FILM COATED ORAL at 20:57

## 2023-02-18 RX ADMIN — ASPIRIN 81 MG CHEWABLE TABLET 81 MG: 81 TABLET CHEWABLE at 09:45

## 2023-02-18 RX ADMIN — KETOCONAZOLE: 20 CREAM TOPICAL at 09:45

## 2023-02-18 RX ADMIN — BUDESONIDE 500 MCG: 0.5 SUSPENSION RESPIRATORY (INHALATION) at 18:15

## 2023-02-18 RX ADMIN — TRAMADOL HYDROCHLORIDE 50 MG: 50 TABLET, COATED ORAL at 20:56

## 2023-02-18 RX ADMIN — FUROSEMIDE 40 MG: 40 TABLET ORAL at 09:44

## 2023-02-18 RX ADMIN — TRAMADOL HYDROCHLORIDE 50 MG: 50 TABLET, COATED ORAL at 09:44

## 2023-02-18 RX ADMIN — APIXABAN 5 MG: 5 TABLET, FILM COATED ORAL at 20:56

## 2023-02-18 RX ADMIN — CARVEDILOL 12.5 MG: 12.5 TABLET, FILM COATED ORAL at 20:57

## 2023-02-18 RX ADMIN — CARVEDILOL 12.5 MG: 12.5 TABLET, FILM COATED ORAL at 09:43

## 2023-02-18 RX ADMIN — APIXABAN 5 MG: 5 TABLET, FILM COATED ORAL at 09:45

## 2023-02-18 ASSESSMENT — PAIN DESCRIPTION - DESCRIPTORS
DESCRIPTORS: ACHING
DESCRIPTORS: THROBBING

## 2023-02-18 ASSESSMENT — PAIN DESCRIPTION - LOCATION
LOCATION: BACK
LOCATION: GROIN;BACK

## 2023-02-18 ASSESSMENT — PAIN SCALES - GENERAL
PAINLEVEL_OUTOF10: 7
PAINLEVEL_OUTOF10: 4

## 2023-02-18 NOTE — PROGRESS NOTES
Physical Therapy  Facility/Department: [facility] MED SURG UNIT  Rehabilitation Physical Therapy Initial Assessment    NAME: Mark Warren  : 1945 (78 y.o.)  MRN: [id number]  CODE STATUS: Full Code    Date of Service: 23      Past Medical History:   Diagnosis Date    CAD (coronary artery disease)     Chronic kidney disease     COPD (chronic obstructive pulmonary disease) (HCC)     Diabetes mellitus ([facility] [address]     Diabetic neuropathy ([facility] [address]     Hyperlipidemia     Hypertension      Past Surgical History:   Procedure Laterality Date    COLON SURGERY      CORONARY ANGIOPLASTY WITH STENT PLACEMENT      CORONARY ARTERY BYPASS GRAFT      PACEMAKER PLACEMENT         Chart Reviewed: Yes  Patient assessed for rehabilitation services?: Yes  Family / Caregiver Present: No  Referring Practitioner: Aislinn Hunt  Referral Date : 23  Diagnosis: Acute decompensation heat failure, acute excerbation of COPD  Other (Comment): Be sure to give coomplete instructions before having pt move-pt does not like to be corrected during  General Comment  Comments: c/o back and neck pain with stading upright when walking    Restrictions:  Restrictions/Precautions: Fall Risk;Up as Tolerated     SUBJECTIVE  Subjective: \"I can walk, I usually use my cane. \"  Pain: 7/10 R side rash       Post Treatment Pain Screening       Prior Level of Function:  Social/Functional History  Lives With: Alone  Type of Home: House  Home Layout: Two level, Bed/Bath upstairs (full flght of steps)  Home Access: Stairs to enter with rails  Entrance Stairs - Number of Steps: 1  Entrance Stairs - Rails: Both (to ge5t to second floor on HR on right ascending)  Bathroom Shower/Tub: Tub/Shower unit  Bathroom Toilet: Handicap height  Bathroom Equipment: Grab bars in shower  Bathroom Accessibility: Walker accessible  Home Equipment: Cane, Oxygen, Grab bars  Has the patient had two or more falls in the past year or any fall with injury in the past year?: Yes  Receives Help From: Family  ADL Assistance: Independent  Homemaking Assistance: Needs assistance      OBJECTIVE  Vision  Vision: Impaired  Vision Exceptions: Wears glasses at all times    Hearing  Hearing: Exceptions to [facility]  Hearing Exceptions: Hard of hearing/hearing concerns;Bilateral hearing aid    Cognition  Overall Cognitive Status: WFL  Cognition Comment: Talkative and frustrated about current situation    ROM       Strength  Strength RLE  Comment: Knee flex and extension  4+/5, hip flexion 3+/5, hip abd/add 3+/5  Strength LLE  Comment: knee flex/ext 4+/5, hip flexion 3+/5, hip add/abd 3+/5  Strength RUE  Comment: see OT  Strength LUE  Comment: see OT    Quality of Movement            Functional Mobility  Transfers  Sit to Stand: Contact guard assistance  Stand to Sit: Contact guard assistance  Balance  Posture: Poor  Sitting - Static: Good  Sitting - Dynamic: Good  Standing - Static: Good;-  Standing - Dynamic: Good;-    Environmental Mobility  Ambulation  WB Status: no known restrictions  Ambulation  Surface: Level tile  Device: Rolling Walker  Other Apparatus: O2 (3L)  Assistance: Contact guard assistance  Quality of Gait: step through pattern,  Gait Deviations: Increased MALICK; Decreased step length;Decreased step height  Distance: 135'  Comments: c/o low back pain throughout. More Ambulation?: No         ASSESSMENT       Activity Tolerance  Activity Tolerance Comments: Pt amb significant distance but c/o back/neck pain throughout- apparently long-standing problem, limited amb as pt stated it aggravated  his back pain    Assessment  Assessment: Pt amb with ww x130' with average pace, stepthough gait and increased MALICK secondary to size, using 3L of 02 via nasal cannula, c/o neck and back pain throughout.   Treatment Diagnosis: decreased functional mobility, decreased ADL status, neeeds to be able to climb full set of steps to get to bathroom, decreased dynamic balance  Therapy Prognosis: Fair  Decision Making: Low Complexity  Barriers to Learning: [place]  Treatment Initiated : transfers and ambulation  Discharge Recommendations: Continue to assess pending progress;Home with Home health PT  PT D/C Equipment  Other: apparently is having difficulty obtaining what he needs for the [address]  PT Equipment Recommendations  Equipment Needed:  (needs further evaluation)  Other: apparently is having difficulty obtaining what he needs for the [address]   Pt amb with ww x130' with average pace, stepthough gait and increased MALICK secondary to size, using 3L of 02 via nasal cannula, c/o neck and back pain throughout. GOALS  Patient Goals   Patient Goals : Pt wants to return to own home able to climb a full set of steps, and walk using a SPC  Short Term Goals  Time Frame for Short Term Goals: 3-7days  Short Term Goal 1: Pt will perform balance exercises to support amb with SPC and safety in stair climbing  Short Term Goal 2: Pt will practice lifting body weight using step in parallel bars to build strength in legs in preparation for stair climbing  Short Term Goal 3: Pt will demonstrate safe transtion/transfers without verbal cues 100% of the time. Short Term Goal 4: Pt will particpate in balance/strengthening ex  Long Term Goals  Time Frame for Long Term Goals : 7-10  Long Term Goal 1: Demonstrate indep safe transitions/transfers with appropriate AD  Long Term Goal 2: Pt will perform 13 reg size steps with HR on right ascending and SPC on left Indep  Long Term Goal 3: Patient will ambulate 48' with LRD at independent level in order to get room to room safely. Long Term Goal 4: Written HEP    PLAN OF CARE  Frequency: 1-2 treatment sessions per day, 5-7 days per week  [email or web address]:  (1-2x/day, 5-7days per week)  Current Treatment Recommendations: Strengthening;ROM;Balance training;Functional mobility training; Endurance training;Transfer training;Gait training;Stair training;Home exercise program;Safety education & training;Positioning;Equipment evaluation, education, & procurement; Therapeutic activities  Safety Devices  Type of Devices: Call light within reach;Gait belt;Left in chair    EDUCATION       ELOS:    Extended time due to long verbal responses to questions and information not pertinent to therapy- needed repeated redirection.       Therapy Time   Individual Concurrent Group Co-treatment   Time In  850         Time Out  1010         Minutes  Saabs Martínez, [id number], 02/18/23 at 11:40 AM

## 2023-02-18 NOTE — PROGRESS NOTES
Facility/Department: [facility] SUBACUTE UNIT  Occupational Therapy Initial Assessment    Name: Jesi Cheatham  :   MRN: [id number]  Date of Service: 2023    Discharge Recommendations:  Continue to assess pending progress, Home with Home health OT  OT Equipment Recommendations  Equipment Needed: Yes (BSC- does not have bathroom on main level, tub bench or shower chair, FWW, LB AE)       Patient Diagnosis(es): There were no encounter diagnoses. Past Medical History:  has a past medical history of CAD (coronary artery disease), Chronic kidney disease, COPD (chronic obstructive pulmonary disease) ([facility] Diabetes mellitus ([facility] Diabetic neuropathy ([facility] Hyperlipidemia, and Hypertension. Past Surgical History:  has a past surgical history that includes Coronary angioplasty with stent; pacemaker placement; Colon surgery; and Coronary artery bypass graft. Treatment Diagnosis: Decreased ADL/IADL performance d/t dyspnea, weakness, back pain, recent falls. Assessment   Performance deficits / Impairments: Decreased functional mobility ; Decreased safe awareness;Decreased balance;Decreased ADL status; Decreased endurance;Decreased strength;Decreased posture  Assessment: Pt is a 68 y/o M admitted to [facility] for skilled therapy after hospitalization at [facility] for dyspnea, weakness, and fall at home. Pt lives alone in a 2 level duplex with dtr next door, has 1 CHERIE with pillars on B sides, no bathroom on main level with pt needing to navigate 12 steps to B/B on second floor. Pt only has an air mattress and has been using recliner for sleeping. Pt usually uses SPC for mobility, goes without it at times in home when feeling well. Pt was performing ADLs at Mod I level and IADLs Mod I level except for laundry. Dtr active and supportive with care. OT eval completed with pt demonstrating [place], needs increased volume in L ear and slower pace speech. 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  Pt is talkative and begins venting about his situation, did not have good experiencing at [facility]. Pt has several medical concerns with redirection back to therapy eval needed. Pt presents below baseline level of function and is at risk of fall at home. Recommend 7-10 days of skilled OT with [facility] at d/c to ensure safe transition home. Pt needs DME at home. Treatment Diagnosis: Decreased ADL/IADL performance d/t dyspnea, weakness, back pain, recent falls. REQUIRES OT FOLLOW-UP: Yes  Activity Tolerance  Activity Tolerance: Patient limited by fatigue;Patient limited by pain        Plan   Occupational Therapy Plan  Times Per Week: 4-7  Times Per Day: Once a day  Current Treatment Recommendations: Strengthening, Balance training, Functional mobility training, Endurance training, Safety education & training, Patient/Caregiver education & training, Self-Care / ADL, Equipment evaluation, education, & procurement, Home management training     Restrictions  Restrictions/Precautions  Restrictions/Precautions: Fall Risk, Up as Tolerated  Required Braces or Orthoses?: No  Implants present? : Metal implants, Pacemaker    Subjective   General  Chart Reviewed: Yes, Progress Notes, History and Physical  Patient assessed for rehabilitation services?: Yes  Response to previous treatment: Patient with no complaints from previous session  Family / Caregiver Present: No  Referring Practitioner: Dr. Luz Guzman  Subjective  Subjective: \"I'm doing better. \"  7/10 R side rash  Social/Functional History  Social/Functional History  Lives With: Alone (dtr next door)  Type of Home: House (duplex)  Home Layout: Two level, Bed/Bath upstairs (No bathroom on first floor)  Home Access: Stairs to enter with rails  Entrance Stairs - Number of Steps: 1  Entrance Stairs - Rails: Both (Has two pillars on each side of steps)  Bathroom Shower/Tub: Tub/Shower unit  Bathroom Toilet: Handicap height  Bathroom Equipment: Grab bars in shower  Bathroom Accessibility: Walker accessible  Home Equipment: Cane, Oxygen, Grab bars  Has the patient had two or more falls in the past year or any fall with injury in the past year?: Yes  Receives Help From: Family (Dtr assists)  ADL Assistance: Independent  Homemaking Assistance: Needs assistance (Needed help with laundry only from dtr)  Ambulation Assistance: Independent (Uses SPC at all times)  Transfer Assistance: Independent  Active : Yes  Mode of Transportation: Car  Occupation: Retired  Type of Occupation:        Objective   Heart Rate: 61  [facility]: Monitor  BP: 92/61  MAP (Calculated): 71  Resp: 18  SpO2: 97 %  O2 Device: Nasal cannula          Observation/Palpation  Posture: Good  Observation: 3L 02 via NC  Safety Devices  Type of Devices: Call light within reach; All fall risk precautions in place; Patient at risk for falls; Left in chair  Bed Mobility Training  Bed Mobility Training: No  Balance  Sitting: Intact  Standing: With support (Used bariatric FWW for standing and mobility tasks CGA)  Transfer Training  Transfer Training: Yes  Overall Level of Assistance: Contact-guard assistance (with FWW)  Sit to Stand: Contact-guard assistance  Stand to Sit: Contact-guard assistance  Toilet Transfer: Contact-guard assistance     AROM: Within functional limits  Strength: Generally decreased, functional  Coordination: Within functional limits  Tone: Normal  Sensation: Impaired (B hands intact, B feet numbness reports neuropathy)  ADL  Feeding: Independent  Grooming: Contact guard assistance  UE Bathing: Minimal assistance  LE Bathing: Moderate assistance;Maximum assistance  UE Dressing: Stand by assistance  LE Dressing:  Moderate assistance  Toileting: Minimal assistance              Vision  Vision: Impaired  Vision Exceptions: Wears glasses at all times  Hearing  Hearing: Exceptions to [facility]  Hearing Exceptions: Hard of hearing/hearing concerns;Bilateral hearing aid (L ear is better than R ear)  Cognition  Overall Cognitive Status: [facility]  Cognition Comment: Talkative and frustrated about current situation  Orientation  Overall Orientation Status: Within Functional Limits  Orientation Level: Oriented X4                  Education Given To: Patient  Education Provided: Role of Therapy;Plan of Care;Transfer Training;Equipment; Fall Prevention Strategies; Energy Conservation; ADL Adaptive Strategies;Precautions  Education Method: Demonstration;Verbal;Teach Back  Barriers to Learning: None (Can be talkative and is frustrated about his situation, needs cues to redirect)  Education Outcome: Verbalized understanding;Demonstrated understanding;Continued education needed  LUE AROM (degrees)  LUE AROM : WNL  RUE AROM (degrees)  RUE AROM : WFL             Goals  Short Term Goals  Time Frame for Short Term Goals: 3-5 days  Short Term Goal 1: Doff/victorino UB/LB clothing Min A  Short Term Goal 2: Bathe UB/LB Min A  Short Term Goal 3: Complete toileting Min A  Short Term Goal 4: Complete sinkside grooming CGA in stance at sink 3-5 minutes  Short Term Goal 5: Complete functional mobility and transfers SBA in LRE  Additional Goals?: Yes (Complete BUE HEP with min vc)  Long Term Goals  Time Frame for Long Term Goals : 7-10 days  Long Term Goal 1: Doff/victorino UB/LB clothing Mod I  Long Term Goal 2: Bathe UB/LB Mod I  Long Term Goal 3: Complete toileting Mod I  Long Term Goal 4: Complete sinkside grooming Mod I in stance at sink 7-10 minutes  Long Term Goal 5: Complete functional mobility and transfers Mod I in LRE  Additional Goals?: Yes (Demo BUE HEP I)  Patient Goals   Patient goals :  To return home       Therapy Time   Individual Concurrent Group Co-treatment   Time In  8:20am         Time Out  9:35am         Minutes  [address], [id number]

## 2023-02-18 NOTE — H&P
Hospital Medicine History & Physical      PCP: Renu Haas MD    Date of Admission: 2/17/2023    Date of Service: 2/18/23      Chief Complaint:  weakness      History Of Present Illness:  69 y.o. male who presented to [facility] after acute admission to [address] [facility] where he was treated for acute on chronic hypoxic and hypercapnic respiratory failure secondary to acute decompensated CHF and an acute exacerbation of COPD as well as untreated MORENA. Improved with rescue bipap and diuresis; cardiology adjusted his medications and after completing acute stay he was discharged to [facility] for therapy. Per his report he has broken CPAP x 6 mts and trying to get one from [place]. Denied fever, CP, dizziness       Past Medical History:          Diagnosis Date    CAD (coronary artery disease)     Chronic kidney disease     COPD (chronic obstructive pulmonary disease) (HCC)     Diabetes mellitus ([facility]     Diabetic neuropathy ([facility]     Hyperlipidemia     Hypertension        Past Surgical History:          Procedure Laterality Date    COLON SURGERY      CORONARY ANGIOPLASTY WITH STENT PLACEMENT      CORONARY ARTERY BYPASS GRAFT      PACEMAKER PLACEMENT         Medications Prior to Admission:      Prior to Admission medications    Medication Sig Start Date End Date Taking? Authorizing Provider   Omega-3 Fatty Acids (FISH OIL) 1000 MG capsule Take by mouth daily    Historical Provider, MD   gabapentin (NEURONTIN) 300 MG capsule Take 1 capsule by mouth nightly for 30 days.  7/6/22 8/5/22  LUI Covington CNP   Alpha-Lipoic Acid 600 MG CAPS Take 600 mg by mouth daily  Patient not taking: Reported on 2/11/2023 7/6/22 8/5/22  LUI Covington CNP   naloxone 4 MG/0.1ML LIQD nasal spray 1 spray by Nasal route as needed for Opioid Reversal  Patient not taking: No sig reported 3/17/22   Amy Warren MD   ferrous sulfate (IRON 325) 325 (65 Fe) MG tablet ferrous sulfate 325 mg (65 mg iron) tablet   Take 1 tablet every day by oral route for 30 days. Historical Provider, MD   metroNIDAZOLE (FLAGYL) 500 MG tablet TAKE 1 TABLET BY MOUTH EVERY 8 HOURS FOR 10 DAYS  Patient not taking: No sig reported 1/21/22   Historical Provider, MD   nitroGLYCERIN (NITROSTAT) 0.4 MG SL tablet  2/4/22   Historical Provider, MD   tiZANidine (ZANAFLEX) 2 MG tablet Take 1 tablet by mouth every 8 hours as needed (neck pain) 2/10/22   Veronica Holman MD   clobetasol (TEMOVATE) 0.05 % ointment Apply topically 2 times daily for 2 weeks for rash. Do not use on face or neck 2/10/22   Veronica Holman MD   ketoconazole (NIZORAL) 2 % cream APPLY TO THE skin rash twice daily FOR 4 weeks 2/10/22   Veronica Holman MD   atorvastatin (LIPITOR) 40 MG tablet TAKE 1 TABLET BY MOUTH ONCE DAILY AT BEDTIME 2/10/22   Veronica Holman MD   metFORMIN (GLUCOPHAGE) 500 MG tablet Take 500 mg by mouth daily (with breakfast)    Historical Provider, MD   amLODIPine (NORVASC) 2.5 MG tablet Take 2.5 mg by mouth daily    Historical Provider, MD   aspirin 81 MG EC tablet Take 81 mg by mouth daily    Historical Provider, MD       Allergies:  Penicillins, Gadolinium, Iodine, and Iv [iodides]    Social History:      The patient currently lives home    TOBACCO:   reports that he has quit smoking. He has never used smokeless tobacco.  ETOH:   reports current alcohol use. Family History:       Reviewed in detail and negative for DM, CAD, Cancer, CVA. Positive as follows:    No family history on file. REVIEW OF SYSTEMS:   Pertinent positives as noted in the HPI. All other systems reviewed and negative. PHYSICAL EXAM:    BP 92/61   Pulse 61   Temp 97.6 °F (36.4 °C) (Oral)   Resp 16   Ht 5' 6\" (1.676 m)   Wt 219 lb (99.3 kg)   SpO2 97%   BMI 35.35 kg/m²     General appearance:  No apparent distress, appears stated age and cooperative. HEENT:  Normal cephalic, atraumatic without obvious deformity. Pupils equal, round, and reactive to light. Extra ocular muscles intact.  Conjunctivae/corneas clear.  Neck: Supple, with full range of motion. No jugular venous distention. Trachea midline. Respiratory:  Normal respiratory effort. Clear to auscultation, bilaterally without Rales/Wheezes/Rhonchi. Cardiovascular:  Regular rate and rhythm with normal S1/S2 without murmurs, rubs or gallops. Abdomen:  distended with multiple hernias normal bowel sounds. Musculoskeletal:  No clubbing, cyanosis or edema bilaterally. Full range of motion without deformity. Skin: Skin color, texture, turgor normal.  No rashes or lesions. Neurologic:  Neurovascularly intact without any focal sensory/motor deficits. Cranial nerves: II-XII intact, grossly non-focal.  Psychiatric:  Alert and oriented, thought content appropriate, normal insight  Capillary Refill: Brisk,< 3 seconds   Peripheral Pulses: +2 palpable, equal bilaterally       Labs:     Recent Labs     02/18/23  0626   WBC 5.5   HGB 9.8*   HCT 34.2*        Recent Labs     02/16/23  0950 02/17/23  0542 02/18/23  0626    135 137   K 4.4 4.2 3.7   CL 95 93* 90*   CO2 33* 36* 40*   BUN 29* 30* 29*   CREATININE 1.46* 1.50* 1.57*   CALCIUM 8.4* 8.6 8.9     No results for input(s): AST, ALT, BILIDIR, BILITOT, ALKPHOS in the last 72 hours. No results for input(s): INR in the last 72 hours. No results for input(s): Marvie Forget in the last 72 hours. Urinalysis:    No results found for: Katerina London, BACTERIA, [address] [place] [address], [facility], [facility], [facility]94    Radiology:     CXR: I have reviewed the CXR with the following interpretation:   EKG:  I have reviewed the EKG with the following interpretation:     No orders to display       ASSESSMENT:    Active Hospital Problems    Diagnosis Date Noted    CHF (congestive heart failure), NYHA class I, acute on chronic, combined ([facility] [I50.43] 02/17/2023     Priority: Medium       PLAN:        DVT Prophylaxis: eliquis  Diet: ADULT DIET;  Regular  Code Status: Full Code    PT/OT Eval Status: done    Dispo -   Acute on chronic respiratory failure with hypoxia and hypercapnia-O2, CPAP  Decompensated congestive heart failure EF 65%-lasix per cardiology  COPD exacerbation-back to baseline   MORENA -CPAP  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 Anne Ramey MD    Thank you Easton Reeves MD for the opportunity to be involved in this patient's care. If you have any questions or concerns please feel free to contact me.

## 2023-02-19 LAB
GLUCOSE BLD-MCNC: 110 MG/DL (ref 70–99)
GLUCOSE BLD-MCNC: 185 MG/DL (ref 70–99)
GLUCOSE BLD-MCNC: 203 MG/DL (ref 70–99)
GLUCOSE BLD-MCNC: 224 MG/DL (ref 70–99)
PERFORMED ON: ABNORMAL

## 2023-02-19 PROCEDURE — 97535 SELF CARE MNGMENT TRAINING: CPT

## 2023-02-19 PROCEDURE — 94640 AIRWAY INHALATION TREATMENT: CPT

## 2023-02-19 PROCEDURE — 97530 THERAPEUTIC ACTIVITIES: CPT

## 2023-02-19 PROCEDURE — 97116 GAIT TRAINING THERAPY: CPT

## 2023-02-19 PROCEDURE — 97110 THERAPEUTIC EXERCISES: CPT

## 2023-02-19 PROCEDURE — 6370000000 HC RX 637 (ALT 250 FOR IP): Performed by: INTERNAL MEDICINE

## 2023-02-19 PROCEDURE — 94660 CPAP INITIATION&MGMT: CPT

## 2023-02-19 PROCEDURE — 2580000003 HC RX 258: Performed by: INTERNAL MEDICINE

## 2023-02-19 PROCEDURE — 6360000002 HC RX W HCPCS: Performed by: INTERNAL MEDICINE

## 2023-02-19 PROCEDURE — 1200000002 HC SEMI PRIVATE SWING BED

## 2023-02-19 PROCEDURE — 2700000000 HC OXYGEN THERAPY PER DAY

## 2023-02-19 RX ADMIN — ASPIRIN 81 MG CHEWABLE TABLET 81 MG: 81 TABLET CHEWABLE at 09:02

## 2023-02-19 RX ADMIN — ATORVASTATIN CALCIUM 40 MG: 40 TABLET, FILM COATED ORAL at 20:05

## 2023-02-19 RX ADMIN — INSULIN LISPRO 2 UNITS: 100 INJECTION, SOLUTION INTRAVENOUS; SUBCUTANEOUS at 11:52

## 2023-02-19 RX ADMIN — BUDESONIDE 500 MCG: 0.5 SUSPENSION RESPIRATORY (INHALATION) at 18:05

## 2023-02-19 RX ADMIN — TRAMADOL HYDROCHLORIDE 50 MG: 50 TABLET, COATED ORAL at 18:41

## 2023-02-19 RX ADMIN — APIXABAN 5 MG: 5 TABLET, FILM COATED ORAL at 09:02

## 2023-02-19 RX ADMIN — TRAMADOL HYDROCHLORIDE 50 MG: 50 TABLET, COATED ORAL at 09:02

## 2023-02-19 RX ADMIN — CARVEDILOL 12.5 MG: 12.5 TABLET, FILM COATED ORAL at 20:06

## 2023-02-19 RX ADMIN — FUROSEMIDE 40 MG: 40 TABLET ORAL at 08:59

## 2023-02-19 RX ADMIN — BUDESONIDE 500 MCG: 0.5 SUSPENSION RESPIRATORY (INHALATION) at 06:11

## 2023-02-19 RX ADMIN — KETOCONAZOLE: 20 CREAM TOPICAL at 09:03

## 2023-02-19 RX ADMIN — CARVEDILOL 12.5 MG: 12.5 TABLET, FILM COATED ORAL at 09:02

## 2023-02-19 RX ADMIN — FUROSEMIDE 40 MG: 40 TABLET ORAL at 17:37

## 2023-02-19 RX ADMIN — APIXABAN 5 MG: 5 TABLET, FILM COATED ORAL at 20:05

## 2023-02-19 RX ADMIN — Medication 10 ML: at 09:07

## 2023-02-19 ASSESSMENT — PAIN SCALES - GENERAL: PAINLEVEL_OUTOF10: 9

## 2023-02-19 NOTE — PROGRESS NOTES
Facility/Department: [facility] SUBACUTE UNIT  Daily Treatment Note  NAME: Sim Scruggs  : 1945  MRN: [id number]    Date of Service: 2023    Discharge Recommendations:  Continue to assess pending progress, Home with Home health OT  OT Equipment Recommendations  Equipment Needed: Yes      Patient Diagnosis(es): There were no encounter diagnoses. Assessment    Assessment: engaged pt in functional transfer training and dynamic standtrials. MAx cues for pt to attend to task due to pts talkative nature. Pt tolerated standing up to 6 min with multiple seated rest breaks while shaving. Consulted with pts nurse due to pt expressing concerns about seeing podiatrist for toe nail clipping. Increased time required to complete LE dressing/bathing and shaving tasks. Activity Tolerance: Patient tolerated treatment well;Patient limited by endurance  Discharge Recommendations: Continue to assess pending progress;Home with Home health OT  Equipment Needed: Yes      Plan   Occupational Therapy Plan  Times Per Week: 4-7  Times Per Day: Once a day  Current Treatment Recommendations: Strengthening;Balance training;Functional mobility training; Endurance training; Safety education & training;Patient/Caregiver education & training;Self-Care / ADL;Equipment evaluation, education, & procurement;Home management training       Subjective   Subjective  Subjective: \"Let me tell you about what i've been through. \" Pt seen in room for OT tx session  Orientation  Overall Orientation Status: Within Normal Limits  Orientation Level: Oriented X4  Cognition  Overall Cognitive Status: WFL  Cognition Comment: Talkative and frustrated about current situation        Objective    Bed Mobility Training  Bed Mobility Training: No  Balance  Sitting: Intact  Standing: Intact  Transfer Training  Transfer Training: Yes  Overall Level of Assistance: Contact-guard assistance  Interventions: Demonstration;Verbal cues; Visual cues  Sit to Stand: Stand-by assistance  Stand to Sit: Contact-guard assistance  Toilet Transfer: Contact-guard assistance       ADL  Grooming: Contact guard assistance  Grooming Skilled Clinical Factors: To complete shaving at sink w/ max cues for attention to task and increased time required due to pts talkative nature  LE Bathing: Minimal assistance  LE Bathing Skilled Clinical Factors: SBA required to sponge bathe L Foot and MIN A required to bathe R foot  LE Dressing: Contact guard assistance  LE Dressing Skilled Clinical Factors: Max cues and increased time required to complete. Toileting: Minimal assistance;Contact guard assistance  OT Exercises  Exercise Treatment: Attempted to engage pt in B/L UE ther ex with yellow tband  Resistive Exercises: Completed B/L shoulder and bicep exercises w/ max cues to attend to task     Safety Devices  Type of Devices: Call light within reach;Gait belt;Left in chair     Patient Education  Education Given To: Patient  Education Provided: Plan of Care;Home Exercise Program;ADL Adaptive Strategies;Transfer Training;Energy Conservation; Fall Prevention Strategies    Goals  Short Term Goals  Time Frame for Short Term Goals: 3-5 days  Short Term Goal 1: Doff/victorino UB/LB clothing Min A  Short Term Goal 2: Bathe UB/LB Min A  Short Term Goal 3: Complete toileting Min A  Short Term Goal 4: Complete sinkside grooming CGA in stance at sink 3-5 minutes  Short Term Goal 5: Complete functional mobility and transfers SBA in LRE  Additional Goals?: Yes (Complete BUE HEP with min vc)  Long Term Goals  Time Frame for Long Term Goals : 7-10 days  Long Term Goal 1: Doff/victorino UB/LB clothing Mod I  Long Term Goal 2: Bathe UB/LB Mod I  Long Term Goal 3: Complete toileting Mod I  Long Term Goal 4: Complete sinkside grooming Mod I in stance at sink 7-10 minutes  Long Term Goal 5: Complete functional mobility and transfers Mod I in LRE  Additional Goals?: Yes (Demo BUE HEP I)  Patient Goals   Patient goals :  To return home Therapy Time   Individual Concurrent Group Co-treatment   Time In 1325         Time Out 1435         Minutes Francis 21, MATTHIEU

## 2023-02-19 NOTE — PROGRESS NOTES
Physical Therapy  Facility/Department: [facility] MED SURG UNIT  Daily Treatment Note  NAME: Milagro Edouard  : 1945  MRN: [id number]    Date of Service: 2023    Discharge Recommendations:  Continue to assess pending progress, Home with Home health PT   PT Equipment Recommendations  Equipment Needed:  (needs further evaluation)  Other: -old one 13years old, and portable O2 for when leaves house. Did like rollator, may want one when goes home. Patient Diagnosis(es): There were no encounter diagnoses. Assessment   Assessment: Pt was introduced to amb with rollator, performing steps with \"HR\" on left ascending and SPC on left. Needs repeated instruction to perfrom transtions/transfers with consistency. Only asked pt to perform one step at a time to conserve energy as o2 dependent  Activity Tolerance: Patient tolerated treatment well  Other: -old one 13years old, and portable O2 for whenleaves house. Did like rollaor, may want one when goers home. Plan          Restrictions  Restrictions/Precautions  Restrictions/Precautions: Fall Risk, Up as Tolerated  Required Braces or Orthoses? :  (Pt states he use \"cheap\" cloth \"braces\" on knees, to have them brought in)  Implants present? : Metal implants, Pacemaker     Subjective          Objective   Vitals  SpO2: 96 % (pre treatment)  O2 Device: Nasal cannula  Bed Mobility Training  Bed Mobility Training: No (Pt up in chair at bedside, does not like bed, claims hurts his back)  Balance  Sitting: Intact  Standing: Intact (able to maintain standing balance for at least 5-10 seconds without support)  Transfer Training  Transfer Training: Yes  Overall Level of Assistance: Contact-guard assistance  Interventions: Demonstration;Verbal cues; Visual cues  Sit to Stand: Contact-guard assistance (with cues)  Stand to Sit: Contact-guard assistance (with verbal cues)  Gait Training  Gait Training: Yes  Right Side Weight Bearing: As tolerated (with soft elastic brace left knee (pt owned))  Left Side Weight Bearing: As tolerated  Gait  Overall Level of Assistance: Contact-guard assistance  Interventions: Demonstration (started on rollator, so demonstration prior to use)  Base of Support: Widened (secondary to size)  Distance (ft): 50 Feet (second walk 46' with rollator)  Assistive Device: Walker, rollator  Rail Use: Left (ascending (as at home), SPC on right)  Stairs - Level of Assistance: Contact-guard assistance (d and v cues for pattern)  Number of Stairs Trained: 10 (Performed using 6\" step and parallel bar for hand rail, total number of steps done 10 in tow separate tries)     PT Exercises  Exercise Treatment: Pt encouraged to perform ankle pumps and knee ext with emphasis on final extension Left knee. Safety Devices  Type of Devices: Call light within reach;Gait belt;Left in chair       Goals  Short Term Goals  Time Frame for Short Term Goals: 3-7days  Short Term Goal 1: Pt will perform balance exercises to support amb with SPC and safety in stair climbing  Short Term Goal 2: Pt will practice lifting body weight using step in parallel bars to build strength in legs in preparation for stair climbing  Short Term Goal 3: Pt will demonstrate safe transtion/transfers without verbal cues 100% of the time. Short Term Goal 4: Pt will particpate in balance/strengthening ex  Long Term Goals  Time Frame for Long Term Goals : 7-10  Long Term Goal 1: Demonstrate indep safe transitions/transfers with appropriate AD  Long Term Goal 2: Pt will perform 13 reg size steps with HR on right ascending and SPC on left Indep  Long Term Goal 3: Patient will ambulate 48' with LRD at independent level in order to get room to room safely.   Long Term Goal 4: Written HEP  Patient Goals   Patient Goals : Pt wants to return to own home able to climb a full set of steps, and walk using a [facility]    Education  Patient Education  Education Given To: Patient  Education Provided: Plan of Care;Transfer Training;Equipment (stair pattern)  Education Provided Comments: Pt given all instructions with visual demonstration and verbal support prior to each activity, also had pt veralized what he needed to do prior to movement/ or what he did wrong if transtioned befoe told to do so. Education Method: Demonstration;Verbal;Teach Back  Barriers to Learning: None  Education Outcome: Demonstrated understanding;Verbalized understanding;Continued education needed    Therapy Time   Individual Concurrent Group Co-treatment   Time In  850         Time Out  1020         Minutes  90            Extended time required for set up and continue redirection for pt to stay on task.  Asks for repeated instructions and wants to tell why he did follow directions or tell story- pt encouraged to concentrate on what is being asked- has multiple questions about things that are not uncontrol of therapist, ie type of bed he has here, food that he wants to eat, etc.       Paty Maloney, [id number]

## 2023-02-20 LAB
ANION GAP SERPL CALCULATED.3IONS-SCNC: 8 MEQ/L (ref 9–15)
BASOPHILS ABSOLUTE: 0 K/UL (ref 0–0.1)
BASOPHILS RELATIVE PERCENT: 0.5 % (ref 0.2–1.2)
BUN BLDV-MCNC: 30 MG/DL (ref 8–23)
CALCIUM SERPL-MCNC: 9 MG/DL (ref 8.5–9.9)
CHLORIDE BLD-SCNC: 91 MEQ/L (ref 95–107)
CO2: 39 MEQ/L (ref 20–31)
CREAT SERPL-MCNC: 1.81 MG/DL (ref 0.7–1.2)
EOSINOPHILS ABSOLUTE: 0.3 K/UL (ref 0–0.5)
EOSINOPHILS RELATIVE PERCENT: 4.8 % (ref 0.8–7)
GFR SERPL CREATININE-BSD FRML MDRD: 38.2 ML/MIN/{1.73_M2}
GLUCOSE BLD-MCNC: 114 MG/DL (ref 70–99)
GLUCOSE BLD-MCNC: 130 MG/DL (ref 70–99)
GLUCOSE BLD-MCNC: 157 MG/DL (ref 70–99)
GLUCOSE BLD-MCNC: 195 MG/DL (ref 70–99)
GLUCOSE BLD-MCNC: 238 MG/DL (ref 70–99)
HCT VFR BLD CALC: 38.1 % (ref 42–52)
HEMOGLOBIN: 10.7 G/DL (ref 13.7–17.5)
IMMATURE GRANULOCYTES #: 0 K/UL
IMMATURE GRANULOCYTES %: 0.3 %
LYMPHOCYTES ABSOLUTE: 0.6 K/UL (ref 1.3–3.6)
LYMPHOCYTES RELATIVE PERCENT: 9.1 %
MCH RBC QN AUTO: 24.1 PG (ref 25.7–32.2)
MCHC RBC AUTO-ENTMCNC: 28.1 % (ref 32.3–36.5)
MCV RBC AUTO: 85.8 FL (ref 79–92.2)
MONOCYTES ABSOLUTE: 0.7 K/UL (ref 0.3–0.8)
MONOCYTES RELATIVE PERCENT: 10.9 % (ref 5.3–12.2)
NEUTROPHILS ABSOLUTE: 4.7 K/UL (ref 1.8–5.4)
NEUTROPHILS RELATIVE PERCENT: 74.4 % (ref 34–67.9)
PDW BLD-RTO: 16.9 % (ref 11.6–14.4)
PERFORMED ON: ABNORMAL
PLATELET # BLD: 232 K/UL (ref 163–337)
POTASSIUM SERPL-SCNC: 3.9 MEQ/L (ref 3.4–4.9)
RBC # BLD: 4.44 M/UL (ref 4.63–6.08)
SODIUM BLD-SCNC: 138 MEQ/L (ref 135–144)
WBC # BLD: 6.3 K/UL (ref 4.2–9)

## 2023-02-20 PROCEDURE — 94640 AIRWAY INHALATION TREATMENT: CPT

## 2023-02-20 PROCEDURE — 6360000002 HC RX W HCPCS: Performed by: INTERNAL MEDICINE

## 2023-02-20 PROCEDURE — 6370000000 HC RX 637 (ALT 250 FOR IP): Performed by: INTERNAL MEDICINE

## 2023-02-20 PROCEDURE — 97535 SELF CARE MNGMENT TRAINING: CPT

## 2023-02-20 PROCEDURE — 97110 THERAPEUTIC EXERCISES: CPT

## 2023-02-20 PROCEDURE — 97530 THERAPEUTIC ACTIVITIES: CPT

## 2023-02-20 PROCEDURE — 1200000002 HC SEMI PRIVATE SWING BED

## 2023-02-20 PROCEDURE — 80048 BASIC METABOLIC PNL TOTAL CA: CPT

## 2023-02-20 PROCEDURE — 97116 GAIT TRAINING THERAPY: CPT

## 2023-02-20 PROCEDURE — 85025 COMPLETE CBC W/AUTO DIFF WBC: CPT

## 2023-02-20 PROCEDURE — 36415 COLL VENOUS BLD VENIPUNCTURE: CPT

## 2023-02-20 RX ADMIN — INSULIN LISPRO 2 UNITS: 100 INJECTION, SOLUTION INTRAVENOUS; SUBCUTANEOUS at 16:54

## 2023-02-20 RX ADMIN — FUROSEMIDE 40 MG: 40 TABLET ORAL at 16:55

## 2023-02-20 RX ADMIN — BUDESONIDE 500 MCG: 0.5 SUSPENSION RESPIRATORY (INHALATION) at 18:01

## 2023-02-20 RX ADMIN — BUDESONIDE 500 MCG: 0.5 SUSPENSION RESPIRATORY (INHALATION) at 06:07

## 2023-02-20 RX ADMIN — APIXABAN 5 MG: 5 TABLET, FILM COATED ORAL at 09:14

## 2023-02-20 RX ADMIN — CARVEDILOL 12.5 MG: 12.5 TABLET, FILM COATED ORAL at 09:14

## 2023-02-20 RX ADMIN — ATORVASTATIN CALCIUM 40 MG: 40 TABLET, FILM COATED ORAL at 22:16

## 2023-02-20 RX ADMIN — KETOCONAZOLE: 20 CREAM TOPICAL at 09:15

## 2023-02-20 RX ADMIN — APIXABAN 5 MG: 5 TABLET, FILM COATED ORAL at 22:16

## 2023-02-20 RX ADMIN — FUROSEMIDE 40 MG: 40 TABLET ORAL at 09:14

## 2023-02-20 RX ADMIN — ASPIRIN 81 MG CHEWABLE TABLET 81 MG: 81 TABLET CHEWABLE at 09:14

## 2023-02-20 RX ADMIN — TRAMADOL HYDROCHLORIDE 50 MG: 50 TABLET, COATED ORAL at 05:17

## 2023-02-20 RX ADMIN — TRAMADOL HYDROCHLORIDE 50 MG: 50 TABLET, COATED ORAL at 14:41

## 2023-02-20 RX ADMIN — TRAMADOL HYDROCHLORIDE 50 MG: 50 TABLET, COATED ORAL at 23:04

## 2023-02-20 RX ADMIN — CARVEDILOL 12.5 MG: 12.5 TABLET, FILM COATED ORAL at 22:17

## 2023-02-20 ASSESSMENT — PAIN DESCRIPTION - ORIENTATION: ORIENTATION: RIGHT;LEFT;MID

## 2023-02-20 ASSESSMENT — PAIN SCALES - GENERAL
PAINLEVEL_OUTOF10: 7
PAINLEVEL_OUTOF10: 7

## 2023-02-20 ASSESSMENT — PAIN DESCRIPTION - DESCRIPTORS: DESCRIPTORS: ACHING

## 2023-02-20 ASSESSMENT — PAIN DESCRIPTION - LOCATION: LOCATION: BACK

## 2023-02-20 NOTE — PROGRESS NOTES
Facility/Department: [facility] MED SURG UNIT  Daily Treatment Note  NAME: Mark Warren  : 1945  MRN: [id number]    Date of Service: 2023    Discharge Recommendations:  Continue to assess pending progress, Home with Home health PT        Patient Diagnosis(es): There were no encounter diagnoses. Assessment   Assessment: Pt tolerating 6'' step ups in // bars with B UE assist needign VC's for sequencing to dec strain on L knee with good follow. Pt ambulating steadily with SPC with no inc in knee pain CGAx1. Pt returned to room to sit up in recliner with call light and chair alarm in place. Continue with POC. Activity Tolerance: Patient tolerated treatment well;Patient limited by endurance     Plan    Physcial Therapy Plan  Current Treatment Recommendations: Strengthening;ROM;Balance training;Functional mobility training; Endurance training;Transfer training;Gait training;Stair training;Home exercise program;Safety education & training;Positioning;Equipment evaluation, education, & procurement; Therapeutic activities     Restrictions  Restrictions/Precautions  Restrictions/Precautions: Fall Risk, Up as Tolerated  Required Braces or Orthoses? :  (Pt states he use \"cheap\" cloth \"braces\" on knees, to have them brought in)  Implants present? : Metal implants, Pacemaker     Subjective    Subjective  Subjective: Pt sitting up in recliner and agreeable to therapy.   Pain: 4/10 back pain at rest  Orientation  Overall Orientation Status: Within Normal Limits  Orientation Level: Oriented X4  Cognition  Overall Cognitive Status: WNL  Cognition Comment: talkative and voices several concerns, needs redirection back to therapy tasks frequently     Objective   Vitals       Balance  Sitting: Intact  Standing: With support (Uses SPC but able to go short distances without)  Transfer Training  Transfer Training: Yes  Overall Level of Assistance: Supervision  Sit to Stand: Supervision  Stand to Sit: Supervision  Bed to Chair: Supervision  Toilet Transfer: Stand-by assistance  Gait Training  Gait Training: Yes  Gait  Overall Level of Assistance: Contact-guard assistance  Base of Support: Widened  Distance (ft):  (50', and 75' x 2 with seated RB between trials)  Assistive Device: Cane, straight  Rail Use:  (// bar)  Stairs - Level of Assistance: Contact-guard assistance  Number of Stairs Trained: 12 (6'')     PT Exercises  A/AROM Exercises: standing heel raises x 15 H3'', standing marches alternating x 10 H3'',     Safety Devices  Type of Devices: Call light within reach; Left in chair;Nurse notified       Goals  Short Term Goals  Time Frame for Short Term Goals: 3-7days  Short Term Goal 1: Pt will perform balance exercises to support amb with SPC and safety in stair climbing  Short Term Goal 2: Pt will practice lifting body weight using step in parallel bars to build strength in legs in preparation for stair climbing  Short Term Goal 3: Pt will demonstrate safe transtion/transfers without verbal cues 100% of the time. Short Term Goal 4: Pt will particpate in balance/strengthening ex  Long Term Goals  Time Frame for Long Term Goals : 7-10  Long Term Goal 1: Demonstrate indep safe transitions/transfers with appropriate AD  Long Term Goal 2: Pt will perform 13 reg size steps with HR on right ascending and SPC on left Indep  Long Term Goal 3: Patient will ambulate 48' with LRD at independent level in order to get room to room safely.   Long Term Goal 4: Written HEP  Patient Goals   Patient Goals : Pt wants to return to own home able to climb a full set of steps, and walk using a [facility]    Education       Therapy Time   Individual Concurrent Group Co-treatment   Time In 1515         Time Out 1545         Minutes 30                 [place], [place]

## 2023-02-20 NOTE — PROGRESS NOTES
Facility/Department: [facility] SUBACUTE UNIT  Daily Treatment Note  NAME: Marietta Dao  : 1945  MRN: [id number]    Date of Service: 2023    Discharge Recommendations:  Continue to assess pending progress, Home with Home health OT  OT Equipment Recommendations  Equipment Needed: Yes (FWW, BSC, tub bench or shower chair)      Patient Diagnosis(es): There were no encounter diagnoses. Assessment    Assessment: Pt encountered for skilled OT intervention, presents in bed. Informs OT he completed grooming today and shaved. Agreeable to change shirt. Pt c/o chair being uncomfortable, obtained waffel cushion which improved comfort. Pt demonstrated his typical exercise routine he does at home which involves standing with cane and moving in various patterns. Pt talkative and shared stories of his [employer] experience, OT provided listening ear with redirection back to therapy. Pt has been participating with ADL retraining with own clothing donned and grooming items set up in bathroom. Discussed home set up and recommendation of tub bench- may not work in pt's bathroom d/t space constraints. Pulled up reference pictures of bathroom DME and provided education on different options. Activity Tolerance: Patient tolerated treatment well;Patient limited by endurance  Discharge Recommendations: Continue to assess pending progress;Home with Home health OT  Equipment Needed: Yes (FWW, BSC, tub bench or shower chair)      Plan   Occupational Therapy Plan  Times Per Week: 4-7  Times Per Day: Once a day  Current Treatment Recommendations: Strengthening;Balance training;Functional mobility training; Endurance training; Safety education & training;Patient/Caregiver education & training;Self-Care / ADL;Equipment evaluation, education, & procurement;Home management training     Restrictions   None- [place] and does better in L ear    Subjective   Subjective  Subjective: \"Nobody listens\"  Pain: 4/10 L knee and back  Orientation  Overall Orientation Status: Within Normal Limits  Orientation Level: Oriented X4  Pain: 4/10 back pain at rest  Cognition  Overall Cognitive Status: WNL  Cognition Comment: talkative and voices several concerns, needs redirection back to therapy tasks frequently        Objective    Bed Mobility Training  Bed Mobility Training: Yes  Overall Level of Assistance: Modified independent  Supine to Sit: Modified independent  Sit to Supine: Modified independent  Balance  Sitting: Intact  Standing: With support (Uses SPC but able to go short distances without)  Transfer Training  Transfer Training: Yes  Overall Level of Assistance: Supervision (With [facility])  Sit to Stand: Supervision  Stand to Sit: Supervision  Bed to Chair: Supervision  Toilet Transfer: Stand-by assistance     ADL  Feeding: Independent  Grooming: Stand by assistance  UE Bathing: Minimal assistance  LE Bathing: Minimal assistance  LE Bathing Skilled Clinical Factors: Cannot reach R foot  UE Dressing: Stand by assistance  UE Dressing Skilled Clinical Factors: Doffed and donned justen shirt while seated EOB SBA  LE Dressing: Contact guard assistance  LE Dressing Skilled Clinical Factors: Max cues and increased time required to complete. Toileting: Contact guard assistance  Additional Comments: Max cues for redirection and extended time    OT Exercises  Exercise Treatment: Pt used SPC for shoulder flexion/extension, shoulder circles, chest press in stance x10 reps to improve AROM and strength for ADLs  Dynamic Standing Balance Exercises: Pt stood to demonstrate his typical exercise routine at home, involved stepping side to side and moving cane with BUE. Good ability to weight shift without LOB and standing tolerance improved. Safety Devices  Type of Devices: Call light within reach; Left in chair;Nurse notified     Patient Education  Education Given To: Patient  Education Provided: Plan of Care;Home Exercise Program;ADL Adaptive Strategies;Transfer Training;Energy Conservation; Fall Prevention Strategies  Education Method: Demonstration;Verbal;Teach Back  Barriers to Learning: None  Education Outcome: Verbalized understanding;Demonstrated understanding;Continued education needed    Goals  Short Term Goals  Time Frame for Short Term Goals: 3-5 days  Short Term Goal 1: Doff/victorino UB/LB clothing Min A  Short Term Goal 2: Bathe UB/LB Min A  Short Term Goal 3: Complete toileting Min A  Short Term Goal 4: Complete sinkside grooming CGA in stance at sink 3-5 minutes  Short Term Goal 5: Complete functional mobility and transfers SBA in LRE  Additional Goals?: Yes (Complete BUE HEP with min vc)  Long Term Goals  Time Frame for Long Term Goals : 7-10 days  Long Term Goal 1: Doff/victorino UB/LB clothing Mod I  Long Term Goal 2: Bathe UB/LB Mod I  Long Term Goal 3: Complete toileting Mod I  Long Term Goal 4: Complete sinkside grooming Mod I in stance at sink 7-10 minutes  Long Term Goal 5: Complete functional mobility and transfers Mod I in LRE  Additional Goals?: Yes (Demo BUE HEP I)  Patient Goals   Patient goals :  To return home       Therapy Time   Individual Concurrent Group Co-treatment   Time In [facility]         Time Out 1415         Minutes [id number] [facility], [id number]

## 2023-02-20 NOTE — PROGRESS NOTES
Comprehensive Nutrition Assessment    Type and Reason for Visit:  Initial    Nutrition Recommendations/Plan:   Sodium restricted diet d/t CHF  PO >75% Meals. Malnutrition Assessment:  Malnutrition Status:  No malnutrition (02/20/23 1305)        Nutrition Assessment:    Nutritional status presents as adequate. No s/s of malnutrition. Pt reports excellent appetite. No supplement recommended at this time. Will add sodium restriction in diet, due to respiratory status/CHF. Nutrition Related Findings:    69 y.o. male who presented to [facility] after acute admission to [facility] where he was treated for acute on chronic hypoxic and hypercapnic respiratory failure secondary to acute decompensated CHF and an acute exacerbation of COPD as well as untreated MORENA. Improved with rescue bipap and diuresis; cardiology adjusted his medications and after completing acute stay he was discharged to [facility] for therapy. Per his report he has broken CPAP x 6 mts and trying to get one from [place]. Admit weight 218#, remains within a UBWR for patient- reports weight is generally ~215# but has been as high as ~ 230#. BMI- obese. Reports having no bottom dentures but no difficulty with current diet order. No supplement needed at this time- appetite reported as excellent. Able to feed self independently. Pt reports multiple abdominal hernias but tolerates diet well. Skin- no pressure areas, labs reviewed (2/20), meds reviewed; Last BM per EMR 2/16/23, Wound Type: None       Current Nutrition Intake & Therapies:    Average Meal Intake: %  Average Supplements Intake: None Ordered  ADULT DIET; Regular    Anthropometric Measures:  Height: 5' 6\" (167.6 cm)  Ideal Body Weight (IBW): 142 lbs (65 kg)    Admission Body Weight: 218 lb (98.9 kg) (2/17/23)  Current Body Weight: 218 lb (98.9 kg) (2/17/23), 153.5 % IBW.  Weight Source: Bed Scale  Current BMI (kg/m2): 35.2  Usual Body Weight: 217 lb (98.4 kg) (8/27/22)  % Weight Change (Calculated): 0.5   BMI Categories: Obese Class 2 (BMI 35.0 -39.9)    Estimated Daily Nutrient Needs:  Energy Requirements Based On: Kcal/kg  Weight Used for Energy Requirements: Current  Energy (kcal/day): 1488-1786kcal/day  (15-18kcal/kg)  Weight Used for Protein Requirements: Ideal  Protein (g/day): 78-98gm/day (1.2-1.5gm/kg IBW)  Method Used for Fluid Requirements: 1 ml/kcal  Fluid (ml/day): 0549-6877    Nutrition Diagnosis:   No nutrition diagnosis at this time     Nutrition Interventions:   Food and/or Nutrient Delivery: Modify Current Diet  Nutrition Education/Counseling: No recommendation at this time  Coordination of Nutrition Care: Continue to monitor while inpatient       Goals:     Goals: PO intake 75% or greater       Nutrition Monitoring and Evaluation:      Food/Nutrient Intake Outcomes: Food and Nutrient Intake  Physical Signs/Symptoms Outcomes: Biochemical Data, Meal Time Behavior, Weight    Discharge Planning:     Too soon to determine     Paty Encarnacion RD

## 2023-02-20 NOTE — PROGRESS NOTES
Hospitalist Progress Note      PCP: Kristina Hernandez MD    Date of Admission: 2/17/2023    Chief Complaint: weakness/dyspnea    Subjective: pt awake/alert, looks comfortable     Medications:  Reviewed    Infusion Medications    sodium chloride      dextrose       Scheduled Medications    sodium chloride flush  5-40 mL IntraVENous 2 times per day    insulin lispro  0-8 Units SubCUTAneous TID WC    insulin lispro  0-4 Units SubCUTAneous Nightly    ketoconazole   Topical Daily    budesonide  0.5 mg Nebulization BID    furosemide  40 mg Oral BID    apixaban  5 mg Oral BID    carvedilol  12.5 mg Oral BID    miconazole   Topical BID    aspirin  81 mg Oral Daily    sodium chloride flush  5-40 mL IntraVENous BID    atorvastatin  40 mg Oral Nightly     PRN Meds: sodium chloride flush, sodium chloride, ondansetron **OR** ondansetron, polyethylene glycol, acetaminophen **OR** acetaminophen, glucose, dextrose bolus **OR** dextrose bolus, glucagon (rDNA), dextrose, traMADol      Intake/Output Summary (Last 24 hours) at 2/20/2023 2810  Last data filed at 2/19/2023 1230  Gross per 24 hour   Intake 720 ml   Output --   Net 720 ml       Exam:    /72   Pulse 78   Temp 97.8 °F (36.6 °C) (Oral)   Resp 18   Ht 5' 6\" (1.676 m)   Wt 219 lb (99.3 kg)   SpO2 98%   BMI 35.35 kg/m²     General appearance: No apparent distress, appears stated age and cooperative. HEENT: Pupils equal, round, and reactive to light. Conjunctivae/corneas clear. Neck: Supple, with full range of motion. No jugular venous distention. Trachea midline. Respiratory:  Normal respiratory effort. Clear to auscultation, bilaterally without Rales/Wheezes/Rhonchi. Cardiovascular: Regular rate and rhythm with normal S1/S2 without murmurs, rubs or gallops. Abdomen: Soft, non-tender, distended with normal bowel sounds. Musculoskeletal: No clubbing, cyanosis or edema bilaterally. Full range of motion without deformity.   Skin: Skin color, texture, turgor normal. No rashes or lesions. Neurologic:  Neurovascularly intact without any focal sensory/motor deficits. Cranial nerves: II-XII intact, grossly non-focal.  Psychiatric: Alert and oriented, thought content appropriate, normal insight  Capillary Refill: Brisk,< 3 seconds   Peripheral Pulses: +2 palpable, equal bilaterally       Labs:   Recent Labs     02/18/23  0626 02/20/23  0558   WBC 5.5 6.3   HGB 9.8* 10.7*   HCT 34.2* 38.1*    232     Recent Labs     02/18/23  0626 02/20/23  0603    138   K 3.7 3.9   CL 90* 91*   CO2 40* 39*   BUN 29* 30*   CREATININE 1.57* 1.81*   CALCIUM 8.9 9.0     No results for input(s): AST, ALT, BILIDIR, BILITOT, ALKPHOS in the last 72 hours. No results for input(s): INR in the last 72 hours. No results for input(s): Griffin Natalia in the last 72 hours. Urinalysis:    No results found for: Dorian Prather, BACTERIA, [facility], BLOODU, [address], [facility]    Radiology:  No orders to display           Assessment/Plan:    Active Hospital Problems    Diagnosis Date Noted    CHF (congestive heart failure), NYHA class I, acute on chronic, [facility] [I50.43] 02/17/2023     Priority: Medium         DVT Prophylaxis: apixaban  Diet: ADULT DIET; Regular  Code Status: Full Code    PT/OT Eval Status: done  Dispo -   Acute on chronic respiratory failure with hypoxia and hypercapnia-O2, CPAP  Decompensated congestive heart failure EF 65%-lasix per cardiology  COPD exacerbation-back to baseline   MORENA -CPAP.  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

## 2023-02-21 LAB
GLUCOSE BLD-MCNC: 153 MG/DL (ref 70–99)
GLUCOSE BLD-MCNC: 161 MG/DL (ref 70–99)
GLUCOSE BLD-MCNC: 218 MG/DL (ref 70–99)
GLUCOSE BLD-MCNC: 254 MG/DL (ref 70–99)
PERFORMED ON: ABNORMAL

## 2023-02-21 PROCEDURE — 2700000000 HC OXYGEN THERAPY PER DAY

## 2023-02-21 PROCEDURE — 6360000002 HC RX W HCPCS: Performed by: INTERNAL MEDICINE

## 2023-02-21 PROCEDURE — 97110 THERAPEUTIC EXERCISES: CPT

## 2023-02-21 PROCEDURE — 6370000000 HC RX 637 (ALT 250 FOR IP): Performed by: INTERNAL MEDICINE

## 2023-02-21 PROCEDURE — 1200000002 HC SEMI PRIVATE SWING BED

## 2023-02-21 PROCEDURE — 97530 THERAPEUTIC ACTIVITIES: CPT

## 2023-02-21 PROCEDURE — 94640 AIRWAY INHALATION TREATMENT: CPT

## 2023-02-21 PROCEDURE — 97116 GAIT TRAINING THERAPY: CPT

## 2023-02-21 RX ADMIN — ACETAMINOPHEN 650 MG: 325 TABLET ORAL at 14:13

## 2023-02-21 RX ADMIN — APIXABAN 5 MG: 5 TABLET, FILM COATED ORAL at 08:01

## 2023-02-21 RX ADMIN — BUDESONIDE 500 MCG: 0.5 SUSPENSION RESPIRATORY (INHALATION) at 17:58

## 2023-02-21 RX ADMIN — ATORVASTATIN CALCIUM 40 MG: 40 TABLET, FILM COATED ORAL at 21:52

## 2023-02-21 RX ADMIN — TRAMADOL HYDROCHLORIDE 50 MG: 50 TABLET, COATED ORAL at 16:22

## 2023-02-21 RX ADMIN — CARVEDILOL 12.5 MG: 12.5 TABLET, FILM COATED ORAL at 08:01

## 2023-02-21 RX ADMIN — TRAMADOL HYDROCHLORIDE 50 MG: 50 TABLET, COATED ORAL at 08:01

## 2023-02-21 RX ADMIN — INSULIN LISPRO 4 UNITS: 100 INJECTION, SOLUTION INTRAVENOUS; SUBCUTANEOUS at 14:05

## 2023-02-21 RX ADMIN — FUROSEMIDE 40 MG: 40 TABLET ORAL at 16:24

## 2023-02-21 RX ADMIN — CARVEDILOL 12.5 MG: 12.5 TABLET, FILM COATED ORAL at 21:52

## 2023-02-21 RX ADMIN — FUROSEMIDE 40 MG: 40 TABLET ORAL at 08:01

## 2023-02-21 RX ADMIN — KETOCONAZOLE: 20 CREAM TOPICAL at 08:02

## 2023-02-21 RX ADMIN — APIXABAN 5 MG: 5 TABLET, FILM COATED ORAL at 21:52

## 2023-02-21 RX ADMIN — ACETAMINOPHEN 650 MG: 325 TABLET ORAL at 08:01

## 2023-02-21 RX ADMIN — ASPIRIN 81 MG CHEWABLE TABLET 81 MG: 81 TABLET CHEWABLE at 08:01

## 2023-02-21 RX ADMIN — BUDESONIDE 500 MCG: 0.5 SUSPENSION RESPIRATORY (INHALATION) at 06:01

## 2023-02-21 NOTE — PROGRESS NOTES
Physical Therapy  Facility/Department: [facility] MED SURG UNIT  Daily Treatment Note  NAME: Shaq Baker  : 1945  MRN: [id number]    Date of Service: 2023    Discharge Recommendations:  (P) Continue to assess pending progress, Home with Home health PT        Patient Diagnosis(es): There were no encounter diagnoses. Assessment   Assessment: (P) Pt. tolerated 10 reps of B LE ther ex with all available planes of motion for LE strengthening. Pt. starts to demo limited stand tolerance with R LE as stand time increases with LE stand ther ex. Pt. able to maintain stand position with B UE support and contact guard assistance. Pt. limited by fatigue and endurance. O2 SAT 95-97% during stand ther ex with 3LO2 donned. Pt. c/o muscle cramping. Pt. education to discuss plan of care with nurse and dietician for proper care and safey awareness. Activity Tolerance: (P) Patient tolerated treatment well;Patient limited by endurance     Plan    Physcial Therapy Plan  General Plan: (P) 3-5 times per week  Current Treatment Recommendations: (P) Strengthening;ROM;Balance training;Functional mobility training; Endurance training;Transfer training;Gait training;Stair training;Home exercise program;Safety education & training;Positioning;Equipment evaluation, education, & procurement; Therapeutic activities     Restrictions  Restrictions/Precautions  Restrictions/Precautions: Fall Risk, Up as Tolerated  Required Braces or Orthoses? :  (Pt states he use \"cheap\" cloth \"braces\" on knees, to have them brought in)  Implants present? : Metal implants, Pacemaker     Subjective    Subjective  Subjective: (P) No complaints at arrival. SpO2 100% with 3LO2 donned.  HR 60 bpm.     Objective   Vitals       Transfer Training  Transfer Training: Yes  Sit to Stand: (P) Supervision  Stand to Sit: (P) Supervision  Gait Training  Gait Training: Yes  Gait  Overall Level of Assistance: Contact-guard assistance  Base of Support: Widened  Gait Abnormalities: (Limited by endurance and back pain. SpO2 96-97% HR 58 bpm. SOB. SpO2 95-96% post 50 feet of gait. 0 LOB with use of SPC.)  Distance (ft):  (50'x2)  Assistive Device: Cane, straight (Cane in right hand)  Rail Use: Left  Stairs - Level of Assistance: Stand-by assistance;Contact-guard assistance  Number of Stairs Trained: 8 (8 steps tolerated in // bars with 6 inch before fatigue. Spo2 desats to 93% with recovery 96% within 1 minute seated. HR 60 bpm.)     PT Exercises  Static Standing Balance Exercises: (P) Supported Static stand ther ex B LE: heel raises x 10', Hamstring curls x 10', Hip flexion x 10', Hip abduction x 10', Hip extension x 10', mini squats x 10'. CGA needed for safety. Pt. tneds to demo slight R LE buckling as stand time increases to complete ther ex. Pt. able to maintain stand with B UE support to complete each set of stand ther ex. SpO2 95-97% HR 45 - 59 bpm.  Other Specialty Interventions  Other Treatments/Modalities: SpO2 improves to % with vc's purselip breathing. Goals  Short Term Goals  Time Frame for Short Term Goals: 3-7days  Short Term Goal 1: Pt will perform balance exercises to support amb with SPC and safety in stair climbing  Short Term Goal 2: Pt will practice lifting body weight using step in parallel bars to build strength in legs in preparation for stair climbing  Short Term Goal 3: Pt will demonstrate safe transtion/transfers without verbal cues 100% of the time. Short Term Goal 4: Pt will particpate in balance/strengthening ex  Long Term Goals  Time Frame for Long Term Goals : 7-10  Long Term Goal 1: Demonstrate indep safe transitions/transfers with appropriate AD  Long Term Goal 2: Pt will perform 13 reg size steps with HR on right ascending and SPC on left Indep  Long Term Goal 3: Patient will ambulate 48' with LRD at independent level in order to get room to room safely.   Long Term Goal 4: Written HEP  Patient Goals   Patient Goals : Pt wants to return to own home able to climb a full set of steps, and walk using a [address]    Education       Therapy Time   Individual Concurrent Group Co-treatment   Time In  Abiel Mench[address]         Time Out  120         Minutes  [address] .[id number]

## 2023-02-21 NOTE — CONSULTS
PODIATRIC MEDICINE AND SURGERY       CONSULT HISTORY AND PHYSICAL       ASSESSMENT:  This 69 y.o. male with PMH of DM, profound peripheral neuropathy presents with elongated and painful nails in setting of diabetes mellitus       Plan:  Exam and evaluation  Nails 1-5 bilateral debrided in length and thickness without incident using nail nippers  Educated patient on diabetic foot care and importance of checking feet daily. Discussed with patient can follow up with podiatry for diabetic nail care as outpatient if desired    HPI: This very pleasant 69y.o. year old male with PMH of DM, neuropathy, RLS seen today for diabetic nail care. Patient states that the toenails on both feet are long, painful, and difficult to trim. Patient states that their pain is primarily in the hallux nails and is  aching in nature when the nails haven't been trimmed. No other pedal complaints. Past Medical History:   Diagnosis Date    CAD (coronary artery disease)     Chronic kidney disease     COPD (chronic obstructive pulmonary disease) (HCC)     Diabetes mellitus ([facility] [address]     Diabetic neuropathy (HCC)     Hyperlipidemia     Hypertension        Past Surgical History:   Procedure Laterality Date    COLON SURGERY      CORONARY ANGIOPLASTY WITH STENT PLACEMENT      CORONARY ARTERY BYPASS GRAFT      PACEMAKER PLACEMENT         [unfilled]    Allergies   Allergen Reactions    Penicillins Hives, Rash and Other (See Comments)     Other reaction(s): Generalized rash, Respiratory distress, Urticaria, Syncope, Syncope, Unknown    Gadolinium Hives    Iodine Hives    Iv [Iodides] Hives, Itching and Rash       No family history on file.     Social History     Socioeconomic History    Marital status: Legally      Spouse name: Not on file    Number of children: Not on file    Years of education: Not on file    Highest education level: Not on file   Occupational History    Not on file   Tobacco Use    Smoking status: Former    Smokeless tobacco: Never   Vaping Use    Vaping Use: Never used   Substance and Sexual Activity    Alcohol use: Yes     Comment: rare    Drug use: Never    Sexual activity: Not Currently   Other Topics Concern    Not on file   Social History Narrative    Not on file     Social Determinants of Health     Financial Resource Strain: Not on file   Food Insecurity: Not on file   Transportation Needs: Not on file   Physical Activity: Not on file   Stress: Not on file   Social Connections: Not on file   Intimate Partner Violence: Not on file   Housing Stability: Not on file         REVIEW OF SYSTEMS:  CONSTITUTIONAL:  No fevers, chills, nightsweats, unintended weight loss  HEENT:  Denies frequent or severe headaches, nasal congestion/sinus symptoms, problematic allergy problems. EYES:  No diplopia or blurry vision. CARDIOVASCULAR:  No chest pain, dyspnea, palpitations, orthopnea  PULM:  No dyspnea, unexplained cough. GI:  No dysphagia/odynophagia, problematic reflux, constipation, diarrhea, changes in stool habits, hematochezia, melena. :  No new urinary complaints, including dysuria, gross hematuria or pyuria. NEURO:  No new balance problems, peripheral weakness/paresthesias or numbness of concern. MUSC-SKEL:  No new joint pain, swelling, or erythema. PSY:  No concerns regarding depression, anxiety or panic. INTEGUMENTARY:  No new skin changes (rash, new or changing mole, new growth)      OBJECTIVE:  /62   Pulse 66   Temp 98.3 °F (36.8 °C) (Oral)   Resp 18   Ht 5' 6\" (1.676 m)   Wt 219 lb (99.3 kg)   SpO2 96%   BMI 35.35 kg/m²   Patient is alert and oriented x 3 in NAD.      Vascular:   +1 palpable Dorsalis Pedis and Posterior Tibial Pulses B/L  Capillary Fill time < 3 seconds to B/L digits  Skin temperature warm to warm tibial tuberosity to the digits B/L  Hair growth negative to digits  no edema    Neurological:   Light touch absent B/L  Pain sensation intact to hallux nails bilateral    Musculoskeletal/Orthopaedic:   Structural Deformities: none noted  5/5 muscle strength Dorsiflexion, Plantarflexion, Inversion, Eversion B/L  ROM wnl pedal and ankle joints     Dermatological:   Skin appears diffusely dry, thin, atrophic. Hyperkeratosis  absent. Interspaces 1-4 bilateral are clear and without debris. No open lesions, ulcerations, verruca appreciated B/L. Nails 1 bilateral are thick, elongated, with subungual debris  Nails 2 bilateral are thick, elongated, with subungual debris  Nails 3 bilateral are thick, elongated, with subungual debris  Nails 4 bilateral are thick, elongated, with subungual debris  Nails 5 bilateral are thick, elongated, with subungual debris      Thank you for the consult.      Lorri Bravo DPM  Please first page Podiatry On Call, [phone]  February 21, 2023  8:55 AM

## 2023-02-21 NOTE — PROGRESS NOTES
Occupational Therapy  Facility/Department: [facility] MED SURG UNIT  Daily Treatment Note  NAME: Jagjit Potter  : 1945  MRN: [id number]    Date of Service: 2023    Discharge Recommendations:   Home with Home health OT     Treatment Diagnosis: Decreased ADL/IADL performance d/t dyspnea, weakness, back pain, recent falls. Patient Diagnosis(es): weakness, fall    Assessment    Activity Tolerance: Patient needed frequent RB due to increased fatigue. Trained and educated pt in energy conservation techniques and breathing techniques to decrease SOB and fatigue. Pt.'s OT session needed to be completed in 2 sessions due to schedule conflict and SWEENEY had to come back after out pt. To finish OT session. Pt. Was agreeable and stated he could use the break. Pt. Tolerated UB exercises well with no weights, RB in between, 20 reps each for the first part of OT session. For the second part of OT session pt. Was able to demonstrate bed mobility at supervision. Pt. Tolerated 4 sit to stands from bed surface at supervision/SBA. Pt. Tolerated standing for 4 minutes with cane while ambulating to recliner to sit up for lunch with no LOB. Pt. Tolerated trunk exercises for flexion/extension and leaning side to side. Pt. Was able to complete FM UB activity with BUE to increase FM skills and coordination. Pt. Does get distracted easily and needs vc for attention to task and for redirection. Pt. Was on 3 liters of O2. Pt.'s O2 was at 95%. Answered pt.'s questions and concerns. Pt. Requires extra time to complete all tasks. Plan       Occupational Therapy Plan  Times Per Week: 4-7  Times Per Day:  Once a day  Current Treatment Recommendations: Strengthening, Balance training, Functional mobility training, Endurance training, Safety education & training, Patient/Caregiver education & training, Self-Care / ADL, Equipment evaluation, education, & procurement, Home management training      Restrictions Restrictions/Precautions  Restrictions/Precautions: Fall Risk, Up as Tolerated  Required Braces or Orthoses?: No  Implants present? : Metal implants, Pacemaker  Pt. Is on 3 liters of O2. Subjective    Pt. Was agreeable to OT. Objective    Vitals     Bed Mobility Training  Getting to EOB- Mod I    Sit to Stand: Supervision/SBA with vc  4 x   Stand to Sit: Supervision/SBA with vc 4 x   Pt. Tolerated standing for 4 minutes with cane at SBA with no LOB   UB exercises with BUE in all planes and directions to increase UB strength 20 reps each:  Bicep curls, chest presses, wrist flexion and extension, wrist pronation and supination, shoulder adduction and abduction, shoulder flexion and extension  Trunk exercises: flexion and extension, leaning side to side 10 x each holds of 5 seconds each   Shoulder shrugs and scapula squeezes 10 x each holds of 3 seconds   FM UB activity with BUE to increase FM skills and coordination- supervision after s/u in sitting position                    Goals  Short Term Goals  Time Frame for Short Term Goals: 3-5 days  Short Term Goal 1: Doff/victorino UB/LB clothing Min A  Short Term Goal 2: Bathe UB/LB Min A  Short Term Goal 3: Complete toileting Min A  Short Term Goal 4: Complete sinkside grooming CGA in stance at sink 3-5 minutes  Short Term Goal 5: Complete functional mobility and transfers SBA in LRE  Additional Goals?: Yes (Complete BUE HEP with min vc)  Long Term Goals  Time Frame for Long Term Goals : 7-10 days  Long Term Goal 1: Doff/victorino UB/LB clothing Mod I  Long Term Goal 2: Bathe UB/LB Mod I  Long Term Goal 3: Complete toileting Mod I  Long Term Goal 4: Complete sinkside grooming Mod I in stance at sink 7-10 minutes  Long Term Goal 5: Complete functional mobility and transfers Mod I in LRE  Additional Goals?: Yes (Demo BUE HEP I)  Patient Goals   Patient goals :  To return home       Therapy Time   Individual Concurrent Group Co-treatment   Time In  9:35 am/ 10:50 am Time Out  10:00 am/ 11:30 am         Minutes  25+40=65                 Gabriella Shields, [address] Javier

## 2023-02-21 NOTE — PROGRESS NOTES
Physical Therapy  Facility/Department: [facility] MED SURG UNIT  Daily Treatment Note  NAME: Leigha Courser  : 1945  MRN: [id number]    Date of Service: 2023    Discharge Recommendations:  Continue to assess pending progress, Home with Home health PT        Patient Diagnosis(es): There were no encounter diagnoses. Assessment   Assessment: (P) Pt. in chair at arrival pleasant and talkative. Pt. needs verbal cues to redirect pt. with task at hand. Pt. tolerated gait and stair training this a.m. with 3LO2 donned. Pt. limited by pain and endurance. Pt. reports pain is in back and left leg. SpO2 desats to 93% post ascending and descending 8 steps in //bars. Pt. needs minimal verbal cues to assist with proper LE sequence to benefit with functional stairs and safety awareness. Recovery SpO2 > 95% within 1 minute seated rest break. Pt. steady with slow widen step width with gait pattern requiring used of single point cane for balance. Pt. able to get into bed demonstrating modified independence. Activity Tolerance: Patient tolerated treatment well;Patient limited by endurance     Plan    Physcial Therapy Plan  General Plan: 3-5 times per week  Current Treatment Recommendations: Strengthening;ROM;Balance training;Functional mobility training; Endurance training;Transfer training;Gait training;Stair training;Home exercise program;Safety education & training;Positioning;Equipment evaluation, education, & procurement; Therapeutic activities     Restrictions  Restrictions/Precautions  Restrictions/Precautions: Fall Risk, Up as Tolerated  Required Braces or Orthoses? :  (Pt states he use \"cheap\" cloth \"braces\" on knees, to have them brought in)  Implants present? : Metal implants, Pacemaker     Subjective    Subjective  Subjective: Pt. c/o 7/10 pain with Left leg such as knee, foot, and back. My back pain restrictives me from getting in and out of bed and walking.  3LO2 donned, SpO2 88-96 HR 62 bpm.     Objective   Vitals Bed Mobility Training  Bed Mobility Training: (P) Yes  Sit to Supine: (P) Modified independent  Transfer Training  Transfer Training: Yes  Sit to Stand: Supervision  Stand to Sit: Supervision  Gait Training  Gait Training: Yes  Gait  Overall Level of Assistance: Contact-guard assistance  Base of Support: (P) Widened  Gait Abnormalities: (P)  (Limited by endurance and back pain. SpO2 96-97% HR 58 bpm. SOB. SpO2 95-96% post 50 feet of gait. 0 LOB with use of SPC.)  Distance (ft): (P)  (50'x2)  Assistive Device: (P) Cane, straight (Cane in right hand)  Rail Use: (P) Left  Stairs - Level of Assistance: (P) Stand-by assistance;Contact-guard assistance  Number of Stairs Trained: (P) 8 (8 steps tolerated in // bars with 6 inch before fatigue. Spo2 desats to 93% with recovery 96% within 1 minute seated. HR 60 bpm.)        Other Specialty Interventions  Other Treatments/Modalities: SpO2 improves to % with vc's purselip breathing. Goals  Short Term Goals  Time Frame for Short Term Goals: 3-7days  Short Term Goal 1: Pt will perform balance exercises to support amb with SPC and safety in stair climbing  Short Term Goal 2: Pt will practice lifting body weight using step in parallel bars to build strength in legs in preparation for stair climbing  Short Term Goal 3: Pt will demonstrate safe transtion/transfers without verbal cues 100% of the time. Short Term Goal 4: Pt will particpate in balance/strengthening ex  Long Term Goals  Time Frame for Long Term Goals : 7-10  Long Term Goal 1: Demonstrate indep safe transitions/transfers with appropriate AD  Long Term Goal 2: Pt will perform 13 reg size steps with HR on right ascending and SPC on left Indep  Long Term Goal 3: Patient will ambulate 48' with LRD at independent level in order to get room to room safely.   Long Term Goal 4: Written HEP  Patient Goals   Patient Goals : Pt wants to return to own home able to climb a full set of steps, and walk using a [facility]    Education       Therapy Time   Individual Concurrent Group Co-treatment   Time In  830         [facility]         Minutes  [place], [place] .[address]

## 2023-02-21 NOTE — PROGRESS NOTES
Spiritual Care Services     Summary of Visit:  Pt venting frustration from his previous hospitalization prior to moving to [facility]. Emotional support and validation of feelings provided. Encouraged pt and family to ask for what they need while he is with us. Encouraged them to make a list of any questions/needs prior to his care conference. Some concern about having necessary equipment prior to discharge. Much reassurance given. Family has moved twice in three years. Stressful. Pt was raised Religion, seeking to reconnect to his aki. Spiritual literature provided. Pt is a [employer]. Encounter Summary  Encounter Overview/Reason : Initial Encounter  Service Provided For[de-identified] Patient and family together  Referral/Consult From[de-identified] [facility]  Support System: Children, Family members  Complexity of Encounter: Moderate  Begin Time: 1435  End Time : 1530  Total Time Calculated: 55 min  Encounter   Type: Initial Screen/Assessment     Spiritual/Emotional needs  Type: Spiritual Support          Spiritual Assessment/Intervention/Outcomes:    Assessment: Other (Comment) (frustrated)    Intervention: Active listening    Outcome: Comfort      Care Plan:    Plan and Referrals  Plan/Referrals: Continue Support (comment)    Spiritual Care Services   Electronically signed by Mile Martinez [facility] on 2/21/2023 at 3:42 PM.    To reach a  for emotional and spiritual support, place an [facility] consult request.   If a  is needed immediately, dial 0 and ask to page the on-call .

## 2023-02-21 NOTE — PROGRESS NOTES
Awake in bed watching tv. Alert and oriented x4. Ambulates with use of cane and SBA. Dyspnea noted with exertion. Respirations unlabored at rest. O2 on continuous @ 3lm via nc. Pt continent of B&B. Head to toe assessment completed. Call light within reach.

## 2023-02-22 LAB
GLUCOSE BLD-MCNC: 134 MG/DL (ref 70–99)
GLUCOSE BLD-MCNC: 144 MG/DL (ref 70–99)
GLUCOSE BLD-MCNC: 218 MG/DL (ref 70–99)
GLUCOSE BLD-MCNC: 283 MG/DL (ref 70–99)
PERFORMED ON: ABNORMAL

## 2023-02-22 PROCEDURE — 6370000000 HC RX 637 (ALT 250 FOR IP): Performed by: INTERNAL MEDICINE

## 2023-02-22 PROCEDURE — 97530 THERAPEUTIC ACTIVITIES: CPT

## 2023-02-22 PROCEDURE — 94640 AIRWAY INHALATION TREATMENT: CPT

## 2023-02-22 PROCEDURE — 2700000000 HC OXYGEN THERAPY PER DAY

## 2023-02-22 PROCEDURE — 6360000002 HC RX W HCPCS: Performed by: INTERNAL MEDICINE

## 2023-02-22 PROCEDURE — 97110 THERAPEUTIC EXERCISES: CPT

## 2023-02-22 PROCEDURE — 94660 CPAP INITIATION&MGMT: CPT

## 2023-02-22 PROCEDURE — 97535 SELF CARE MNGMENT TRAINING: CPT

## 2023-02-22 PROCEDURE — 1200000002 HC SEMI PRIVATE SWING BED

## 2023-02-22 PROCEDURE — 97116 GAIT TRAINING THERAPY: CPT

## 2023-02-22 RX ADMIN — CARVEDILOL 12.5 MG: 12.5 TABLET, FILM COATED ORAL at 07:58

## 2023-02-22 RX ADMIN — APIXABAN 5 MG: 5 TABLET, FILM COATED ORAL at 20:48

## 2023-02-22 RX ADMIN — BUDESONIDE 500 MCG: 0.5 SUSPENSION RESPIRATORY (INHALATION) at 18:09

## 2023-02-22 RX ADMIN — INSULIN LISPRO 4 UNITS: 100 INJECTION, SOLUTION INTRAVENOUS; SUBCUTANEOUS at 11:48

## 2023-02-22 RX ADMIN — CARVEDILOL 12.5 MG: 12.5 TABLET, FILM COATED ORAL at 20:48

## 2023-02-22 RX ADMIN — ACETAMINOPHEN 650 MG: 325 TABLET ORAL at 00:53

## 2023-02-22 RX ADMIN — ATORVASTATIN CALCIUM 40 MG: 40 TABLET, FILM COATED ORAL at 20:48

## 2023-02-22 RX ADMIN — APIXABAN 5 MG: 5 TABLET, FILM COATED ORAL at 07:59

## 2023-02-22 RX ADMIN — FUROSEMIDE 40 MG: 40 TABLET ORAL at 16:25

## 2023-02-22 RX ADMIN — TRAMADOL HYDROCHLORIDE 50 MG: 50 TABLET, COATED ORAL at 16:24

## 2023-02-22 RX ADMIN — ASPIRIN 81 MG CHEWABLE TABLET 81 MG: 81 TABLET CHEWABLE at 07:59

## 2023-02-22 RX ADMIN — TRAMADOL HYDROCHLORIDE 50 MG: 50 TABLET, COATED ORAL at 07:58

## 2023-02-22 RX ADMIN — FUROSEMIDE 40 MG: 40 TABLET ORAL at 07:59

## 2023-02-22 RX ADMIN — BUDESONIDE 500 MCG: 0.5 SUSPENSION RESPIRATORY (INHALATION) at 06:40

## 2023-02-22 RX ADMIN — ACETAMINOPHEN 650 MG: 325 TABLET ORAL at 07:58

## 2023-02-22 RX ADMIN — KETOCONAZOLE: 20 CREAM TOPICAL at 08:00

## 2023-02-22 ASSESSMENT — PAIN SCALES - GENERAL: PAINLEVEL_OUTOF10: 9

## 2023-02-22 ASSESSMENT — PAIN DESCRIPTION - ORIENTATION: ORIENTATION: LEFT

## 2023-02-22 ASSESSMENT — PAIN DESCRIPTION - LOCATION: LOCATION: HEAD

## 2023-02-22 ASSESSMENT — PAIN DESCRIPTION - DESCRIPTORS: DESCRIPTORS: ACHING

## 2023-02-22 NOTE — PROGRESS NOTES
Physical Therapy  Facility/Department: [facility] MED SURG UNIT  Daily Treatment Note  NAME: Rossyrraffi Baker  : 1945  MRN: [id number]    Date of Service: 2023    Discharge Recommendations:  Continue to assess pending progress, Home with Home health PT        Patient Diagnosis(es): There were no encounter diagnoses. Assessment   Assessment: (P) Pt. tolerated good with ambulating with single point cane with 0 LOB. Contact guard assistance for safety. Pt. limited by SOB. SpO2 desats to 92% HR 53 bpm. SpO2 increases to 100% with seated ther ex for LE strengthening. Overall, pt. demo's controlled tsf skills with 0 LOB using single pint cane to maintain balance. Activity Tolerance: (P) Patient tolerated treatment well;Patient limited by endurance     Plan    Physcial Therapy Plan  General Plan: (P) 3-5 times per week  Current Treatment Recommendations: (P) Strengthening;ROM;Balance training;Functional mobility training; Endurance training;Transfer training;Gait training;Stair training;Home exercise program;Safety education & training;Positioning;Equipment evaluation, education, & procurement; Therapeutic activities     Restrictions  Restrictions/Precautions  Restrictions/Precautions: Fall Risk, Up as Tolerated  Required Braces or Orthoses? :  (Pt states he use \"cheap\" cloth \"braces\" on knees, to have them brought in)  Implants present? : Metal implants, Pacemaker     Subjective    Subjective  Subjective: Pt. up in chair and agreeable to therapy. 3LO2 donned.  SpO2 % HR 67 bpm.  Pain: 4/10 back pain at rest  Orientation  Overall Orientation Status: Within Normal Limits  Orientation Level: Oriented X4  Cognition  Overall Cognitive Status: WNL  Cognition Comment: Tallative and needs redirection back to therapy     Objective   Vitals     Bed Mobility Training  Bed Mobility Training: No  Balance  Sitting: Intact  Standing: With support [facility] )  Transfer Training  Transfer Training: Yes  Overall Level of Assistance: Supervision;Modified independent (With SPC)  Sit to Stand: Supervision;Modified independent  Stand to Sit: Modified independent;Supervision  Toilet Transfer: Supervision  Gait  Overall Level of Assistance: Contact-guard assistance  Gait Abnormalities:  (Fair balance. Limited by SOB. 3LO2 donned. SpO2 desats to 92% HR 53 bpm. Short stride with Kyphotic thoracic and downward gaze.)  Distance (ft): 150 Feet  Assistive Device: Cane, straight     PT Exercises  A/AROM Exercises: Seated ther ex B LE: x 15-20 resp with DF/PF, LAQ, Hip flexion, hip abduction/adduction with feet flat on floor. Other Specialty Interventions  Other Treatments/Modalities: Applied L knee brace for support for gait training. Safety Devices  Type of Devices: Call light within reach; Left in chair       Goals  Short Term Goals  Time Frame for Short Term Goals: 3-7days  Short Term Goal 1: Pt will perform balance exercises to support amb with SPC and safety in stair climbing  Short Term Goal 2: Pt will practice lifting body weight using step in parallel bars to build strength in legs in preparation for stair climbing  Short Term Goal 3: Pt will demonstrate safe transtion/transfers without verbal cues 100% of the time. Short Term Goal 4: Pt will particpate in balance/strengthening ex  Long Term Goals  Time Frame for Long Term Goals : 7-10  Long Term Goal 1: Demonstrate indep safe transitions/transfers with appropriate AD  Long Term Goal 2: Pt will perform 13 reg size steps with HR on right ascending and SPC on left Indep  Long Term Goal 3: Patient will ambulate 48' with LRD at independent level in order to get room to room safely.   Long Term Goal 4: Written HEP  Patient Goals   Patient Goals : Pt wants to return to own home able to climb a full set of steps, and walk using a [facility]    Education       Therapy Time   Individual Concurrent Group Co-treatment   Time In  [facility]         Time Out  [address] .[address]

## 2023-02-22 NOTE — PROGRESS NOTES
Met with patient and family for weekly care conference, current diet remains appropriate and well tolerated . Intake appears adequate to meet estimated needs at this time. No d/c needs identified at this time. Current weight ordered for further assessment. RD to continue to monitor for duration of admission.

## 2023-02-22 NOTE — PROGRESS NOTES
Facility/Department: [facility] SUBACUTE UNIT  Daily Treatment Note  NAME: Greta Mcdowell  : 1945  MRN: [id number]    Date of Service: 2023    Discharge Recommendations:  Continue to assess pending progress, Home with Home health OT  OT Equipment Recommendations  Equipment Needed: Yes  Mobility Devices:  (FWW, BSC, tub bench or shower chair)      Patient Diagnosis(es): There were no encounter diagnoses. Assessment    Assessment: Pt encountered for skilled OT intervention with tx in ADL retraining, functional transfers/mobility, standing tolerance/balance, and EC completed this date. Pt agreeable to sponge bath and clothing change prior to care conference meeting, needs cues to stay on task. Pt demo's improvement in self care skills, activity tolerance, and dynamic standing balance. Low fall risk and is steady with use of SPC. OT provided education to improve safety and for energy conservation, fair carryover with pt preferring to do things in his own manner. Dtr Bevely Corfu arrived at session conclusion, all needs within reach. Activity Tolerance: Patient tolerated treatment well;Patient limited by endurance  Discharge Recommendations: Continue to assess pending progress;Home with Home health OT  Equipment Needed: Yes  Mobility Devices:  (FWW, BSC, tub bench or shower chair)      Plan   Occupational Therapy Plan  Times Per Week: 4-7  Times Per Day: Once a day  Current Treatment Recommendations: Strengthening;Balance training;Functional mobility training; Endurance training; Safety education & training;Patient/Caregiver education & training;Self-Care / ADL;Equipment evaluation, education, & procurement;Home management training     Restrictions   None    Subjective   Subjective  Subjective: \"I'm only here because my C-PAP machine doesn't work\"  Pain: Chronic B pain and L knee, 4/10 at rest  Orientation  Overall Orientation Status: Within Normal Limits  Orientation Level: Oriented X4  Pain: 4/10 back pain at rest  Cognition  Overall Cognitive Status: WNL  Cognition Comment: Tallative and needs redirection back to therapy        Objective    Bed Mobility Training  Bed Mobility Training: No  Balance  Sitting: Intact  Standing: With support (SPC used for mobility and standing tasks, supervision level)  Transfer Training  Transfer Training: Yes  Overall Level of Assistance: Supervision;Modified independent (With SPC)  Sit to Stand: Supervision;Modified independent  Stand to Sit: Modified independent;Supervision  Toilet Transfer: Supervision     ADL  Feeding: Independent  Grooming: Supervision  Grooming Skilled Clinical Factors: Completed grooming in stance at sink 10-15 minutes intermittent supervision  UE Bathing: Supervision  LE Bathing: Supervision  UE Dressing: Setup;Supervision  UE Dressing Skilled Clinical Factors: Doffed and donned justen shirt  LE Dressing: Supervision  LE Dressing Skilled Clinical Factors: Doffed and donned socks while seated in chair supervision, no AE needed. Increased time and effort for R foot- decreased AROM RLE  Toileting: Supervision  Additional Comments: Sponge bath, grooming, and dressing completed during stance at sink supervision. Safety Devices  Type of Devices: Call light within reach; Left in chair     Patient Education  Education Given To: Patient  Education Provided: Plan of Care;Home Exercise Program;ADL Adaptive Strategies;Transfer Training;Energy Conservation; Fall Prevention Strategies  Education Method: Demonstration;Verbal;Teach Back  Barriers to Learning: None  Education Outcome: Verbalized understanding;Demonstrated understanding;Continued education needed    Goals  Short Term Goals  Time Frame for Short Term Goals: 3-5 days  Short Term Goal 1: Doff/victorino UB/LB clothing Min A  Short Term Goal 2: Bathe UB/LB Min A  Short Term Goal 3: Complete toileting Min A  Short Term Goal 4: Complete sinkside grooming CGA in stance at sink 3-5 minutes  Short Term Goal 5: Complete functional mobility and transfers SBA in LRE  Additional Goals?: Yes (Complete BUE HEP with min vc)  Long Term Goals  Time Frame for Long Term Goals : 7-10 days  Long Term Goal 1: Doff/victorino UB/LB clothing Mod I  Long Term Goal 2: Bathe UB/LB Mod I  Long Term Goal 3: Complete toileting Mod I  Long Term Goal 4: Complete sinkside grooming Mod I in stance at sink 7-10 minutes  Long Term Goal 5: Complete functional mobility and transfers Mod I in LRE  Additional Goals?: Yes (Demo BUE HEP I)  Patient Goals   Patient goals :  To return home       Therapy Time   Individual Concurrent Group Co-treatment   Time In [facility] [address]         Time Out 1335         Minutes [id number] [facility], [id number]

## 2023-02-22 NOTE — PROGRESS NOTES
Facility/Department: [facility] SUBACUTE UNIT  Daily Treatment Note  NAME: Milagro Edouard  : 1945  MRN: [id number]    Date of Service: 2023    Discharge Recommendations:  Continue to assess pending progress, Home with Home health PT    Increased PRNA    Patient Diagnosis(es): There were no encounter diagnoses. Assessment     Pt will need increased PRNA at home. Pt may choose to stay on first floor and sleep in recliner which pt already does often per pt report. Pts only bathroom is on second floor- home is a rental and stair chair lift not an option. Educated would benefit from increased PRNA of [facility] and [facility] versus slower paced SNF for longer rehab course to be able to negotiate stairs better with knee pain and respiratory issues. Pt choosing to go home with [facility] and feels he will do better at home. Plan PT 5-7x week, 1-2x per day, OT 4-7x per week    Restrictions  Restrictions/Precautions  Restrictions/Precautions: Fall Risk, Up as Tolerated  Required Braces or Orthoses? :  (Pt states he use \"cheap\" cloth \"braces\" on knees, to have them brought in)  Implants present? : Metal implants, Pacemaker     Subjective    Objective     Pt and daughter present for Interdisciplinary Care Conference this date. See separate note for full report. Pt limited by respiratory an chronic knee pain c/o. Explored discharge options of slow SNF vs home this Sat 23. Dtr willing to provide more PRNA to pt and pt will be more comfortable at home- choosing to go home on Sat 23      Goals  Short Term Goals  Time Frame for Short Term Goals: 3-7days  Short Term Goal 1: Pt will perform balance exercises to support amb with SPC and safety in stair climbing  Short Term Goal 2: Pt will practice lifting body weight using step in parallel bars to build strength in legs in preparation for stair climbing  Short Term Goal 3: Pt will demonstrate safe transtion/transfers without verbal cues 100% of the time.   Short Term Goal 4: Pt will particpate in balance/strengthening ex  Long Term Goals  Time Frame for Long Term Goals : 7-10  Long Term Goal 1: Demonstrate indep safe transitions/transfers with appropriate AD  Long Term Goal 2: Pt will perform 13 reg size steps with HR on right ascending and SPC on left Indep  Long Term Goal 3: Patient will ambulate 50' with LRD at independent level in order to get room to room safely.  Long Term Goal 4: Written HEP  Patient Goals   Patient Goals : Pt wants to return to own home able to climb a full set of steps, and walk using a SPC    Education   Plan, goals , status    Therapy Time   Individual Concurrent Group Co-treatment   Time In  125         Time Out  155         Minutes  30                 Miriam Su, PT

## 2023-02-23 LAB
ANION GAP SERPL CALCULATED.3IONS-SCNC: 7 MEQ/L (ref 9–15)
BASOPHILS ABSOLUTE: 0 K/UL (ref 0–0.1)
BASOPHILS RELATIVE PERCENT: 0.5 % (ref 0.2–1.2)
BUN BLDV-MCNC: 27 MG/DL (ref 8–23)
CALCIUM SERPL-MCNC: 8.9 MG/DL (ref 8.5–9.9)
CHLORIDE BLD-SCNC: 90 MEQ/L (ref 95–107)
CO2: 37 MEQ/L (ref 20–31)
CREAT SERPL-MCNC: 1.5 MG/DL (ref 0.7–1.2)
EOSINOPHILS ABSOLUTE: 0.3 K/UL (ref 0–0.5)
EOSINOPHILS RELATIVE PERCENT: 4.4 % (ref 0.8–7)
GFR SERPL CREATININE-BSD FRML MDRD: 47.9 ML/MIN/{1.73_M2}
GLUCOSE BLD-MCNC: 147 MG/DL (ref 70–99)
GLUCOSE BLD-MCNC: 150 MG/DL (ref 70–99)
GLUCOSE BLD-MCNC: 196 MG/DL (ref 70–99)
GLUCOSE BLD-MCNC: 214 MG/DL (ref 70–99)
GLUCOSE BLD-MCNC: 238 MG/DL (ref 70–99)
HCT VFR BLD CALC: 34.6 % (ref 42–52)
HEMOGLOBIN: 10 G/DL (ref 13.7–17.5)
IMMATURE GRANULOCYTES #: 0 K/UL
IMMATURE GRANULOCYTES %: 0.3 %
LYMPHOCYTES ABSOLUTE: 0.6 K/UL (ref 1.3–3.6)
LYMPHOCYTES RELATIVE PERCENT: 10.5 %
MCH RBC QN AUTO: 24.5 PG (ref 25.7–32.2)
MCHC RBC AUTO-ENTMCNC: 28.9 % (ref 32.3–36.5)
MCV RBC AUTO: 84.8 FL (ref 79–92.2)
MONOCYTES ABSOLUTE: 0.6 K/UL (ref 0.3–0.8)
MONOCYTES RELATIVE PERCENT: 9.6 % (ref 5.3–12.2)
NEUTROPHILS ABSOLUTE: 4.4 K/UL (ref 1.8–5.4)
NEUTROPHILS RELATIVE PERCENT: 74.7 % (ref 34–67.9)
PDW BLD-RTO: 16.4 % (ref 11.6–14.4)
PERFORMED ON: ABNORMAL
PLATELET # BLD: 194 K/UL (ref 163–337)
POTASSIUM SERPL-SCNC: 4.1 MEQ/L (ref 3.4–4.9)
RBC # BLD: 4.08 M/UL (ref 4.63–6.08)
SODIUM BLD-SCNC: 134 MEQ/L (ref 135–144)
WBC # BLD: 5.9 K/UL (ref 4.2–9)

## 2023-02-23 PROCEDURE — 97535 SELF CARE MNGMENT TRAINING: CPT

## 2023-02-23 PROCEDURE — 36415 COLL VENOUS BLD VENIPUNCTURE: CPT

## 2023-02-23 PROCEDURE — 6370000000 HC RX 637 (ALT 250 FOR IP): Performed by: INTERNAL MEDICINE

## 2023-02-23 PROCEDURE — 1200000002 HC SEMI PRIVATE SWING BED

## 2023-02-23 PROCEDURE — 97530 THERAPEUTIC ACTIVITIES: CPT

## 2023-02-23 PROCEDURE — 97116 GAIT TRAINING THERAPY: CPT

## 2023-02-23 PROCEDURE — 94660 CPAP INITIATION&MGMT: CPT

## 2023-02-23 PROCEDURE — 2700000000 HC OXYGEN THERAPY PER DAY

## 2023-02-23 PROCEDURE — 85025 COMPLETE CBC W/AUTO DIFF WBC: CPT

## 2023-02-23 PROCEDURE — 80048 BASIC METABOLIC PNL TOTAL CA: CPT

## 2023-02-23 PROCEDURE — 94640 AIRWAY INHALATION TREATMENT: CPT

## 2023-02-23 PROCEDURE — 97110 THERAPEUTIC EXERCISES: CPT

## 2023-02-23 PROCEDURE — 6360000002 HC RX W HCPCS: Performed by: INTERNAL MEDICINE

## 2023-02-23 RX ADMIN — APIXABAN 5 MG: 5 TABLET, FILM COATED ORAL at 20:41

## 2023-02-23 RX ADMIN — ATORVASTATIN CALCIUM 40 MG: 40 TABLET, FILM COATED ORAL at 20:41

## 2023-02-23 RX ADMIN — FUROSEMIDE 40 MG: 40 TABLET ORAL at 08:25

## 2023-02-23 RX ADMIN — BUDESONIDE 500 MCG: 0.5 SUSPENSION RESPIRATORY (INHALATION) at 06:10

## 2023-02-23 RX ADMIN — TRAMADOL HYDROCHLORIDE 50 MG: 50 TABLET, COATED ORAL at 00:33

## 2023-02-23 RX ADMIN — TRAMADOL HYDROCHLORIDE 50 MG: 50 TABLET, COATED ORAL at 08:18

## 2023-02-23 RX ADMIN — KETOCONAZOLE: 20 CREAM TOPICAL at 08:20

## 2023-02-23 RX ADMIN — CARVEDILOL 12.5 MG: 12.5 TABLET, FILM COATED ORAL at 20:41

## 2023-02-23 RX ADMIN — ASPIRIN 81 MG CHEWABLE TABLET 81 MG: 81 TABLET CHEWABLE at 08:19

## 2023-02-23 RX ADMIN — INSULIN LISPRO 2 UNITS: 100 INJECTION, SOLUTION INTRAVENOUS; SUBCUTANEOUS at 11:51

## 2023-02-23 RX ADMIN — APIXABAN 5 MG: 5 TABLET, FILM COATED ORAL at 08:20

## 2023-02-23 RX ADMIN — FUROSEMIDE 40 MG: 40 TABLET ORAL at 17:15

## 2023-02-23 RX ADMIN — BUDESONIDE 500 MCG: 0.5 SUSPENSION RESPIRATORY (INHALATION) at 18:20

## 2023-02-23 RX ADMIN — CARVEDILOL 12.5 MG: 12.5 TABLET, FILM COATED ORAL at 08:19

## 2023-02-23 RX ADMIN — TRAMADOL HYDROCHLORIDE 50 MG: 50 TABLET, COATED ORAL at 17:45

## 2023-02-23 RX ADMIN — INSULIN LISPRO 2 UNITS: 100 INJECTION, SOLUTION INTRAVENOUS; SUBCUTANEOUS at 17:15

## 2023-02-23 ASSESSMENT — PAIN SCALES - GENERAL: PAINLEVEL_OUTOF10: 8

## 2023-02-23 ASSESSMENT — PAIN DESCRIPTION - ORIENTATION: ORIENTATION: RIGHT;LEFT;LOWER

## 2023-02-23 ASSESSMENT — PAIN DESCRIPTION - DESCRIPTORS: DESCRIPTORS: ACHING;THROBBING;TENDER;SORE

## 2023-02-23 ASSESSMENT — PAIN DESCRIPTION - LOCATION: LOCATION: BACK;LEG

## 2023-02-23 NOTE — PROGRESS NOTES
Facility/Department: [facility] MED SURG UNIT  Daily Treatment Note  NAME: Brittanie Garza  : 1945  MRN: [id number]    Date of Service: 2023    Discharge Recommendations:  Continue to assess pending progress, Home with Home health PT        Patient Diagnosis(es): There were no encounter diagnoses. Assessment   Assessment: Pt able to ambulate inc distance this date with standing RB only to inc endurance. Pt's O2 levels destated to 88% on 3LO2 but recovered within 10'' with VC's for PLB once seated. Pt able to perform steps this date with VC's for proper sequencing to avoid strain on LLE. Pt performed standing LE ther ex with inc reps tolerated but needed seated RB's between d/t fatigue. Pt returned to room to lay in bed \"I want to take a nap\". No c/o pain post, just fatigue. Call light and bed alarm in place. Continue with POC. Activity Tolerance: Patient tolerated treatment well;Patient limited by endurance; Patient limited by fatigue     Plan    Physcial Therapy Plan  General Plan: 3-5 times per week  Current Treatment Recommendations: Strengthening;ROM;Balance training;Functional mobility training; Endurance training;Transfer training;Gait training;Stair training;Home exercise program;Safety education & training;Positioning;Equipment evaluation, education, & procurement; Therapeutic activities     Restrictions  Restrictions/Precautions  Restrictions/Precautions: Fall Risk, Up as Tolerated  Required Braces or Orthoses? :  (Pt states he use \"cheap\" cloth \"braces\" on knees, to have them brought in)  Implants present? : Metal implants, Pacemaker     Subjective    Subjective  Subjective: Pt sitting up in chair and agreeable to therapy. Pt reports no pain this morning.      Objective   Vitals     Bed Mobility Training  Sit to Supine: Modified independent  Transfer Training  Transfer Training: Yes  Overall Level of Assistance: Modified independent  Sit to Stand: Modified independent  Stand to Sit: Modified independent  Gait Training  Gait Training: Yes  Gait  Overall Level of Assistance: Stand-by assistance  Base of Support: Widened  Distance (ft):  (150' x 2 with standing RB between. O2 levels destat to 88% on 3LO2 via nasal canula but inc WNL's within 10'' seated RB with VC's for PLB)  Assistive Device: Cane, straight  Rail Use: Both  Stairs - Level of Assistance: Stand-by assistance;Contact-guard assistance  Number of Stairs Trained: 12     PT Exercises  A/AROM Exercises: standing heel raises x 20, standing marches x 20 alternating, standing hip extension x 20 alternating (2 seated RB's between trials d/t fatigue, O2 levels maintained WNL's)             Goals  Short Term Goals  Time Frame for Short Term Goals: 3-7days  Short Term Goal 1: Pt will perform balance exercises to support amb with SPC and safety in stair climbing  Short Term Goal 2: Pt will practice lifting body weight using step in parallel bars to build strength in legs in preparation for stair climbing  Short Term Goal 3: Pt will demonstrate safe transtion/transfers without verbal cues 100% of the time. Short Term Goal 4: Pt will particpate in balance/strengthening ex  Long Term Goals  Time Frame for Long Term Goals : 7-10  Long Term Goal 1: Demonstrate indep safe transitions/transfers with appropriate AD  Long Term Goal 2: Pt will perform 13 reg size steps with HR on right ascending and SPC on left Indep  Long Term Goal 3: Patient will ambulate 48' with LRD at independent level in order to get room to room safely.   Long Term Goal 4: Written HEP  Patient Goals   Patient Goals : Pt wants to return to own home able to climb a full set of steps, and walk using a [facility]    Education       Therapy Time   Individual Concurrent Group Co-treatment   Time In 0840         Time Out 0930         Minutes 48                 [place], [place]

## 2023-02-23 NOTE — PROGRESS NOTES
Facility/Department: [facility] SUBACUTE UNIT  Daily Treatment Note  NAME: Agnes Jackson  : 1945  MRN: [id number]    Date of Service: 2023    Discharge Recommendations:  Continue to assess pending progress, Home with Home health OT         Patient Diagnosis(es): There were no encounter diagnoses. Assessment    Assessment: Skilled OT intervention with tx in standing balance/ tolerance for IADL carryover, ADL retraining, EC, BUE ther ex, and trunk strength completed during session. Pt demo's improvement in independence and safety during mobility and functional tasks. Talkative and c/o C-PAP machine, needs cues to redirect. Overall progressing and meeting STG/LTG. Activity Tolerance: Patient tolerated treatment well;Patient limited by endurance; Patient limited by fatigue      Plan   Occupational Therapy Plan  Times Per Week: 4-7  Times Per Day: Once a day  Current Treatment Recommendations: Strengthening;Balance training;Functional mobility training; Endurance training; Safety education & training;Patient/Caregiver education & training;Self-Care / ADL;Equipment evaluation, education, & procurement;Home management training     Restrictions   None    Subjective   Subjective  Subjective: Pt agreeable to OT  Pain: Chronic B pain and L knee, 5/10 at rest  Orientation  Overall Orientation Status: Within Normal Limits  Orientation Level: Oriented X4  Pain: 5/10 chronic back and R knee pain  Cognition  Overall Cognitive Status: WNL  Cognition Comment: Tallative and needs redirection back to therapy        Objective    Bed Mobility Training  Bed Mobility Training: No  Sit to Supine: Modified independent  Balance  Standing: With support (uses SPC for functional mobility, can go short distances without AD)  Transfer Training  Transfer Training: Yes  Overall Level of Assistance: Modified independent  Interventions: Demonstration;Verbal cues; Visual cues  Sit to Stand: Modified independent  Stand to Sit: Modified independent  Bed to Chair: Modified independent  Toilet Transfer: Modified independent       ADL  Feeding: Independent  Grooming: Modified independent   UE Bathing: Supervision  LE Bathing: Supervision  UE Dressing: Modified independent   LE Dressing: Modified independent   LE Dressing Skilled Clinical Factors: Uses shoe horn at home  Toileting: Modified independent     OT Exercises  Exercise Treatment: Pt used SPC for shoulder flexion/extension, shoulder circles, chest press in stance two sets of x10 reps to improve AROM and strength for ADLs. Dynamic Standing Balance Exercises: Pt tolerated x4 dynamic standing trials without use of SPC supervision level, pt stepped out of MALICK while completing BUE functional reaching. Each trial 3-4 minutes in duration with seated rest break in-between, pt cued to engage in PLB. Breathing Techniques: Education on pacing, PLB provided with good follow through     Safety Devices  Type of Devices: Call light within reach; Left in chair     Patient Education  Education Given To: Patient  Education Provided: Plan of Care;Home Exercise Program;ADL Adaptive Strategies;Transfer Training;Energy Conservation; Fall Prevention Strategies  Education Method: Demonstration;Verbal;Teach Back  Barriers to Learning: None  Education Outcome: Verbalized understanding;Demonstrated understanding;Continued education needed    Goals  Short Term Goals  Time Frame for Short Term Goals: 3-5 days  Short Term Goal 1: Doff/victorino UB/LB clothing Min A  Short Term Goal 2: Bathe UB/LB Min A  Short Term Goal 3: Complete toileting Min A  Short Term Goal 4: Complete sinkside grooming CGA in stance at sink 3-5 minutes  Short Term Goal 5: Complete functional mobility and transfers SBA in LRE  Additional Goals?: Yes (Complete BUE HEP with min vc)  Long Term Goals  Time Frame for Long Term Goals : 7-10 days  Long Term Goal 1: Doff/victorino UB/LB clothing Mod I  Long Term Goal 2: Bathe UB/LB Mod I  Long Term Goal 3: Complete toileting Mod I  Long Term Goal 4: Complete sinkside grooming Mod I in stance at sink 7-10 minutes  Long Term Goal 5: Complete functional mobility and transfers Mod I in LRE  Additional Goals?: Yes (Theron FERR[place] HEP I)  Patient Goals   Patient goals :  To return home       Therapy Time   Individual Concurrent Group Co-treatment   Time In [facility]         Time Out 1430         Minutes [place]  [id number]

## 2023-02-23 NOTE — PROGRESS NOTES
Assumed care of pt. Meds given as ordered. Head to toe assessment completed. PT is A+O X4 independent up with walker. O2 is in place current Spo2 98% on 3 L NC Respirations are unlabored. Pt is resting comfortably in bed with no complaints at this time. Call light within reach.

## 2023-02-23 NOTE — PLAN OF CARE
Problem: Safety - Adult  Goal: Free from fall injury  Outcome: Progressing     Problem: Discharge Planning  Goal: Discharge to home or other facility with appropriate resources  Outcome: Progressing  Flowsheets (Taken 2/22/2023 2052)  Discharge to home or other facility with appropriate resources: Identify barriers to discharge with patient and caregiver     Problem: Chronic Conditions and Co-morbidities  Goal: Patient's chronic conditions and co-morbidity symptoms are monitored and maintained or improved  Outcome: Progressing  Flowsheets (Taken 2/22/2023 2052)  Care Plan - Patient's Chronic Conditions and Co-Morbidity Symptoms are Monitored and Maintained or Improved: Monitor and assess patient's chronic conditions and comorbid symptoms for stability, deterioration, or improvement     Problem: Pain  Goal: Verbalizes/displays adequate comfort level or baseline comfort level  Outcome: Progressing

## 2023-02-23 NOTE — PROGRESS NOTES
Chart reviewed. Multidisciplinary team met with patient and patient's daughter Ravinder Galindo in care conference. SS completed psychosocial assessment along with on-going DC planning during this time. Upon visit patient is alert and sitting up in recliner chair with clothing from home and wearing nasal cannula. Mood is cooperative. Patient is talkative. Patient reports being active with the [facility] and reports PCP at the [facility] is Dr. Moffett . Patient appears irritable when discussing VA and equipment needs. Patient reports that he has been trying to get c-pap approved through the [facility] \"for seven years. \"  Daughter Ravinder Galindo reports that its been weeks not years. Patient is reported to have oxygen at home however not reported to have portable oxygen. Daughter Ravinder Galindo reports that she is working with the [facility] in [facility] [email or web address] to get patient's oxygen needs. Patient reports plan is to return home to [facility] with daughter's family residing on other side. Patient reports concern with being able to go up stairs with shortness of breath. Therapy discussed option of patient remaining on main level. Daughter reports that patient can remain on main level and plans to get patient a bed side commode. Therapy discussed SNF for continued therapy at slower pace for patient to get stronger before returning home. Patient reports not being able to sleep well at hospital and reports concern that sleep will not improve at a SNF. Patient reports desire to DC home with family assistance. Ravinder Galindo reports feeling confident assisting with patient's care needs. Discussed HHC. Patient and family agreeable with HHC. [facility] identified as agency of choice. Patient gave this  permission to contact [address] [place] [address] to follow up on c-pap and portable oxygen as patient will need these items upon DC. This  attempted to contact [address] [place] [address] however unable to speak with anyone as office was closed.   SS to continue to follow and attempt to contact [address] [place] [address] on weekday before 4:30pm

## 2023-02-24 LAB
GLUCOSE BLD-MCNC: 181 MG/DL (ref 70–99)
GLUCOSE BLD-MCNC: 195 MG/DL (ref 70–99)
GLUCOSE BLD-MCNC: 199 MG/DL (ref 70–99)
GLUCOSE BLD-MCNC: 228 MG/DL (ref 70–99)
PERFORMED ON: ABNORMAL

## 2023-02-24 PROCEDURE — 6360000002 HC RX W HCPCS: Performed by: INTERNAL MEDICINE

## 2023-02-24 PROCEDURE — 97530 THERAPEUTIC ACTIVITIES: CPT

## 2023-02-24 PROCEDURE — 97110 THERAPEUTIC EXERCISES: CPT

## 2023-02-24 PROCEDURE — 6370000000 HC RX 637 (ALT 250 FOR IP): Performed by: INTERNAL MEDICINE

## 2023-02-24 PROCEDURE — 94640 AIRWAY INHALATION TREATMENT: CPT

## 2023-02-24 PROCEDURE — 97116 GAIT TRAINING THERAPY: CPT

## 2023-02-24 PROCEDURE — 1200000002 HC SEMI PRIVATE SWING BED

## 2023-02-24 RX ADMIN — TRAMADOL HYDROCHLORIDE 50 MG: 50 TABLET, COATED ORAL at 01:50

## 2023-02-24 RX ADMIN — CARVEDILOL 12.5 MG: 12.5 TABLET, FILM COATED ORAL at 20:24

## 2023-02-24 RX ADMIN — INSULIN LISPRO 2 UNITS: 100 INJECTION, SOLUTION INTRAVENOUS; SUBCUTANEOUS at 08:46

## 2023-02-24 RX ADMIN — BUDESONIDE 500 MCG: 0.5 SUSPENSION RESPIRATORY (INHALATION) at 06:09

## 2023-02-24 RX ADMIN — BUDESONIDE 500 MCG: 0.5 SUSPENSION RESPIRATORY (INHALATION) at 18:02

## 2023-02-24 RX ADMIN — APIXABAN 5 MG: 5 TABLET, FILM COATED ORAL at 08:45

## 2023-02-24 RX ADMIN — KETOCONAZOLE: 20 CREAM TOPICAL at 08:45

## 2023-02-24 RX ADMIN — FUROSEMIDE 40 MG: 40 TABLET ORAL at 08:45

## 2023-02-24 RX ADMIN — ASPIRIN 81 MG CHEWABLE TABLET 81 MG: 81 TABLET CHEWABLE at 08:45

## 2023-02-24 RX ADMIN — FUROSEMIDE 40 MG: 40 TABLET ORAL at 17:00

## 2023-02-24 RX ADMIN — TRAMADOL HYDROCHLORIDE 50 MG: 50 TABLET, COATED ORAL at 09:31

## 2023-02-24 RX ADMIN — APIXABAN 5 MG: 5 TABLET, FILM COATED ORAL at 20:24

## 2023-02-24 RX ADMIN — ATORVASTATIN CALCIUM 40 MG: 40 TABLET, FILM COATED ORAL at 20:24

## 2023-02-24 RX ADMIN — TRAMADOL HYDROCHLORIDE 50 MG: 50 TABLET, COATED ORAL at 17:05

## 2023-02-24 RX ADMIN — CARVEDILOL 12.5 MG: 12.5 TABLET, FILM COATED ORAL at 08:45

## 2023-02-24 ASSESSMENT — PAIN DESCRIPTION - LOCATION: LOCATION: BACK

## 2023-02-24 ASSESSMENT — PAIN DESCRIPTION - DESCRIPTORS: DESCRIPTORS: ACHING

## 2023-02-24 ASSESSMENT — PAIN SCALES - GENERAL
PAINLEVEL_OUTOF10: 0
PAINLEVEL_OUTOF10: 7
PAINLEVEL_OUTOF10: 0

## 2023-02-24 NOTE — CARE COORDINATION
Notice of Medicare Non-Coverage Letter date given: 2/23/2023   Notice of Medicare Non-Coverage Time Given:1500   Notice of Medicare Non-Coverage Letter Given By: I reviewed [address] with patient and he verbilized understanding. His signature was obtained. Copy given to pt and orig placed on paper chart.  Meli

## 2023-02-24 NOTE — PROGRESS NOTES
Facility/Department: [facility] SUBACUTE UNIT  Daily Treatment Note  NAME: Reyes Como  : 1945  MRN: [id number]    Date of Service: 2023    Discharge Recommendations:  Continue to assess pending progress, Home with Home health OT  OT Equipment Recommendations  Equipment Needed: Yes  Mobility Devices:  (FWW, BSC, tub bench or shower chair)      Patient Diagnosis(es): There were no encounter diagnoses. Assessment    Assessment: Skilled OT intervention with tx RANDYE HEP, standing tolerance/balance completed this date. Trained and educated pt in ther ex using red theraband, reviewed pt's standing exercises with good safety and tech demonstrated. Pt will be d/c home tomorrow and has made great progress towards goals. Activity Tolerance: Patient tolerated treatment well  Discharge Recommendations: Continue to assess pending progress;Home with Home health OT  Equipment Needed: Yes  Mobility Devices:  (FWW, BSC, tub bench or shower chair)      Plan   Occupational Therapy Plan  Times Per Week: 4-7  Times Per Day: Once a day  Current Treatment Recommendations: Strengthening;Balance training;Functional mobility training; Endurance training; Safety education & training;Patient/Caregiver education & training;Self-Care / ADL;Equipment evaluation, education, & procurement;Home management training     Restrictions   None    Subjective   Subjective  Subjective: \"I'm going home tomorrow. \"  Pain: 6/10 chronic back pain  Orientation  Overall Orientation Status: Within Normal Limits  Orientation Level: Oriented X4  Pain: 6/10 chronic back pain  Cognition  Overall Cognitive Status: WNL  Cognition Comment: Tallative and needs redirection back to therapy        Objective    Bed Mobility Training  Bed Mobility Training: Yes  Overall Level of Assistance: Modified independent  Supine to Sit: Modified independent  Sit to Supine: Modified independent  Balance  Sitting: Intact  Standing: With support (Uses SPC for functional mobility, able to do short distances without device)  Transfer Training  Transfer Training: Yes  Overall Level of Assistance: Modified independent  Sit to Stand: Modified independent  Stand to Sit: Modified independent  Bed to Chair: Modified independent  Toilet Transfer: Modified independent     ADL  Feeding: Independent  Grooming: Modified independent   UE Bathing: Modified independent   LE Bathing: Modified independent   UE Dressing: Modified independent   LE Dressing: Modified independent   Toileting: Modified independent     OT Exercises  Exercise Treatment: Trained and educated pt BUE ther ex using red theraband, OT provided handout with pictures and written instruction for exercises, pt returned demo of each x10 reps.  Dynamic Standing Balance Exercises: Unsupported stance using SPC as exercise wand trunk flexion, extension, rotation x20 reps Mod I level. Pt in stance 10-15 minutes before requiring seated rest break.     Safety Devices  Type of Devices: Call light within reach;Left in chair     Patient Education  Education Given To: Patient  Education Provided: Home Exercise Program;Fall Prevention Strategies;Energy Conservation  Education Method: Demonstration;Verbal;Teach Back  Barriers to Learning: None  Education Outcome: Verbalized understanding;Demonstrated understanding;Continued education needed    Goals  Short Term Goals  Time Frame for Short Term Goals: 3-5 days  Short Term Goal 1: Doff/victorino UB/LB clothing Min A  Short Term Goal 2: Bathe UB/LB Min A  Short Term Goal 3: Complete toileting Min A  Short Term Goal 4: Complete sinkside grooming CGA in stance at sink 3-5 minutes  Short Term Goal 5: Complete functional mobility and transfers SBA in LRE  Additional Goals?: Yes (Complete BUE HEP with min vc)  Long Term Goals  Time Frame for Long Term Goals : 7-10 days  Long Term Goal 1: Doff/victorino UB/LB clothing Mod I  Long Term Goal 2: Bathe UB/LB Mod I  Long Term Goal 3: Complete toileting Mod I  Long Term Goal 4:  Complete sinkside grooming Mod I in stance at sink 7-10 minutes  Long Term Goal 5: Complete functional mobility and transfers Mod I in LRE  Additional Goals?: Yes (Demo [place] HEP I)  Patient Goals   Patient goals :  To return home       Therapy Time   Individual Concurrent Group Co-treatment   Time In [facility]         Time Out [address]         Minutes [id number] [address] [place], [id number]

## 2023-02-24 NOTE — PROGRESS NOTES
Comprehensive Nutrition Assessment    Type and Reason for Visit:  Patient Education    Nutrition Recommendations/Plan:   Change diet to carb controlled (4carb/meal) per pt request  Diet education and handouts     Malnutrition Assessment:  Malnutrition Status:  No malnutrition (02/20/23 1305)        Nutrition Assessment:    Patient requesting diet to be changed to diabetic diet. Added carb controlled 4 carb/meal for patient. Patient requested diet information for homegoing- provided with diabetic guidelines, snack list and 1600kcal sample menus. Nutrition Education    Educated on Diabetic diet  Learners: Patient  Readiness: Eager  Method: Explanation (basic) and Handout- patient preferred to review diet education materials on his own time, rather than full diet education. Basic information and explanation provided in conversation. Response: Patient appreciative and reported he will review the materials at a later time. Contact name and number provided. Current Nutrition Intake & Therapies:    Average Meal Intake: %  Average Supplements Intake: None Ordered  ADULT DIET; Regular; 4 carb choices (60 gm/meal); No Added Salt (3-4 gm)    Anthropometric Measures:  Height: 5' 6\" (167.6 cm)  Ideal Body Weight (IBW): 142 lbs (65 kg)    Admission Body Weight: 218 lb (98.9 kg) (2/17/23)  Current Body Weight: 218 lb (98.9 kg) (2/17/23), 153.5 % IBW.  Weight Source: Bed Scale  Current BMI (kg/m2): 35.2  Usual Body Weight: 217 lb (98.4 kg) (8/27/22)  % Weight Change (Calculated): 0.5  BMI Categories: Obese Class 2 (BMI 35.0 -39.9)    Estimated Daily Nutrient Needs:  Energy Requirements Based On: Kcal/kg  Weight Used for Energy Requirements: Current  Energy (kcal/day): 1488-1786kcal/day  (15-18kcal/kg)  Weight Used for Protein Requirements: Ideal  Protein (g/day): 78-98gm/day (1.2-1.5gm/kg IBW)  Method Used for Fluid Requirements: 1 ml/kcal  Fluid (ml/day): 0644-7124    Nutrition Diagnosis:   No nutrition diagnosis at this time     Nutrition Interventions:   Food and/or Nutrient Delivery: Modify Current Diet  Nutrition Education/Counseling: No recommendation at this time  Coordination of Nutrition Care: Continue to monitor while inpatient       Goals:     Goals: PO intake 75% or greater       Nutrition Monitoring and Evaluation:      Food/Nutrient Intake Outcomes: Food and Nutrient Intake  Physical Signs/Symptoms Outcomes: Biochemical Data, Meal Time Behavior, Weight    Discharge Planning:    No discharge needs at this time.      Ayleen Mcclain RD

## 2023-02-24 NOTE — PROGRESS NOTES
Physical Therapy  Facility/Department: [facility] MED SURG UNIT  Daily Treatment Note  NAME: Leigha Hopson  : 1945  MRN: [id number]    Date of Service: 2023    Discharge Recommendations:  Continue to assess pending progress, Home with Home health PT        Patient Diagnosis(es): There were no encounter diagnoses. Assessment   Activity Tolerance: (P) Patient tolerated treatment well;Patient limited by endurance; Patient limited by fatigue     Plan    Physcial Therapy Plan  General Plan: (P) 3-5 times per week  Current Treatment Recommendations: (P) Strengthening;ROM;Balance training;Functional mobility training; Endurance training;Transfer training;Gait training;Stair training;Home exercise program;Safety education & training;Positioning;Equipment evaluation, education, & procurement; Therapeutic activities     Restrictions  Restrictions/Precautions  Restrictions/Precautions: Fall Risk, Up as Tolerated  Required Braces or Orthoses? :  (Pt states he use \"cheap\" cloth \"braces\" on knees, to have them brought in)  Implants present? : Metal implants, Pacemaker     Subjective    Subjective  Subjective: (P) Pt. up in restroom finishing up with self care and changing clothes. Pt. returning back to chair without O2 donned reporting SOB. SpO2 82% without O2 line donned. Pt. reports this is the first time I took my O2 off due to line being stuck. Objective      Transfer Training  Sit to Stand: Modified independent  Stand to Sit: Modified independent  Stand Pivot Transfers: Modified independent  Gait Training  Gait Training: Yes  Gait  Overall Level of Assistance: Modified independent  Distance (ft):  (Amb. in room for training/review from surface to surface managing O2 line for safe gait awareness to reduce risk of incident. Amb.  in hallway 80'x2 with SpO2 95% post with 3 LO2 donned.)  Assistive Device: Cane, straight      Other Specialty Interventions  Other Treatments/Modalities: Pt found returning from restroom to chair without O2 donned and demo'd moderate SOB. Pt. reports it is hard to manage O2 line and reports I dont want to trip over line. SpO2 desats to 82%. Pt. education on the importance to improve safety awareness with maintaining O2 donned when getting up and ambulating to and from restroom for safety to redcue risk of incident. Pt. education and training review with managing O2 line with pt demonstrating good O2 line managment. However, pt. appears very fustrated of personal situation. Recovery SpO2 with 3LO2 donned %. PT Exercises  Exercise Treatment: HEP educated and issued on seated and standing ther ex to benefit with strength at home. Pt. Demonstrated 3-5 reps with all available planes of motion with seated there ex demonstrating understanding of HEP handout. Goals  Short Term Goals  Time Frame for Short Term Goals: 3-7days  Short Term Goal 1: Pt will perform balance exercises to support amb with SPC and safety in stair climbing  Short Term Goal 2: Pt will practice lifting body weight using step in parallel bars to build strength in legs in preparation for stair climbing  Short Term Goal 3: Pt will demonstrate safe transtion/transfers without verbal cues 100% of the time. Short Term Goal 4: Pt will particpate in balance/strengthening ex  Long Term Goals  Time Frame for Long Term Goals : 7-10  Long Term Goal 1: Demonstrate indep safe transitions/transfers with appropriate AD  Long Term Goal 2: Pt will perform 13 reg size steps with HR on right ascending and SPC on left Indep  Long Term Goal 3: Patient will ambulate 48' with LRD at independent level in order to get room to room safely.   Long Term Goal 4: Written HEP  Patient Goals   Patient Goals : Pt wants to return to own home able to climb a full set of steps, and walk using a [facility]    Education       Therapy Time   Individual Concurrent Group Co-treatment   Time In  830         Time Out  925         Minutes  Luis Enrique Malhotra Dayana, PTA .[id number]

## 2023-02-24 NOTE — PROGRESS NOTES
This  met with patient and spoke with patient's daughter Haresh Giron whom have this  verbal permission to contact the [place] and [address] regarding arrival of new c-pap. [facility]'s [facility] PCP is reported to be LAZARA Rouse. This  contacted the [facility] and left voice mail for Dr. Almedia Ahumada nurse  [facility] regarding when c-pap should be delivered. This  then contacted [address] ( [phone]) and spoke with Amber . Amber from [address] [facility] reports that patient does not have an open account with [facility] office. Amber reports that patient was receiving services from [address] [place] branch. Amber reports plan to contact [place] to request to transfer [place]'s case to [place] so that patient can have an account opened with [facility] to receive DME. Amber reports that [address] can assist with getting patient a c-pap and requesting that [place] fax patient's sleep study, demographics, test results, supporting documentation and prescription for c-pap. Amber reports that information can be faxed to #[phone]. Meanwhile. this  received a phone call from [address] reporting that they have no records of a sleep study, etc.  This  followed up with patient's daughter Haresh Giron whom reports that patient had sleep study through civilian  In [place] not the [place]. Gloria plans to contact patient's physician in [place] to request that records be faxed to the [place], [address] and [facility]. Haresh Bree also reports plan to schedule an appointment for patient with [facility] Dr. Lola Pichardo for patient to become an established patient. Haresh Giron reports plan to follow up with Dr. Lola Pichardo to get patient a new sleep study for c-pap. Patient and Haresh Giron had reported interest in [facility] to follow patient upon DC. This  collaborated with [facility] liaison Elida via phone.   Elida reports that patient does not have a PCP and encourages patient to ask Dr. Lola Pichardo for [facility] order at first appointment. Patient and daughter Lawrence Rg updated and voice understanding. Patient is reported to have home O2. Patient requesting to DC on Sat. 2/25 back home with daughter's assistance. Daughter Lawrence Rg reports being willing and able to assist with patient's care needs and plans to help patient get an appointment with [facility] Dr. Estuardo Polk. To start process of getting patient a c-pap. This  provided Lawrence Rg with Dr. Janet Bah contact number. Lawrence Rg thanked this  for assistance and reports that patient will use home O2 upon DC. Bradyaltaf Ifrah reports no further DC needs at this time.   SS to continue to follow as needed while patient is at [facility]

## 2023-02-24 NOTE — PROGRESS NOTES
Patient up to bathroom to bathe self, clean clothes provided. Pt now in chair resting. Po tramadol given prn for generalized pain per pt. Call light in reach, pt independent in room.

## 2023-02-25 VITALS
SYSTOLIC BLOOD PRESSURE: 124 MMHG | DIASTOLIC BLOOD PRESSURE: 76 MMHG | HEIGHT: 66 IN | OXYGEN SATURATION: 99 % | HEART RATE: 65 BPM | BODY MASS INDEX: 35.2 KG/M2 | TEMPERATURE: 97.8 F | RESPIRATION RATE: 18 BRPM | WEIGHT: 219 LBS

## 2023-02-25 LAB
GLUCOSE BLD-MCNC: 138 MG/DL (ref 70–99)
GLUCOSE BLD-MCNC: 213 MG/DL (ref 70–99)
PERFORMED ON: ABNORMAL
PERFORMED ON: ABNORMAL

## 2023-02-25 PROCEDURE — 94640 AIRWAY INHALATION TREATMENT: CPT

## 2023-02-25 PROCEDURE — 6370000000 HC RX 637 (ALT 250 FOR IP): Performed by: INTERNAL MEDICINE

## 2023-02-25 PROCEDURE — 94660 CPAP INITIATION&MGMT: CPT

## 2023-02-25 PROCEDURE — 6360000002 HC RX W HCPCS: Performed by: INTERNAL MEDICINE

## 2023-02-25 RX ORDER — FUROSEMIDE 40 MG/1
40 TABLET ORAL DAILY
Qty: 30 TABLET | Refills: 0 | Status: SHIPPED | OUTPATIENT
Start: 2023-02-25

## 2023-02-25 RX ORDER — CARVEDILOL 12.5 MG/1
12.5 TABLET ORAL 2 TIMES DAILY
Qty: 60 TABLET | Refills: 3 | Status: SHIPPED | OUTPATIENT
Start: 2023-02-25

## 2023-02-25 RX ORDER — BUDESONIDE 0.5 MG/2ML
0.5 INHALANT ORAL 2 TIMES DAILY
Qty: 60 EACH | Refills: 3 | Status: SHIPPED | OUTPATIENT
Start: 2023-02-25

## 2023-02-25 RX ADMIN — TRAMADOL HYDROCHLORIDE 50 MG: 50 TABLET, COATED ORAL at 01:08

## 2023-02-25 RX ADMIN — CARVEDILOL 12.5 MG: 12.5 TABLET, FILM COATED ORAL at 09:31

## 2023-02-25 RX ADMIN — BUDESONIDE 500 MCG: 0.5 SUSPENSION RESPIRATORY (INHALATION) at 05:20

## 2023-02-25 RX ADMIN — APIXABAN 5 MG: 5 TABLET, FILM COATED ORAL at 09:31

## 2023-02-25 RX ADMIN — TRAMADOL HYDROCHLORIDE 50 MG: 50 TABLET, COATED ORAL at 09:32

## 2023-02-25 RX ADMIN — KETOCONAZOLE: 20 CREAM TOPICAL at 09:40

## 2023-02-25 RX ADMIN — FUROSEMIDE 40 MG: 40 TABLET ORAL at 09:32

## 2023-02-25 RX ADMIN — INSULIN LISPRO 2 UNITS: 100 INJECTION, SOLUTION INTRAVENOUS; SUBCUTANEOUS at 12:12

## 2023-02-25 RX ADMIN — ASPIRIN 81 MG CHEWABLE TABLET 81 MG: 81 TABLET CHEWABLE at 09:31

## 2023-02-25 ASSESSMENT — PAIN DESCRIPTION - ORIENTATION: ORIENTATION: MID;RIGHT;LEFT

## 2023-02-25 ASSESSMENT — PAIN DESCRIPTION - LOCATION: LOCATION: BACK

## 2023-02-25 ASSESSMENT — PAIN SCALES - GENERAL: PAINLEVEL_OUTOF10: 7

## 2023-02-25 ASSESSMENT — PAIN DESCRIPTION - DESCRIPTORS: DESCRIPTORS: ACHING

## 2023-02-25 ASSESSMENT — PAIN - FUNCTIONAL ASSESSMENT: PAIN_FUNCTIONAL_ASSESSMENT: ACTIVITIES ARE NOT PREVENTED

## 2023-02-25 NOTE — CARE COORDINATION
Trinidad Garcia [facility]) is aware that it was approved by Dr Denny Bennett to push [address] OT services out, until start date on 3/3/23.

## 2023-02-25 NOTE — DISCHARGE INSTR - COC
Continuity of Care Form    Patient Name: Milagro Edouard   :  [birth year]  MRN:  [id number]    Admit date:  2023  Discharge date:  2023    Code Status Order: Full Code   Advance Directives:     Admitting Physician:  Belgica Tidwell MD  PCP: Maldonado Clark MD    Discharging Nurse: [facility] Unit/Room#: 5884/9288-42  Discharging Unit Phone Number: [phone]    Emergency Contact:   Extended Emergency Contact Information  Primary Emergency Contact: Fabby Mcintyre [address] Phone: [phone]  Relation: Child   needed?  No    Past Surgical History:  Past Surgical History:   Procedure Laterality Date    COLON SURGERY      CORONARY ANGIOPLASTY WITH STENT PLACEMENT      CORONARY ARTERY BYPASS GRAFT      PACEMAKER PLACEMENT         Immunization History:   Immunization History   Administered Date(s) Administered    Influenza Virus Vaccine 10/22/2007, 10/31/2008, 10/01/2009, 2014, 2017, 10/01/2020, 2021    Pneumococcal Conjugate 13-valent (Abgbjlw55) 2016    Pneumococcal Polysaccharide (Nctdieqdo65) 2015    Td vaccine (adult) 2012, 2012    Zoster Recombinant (Shingrix) 2018       Active Problems:  Patient Active Problem List   Diagnosis Code    Presence of permanent cardiac pacemaker Z95.0    Atherosclerosis of coronary artery without angina pectoris I25.10    Diabetic peripheral neuropathy (HCC) E11.42    Obstructive sleep apnea of adult G47.33    Type 2 diabetes mellitus without complication (HCC) G71.7    Steatosis of liver K76.0    Right bundle branch block I45.10    Old myocardial infarction I25.2    Morbid obesity ([address] E66.01    Generalized osteoarthritis M15.9    Bilateral hearing loss H91.93    Opioid use, 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    COPD with acute exacerbation ([address] J44.1    Acute decompensated heart failure (HCC) I50.9    Acute hypercapnic respiratory failure (HCC) J96.02    CHF (congestive heart failure), NYHA class I, acute on chronic, combined ([facility] I50.43       Isolation/Infection:   Isolation            No Isolation          Patient Infection Status       Infection Onset Added Last Indicated Last Indicated By Review Planned Expiration Resolved Resolved By    None active    Resolved    COVID-19 (Rule Out) 02/10/23 02/10/23 02/11/23 Respiratory Panel, Molecular, with COVID-19 (Restricted: peds pts or suitable admitted adults) (Ordered)   02/11/23 Rule-Out Test Resulted            Nurse Assessment:  Last Vital Signs: /76   Pulse 65   Temp 97.8 °F (36.6 °C) (Oral)   Resp 18   Ht 5' 6\" (1.676 m)   Wt 219 lb (99.3 kg)   SpO2 99%   BMI 35.35 kg/m²     Last documented pain score (0-10 scale): Pain Level: 7  Last Weight:   Wt Readings from Last 1 Encounters:   02/17/23 219 lb (99.3 kg)     Mental Status:  oriented, alert, coherent, logical, thought processes intact, and able to concentrate and follow conversation    IV Access:  - None    Nursing Mobility/ADLs:  Walking   Independent  Transfer  Independent  Bathing  Independent  Dressing  Independent  [facility]  Independent  Med Delivery   whole    Wound Care Documentation and Therapy:        Elimination:  Continence: Bowel: Yes  Bladder: Yes  Urinary Catheter: None   Colostomy/Ileostomy/Ileal Conduit: No       Date of Last BM: 2/22/2023  No intake or output data in the 24 hours ending 02/25/23 0844  No intake/output data recorded. Safety Concerns: At Risk for Falls    Impairments/Disabilities:      None    Nutrition Therapy:  Current Nutrition Therapy:   - Oral Diet:  Carb Control 4 carbs/meal (1800kcals/day)    Routes of Feeding: Oral  Liquids:  Thin Liquids  Daily Fluid Restriction: no  Last Modified Barium Swallow with Video (Video Swallowing Test): not done    Treatments at the Time of Hospital Discharge:   Respiratory Treatments: ***  Oxygen Therapy:  is on oxygen at 3 L/min per nasal cannula. Ventilator:    - No ventilator support    Rehab Therapies: Physical Therapy and Occupational Therapy  Weight Bearing Status/Restrictions: No weight bearing restrictions  Other Medical Equipment (for information only, NOT a DME order):  cane  Other Treatments: ***    Patient's personal belongings (please select all that are sent with patient):  Adam RUTLEDGE SIGNATURE:  Electronically signed by Amy Luu RN on 2/25/23 at 11:57 AM EST    CASE MANAGEMENT/SOCIAL WORK SECTION    Inpatient Status Date: ***    Readmission Risk Assessment Score:  Readmission Risk              Risk of Unplanned Readmission:  30           Discharging to Facility/ Agency   Name:   Address:  Phone:  Fax:    Dialysis Facility (if applicable)   Name:  Address:  Dialysis Schedule:  Phone:  Fax:    / signature: {Esignature:[id number]}    PHYSICIAN SECTION    Prognosis: Good    Condition at Discharge: Stable    Rehab Potential (if transferring to Rehab): Good    Recommended Labs or Other Treatments After Discharge:     Physician Certification: I certify the above information and transfer of Peter Schroeder  is necessary for the continuing treatment of the diagnosis listed and that he requires Home Care for greater 30 days.      Update Admission H&P: No change in H&P    PHYSICIAN SIGNATURE:  Electronically signed by Morro Fields MD on 2/25/23 at 8:44 AM EST

## 2023-02-27 ENCOUNTER — TELEPHONE (OUTPATIENT)
Dept: PRIMARY CARE CLINIC | Age: 78
End: 2023-02-27

## 2023-02-27 ENCOUNTER — TELEPHONE (OUTPATIENT)
Dept: FAMILY MEDICINE CLINIC | Age: 78
End: 2023-02-27

## 2023-02-27 NOTE — TELEPHONE ENCOUNTER
Care Transitions Initial Follow Up Call    Outreach made within 2 business days of discharge: Yes    Patient: Reyna Oswald Patient : 1945   MRN: [id number]  Reason for Admission: There are no discharge diagnoses documented for the most recent discharge. Discharge Date: 23       Spoke with: Pt's daughter per communication consent. Discharge department/facility: [facility] Interactive Patient Contact:  Was patient able to fill all prescriptions: Yes  Was patient instructed to bring all medications to the follow-up visit: Yes  Is patient taking all medications as directed in the discharge summary?  Yes  Does patient understand their discharge instructions: Yes  Does patient have questions or concerns that need addressed prior to 7-14 day follow up office visit: no, [facility]    Scheduled appointment with PCP within 7-14 days    Follow Up  Future Appointments   Date Time Provider Jin Patrick   3/1/2023  2:15 PM Jenn Avina MD [address]

## 2023-03-01 ENCOUNTER — OFFICE VISIT (OUTPATIENT)
Dept: PULMONOLOGY | Age: 78
End: 2023-03-01
Payer: MEDICARE

## 2023-03-01 VITALS
OXYGEN SATURATION: 92 % | TEMPERATURE: 97.8 F | HEART RATE: 65 BPM | WEIGHT: 209 LBS | DIASTOLIC BLOOD PRESSURE: 64 MMHG | BODY MASS INDEX: 33.73 KG/M2 | SYSTOLIC BLOOD PRESSURE: 114 MMHG

## 2023-03-01 DIAGNOSIS — J44.9 CHRONIC OBSTRUCTIVE PULMONARY DISEASE, UNSPECIFIED COPD TYPE (HCC): Primary | ICD-10-CM

## 2023-03-01 DIAGNOSIS — J96.12 CHRONIC RESPIRATORY FAILURE WITH HYPERCAPNIA (HCC): ICD-10-CM

## 2023-03-01 DIAGNOSIS — G47.33 OBSTRUCTIVE SLEEP APNEA OF ADULT: ICD-10-CM

## 2023-03-01 PROCEDURE — 1111F DSCHRG MED/CURRENT MED MERGE: CPT | Performed by: INTERNAL MEDICINE

## 2023-03-01 PROCEDURE — 99215 OFFICE O/P EST HI 40 MIN: CPT | Performed by: INTERNAL MEDICINE

## 2023-03-01 PROCEDURE — 3023F SPIROM DOC REV: CPT | Performed by: INTERNAL MEDICINE

## 2023-03-01 PROCEDURE — G8427 DOCREV CUR MEDS BY ELIG CLIN: HCPCS | Performed by: INTERNAL MEDICINE

## 2023-03-01 PROCEDURE — 1036F TOBACCO NON-USER: CPT | Performed by: INTERNAL MEDICINE

## 2023-03-01 PROCEDURE — G8484 FLU IMMUNIZE NO ADMIN: HCPCS | Performed by: INTERNAL MEDICINE

## 2023-03-01 PROCEDURE — G8417 CALC BMI ABV UP PARAM F/U: HCPCS | Performed by: INTERNAL MEDICINE

## 2023-03-01 PROCEDURE — 1123F ACP DISCUSS/DSCN MKR DOCD: CPT | Performed by: INTERNAL MEDICINE

## 2023-03-01 RX ORDER — ALBUTEROL SULFATE 2.5 MG/3ML
2.5 SOLUTION RESPIRATORY (INHALATION) EVERY 6 HOURS PRN
Qty: 120 EACH | Refills: 1 | Status: SHIPPED | OUTPATIENT
Start: 2023-03-01 | End: 2023-03-31

## 2023-03-01 ASSESSMENT — ENCOUNTER SYMPTOMS
DIARRHEA: 0
VOICE CHANGE: 0
WHEEZING: 1
ABDOMINAL PAIN: 0
VOMITING: 0
COUGH: 1
CHEST TIGHTNESS: 0
EYE ITCHING: 0
RHINORRHEA: 0
NAUSEA: 0
SORE THROAT: 0
SHORTNESS OF BREATH: 1

## 2023-03-01 NOTE — PROGRESS NOTES
Subjective:     Esteban Cummings is a 69 y.o. male who complains today of:     Chief Complaint   Patient presents with    Follow-Up from Hospital     3wk f/u from hospital for COPD exacerbation. Pt states he has MORENA but its motor life exceeded on machine and is more than 8years old. 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 He said he was on BiPAP which is not working  as per daughter . He had O2 at home  using 2 .5 lit almost 24 hour a day . He had ABG show increased PCO2 69. He has COPD  and recommend NIV/trilogy . He  would better benefit fromNIV therapy, as opposed to bilevel therapy because of advanced capability of AVAPS AE to provide a targeted tidal volume coupled with advancement of right to improve symptoms of hypercapnia and help decrease readmission due to respiratory failure. For this reason BiPAP has been considered but rule out in an NIV is more clinically appropriate. C/o shortness of breath , worse with exertion. Occasional Wheezing. Cough with  Sputum. No Hemoptysis. No Chest tightness. No Chest pain with radiation  or pleuritic pain. No  leg edema. No orthopnea. No Fever or chills. No Rhinorrhea and postnasal drip.     Allergies:  Penicillins, Gadolinium, Iodine, and Iv [iodides]  Past Medical History:   Diagnosis Date    CAD (coronary artery disease)     Chronic kidney disease     COPD (chronic obstructive pulmonary disease) (HCC)     Diabetes mellitus ([facility]     Diabetic neuropathy ([facility]     Hyperlipidemia     Hypertension      Past Surgical History:   Procedure Laterality Date    COLON SURGERY      CORONARY ANGIOPLASTY WITH STENT PLACEMENT CORONARY ARTERY BYPASS GRAFT      PACEMAKER PLACEMENT       No family history on file. Social History     Socioeconomic History    Marital status: Legally      Spouse name: Not on file    Number of children: Not on file    Years of education: Not on file    Highest education level: Not on file   Occupational History    Not on file   Tobacco Use    Smoking status: Former    Smokeless tobacco: Never   Vaping Use    Vaping Use: Never used   Substance and Sexual Activity    Alcohol use: Yes     Comment: rare    Drug use: Never    Sexual activity: Not Currently   Other Topics Concern    Not on file   Social History Narrative    Not on file     Social Determinants of Health     Financial Resource Strain: Not on file   Food Insecurity: Not on file   Transportation Needs: Not on file   Physical Activity: Not on file   Stress: Not on file   Social Connections: Not on file   Intimate Partner Violence: Not on file   Housing Stability: Not on file         Review of Systems   Constitutional:  Negative for chills, diaphoresis, fatigue and fever. HENT:  Negative for congestion, mouth sores, nosebleeds, postnasal drip, rhinorrhea, sneezing, sore throat and voice change. Eyes:  Negative for itching and visual disturbance. Respiratory:  Positive for cough, shortness of breath and wheezing. Negative for chest tightness. Cardiovascular: Negative. Negative for chest pain, palpitations and leg swelling. Gastrointestinal:  Negative for abdominal pain, diarrhea, nausea and vomiting. Genitourinary:  Negative for difficulty urinating and hematuria. Musculoskeletal:  Negative for arthralgias, joint swelling and myalgias. Skin:  Negative for rash. Allergic/Immunologic: Negative for environmental allergies. Neurological:  Negative for dizziness, tremors, weakness and headaches.    Psychiatric/Behavioral:  Negative for behavioral problems and sleep disturbance.        :     Vitals:    03/01/23 1444   BP: 114/64 Site: Right Upper Arm   Position: Sitting   Cuff Size: Large Adult   Pulse: 65   Temp: 97.8 °F (36.6 °C)   SpO2: 92%   Weight: 209 lb (94.8 kg)     Wt Readings from Last 3 Encounters:   03/01/23 209 lb (94.8 kg)   02/17/23 219 lb (99.3 kg)   02/17/23 218 lb 11.2 oz (99.2 kg)         Physical Exam  Constitutional:       Appearance: He is well-developed. He is obese. HENT:      Head: Normocephalic and atraumatic. Nose: Nose normal.   Eyes:      Conjunctiva/sclera: Conjunctivae normal.      Pupils: Pupils are equal, round, and reactive to light. Neck:      Thyroid: No thyromegaly. Vascular: No JVD. Trachea: No tracheal deviation. Cardiovascular:      Rate and Rhythm: Normal rate and regular rhythm. Heart sounds: No murmur heard. No friction rub. No gallop. Pulmonary:      Effort: Pulmonary effort is normal. No respiratory distress. Breath sounds: Normal breath sounds. No wheezing or rales. Comments: diminished Breath sound bilaterally. Chest:      Chest wall: No tenderness. Abdominal:      General: There is no distension. Musculoskeletal:         General: Normal range of motion. Lymphadenopathy:      Cervical: No cervical adenopathy. Skin:     General: Skin is warm and dry. Findings: No rash. Neurological:      Mental Status: He is alert and oriented to person, place, and time. Cranial Nerves: No cranial nerve deficit.    Psychiatric:         Behavior: Behavior normal.       Current Outpatient Medications   Medication Sig Dispense Refill    albuterol (PROVENTIL) (2.5 MG/3ML) 0.083% nebulizer solution Take 3 mLs by nebulization every 6 hours as needed for Wheezing 120 each 1    ipratropium (ATROVENT) 0.02 % nebulizer solution Take 2.5 mLs by nebulization 4 times daily 2.5 mL 1    apixaban (ELIQUIS) 5 MG TABS tablet Take 1 tablet by mouth 2 times daily 60 tablet 3    budesonide (PULMICORT) 0.5 MG/2ML nebulizer suspension Take 2 mLs by nebulization 2 times daily 60 each 3    carvedilol (COREG) 12.5 MG tablet Take 1 tablet by mouth 2 times daily 60 tablet 3    furosemide (LASIX) 40 MG tablet Take 1 tablet by mouth daily 30 tablet 0    Omega-3 Fatty Acids (FISH OIL) 1000 MG capsule Take by mouth daily      ferrous sulfate (IRON 325) 325 (65 Fe) MG tablet ferrous sulfate 325 mg (65 mg iron) tablet   Take 1 tablet every day by oral route for 30 days. nitroGLYCERIN (NITROSTAT) 0.4 MG SL tablet       tiZANidine (ZANAFLEX) 2 MG tablet Take 1 tablet by mouth every 8 hours as needed (neck pain) 30 tablet 0    clobetasol (TEMOVATE) 0.05 % ointment Apply topically 2 times daily for 2 weeks for rash. Do not use on face or neck 45 g 3    ketoconazole (NIZORAL) 2 % cream APPLY TO THE skin rash twice daily FOR 4 weeks 30 g 5    atorvastatin (LIPITOR) 40 MG tablet TAKE 1 TABLET BY MOUTH ONCE DAILY AT BEDTIME 90 tablet 3    metFORMIN (GLUCOPHAGE) 500 MG tablet Take 500 mg by mouth daily (with breakfast)      aspirin 81 MG EC tablet Take 81 mg by mouth daily      gabapentin (NEURONTIN) 300 MG capsule Take 1 capsule by mouth nightly for 30 days. 30 capsule 0     No current facility-administered medications for this visit. No results found for this or any previous visit.  ]  Results for orders placed during the hospital encounter of 02/10/23    XR CHEST PORTABLE    Narrative  EXAMINATION:  ONE XRAY VIEW OF THE CHEST    2/13/2023 10:08 am    COMPARISON:  02/10/2023    HISTORY:  ORDERING SYSTEM PROVIDED HISTORY: Worsening hypercapnia  TECHNOLOGIST PROVIDED HISTORY:  Reason for exam:->worsening hypercapnia  What reading provider will be dictating this exam?->CRC    FINDINGS:  Mild cardiomegaly is unchanged. Dual chamber cardiac pacemaker is stable. Pulmonary vasculature is upper normal limits. The lungs are clear. No  pneumothorax is identified. There is stable blunting of the left costophrenic  sulcus, likely from pleural thickening.   Post sternotomy changes are noted.    Impression  Stable cardiomegaly. No acute cardiopulmonary process. XR CHEST PORTABLE    Narrative  EXAMINATION:  ONE XRAY VIEW OF THE CHEST    2/10/2023 5:17 pm    COMPARISON:  None. HISTORY:  ORDERING SYSTEM PROVIDED HISTORY: SOB  TECHNOLOGIST PROVIDED HISTORY:  Reason for exam:->SOB  What reading provider will be dictating this exam?->CRC    FINDINGS:  The lungs are without acute focal process. There is no effusion or  pneumothorax. Cardiomegaly. Sternotomy wires noted. Right-sided  transvenous pacer device. The osseous structures are without acute process. Impression  No acute process. Assessment/Plan:     1. Chronic obstructive pulmonary disease, unspecified COPD type ([address]  He is on nebulizer solution but does not have nebulizer machine . C/o shortness of breath , worse with exertion. Occasional Wheezing. Cough with  Sputum. Need new nebulizer. 2. Chronic respiratory failure with hypercapnia ([address]  He  would better benefit fromNIV therapy, as opposed to bilevel therapy because of advanced capability of AVAPS AE to provide a targeted tidal volume coupled with advancement of right to improve symptoms of hypercapnia and help decrease readmission due to respiratory failure. continue O2 2.5 lit as before     3. Obstructive sleep apnea of adult  He was on BIPAP which broke recommend NIV trilogy due to hypercapnea    Return in about 4 weeks (around 3/29/2023) for COPD, chronic respiratory failure, hypoxia on O2.       Jenn Avina MD

## 2023-04-03 ENCOUNTER — TELEPHONE (OUTPATIENT)
Dept: PRIMARY CARE CLINIC | Age: 78
End: 2023-04-03

## 2023-10-15 ENCOUNTER — APPOINTMENT (OUTPATIENT)
Dept: CT IMAGING | Age: 78
End: 2023-10-15
Payer: MEDICARE

## 2023-10-15 ENCOUNTER — HOSPITAL ENCOUNTER (EMERGENCY)
Age: 78
Discharge: HOME | End: 2023-10-15
Payer: MEDICARE

## 2023-10-15 ENCOUNTER — APPOINTMENT (OUTPATIENT)
Dept: GENERAL RADIOLOGY | Age: 78
End: 2023-10-15
Payer: MEDICARE

## 2023-10-15 VITALS
TEMPERATURE: 98.3 F | OXYGEN SATURATION: 100 % | DIASTOLIC BLOOD PRESSURE: 93 MMHG | WEIGHT: 209 LBS | HEIGHT: 66 IN | RESPIRATION RATE: 19 BRPM | SYSTOLIC BLOOD PRESSURE: 125 MMHG | HEART RATE: 83 BPM | BODY MASS INDEX: 33.59 KG/M2

## 2023-10-15 DIAGNOSIS — K43.9 VENTRAL HERNIA WITHOUT OBSTRUCTION OR GANGRENE: ICD-10-CM

## 2023-10-15 DIAGNOSIS — K42.9 UMBILICAL HERNIA WITHOUT OBSTRUCTION AND WITHOUT GANGRENE: ICD-10-CM

## 2023-10-15 DIAGNOSIS — N18.9 CHRONIC KIDNEY DISEASE, UNSPECIFIED CKD STAGE: ICD-10-CM

## 2023-10-15 DIAGNOSIS — R10.31 ABDOMINAL PAIN, RIGHT LOWER QUADRANT: Primary | ICD-10-CM

## 2023-10-15 LAB
ALBUMIN SERPL-MCNC: 3.5 G/DL (ref 3.5–4.6)
ALP SERPL-CCNC: 160 U/L (ref 35–104)
ALT SERPL-CCNC: 10 U/L (ref 0–41)
ANION GAP SERPL CALCULATED.3IONS-SCNC: 9 MEQ/L (ref 9–15)
AST SERPL-CCNC: 12 U/L (ref 0–40)
BACTERIA URNS QL MICRO: NEGATIVE /HPF
BASOPHILS # BLD: 0 K/UL (ref 0–0.2)
BASOPHILS NFR BLD: 0.5 %
BILIRUB SERPL-MCNC: 0.3 MG/DL (ref 0.2–0.7)
BILIRUB UR QL STRIP: NEGATIVE
BUN SERPL-MCNC: 33 MG/DL (ref 8–23)
CALCIUM SERPL-MCNC: 9.7 MG/DL (ref 8.5–9.9)
CHLORIDE SERPL-SCNC: 103 MEQ/L (ref 95–107)
CLARITY UR: CLEAR
CO2 SERPL-SCNC: 25 MEQ/L (ref 20–31)
COLOR UR: YELLOW
CREAT SERPL-MCNC: 1.77 MG/DL (ref 0.7–1.2)
EOSINOPHIL # BLD: 0.3 K/UL (ref 0–0.7)
EOSINOPHIL NFR BLD: 3.3 %
EPI CELLS #/AREA URNS AUTO: ABNORMAL /HPF (ref 0–5)
ERYTHROCYTE [DISTWIDTH] IN BLOOD BY AUTOMATED COUNT: 15.7 % (ref 11.5–14.5)
GLOBULIN SER CALC-MCNC: 4.8 G/DL (ref 2.3–3.5)
GLUCOSE SERPL-MCNC: 101 MG/DL (ref 70–99)
GLUCOSE UR STRIP-MCNC: 250 MG/DL
HCT VFR BLD AUTO: 33.2 % (ref 42–52)
HGB BLD-MCNC: 10 G/DL (ref 14–18)
HGB UR QL STRIP: NEGATIVE
HYALINE CASTS #/AREA URNS AUTO: ABNORMAL /HPF (ref 0–5)
KETONES UR STRIP-MCNC: NEGATIVE MG/DL
LACTATE BLDV-SCNC: 0.7 MMOL/L (ref 0.5–2.2)
LEUKOCYTE ESTERASE UR QL STRIP: NEGATIVE
LIPASE SERPL-CCNC: 97 U/L (ref 12–95)
LYMPHOCYTES # BLD: 1 K/UL (ref 1–4.8)
LYMPHOCYTES NFR BLD: 13 %
MAGNESIUM SERPL-MCNC: 1.9 MG/DL (ref 1.7–2.4)
MCH RBC QN AUTO: 24.9 PG (ref 27–31.3)
MCHC RBC AUTO-ENTMCNC: 30.1 % (ref 33–37)
MCV RBC AUTO: 82.8 FL (ref 79–92.2)
MONOCYTES # BLD: 0.6 K/UL (ref 0.2–0.8)
MONOCYTES NFR BLD: 8.2 %
NEUTROPHILS # BLD: 5.7 K/UL (ref 1.4–6.5)
NEUTS SEG NFR BLD: 74.6 %
NITRITE UR QL STRIP: NEGATIVE
PH UR STRIP: 5.5 [PH] (ref 5–9)
PLATELET # BLD AUTO: 283 K/UL (ref 130–400)
POC CREATININE WHOLE BLOOD: 1.9
POTASSIUM SERPL-SCNC: 4.7 MEQ/L (ref 3.4–4.9)
PROT SERPL-MCNC: 8.3 G/DL (ref 6.3–8)
PROT UR STRIP-MCNC: 30 MG/DL
RBC # BLD AUTO: 4.01 M/UL (ref 4.7–6.1)
RBC #/AREA URNS AUTO: ABNORMAL /HPF (ref 0–5)
SODIUM SERPL-SCNC: 137 MEQ/L (ref 135–144)
SP GR UR STRIP: 1.01 (ref 1–1.03)
TROPONIN, HIGH SENSITIVITY: 29 NG/L (ref 0–19)
TROPONIN, HIGH SENSITIVITY: 29 NG/L (ref 0–19)
URINE REFLEX TO CULTURE: ABNORMAL
UROBILINOGEN UR STRIP-ACNC: 0.2 E.U./DL
WBC # BLD AUTO: 7.6 K/UL (ref 4.8–10.8)
WBC #/AREA URNS AUTO: ABNORMAL /HPF (ref 0–5)

## 2023-10-15 PROCEDURE — 81001 URINALYSIS AUTO W/SCOPE: CPT

## 2023-10-15 PROCEDURE — 36415 COLL VENOUS BLD VENIPUNCTURE: CPT

## 2023-10-15 PROCEDURE — 93005 ELECTROCARDIOGRAM TRACING: CPT

## 2023-10-15 PROCEDURE — 74176 CT ABD & PELVIS W/O CONTRAST: CPT

## 2023-10-15 PROCEDURE — 83605 ASSAY OF LACTIC ACID: CPT

## 2023-10-15 PROCEDURE — 85025 COMPLETE CBC W/AUTO DIFF WBC: CPT

## 2023-10-15 PROCEDURE — 84484 ASSAY OF TROPONIN QUANT: CPT

## 2023-10-15 PROCEDURE — 96374 THER/PROPH/DIAG INJ IV PUSH: CPT

## 2023-10-15 PROCEDURE — 96375 TX/PRO/DX INJ NEW DRUG ADDON: CPT

## 2023-10-15 PROCEDURE — 80053 COMPREHEN METABOLIC PANEL: CPT

## 2023-10-15 PROCEDURE — 71045 X-RAY EXAM CHEST 1 VIEW: CPT

## 2023-10-15 PROCEDURE — 6360000002 HC RX W HCPCS

## 2023-10-15 PROCEDURE — 83735 ASSAY OF MAGNESIUM: CPT

## 2023-10-15 PROCEDURE — 94640 AIRWAY INHALATION TREATMENT: CPT

## 2023-10-15 PROCEDURE — 83690 ASSAY OF LIPASE: CPT

## 2023-10-15 PROCEDURE — 99285 EMERGENCY DEPT VISIT HI MDM: CPT

## 2023-10-15 RX ORDER — DIPHENHYDRAMINE HYDROCHLORIDE 50 MG/ML
50 INJECTION INTRAMUSCULAR; INTRAVENOUS ONCE
Status: DISCONTINUED | OUTPATIENT
Start: 2023-10-15 | End: 2023-10-15

## 2023-10-15 RX ORDER — ONDANSETRON 2 MG/ML
4 INJECTION INTRAMUSCULAR; INTRAVENOUS ONCE
Status: COMPLETED | OUTPATIENT
Start: 2023-10-15 | End: 2023-10-15

## 2023-10-15 RX ORDER — ALBUTEROL SULFATE 2.5 MG/3ML
2.5 SOLUTION RESPIRATORY (INHALATION) PRN
Status: DISCONTINUED | OUTPATIENT
Start: 2023-10-15 | End: 2023-10-16 | Stop reason: HOSPADM

## 2023-10-15 RX ORDER — MORPHINE SULFATE 4 MG/ML
4 INJECTION, SOLUTION INTRAMUSCULAR; INTRAVENOUS ONCE
Status: COMPLETED | OUTPATIENT
Start: 2023-10-15 | End: 2023-10-15

## 2023-10-15 RX ADMIN — ALBUTEROL SULFATE 2.5 MG: 2.5 SOLUTION RESPIRATORY (INHALATION) at 19:17

## 2023-10-15 RX ADMIN — ONDANSETRON 4 MG: 2 INJECTION INTRAMUSCULAR; INTRAVENOUS at 20:46

## 2023-10-15 RX ADMIN — MORPHINE SULFATE 4 MG: 4 INJECTION, SOLUTION INTRAMUSCULAR; INTRAVENOUS at 20:47

## 2023-10-15 ASSESSMENT — PAIN SCALES - GENERAL
PAINLEVEL_OUTOF10: 5
PAINLEVEL_OUTOF10: 0
PAINLEVEL_OUTOF10: 5

## 2023-10-15 ASSESSMENT — PAIN DESCRIPTION - ORIENTATION: ORIENTATION: RIGHT;LOWER

## 2023-10-15 ASSESSMENT — PAIN DESCRIPTION - DESCRIPTORS: DESCRIPTORS: DISCOMFORT

## 2023-10-15 ASSESSMENT — PAIN DESCRIPTION - LOCATION
LOCATION: ABDOMEN
LOCATION: ABDOMEN

## 2023-10-15 ASSESSMENT — ENCOUNTER SYMPTOMS: ABDOMINAL PAIN: 1

## 2023-10-15 ASSESSMENT — PAIN - FUNCTIONAL ASSESSMENT: PAIN_FUNCTIONAL_ASSESSMENT: 0-10

## 2023-10-15 NOTE — ED PROVIDER NOTES
decompressed with no hiatal hernia. There is a cyst, exophytic in the upper pole of the kidney measuring 2.3 cm otherwise incompletely characterized. Otherwise normal right kidney. Normal left kidney. Normal bilateral adrenal glands. Stomach and small bowel. There is a ventral hernia in the upper abdomen with partial herniation of the transverse colon with no signs of obstruction. There are multiple small ventral defects within the anterior abdominal wall consistent with small hernias. The one located within the infraumbilical region contains a portion of the colon. Additional right-sided anterolateral abdominal wall hernia contains multiple loops of small bowel with no signs of obstruction. There are 2 tiny fat-containing umbilical hernias. There is no small bowel obstruction. Distinct appendix is not visualized. Advanced degenerative disease of the spine. Patient reassessed; updated results, findings, plan. Patient states that pain has resolved completely and patient has no abdominal pain on reexamination. Patient is stable for outpatient follow-up at this time. Patient educated on supportive care. Patient given standard anticipatory guidance, return to ED warning signs, strict follow-up guidelines with PCP. Patient verbalized understanding of education, instruction. Patient is agreeable to plan. Patient discharged home in stable condition with daughter. Problems Addressed:  Abdominal pain, right lower quadrant: acute illness or injury  Chronic kidney disease, unspecified CKD stage: chronic illness or injury  Umbilical hernia without obstruction and without gangrene: chronic illness or injury  Ventral hernia without obstruction or gangrene: chronic illness or injury    Amount and/or Complexity of Data Reviewed  Labs: ordered. Radiology: ordered. ECG/medicine tests: ordered. Risk  Prescription drug management.       REASSESSMENT      CRITICAL CARE TIME   Total Critical Care time was 0

## 2023-10-15 NOTE — ED TRIAGE NOTES
Pt states he had appendicitis before and they didn't want to take his appendix out because he has other health issues   Pt states his pain is a 5/10  Pt uses a cane   Pt is afebrile

## 2023-10-16 LAB
EKG ATRIAL RATE: 72 BPM
EKG Q-T INTERVAL: 394 MS
EKG QRS DURATION: 96 MS
EKG QTC CALCULATION (BAZETT): 428 MS
EKG R AXIS: -49 DEGREES
EKG T AXIS: 96 DEGREES
EKG VENTRICULAR RATE: 71 BPM
PERFORMED ON: ABNORMAL
POC CREATININE: 1.9 MG/DL (ref 0.8–1.3)
POC SAMPLE TYPE: ABNORMAL

## 2023-10-16 PROCEDURE — 93010 ELECTROCARDIOGRAM REPORT: CPT | Performed by: INTERNAL MEDICINE

## 2023-10-16 NOTE — DISCHARGE INSTRUCTIONS
Take Tylenol as needed for pain, discomfort. Follow-up with PCP. Return to ED if any new, worsening symptoms.

## 2025-01-02 ENCOUNTER — APPOINTMENT (OUTPATIENT)
Dept: CARDIOLOGY | Facility: HOSPITAL | Age: 80
DRG: 291 | End: 2025-01-02
Payer: MEDICARE

## 2025-01-02 ENCOUNTER — HOSPITAL ENCOUNTER (INPATIENT)
Facility: HOSPITAL | Age: 80
Discharge: HOME | End: 2025-01-02
Attending: STUDENT IN AN ORGANIZED HEALTH CARE EDUCATION/TRAINING PROGRAM | Admitting: HOSPITALIST
Payer: MEDICARE

## 2025-01-02 ENCOUNTER — APPOINTMENT (OUTPATIENT)
Dept: RADIOLOGY | Facility: HOSPITAL | Age: 80
DRG: 291 | End: 2025-01-02
Payer: MEDICARE

## 2025-01-02 DIAGNOSIS — I50.9 CONGESTIVE HEART FAILURE, UNSPECIFIED HF CHRONICITY, UNSPECIFIED HEART FAILURE TYPE: Primary | ICD-10-CM

## 2025-01-02 DIAGNOSIS — R06.02 SHORTNESS OF BREATH: ICD-10-CM

## 2025-01-02 DIAGNOSIS — I50.43 CHF (CONGESTIVE HEART FAILURE), NYHA CLASS I, ACUTE ON CHRONIC, COMBINED: ICD-10-CM

## 2025-01-02 DIAGNOSIS — E87.70 HYPERVOLEMIA, UNSPECIFIED HYPERVOLEMIA TYPE: ICD-10-CM

## 2025-01-02 DIAGNOSIS — J44.1 CHRONIC OBSTRUCTIVE PULMONARY DISEASE WITH ACUTE EXACERBATION (MULTI): ICD-10-CM

## 2025-01-02 LAB
ALBUMIN SERPL BCP-MCNC: 3.3 G/DL (ref 3.4–5)
ALP SERPL-CCNC: 88 U/L (ref 33–136)
ALT SERPL W P-5'-P-CCNC: 10 U/L (ref 10–52)
ANION GAP SERPL CALC-SCNC: 11 MMOL/L (ref 10–20)
AST SERPL W P-5'-P-CCNC: 18 U/L (ref 9–39)
ATRIAL RATE: 53 BPM
BASOPHILS # BLD AUTO: 0.03 X10*3/UL (ref 0–0.1)
BASOPHILS NFR BLD AUTO: 0.4 %
BILIRUB SERPL-MCNC: 0.4 MG/DL (ref 0–1.2)
BNP SERPL-MCNC: 518 PG/ML (ref 0–99)
BUN SERPL-MCNC: 33 MG/DL (ref 6–23)
CALCIUM SERPL-MCNC: 8.7 MG/DL (ref 8.6–10.3)
CARDIAC TROPONIN I PNL SERPL HS: 31 NG/L (ref 0–20)
CARDIAC TROPONIN I PNL SERPL HS: 31 NG/L (ref 0–20)
CHLORIDE SERPL-SCNC: 97 MMOL/L (ref 98–107)
CO2 SERPL-SCNC: 21 MMOL/L (ref 21–32)
CREAT SERPL-MCNC: 1.73 MG/DL (ref 0.5–1.3)
EGFRCR SERPLBLD CKD-EPI 2021: 40 ML/MIN/1.73M*2
EOSINOPHIL # BLD AUTO: 0.19 X10*3/UL (ref 0–0.4)
EOSINOPHIL NFR BLD AUTO: 2.3 %
ERYTHROCYTE [DISTWIDTH] IN BLOOD BY AUTOMATED COUNT: 16.4 % (ref 11.5–14.5)
FLUAV RNA RESP QL NAA+PROBE: NOT DETECTED
FLUBV RNA RESP QL NAA+PROBE: NOT DETECTED
GLUCOSE SERPL-MCNC: 141 MG/DL (ref 74–99)
HCT VFR BLD AUTO: 35.4 % (ref 41–52)
HGB BLD-MCNC: 11.2 G/DL (ref 13.5–17.5)
HOLD SPECIMEN: NORMAL
IMM GRANULOCYTES # BLD AUTO: 0.04 X10*3/UL (ref 0–0.5)
IMM GRANULOCYTES NFR BLD AUTO: 0.5 % (ref 0–0.9)
LYMPHOCYTES # BLD AUTO: 0.6 X10*3/UL (ref 0.8–3)
LYMPHOCYTES NFR BLD AUTO: 7.3 %
MAGNESIUM SERPL-MCNC: 1.83 MG/DL (ref 1.6–2.4)
MCH RBC QN AUTO: 26.8 PG (ref 26–34)
MCHC RBC AUTO-ENTMCNC: 31.6 G/DL (ref 32–36)
MCV RBC AUTO: 85 FL (ref 80–100)
MONOCYTES # BLD AUTO: 0.5 X10*3/UL (ref 0.05–0.8)
MONOCYTES NFR BLD AUTO: 6.1 %
NEUTROPHILS # BLD AUTO: 6.82 X10*3/UL (ref 1.6–5.5)
NEUTROPHILS NFR BLD AUTO: 83.4 %
NRBC BLD-RTO: 0 /100 WBCS (ref 0–0)
PLATELET # BLD AUTO: 230 X10*3/UL (ref 150–450)
POTASSIUM SERPL-SCNC: 4.2 MMOL/L (ref 3.5–5.3)
PROT SERPL-MCNC: 6.9 G/DL (ref 6.4–8.2)
Q ONSET: 185 MS
QRS COUNT: 14 BEATS
QRS DURATION: 162 MS
QT INTERVAL: 486 MS
QTC CALCULATION(BAZETT): 560 MS
QTC FREDERICIA: 535 MS
R AXIS: -46 DEGREES
RBC # BLD AUTO: 4.18 X10*6/UL (ref 4.5–5.9)
SARS-COV-2 RNA RESP QL NAA+PROBE: NOT DETECTED
SODIUM SERPL-SCNC: 125 MMOL/L (ref 136–145)
T AXIS: 116 DEGREES
T OFFSET: 428 MS
VENTRICULAR RATE: 80 BPM
WBC # BLD AUTO: 8.2 X10*3/UL (ref 4.4–11.3)

## 2025-01-02 PROCEDURE — 85025 COMPLETE CBC W/AUTO DIFF WBC: CPT | Performed by: STUDENT IN AN ORGANIZED HEALTH CARE EDUCATION/TRAINING PROGRAM

## 2025-01-02 PROCEDURE — 94640 AIRWAY INHALATION TREATMENT: CPT

## 2025-01-02 PROCEDURE — G0378 HOSPITAL OBSERVATION PER HR: HCPCS

## 2025-01-02 PROCEDURE — 80053 COMPREHEN METABOLIC PANEL: CPT | Performed by: STUDENT IN AN ORGANIZED HEALTH CARE EDUCATION/TRAINING PROGRAM

## 2025-01-02 PROCEDURE — 96375 TX/PRO/DX INJ NEW DRUG ADDON: CPT

## 2025-01-02 PROCEDURE — 96372 THER/PROPH/DIAG INJ SC/IM: CPT | Performed by: NURSE PRACTITIONER

## 2025-01-02 PROCEDURE — 2500000004 HC RX 250 GENERAL PHARMACY W/ HCPCS (ALT 636 FOR OP/ED): Performed by: STUDENT IN AN ORGANIZED HEALTH CARE EDUCATION/TRAINING PROGRAM

## 2025-01-02 PROCEDURE — 71045 X-RAY EXAM CHEST 1 VIEW: CPT

## 2025-01-02 PROCEDURE — 82805 BLOOD GASES W/O2 SATURATION: CPT | Performed by: STUDENT IN AN ORGANIZED HEALTH CARE EDUCATION/TRAINING PROGRAM

## 2025-01-02 PROCEDURE — 84075 ASSAY ALKALINE PHOSPHATASE: CPT | Performed by: STUDENT IN AN ORGANIZED HEALTH CARE EDUCATION/TRAINING PROGRAM

## 2025-01-02 PROCEDURE — 36415 COLL VENOUS BLD VENIPUNCTURE: CPT | Performed by: STUDENT IN AN ORGANIZED HEALTH CARE EDUCATION/TRAINING PROGRAM

## 2025-01-02 PROCEDURE — 83880 ASSAY OF NATRIURETIC PEPTIDE: CPT | Performed by: STUDENT IN AN ORGANIZED HEALTH CARE EDUCATION/TRAINING PROGRAM

## 2025-01-02 PROCEDURE — 99222 1ST HOSP IP/OBS MODERATE 55: CPT | Performed by: NURSE PRACTITIONER

## 2025-01-02 PROCEDURE — 96374 THER/PROPH/DIAG INJ IV PUSH: CPT | Mod: 59

## 2025-01-02 PROCEDURE — 93005 ELECTROCARDIOGRAM TRACING: CPT

## 2025-01-02 PROCEDURE — 84484 ASSAY OF TROPONIN QUANT: CPT | Performed by: STUDENT IN AN ORGANIZED HEALTH CARE EDUCATION/TRAINING PROGRAM

## 2025-01-02 PROCEDURE — 2500000004 HC RX 250 GENERAL PHARMACY W/ HCPCS (ALT 636 FOR OP/ED): Performed by: NURSE PRACTITIONER

## 2025-01-02 PROCEDURE — 83735 ASSAY OF MAGNESIUM: CPT | Performed by: STUDENT IN AN ORGANIZED HEALTH CARE EDUCATION/TRAINING PROGRAM

## 2025-01-02 PROCEDURE — 99285 EMERGENCY DEPT VISIT HI MDM: CPT | Performed by: STUDENT IN AN ORGANIZED HEALTH CARE EDUCATION/TRAINING PROGRAM

## 2025-01-02 PROCEDURE — 71045 X-RAY EXAM CHEST 1 VIEW: CPT | Mod: FOREIGN READ | Performed by: RADIOLOGY

## 2025-01-02 PROCEDURE — 87636 SARSCOV2 & INF A&B AMP PRB: CPT | Performed by: STUDENT IN AN ORGANIZED HEALTH CARE EDUCATION/TRAINING PROGRAM

## 2025-01-02 PROCEDURE — 2500000002 HC RX 250 W HCPCS SELF ADMINISTERED DRUGS (ALT 637 FOR MEDICARE OP, ALT 636 FOR OP/ED): Performed by: STUDENT IN AN ORGANIZED HEALTH CARE EDUCATION/TRAINING PROGRAM

## 2025-01-02 RX ORDER — ACETAMINOPHEN 160 MG/5ML
650 SOLUTION ORAL EVERY 4 HOURS PRN
Status: ACTIVE | OUTPATIENT
Start: 2025-01-02

## 2025-01-02 RX ORDER — POLYETHYLENE GLYCOL 3350 17 G/17G
17 POWDER, FOR SOLUTION ORAL DAILY PRN
Status: ACTIVE | OUTPATIENT
Start: 2025-01-02

## 2025-01-02 RX ORDER — HEPARIN SODIUM 5000 [USP'U]/ML
5000 INJECTION, SOLUTION INTRAVENOUS; SUBCUTANEOUS EVERY 8 HOURS
Status: DISCONTINUED | OUTPATIENT
Start: 2025-01-02 | End: 2025-01-03

## 2025-01-02 RX ORDER — ACETAMINOPHEN 325 MG/1
650 TABLET ORAL EVERY 4 HOURS PRN
Status: DISPENSED | OUTPATIENT
Start: 2025-01-02

## 2025-01-02 RX ORDER — ACETAMINOPHEN 500 MG
10 TABLET ORAL NIGHTLY PRN
Status: ACTIVE | OUTPATIENT
Start: 2025-01-02

## 2025-01-02 RX ORDER — IPRATROPIUM BROMIDE AND ALBUTEROL SULFATE 2.5; .5 MG/3ML; MG/3ML
3 SOLUTION RESPIRATORY (INHALATION) ONCE
Status: COMPLETED | OUTPATIENT
Start: 2025-01-02 | End: 2025-01-02

## 2025-01-02 RX ORDER — TRAMADOL HYDROCHLORIDE 50 MG/1
50 TABLET ORAL 3 TIMES DAILY
Status: ON HOLD | COMMUNITY

## 2025-01-02 RX ORDER — ACETAMINOPHEN 650 MG/1
650 SUPPOSITORY RECTAL EVERY 4 HOURS PRN
Status: ACTIVE | OUTPATIENT
Start: 2025-01-02

## 2025-01-02 RX ORDER — FUROSEMIDE 10 MG/ML
40 INJECTION INTRAMUSCULAR; INTRAVENOUS EVERY 12 HOURS
Status: DISCONTINUED | OUTPATIENT
Start: 2025-01-02 | End: 2025-01-04

## 2025-01-02 RX ORDER — FUROSEMIDE 10 MG/ML
40 INJECTION INTRAMUSCULAR; INTRAVENOUS ONCE
Status: COMPLETED | OUTPATIENT
Start: 2025-01-02 | End: 2025-01-02

## 2025-01-02 RX ORDER — IPRATROPIUM BROMIDE AND ALBUTEROL SULFATE 2.5; .5 MG/3ML; MG/3ML
3 SOLUTION RESPIRATORY (INHALATION)
Status: DISCONTINUED | OUTPATIENT
Start: 2025-01-03 | End: 2025-01-05

## 2025-01-02 RX ADMIN — METHYLPREDNISOLONE SODIUM SUCCINATE 125 MG: 125 INJECTION, POWDER, FOR SOLUTION INTRAMUSCULAR; INTRAVENOUS at 16:19

## 2025-01-02 RX ADMIN — FUROSEMIDE 40 MG: 10 INJECTION, SOLUTION INTRAMUSCULAR; INTRAVENOUS at 19:09

## 2025-01-02 RX ADMIN — HEPARIN SODIUM 5000 UNITS: 5000 INJECTION INTRAVENOUS; SUBCUTANEOUS at 22:06

## 2025-01-02 RX ADMIN — IPRATROPIUM BROMIDE AND ALBUTEROL SULFATE 3 ML: .5; 3 SOLUTION RESPIRATORY (INHALATION) at 16:27

## 2025-01-02 SDOH — SOCIAL STABILITY: SOCIAL INSECURITY
WITHIN THE LAST YEAR, HAVE YOU BEEN KICKED, HIT, SLAPPED, OR OTHERWISE PHYSICALLY HURT BY YOUR PARTNER OR EX-PARTNER?: NO

## 2025-01-02 SDOH — HEALTH STABILITY: PHYSICAL HEALTH
HOW OFTEN DO YOU NEED TO HAVE SOMEONE HELP YOU WHEN YOU READ INSTRUCTIONS, PAMPHLETS, OR OTHER WRITTEN MATERIAL FROM YOUR DOCTOR OR PHARMACY?: SOMETIMES

## 2025-01-02 SDOH — SOCIAL STABILITY: SOCIAL INSECURITY: DO YOU FEEL UNSAFE GOING BACK TO THE PLACE WHERE YOU ARE LIVING?: NO

## 2025-01-02 SDOH — SOCIAL STABILITY: SOCIAL INSECURITY: ABUSE: ADULT

## 2025-01-02 SDOH — SOCIAL STABILITY: SOCIAL INSECURITY: DO YOU FEEL ANYONE HAS EXPLOITED OR TAKEN ADVANTAGE OF YOU FINANCIALLY OR OF YOUR PERSONAL PROPERTY?: NO

## 2025-01-02 SDOH — SOCIAL STABILITY: SOCIAL NETWORK
IN A TYPICAL WEEK, HOW MANY TIMES DO YOU TALK ON THE PHONE WITH FAMILY, FRIENDS, OR NEIGHBORS?: MORE THAN THREE TIMES A WEEK

## 2025-01-02 SDOH — HEALTH STABILITY: MENTAL HEALTH: HOW MANY DRINKS CONTAINING ALCOHOL DO YOU HAVE ON A TYPICAL DAY WHEN YOU ARE DRINKING?: PATIENT DOES NOT DRINK

## 2025-01-02 SDOH — HEALTH STABILITY: MENTAL HEALTH
DO YOU FEEL STRESS - TENSE, RESTLESS, NERVOUS, OR ANXIOUS, OR UNABLE TO SLEEP AT NIGHT BECAUSE YOUR MIND IS TROUBLED ALL THE TIME - THESE DAYS?: NOT AT ALL

## 2025-01-02 SDOH — HEALTH STABILITY: MENTAL HEALTH: HOW OFTEN DO YOU HAVE A DRINK CONTAINING ALCOHOL?: NEVER

## 2025-01-02 SDOH — SOCIAL STABILITY: SOCIAL NETWORK: HOW OFTEN DO YOU ATTEND CHURCH OR RELIGIOUS SERVICES?: 1 TO 4 TIMES PER YEAR

## 2025-01-02 SDOH — ECONOMIC STABILITY: FOOD INSECURITY: WITHIN THE PAST 12 MONTHS, THE FOOD YOU BOUGHT JUST DIDN'T LAST AND YOU DIDN'T HAVE MONEY TO GET MORE.: NEVER TRUE

## 2025-01-02 SDOH — ECONOMIC STABILITY: FOOD INSECURITY: WITHIN THE PAST 12 MONTHS, YOU WORRIED THAT YOUR FOOD WOULD RUN OUT BEFORE YOU GOT THE MONEY TO BUY MORE.: NEVER TRUE

## 2025-01-02 SDOH — SOCIAL STABILITY: SOCIAL INSECURITY: WITHIN THE LAST YEAR, HAVE YOU BEEN HUMILIATED OR EMOTIONALLY ABUSED IN OTHER WAYS BY YOUR PARTNER OR EX-PARTNER?: NO

## 2025-01-02 SDOH — HEALTH STABILITY: MENTAL HEALTH: HOW OFTEN DO YOU HAVE SIX OR MORE DRINKS ON ONE OCCASION?: NEVER

## 2025-01-02 SDOH — SOCIAL STABILITY: SOCIAL INSECURITY: ARE THERE ANY APPARENT SIGNS OF INJURIES/BEHAVIORS THAT COULD BE RELATED TO ABUSE/NEGLECT?: NO

## 2025-01-02 SDOH — HEALTH STABILITY: PHYSICAL HEALTH: ON AVERAGE, HOW MANY DAYS PER WEEK DO YOU ENGAGE IN MODERATE TO STRENUOUS EXERCISE (LIKE A BRISK WALK)?: 5 DAYS

## 2025-01-02 SDOH — SOCIAL STABILITY: SOCIAL INSECURITY: DOES ANYONE TRY TO KEEP YOU FROM HAVING/CONTACTING OTHER FRIENDS OR DOING THINGS OUTSIDE YOUR HOME?: NO

## 2025-01-02 SDOH — ECONOMIC STABILITY: INCOME INSECURITY: IN THE PAST 12 MONTHS HAS THE ELECTRIC, GAS, OIL, OR WATER COMPANY THREATENED TO SHUT OFF SERVICES IN YOUR HOME?: NO

## 2025-01-02 SDOH — SOCIAL STABILITY: SOCIAL INSECURITY: ARE YOU OR HAVE YOU BEEN THREATENED OR ABUSED PHYSICALLY, EMOTIONALLY, OR SEXUALLY BY ANYONE?: NO

## 2025-01-02 SDOH — SOCIAL STABILITY: SOCIAL NETWORK: HOW OFTEN DO YOU GET TOGETHER WITH FRIENDS OR RELATIVES?: MORE THAN THREE TIMES A WEEK

## 2025-01-02 SDOH — SOCIAL STABILITY: SOCIAL INSECURITY
WITHIN THE LAST YEAR, HAVE YOU BEEN RAPED OR FORCED TO HAVE ANY KIND OF SEXUAL ACTIVITY BY YOUR PARTNER OR EX-PARTNER?: NO

## 2025-01-02 SDOH — SOCIAL STABILITY: SOCIAL INSECURITY: HAVE YOU HAD ANY THOUGHTS OF HARMING ANYONE ELSE?: NO

## 2025-01-02 SDOH — SOCIAL STABILITY: SOCIAL INSECURITY: WITHIN THE LAST YEAR, HAVE YOU BEEN AFRAID OF YOUR PARTNER OR EX-PARTNER?: NO

## 2025-01-02 SDOH — SOCIAL STABILITY: SOCIAL INSECURITY: WERE YOU ABLE TO COMPLETE ALL THE BEHAVIORAL HEALTH SCREENINGS?: YES

## 2025-01-02 SDOH — SOCIAL STABILITY: SOCIAL INSECURITY: HAS ANYONE EVER THREATENED TO HURT YOUR FAMILY OR YOUR PETS?: NO

## 2025-01-02 SDOH — HEALTH STABILITY: PHYSICAL HEALTH: ON AVERAGE, HOW MANY MINUTES DO YOU ENGAGE IN EXERCISE AT THIS LEVEL?: 30 MIN

## 2025-01-02 SDOH — SOCIAL STABILITY: SOCIAL INSECURITY: HAVE YOU HAD THOUGHTS OF HARMING ANYONE ELSE?: NO

## 2025-01-02 ASSESSMENT — COGNITIVE AND FUNCTIONAL STATUS - GENERAL
WALKING IN HOSPITAL ROOM: A LITTLE
PATIENT BASELINE BEDBOUND: NO
DAILY ACTIVITIY SCORE: 24
MOBILITY SCORE: 22
CLIMB 3 TO 5 STEPS WITH RAILING: A LITTLE

## 2025-01-02 ASSESSMENT — ACTIVITIES OF DAILY LIVING (ADL)
JUDGMENT_ADEQUATE_SAFELY_COMPLETE_DAILY_ACTIVITIES: YES
FEEDING YOURSELF: INDEPENDENT
DRESSING YOURSELF: INDEPENDENT
TOILETING: INDEPENDENT
PATIENT'S MEMORY ADEQUATE TO SAFELY COMPLETE DAILY ACTIVITIES?: YES
HEARING - RIGHT EAR: HEARING AID
ADEQUATE_TO_COMPLETE_ADL: YES
LACK_OF_TRANSPORTATION: NO
WALKS IN HOME: INDEPENDENT
GROOMING: INDEPENDENT
HEARING - LEFT EAR: HEARING AID
LACK_OF_TRANSPORTATION: NO
LACK_OF_TRANSPORTATION: NO
BATHING: INDEPENDENT

## 2025-01-02 ASSESSMENT — PAIN SCALES - GENERAL
PAINLEVEL_OUTOF10: 0 - NO PAIN

## 2025-01-02 ASSESSMENT — ENCOUNTER SYMPTOMS
CONSTIPATION: 0
DYSURIA: 0
FLANK PAIN: 0
CHILLS: 0
DIARRHEA: 0
PALPITATIONS: 0
HEMATURIA: 0
FREQUENCY: 0
FEVER: 0
WHEEZING: 1
SHORTNESS OF BREATH: 1
CHOKING: 1
ABDOMINAL PAIN: 0
NAUSEA: 0
VOMITING: 0

## 2025-01-02 ASSESSMENT — PATIENT HEALTH QUESTIONNAIRE - PHQ9
SUM OF ALL RESPONSES TO PHQ9 QUESTIONS 1 & 2: 0
1. LITTLE INTEREST OR PLEASURE IN DOING THINGS: NOT AT ALL
2. FEELING DOWN, DEPRESSED OR HOPELESS: NOT AT ALL

## 2025-01-02 ASSESSMENT — LIFESTYLE VARIABLES
HAVE PEOPLE ANNOYED YOU BY CRITICIZING YOUR DRINKING: NO
EVER FELT BAD OR GUILTY ABOUT YOUR DRINKING: NO
SKIP TO QUESTIONS 9-10: 1
AUDIT-C TOTAL SCORE: 0
HOW MANY STANDARD DRINKS CONTAINING ALCOHOL DO YOU HAVE ON A TYPICAL DAY: PATIENT DOES NOT DRINK
HOW OFTEN DO YOU HAVE A DRINK CONTAINING ALCOHOL: NEVER
TOTAL SCORE: 0
HAVE YOU EVER FELT YOU SHOULD CUT DOWN ON YOUR DRINKING: NO
SKIP TO QUESTIONS 9-10: 1
EVER HAD A DRINK FIRST THING IN THE MORNING TO STEADY YOUR NERVES TO GET RID OF A HANGOVER: NO
AUDIT-C TOTAL SCORE: 0
HOW OFTEN DO YOU HAVE 6 OR MORE DRINKS ON ONE OCCASION: NEVER
AUDIT-C TOTAL SCORE: 0

## 2025-01-02 ASSESSMENT — COLUMBIA-SUICIDE SEVERITY RATING SCALE - C-SSRS
6. HAVE YOU EVER DONE ANYTHING, STARTED TO DO ANYTHING, OR PREPARED TO DO ANYTHING TO END YOUR LIFE?: NO
1. IN THE PAST MONTH, HAVE YOU WISHED YOU WERE DEAD OR WISHED YOU COULD GO TO SLEEP AND NOT WAKE UP?: NO
2. HAVE YOU ACTUALLY HAD ANY THOUGHTS OF KILLING YOURSELF?: NO

## 2025-01-02 ASSESSMENT — PAIN - FUNCTIONAL ASSESSMENT: PAIN_FUNCTIONAL_ASSESSMENT: 0-10

## 2025-01-02 NOTE — ED PROVIDER NOTES
HPI   Chief Complaint   Patient presents with    Shortness of Breath     Pt coming into ED due to shortness of breath.       Patient is a 79-year-old male with history of COPD on 2 L nasal cannula continuously at home presenting to the emergency department for complaints of shortness of breath that has been progressively getting worse over the past week.  Patient states that he was treated for pneumonia with antibiotics approximately 1 to 2 months ago and he did recover.  He did have a sick contact of a family member with upper respiratory symptoms which spread to multiple family members in the house.  Patient states has been taking multiple over-the-counter medications without improvement of his breathing which is why he presented to the emergency department today.  He endorses a productive cough but denies any fevers, chills, chest pain, abdominal pain, nausea, vomiting, diarrhea.  Patient states that he does have chronic lower extremity swelling.  He states that this has been unchanged.      History provided by:  Patient, medical records and relative          Patient History   No past medical history on file.  No past surgical history on file.  No family history on file.  Social History     Tobacco Use    Smoking status: Former     Types: Cigarettes     Passive exposure: Past    Smokeless tobacco: Not on file   Substance Use Topics    Alcohol use: Not on file    Drug use: Not on file       Physical Exam   ED Triage Vitals   Temp Pulse Resp BP   -- -- -- --      SpO2 Temp src Heart Rate Source Patient Position   -- -- -- --      BP Location FiO2 (%)     -- --       Physical Exam  Vitals and nursing note reviewed.   Constitutional:       General: He is not in acute distress.     Appearance: Normal appearance. He is not ill-appearing, toxic-appearing or diaphoretic.   HENT:      Head: Normocephalic and atraumatic.      Nose: Nose normal.      Mouth/Throat:      Mouth: Mucous membranes are moist.      Pharynx: No  oropharyngeal exudate or posterior oropharyngeal erythema.   Eyes:      General: No scleral icterus.     Extraocular Movements: Extraocular movements intact.      Pupils: Pupils are equal, round, and reactive to light.   Cardiovascular:      Rate and Rhythm: Normal rate and regular rhythm.      Pulses: Normal pulses.      Heart sounds: Normal heart sounds. No murmur heard.     No friction rub. No gallop.   Pulmonary:      Effort: Pulmonary effort is normal. No respiratory distress.      Breath sounds: No stridor. Wheezing and rales present. No rhonchi.   Chest:      Chest wall: No tenderness.   Abdominal:      General: Abdomen is flat. There is no distension.      Palpations: Abdomen is soft. There is no mass.      Tenderness: There is no abdominal tenderness. There is no guarding.      Hernia: No hernia is present.   Musculoskeletal:         General: No swelling, tenderness, deformity or signs of injury. Normal range of motion.      Cervical back: Normal range of motion and neck supple. No rigidity.      Right lower leg: Edema present.      Left lower leg: Edema present.   Skin:     General: Skin is warm and dry.      Capillary Refill: Capillary refill takes less than 2 seconds.      Coloration: Skin is not jaundiced or pale.      Findings: No bruising, erythema, lesion or rash.   Neurological:      General: No focal deficit present.      Mental Status: He is alert and oriented to person, place, and time. Mental status is at baseline.   Psychiatric:         Mood and Affect: Mood normal.         Behavior: Behavior normal.           ED Course & MDM   Diagnoses as of 01/03/25 0047   Congestive heart failure, unspecified HF chronicity, unspecified heart failure type   Shortness of breath                 No data recorded     Fatoumata Coma Scale Score: 15 (01/02/25 1547 : Janelle Gifford RN)                           Medical Decision Making  Patient is a 79-year-old male presenting to the emergency department for complaints  of shortness of breath.  Patient does appear tachypneic with conversational dyspnea.  He does have diffuse wheezing and crackles to the bilateral lower lobes.  Concern for CHF exacerbation versus pneumonia versus COPD exacerbation.  Labs, chest x-ray, EKG ordered for further evaluation.  Patient was placed on 2 L nasal cannula which she normally wears at home.  Solu-Medrol and DuoNebs ordered for symptom control.  Low suspicion for PE as the patient is currently on anticoagulation for history of A-fib.    Troponins were elevated at 31 with repeat of 31.  Patient not having any active chest pain.  CBC with minor anemia but no leukocytosis or thrombocytopenia.  Metabolic panel with elevated BUN and creatinine which appears to be consistent with patient's previous labs.  BNP is elevated at 518.  COVID influenza were negative.  Chest x-ray shows cardiomegaly and some pulmonary vascular congestion.  Given the patient's symptoms chest x-ray findings elevated BNP recommend admission for diuresis and further evaluation.  Patient states that he is not currently taking any Lasix at home and 40 mg of Lasix was ordered.  Hospital service was contacted and case discussed and patient was accepted for admission.    Amount and/or Complexity of Data Reviewed  Labs: ordered. Decision-making details documented in ED Course.  Radiology: ordered. Decision-making details documented in ED Course.  ECG/medicine tests: independent interpretation performed.      Labs Reviewed   CBC WITH AUTO DIFFERENTIAL - Abnormal       Result Value    WBC 8.2      nRBC 0.0      RBC 4.18 (*)     Hemoglobin 11.2 (*)     Hematocrit 35.4 (*)     MCV 85      MCH 26.8      MCHC 31.6 (*)     RDW 16.4 (*)     Platelets 230      Neutrophils % 83.4      Immature Granulocytes %, Automated 0.5      Lymphocytes % 7.3      Monocytes % 6.1      Eosinophils % 2.3      Basophils % 0.4      Neutrophils Absolute 6.82 (*)     Immature Granulocytes Absolute, Automated 0.04       Lymphocytes Absolute 0.60 (*)     Monocytes Absolute 0.50      Eosinophils Absolute 0.19      Basophils Absolute 0.03     COMPREHENSIVE METABOLIC PANEL - Abnormal    Glucose 141 (*)     Sodium 125 (*)     Potassium 4.2      Chloride 97 (*)     Bicarbonate 21      Anion Gap 11      Urea Nitrogen 33 (*)     Creatinine 1.73 (*)     eGFR 40 (*)     Calcium 8.7      Albumin 3.3 (*)     Alkaline Phosphatase 88      Total Protein 6.9      AST 18      Bilirubin, Total 0.4      ALT 10     B-TYPE NATRIURETIC PEPTIDE - Abnormal     (*)     Narrative:        <100 pg/mL - Heart failure unlikely  100-299 pg/mL - Intermediate probability of acute heart                  failure exacerbation. Correlate with clinical                  context and patient history.    >=300 pg/mL - Heart Failure likely. Correlate with clinical                  context and patient history.    BNP testing is performed using different testing methodology at [facility] than at other [facility]. Direct result comparisons should only be made within the same method.      SERIAL TROPONIN-INITIAL - Abnormal    Troponin I, High Sensitivity 31 (*)     Narrative:     Less than 99th percentile of normal range cutoff-  Female and children under 18 years old <14 ng/L; Male <21 ng/L: Negative  Repeat testing should be performed if clinically indicated.     Female and children under 18 years old 14-50 ng/L; Male 21-50 ng/L:  Consistent with possible cardiac damage and possible increased clinical   risk. Serial measurements may help to assess extent of myocardial damage.     >50 ng/L: Consistent with cardiac damage, increased clinical risk and  myocardial infarction. Serial measurements may help assess extent of   myocardial damage.      NOTE: Children less than 1 year old may have higher baseline troponin   levels and results should be interpreted in conjunction with the overall   clinical context.     NOTE: Troponin I testing is performed  using a different   testing methodology at [facility] than at other   [facility]. Direct result comparisons should only   be made within the same method.   SERIAL TROPONIN, 1 HOUR - Abnormal    Troponin I, High Sensitivity 31 (*)     Narrative:     Less than 99th percentile of normal range cutoff-  Female and children under 18 years old <14 ng/L; Male <21 ng/L: Negative  Repeat testing should be performed if clinically indicated.     Female and children under 18 years old 14-50 ng/L; Male 21-50 ng/L:  Consistent with possible cardiac damage and possible increased clinical   risk. Serial measurements may help to assess extent of myocardial damage.     >50 ng/L: Consistent with cardiac damage, increased clinical risk and  myocardial infarction. Serial measurements may help assess extent of   myocardial damage.      NOTE: Children less than 1 year old may have higher baseline troponin   levels and results should be interpreted in conjunction with the overall   clinical context.     NOTE: Troponin I testing is performed using a different   testing methodology at [facility] than at other   [facility]. Direct result comparisons should only   be made within the same method.   MAGNESIUM - Normal    Magnesium 1.83     SARS-COV-2 AND INFLUENZA A/B PCR - Normal    Flu A Result Not Detected      Flu B Result Not Detected      Coronavirus 2019, PCR Not Detected      Narrative:     This assay has received FDA Emergency Use Authorization (EUA) and  is only authorized for the duration of time that circumstances exist to justify the authorization of the emergency use of in vitro diagnostic tests for the detection of SARS-CoV-2 virus and/or diagnosis of COVID-19 infection under section 564(b)(1) of the Act, 21 U.S.C. 360bbb-3(b)(1). Testing for SARS-CoV-2 is only recommended for patients who meet current clinical and/or epidemiological criteria as defined by federal, state, or local public  health directives. This assay is an in vitro diagnostic nucleic acid amplification test for the qualitative detection of SARS-CoV-2, Influenza A, and Influenza B from nasopharyngeal specimens and has been validated for use at [facility]. Negative results do not preclude COVID-19 infections or Influenza A/B infections, and should not be used as the sole basis for diagnosis, treatment, or other management decisions. If Influenza A/B and RSV PCR results are negative, testing for Parainfluenza virus, Adenovirus and Metapneumovirus is routinely performed for [facility] [place]ology and intensive care inpatients, and is available on other patients by placing an add-on request.    TROPONIN SERIES- (INITIAL, 1 HR)    Narrative:     The following orders were created for panel order Troponin I Series, High Sensitivity (0, 1 HR).  Procedure                               Abnormality         Status                     ---------                               -----------         ------                     Troponin I, High Sensiti...[[id number]]  Abnormal            Final result               Troponin, High Sensitivi...[[id number]]  Abnormal            Final result                 Please view results for these tests on the individual orders.   BLOOD GAS VENOUS   BASIC METABOLIC PANEL   CBC     XR chest 1 view   Final Result   Cardiomegaly with pulmonary vascular congestion, bibasilar infiltrates   and pleural effusions.   Signed by Zach Arias MD            Procedure  Procedures     Chivo Alejo DO  01/02/25 2022       Chivo Alejo DO  01/03/25 0047

## 2025-01-03 PROBLEM — I50.9 CONGESTIVE HEART FAILURE, UNSPECIFIED HF CHRONICITY, UNSPECIFIED HEART FAILURE TYPE: Status: ACTIVE | Noted: 2025-01-03

## 2025-01-03 LAB
ANION GAP SERPL CALC-SCNC: 13 MMOL/L (ref 10–20)
BUN SERPL-MCNC: 33 MG/DL (ref 6–23)
CALCIUM SERPL-MCNC: 8.8 MG/DL (ref 8.6–10.3)
CHLORIDE SERPL-SCNC: 97 MMOL/L (ref 98–107)
CO2 SERPL-SCNC: 26 MMOL/L (ref 21–32)
CREAT SERPL-MCNC: 1.81 MG/DL (ref 0.5–1.3)
EGFRCR SERPLBLD CKD-EPI 2021: 38 ML/MIN/1.73M*2
ERYTHROCYTE [DISTWIDTH] IN BLOOD BY AUTOMATED COUNT: 16.4 % (ref 11.5–14.5)
GLUCOSE BLD MANUAL STRIP-MCNC: 180 MG/DL (ref 74–99)
GLUCOSE SERPL-MCNC: 181 MG/DL (ref 74–99)
HCT VFR BLD AUTO: 34.4 % (ref 41–52)
HGB BLD-MCNC: 10.6 G/DL (ref 13.5–17.5)
HOLD SPECIMEN: NORMAL
MCH RBC QN AUTO: 26 PG (ref 26–34)
MCHC RBC AUTO-ENTMCNC: 30.8 G/DL (ref 32–36)
MCV RBC AUTO: 84 FL (ref 80–100)
NRBC BLD-RTO: 0 /100 WBCS (ref 0–0)
PLATELET # BLD AUTO: 217 X10*3/UL (ref 150–450)
POTASSIUM SERPL-SCNC: 4.1 MMOL/L (ref 3.5–5.3)
RBC # BLD AUTO: 4.08 X10*6/UL (ref 4.5–5.9)
SODIUM SERPL-SCNC: 132 MMOL/L (ref 136–145)
WBC # BLD AUTO: 3.4 X10*3/UL (ref 4.4–11.3)

## 2025-01-03 PROCEDURE — 94640 AIRWAY INHALATION TREATMENT: CPT

## 2025-01-03 PROCEDURE — 2500000004 HC RX 250 GENERAL PHARMACY W/ HCPCS (ALT 636 FOR OP/ED): Performed by: HOSPITALIST

## 2025-01-03 PROCEDURE — 2500000001 HC RX 250 WO HCPCS SELF ADMINISTERED DRUGS (ALT 637 FOR MEDICARE OP): Performed by: NURSE PRACTITIONER

## 2025-01-03 PROCEDURE — 99232 SBSQ HOSP IP/OBS MODERATE 35: CPT | Performed by: HOSPITALIST

## 2025-01-03 PROCEDURE — 2500000002 HC RX 250 W HCPCS SELF ADMINISTERED DRUGS (ALT 637 FOR MEDICARE OP, ALT 636 FOR OP/ED): Performed by: NURSE PRACTITIONER

## 2025-01-03 PROCEDURE — 1200000002 HC GENERAL ROOM WITH TELEMETRY DAILY

## 2025-01-03 PROCEDURE — 2500000004 HC RX 250 GENERAL PHARMACY W/ HCPCS (ALT 636 FOR OP/ED): Performed by: NURSE PRACTITIONER

## 2025-01-03 PROCEDURE — 36415 COLL VENOUS BLD VENIPUNCTURE: CPT | Performed by: NURSE PRACTITIONER

## 2025-01-03 PROCEDURE — 94660 CPAP INITIATION&MGMT: CPT

## 2025-01-03 PROCEDURE — 82947 ASSAY GLUCOSE BLOOD QUANT: CPT

## 2025-01-03 PROCEDURE — 80048 BASIC METABOLIC PNL TOTAL CA: CPT | Performed by: NURSE PRACTITIONER

## 2025-01-03 PROCEDURE — 2500000005 HC RX 250 GENERAL PHARMACY W/O HCPCS: Performed by: HOSPITALIST

## 2025-01-03 PROCEDURE — 2500000002 HC RX 250 W HCPCS SELF ADMINISTERED DRUGS (ALT 637 FOR MEDICARE OP, ALT 636 FOR OP/ED): Performed by: HOSPITALIST

## 2025-01-03 PROCEDURE — 82374 ASSAY BLOOD CARBON DIOXIDE: CPT | Performed by: NURSE PRACTITIONER

## 2025-01-03 PROCEDURE — 85027 COMPLETE CBC AUTOMATED: CPT | Performed by: NURSE PRACTITIONER

## 2025-01-03 RX ORDER — FERROUS SULFATE 325(65) MG
325 TABLET ORAL 2 TIMES DAILY
Status: ON HOLD | COMMUNITY

## 2025-01-03 RX ORDER — METHOCARBAMOL 500 MG/1
500 TABLET, FILM COATED ORAL 2 TIMES DAILY
Status: ON HOLD | COMMUNITY

## 2025-01-03 RX ORDER — VIT C/E/ZN/COPPR/LUTEIN/ZEAXAN 250MG-90MG
1000 CAPSULE ORAL DAILY
Status: DISPENSED | OUTPATIENT
Start: 2025-01-03

## 2025-01-03 RX ORDER — METFORMIN HYDROCHLORIDE 500 MG/1
500 TABLET ORAL
Status: ON HOLD | COMMUNITY

## 2025-01-03 RX ORDER — GUAIFENESIN 600 MG/1
1200 TABLET, EXTENDED RELEASE ORAL 2 TIMES DAILY
Qty: 20 TABLET | Refills: 0 | Status: SHIPPED | OUTPATIENT
Start: 2025-01-03

## 2025-01-03 RX ORDER — AZITHROMYCIN 500 MG/1
500 TABLET, FILM COATED ORAL DAILY
Qty: 5 TABLET | Refills: 0 | Status: SHIPPED | OUTPATIENT
Start: 2025-01-03

## 2025-01-03 RX ORDER — VIT C/E/ZN/COPPR/LUTEIN/ZEAXAN 250MG-90MG
50 CAPSULE ORAL DAILY
Status: ON HOLD | COMMUNITY

## 2025-01-03 RX ORDER — METHOCARBAMOL 500 MG/1
500 TABLET, FILM COATED ORAL 2 TIMES DAILY
Status: DISPENSED | OUTPATIENT
Start: 2025-01-03

## 2025-01-03 RX ORDER — NITROGLYCERIN 0.4 MG/1
0.4 TABLET SUBLINGUAL EVERY 5 MIN PRN
Status: ON HOLD | COMMUNITY

## 2025-01-03 RX ORDER — GUAIFENESIN 600 MG/1
600 TABLET, EXTENDED RELEASE ORAL 2 TIMES DAILY PRN
Status: DISPENSED | OUTPATIENT
Start: 2025-01-03

## 2025-01-03 RX ORDER — AZITHROMYCIN 250 MG/1
500 TABLET, FILM COATED ORAL
Status: DISPENSED | OUTPATIENT
Start: 2025-01-03

## 2025-01-03 RX ORDER — FUROSEMIDE 20 MG/1
20 TABLET ORAL DAILY
Qty: 30 TABLET | Refills: 0 | Status: SHIPPED | OUTPATIENT
Start: 2025-01-03 | End: 2025-02-02

## 2025-01-03 RX ORDER — METHYLPREDNISOLONE 4 MG/1
TABLET ORAL
Qty: 21 TABLET | Refills: 0 | Status: SHIPPED | OUTPATIENT
Start: 2025-01-03 | End: 2025-01-09

## 2025-01-03 RX ORDER — NITROGLYCERIN 0.4 MG/1
0.4 TABLET SUBLINGUAL EVERY 5 MIN PRN
Status: ACTIVE | OUTPATIENT
Start: 2025-01-03

## 2025-01-03 RX ORDER — TRAMADOL HYDROCHLORIDE 50 MG/1
50 TABLET ORAL 3 TIMES DAILY
Status: DISPENSED | OUTPATIENT
Start: 2025-01-03

## 2025-01-03 RX ORDER — LANOLIN ALCOHOL/MO/W.PET/CERES
1000 CREAM (GRAM) TOPICAL DAILY
Status: ON HOLD | COMMUNITY

## 2025-01-03 RX ORDER — FLUTICASONE FUROATE AND VILANTEROL 200; 25 UG/1; UG/1
1 POWDER RESPIRATORY (INHALATION)
Status: DISPENSED | OUTPATIENT
Start: 2025-01-03

## 2025-01-03 RX ORDER — PREDNISONE 20 MG/1
40 TABLET ORAL DAILY
Status: DISPENSED | OUTPATIENT
Start: 2025-01-03

## 2025-01-03 RX ORDER — BUDESONIDE, GLYCOPYRROLATE, AND FORMOTEROL FUMARATE 160; 9; 4.8 UG/1; UG/1; UG/1
AEROSOL, METERED RESPIRATORY (INHALATION)
Status: ON HOLD | COMMUNITY

## 2025-01-03 RX ADMIN — IPRATROPIUM BROMIDE AND ALBUTEROL SULFATE 3 ML: 2.5; .5 SOLUTION RESPIRATORY (INHALATION) at 20:38

## 2025-01-03 RX ADMIN — TRAMADOL HYDROCHLORIDE 50 MG: 50 TABLET, FILM COATED ORAL at 21:35

## 2025-01-03 RX ADMIN — Medication 1000 MCG: at 08:59

## 2025-01-03 RX ADMIN — IPRATROPIUM BROMIDE AND ALBUTEROL SULFATE 3 ML: 2.5; .5 SOLUTION RESPIRATORY (INHALATION) at 00:49

## 2025-01-03 RX ADMIN — IPRATROPIUM BROMIDE AND ALBUTEROL SULFATE 3 ML: 2.5; .5 SOLUTION RESPIRATORY (INHALATION) at 08:17

## 2025-01-03 RX ADMIN — FLUTICASONE FUROATE AND VILANTEROL TRIFENATATE 1 PUFF: 200; 25 POWDER RESPIRATORY (INHALATION) at 08:58

## 2025-01-03 RX ADMIN — AZITHROMYCIN DIHYDRATE 500 MG: 250 TABLET, FILM COATED ORAL at 08:59

## 2025-01-03 RX ADMIN — METHOCARBAMOL TABLETS 500 MG: 500 TABLET, COATED ORAL at 21:35

## 2025-01-03 RX ADMIN — PREDNISONE 40 MG: 20 TABLET ORAL at 08:59

## 2025-01-03 RX ADMIN — TRAMADOL HYDROCHLORIDE 50 MG: 50 TABLET, FILM COATED ORAL at 15:35

## 2025-01-03 RX ADMIN — IPRATROPIUM BROMIDE AND ALBUTEROL SULFATE 3 ML: 2.5; .5 SOLUTION RESPIRATORY (INHALATION) at 13:18

## 2025-01-03 RX ADMIN — TRAMADOL HYDROCHLORIDE 50 MG: 50 TABLET, FILM COATED ORAL at 08:59

## 2025-01-03 RX ADMIN — METHOCARBAMOL TABLETS 500 MG: 500 TABLET, COATED ORAL at 02:05

## 2025-01-03 RX ADMIN — TIOTROPIUM BROMIDE INHALATION SPRAY 2 PUFF: 3.12 SPRAY, METERED RESPIRATORY (INHALATION) at 08:58

## 2025-01-03 RX ADMIN — FUROSEMIDE 40 MG: 10 INJECTION, SOLUTION INTRAMUSCULAR; INTRAVENOUS at 10:17

## 2025-01-03 RX ADMIN — METHOCARBAMOL TABLETS 500 MG: 500 TABLET, COATED ORAL at 08:59

## 2025-01-03 RX ADMIN — Medication 3 L/MIN: at 13:45

## 2025-01-03 RX ADMIN — FUROSEMIDE 40 MG: 10 INJECTION, SOLUTION INTRAMUSCULAR; INTRAVENOUS at 21:35

## 2025-01-03 RX ADMIN — FUROSEMIDE 40 MG: 10 INJECTION, SOLUTION INTRAMUSCULAR; INTRAVENOUS at 01:49

## 2025-01-03 ASSESSMENT — PAIN - FUNCTIONAL ASSESSMENT: PAIN_FUNCTIONAL_ASSESSMENT: 0-10

## 2025-01-03 ASSESSMENT — PAIN SCALES - GENERAL
PAINLEVEL_OUTOF10: 10 - WORST POSSIBLE PAIN
PAINLEVEL_OUTOF10: 0 - NO PAIN

## 2025-01-03 ASSESSMENT — ACTIVITIES OF DAILY LIVING (ADL): LACK_OF_TRANSPORTATION: NO

## 2025-01-03 ASSESSMENT — COGNITIVE AND FUNCTIONAL STATUS - GENERAL
WALKING IN HOSPITAL ROOM: A LITTLE
CLIMB 3 TO 5 STEPS WITH RAILING: A LITTLE
DAILY ACTIVITIY SCORE: 24
MOBILITY SCORE: 22

## 2025-01-03 NOTE — DISCHARGE SUMMARY
Discharge Diagnosis  Fluid overload    Issues Requiring Follow-Up  Follow up with PCP in 1-2 days       Discharge Meds     Medication List      START taking these medications     azithromycin 500 mg tablet; Commonly known as: Zithromax; Take 1 tablet   (500 mg) by mouth once daily.   furosemide 20 mg tablet; Commonly known as: Lasix; Take 1 tablet (20 mg)   by mouth once daily.   guaiFENesin 600 mg 12 hr tablet; Commonly known as: Mucinex; Take 2   tablets (1,200 mg) by mouth 2 times a day. Do not crush, chew, or split.   methylPREDNISolone 4 mg tablets; Commonly known as: Medrol Dospak; Take   as directed on package.     CONTINUE taking these medications     apixaban 5 mg tablet; Commonly known as: Eliquis   Breztri Aerosphere 160-9-4.8 mcg/actuation HFA aerosol inhaler; Generic   drug: budesonide-glycopyr-formoterol   cholecalciferol 25 MCG (1000 UT) capsule; Commonly known as: Vitamin D-3   cyanocobalamin 1,000 mcg tablet; Commonly known as: Vitamin B-12   ferrous sulfate (325 mg ferrous sulfate) tablet   metFORMIN 500 mg tablet; Commonly known as: Glucophage   methocarbamol 500 mg tablet; Commonly known as: Robaxin   nitroglycerin 0.4 mg SL tablet; Commonly known as: Nitrostat   traMADol 50 mg tablet; Commonly known as: Ultram       Test Results Pending At Discharge  Pending Labs       Order Current Status    BLOOD GAS VENOUS In process            Hospital Course     Jose Byrne is a 79 y.o. male with a past medical history of hypertension, hyperlipidemia, diabetic neuropathy, CKD, type II DM, COPD (w/PRN O2 use, cpap), CAD, CHF s/p pacemaker placement (LVEF 65%, 02/10/2023), MI, and obesity who presented to the ED with shortness of breath. He reports several sick contacts at home. He denies any fever, chills, chest pain, nausea, or vomiting. He endorses shortness of breath, orthopnea, leg swelling, and productive cough with white/grey sputum. He appears to be a poor historian regarding his medications      Patient admitted to the hospital for observation.  He was treated with albuterol [place] nebulizer.  He was given IV Lasix.  He was started on prednisone daily.  He was given Mucinex twice daily.  He was given azithromycin 500 mg daily.  Patient discharged in stable condition Mucinex twice daily, azithromycin 5 mg for 5 days, and Medrol Dosepak.    Pertinent Physical Exam At Time of Discharge  Physical Exam  Constitutional:       Appearance: Normal appearance.   HENT:      Head: Normocephalic and atraumatic.      Mouth/Throat:      Mouth: Mucous membranes are moist.   Eyes:      Extraocular Movements: Extraocular movements intact.      Pupils: Pupils are equal, round, and reactive to light.   Pulmonary:      Effort: Pulmonary effort is normal.      Breath sounds: Wheezing present.   Abdominal:      General: Abdomen is flat.      Palpations: Abdomen is soft.   Musculoskeletal:         General: Normal range of motion.   Skin:     General: Skin is warm.   Neurological:      General: No focal deficit present.      Mental Status: He is alert and oriented to person, place, and time.   Psychiatric:         Mood and Affect: Mood normal.         Behavior: Behavior normal.         Outpatient Follow-Up  No future appointments.    Discharge time>30 min     Alonzo Banuelos MD

## 2025-01-03 NOTE — PROGRESS NOTES
Jose Byrne is a 79 y.o. male on day 0 of admission presenting with Fluid overload.      Subjective   Seen and examined   Clinically stable   C/w SOB and wheezing   No chest pain   Cough  NO fever        Objective     Last Recorded Vitals  /64 (BP Location: Left arm, Patient Position: Lying)   Pulse 80   Temp 36.6 °C (97.9 °F) (Temporal)   Resp 24   Wt 97.1 kg (214 lb 1.1 oz)   SpO2 97%   Intake/Output last 3 Shifts:  No intake or output data in the 24 hours ending 01/03/25 1311    Admission Weight  Weight: 98.9 kg (218 lb) (01/02/25 1546)    Daily Weight  01/02/25 : 97.1 kg (214 lb 1.1 oz)    Image Results  ECG 12 lead  Ventricular-paced rhythm  Abnormal ECG  No previous ECGs available    See ED provider note for full interpretation and clinical correlation  XR chest 1 view  Narrative: STUDY:  Chest Radiograph;  1/2/2025 4:00 PM  INDICATION:  Chest pain.  COMPARISON:  None Available.  ACCESSION NUMBER(S):  [id number]  ORDERING CLINICIAN:  MERLE ARANA  TECHNIQUE:  Frontal chest was obtained at 16:00 hours.  FINDINGS:  The patient status post median sternotomy.  There is a dual lead  pacemaker present.  CARDIOMEDIASTINAL SILHOUETTE:  Cardiomediastinal silhouette is enlarged.     LUNGS:  There is pulmonary vascular congestion.  There are bibasilar  infiltrates with pleural effusions.     ABDOMEN:  No remarkable upper abdominal findings.     BONES:  No acute osseous changes.  Impression: Cardiomegaly with pulmonary vascular congestion, bibasilar infiltrates  and pleural effusions.  Signed by Zach Arias MD      Physical Exam  HENT:      Head: Normocephalic and atraumatic.      Nose: Nose normal.      Mouth/Throat:      Mouth: Mucous membranes are dry.   Eyes:      Extraocular Movements: Extraocular movements intact.      Pupils: Pupils are equal, round, and reactive to light.   Cardiovascular:      Rate and Rhythm: Normal rate and regular rhythm.   Pulmonary:      Effort: Respiratory distress  present.      Breath sounds: Wheezing present.   Abdominal:      General: Abdomen is flat.      Palpations: Abdomen is soft.   Musculoskeletal:         General: Normal range of motion.      Cervical back: Normal range of motion and neck supple.   Skin:     General: Skin is warm.   Neurological:      General: No focal deficit present.      Mental Status: He is alert and oriented to person, place, and time.   Psychiatric:         Mood and Affect: Mood normal.         Behavior: Behavior normal.             Assessment/Plan        Assessment & Plan  Fluid overload    Acute on chronic diastolic CHF    Seen and examined   Clinically slightly better  Still c/w SOB and cough   No fever   On NC at 3 liter per min   Saturation is 97%  Albuterol and Atrovent neb   Prednisone PO daily   Azithromycin PO daily   Lasix IV BID     Change to inpatient     Acute COPD exacerbation   Albuterol and Atrovent neb   Prednisone PO daily   Azithromycin PO daily     Community acquired pneumonia  Azithromycin PO daily   Add Rocephin IV daily     CKD III  BMP daily     HTN: controlled     DMII:   Accu check AC/HS   SSI   Hold Metformin     HLD: Not on Statin    DVT : Ren Banuelos MD

## 2025-01-03 NOTE — CONSULTS
Heart Failure Nurse Navigator    The role of the HF nurse navigator is to (1) characterize risk profiles of patients with heart failure transitioning from [id number] after hospitalization, (2) recommend interventions to improve care and reduce risks of worse post-hospitalization outcomes. Potential recommendations may touch base on patient/family education, additional consults that may bring value, and appointment scheduling.    History    I met with Jose Byrne at the bedside, and my impressions include: Met with patient for heart failure education. Patient verbalized understanding. Patient lives alone in a duplex but his daughter and her family live on the other side. [facility]. He states he is compliant with all his medications and follow up appointments. His daughter takes him to his follow up appointments. [facility] but states they are not always punctual and sometimes they send them in the mail and sometimes they do not. He wears 2.6 liters of oxygen at home. Answered all questions. Provided my contact information should any other questions or concerns arise.       [facility]    Patients Primary Care Provider: Dr. Cohen     1. Medical Domain  What is the patient's most recent LVEF? 65%  HFrEF (LVEF <= 40%) Quadruple therapy recommended  HFmrEF (LVEF 41-49%) Quadruple therapy recommended  HFpEF (LVEF >= 50%) Minimum recommendations: SGLT2i and MRA  Is the patient on GDMT for their condition?   ARNI/ACEI/ARB: No None  BB: No None  MRA: No None  SGLT2i: No None  Is the patient prescribed a diuretic? Yes  Furosemide 20 mg daily  Does this patient have an implanted device (ie cardioMEMS, ICD, CRT-D)?  Device type: pacemaker  Could this patient have advanced heart failure (Stage D heart failure)?: No   If yes, the potential markers of advanced heart failure include:  "N/A    REFERENCE: Potential markers of advanced HF   Inotrope (dobutamine or milrinone) used during this admission?   LVEF<=25%?   >=2 hospitalizations for ADHF in the last year?   Severe symptoms of HF (fatigue, dyspnea, confusion, edema) despite medical therapy?   Downtitration of GDMT as compared to home medications?   Discontinuation of GDMT because of hypotension or renal intolerance?   Recurrent arrhythmias (AF, VT with ICD shocks)?   Cardiac cachexia (i.e., unintentional weight loss due to HF)?   High-risk biomarker profile (e.g., hyponatremia [Na<135], very elevated BNP, worsening kidney function)   Escalating doses of diuretics or persistent edema despite escalation     2. Mind and Emotion  Does this patient have possible cognitive impairment?: No   Ask the patient to memorize these 3 words: banana, sunrise, chair  Ask the patient to draw a clock with hands pointing at \"20 minutes after 8\"  Ask the patient to recall the 3 words  Score: Add number of words recalled + clock drawing (0 points for any errors, 2 points if correct)  Interpretation: A score of 0-2 suggests cognitive impairment is present, a score of 3-5 suggests cognitive impairment is absent  Does this patient have major depression?: No (PH-Q2 score 2/6)  Over the last 2 weeks: Little interest or pleasure in doing things? (Not at all +0, Several days +1, More than half the days +2, Nearly every day +3)  Over the last 2 weeks: Feeling down, depressed or hopeless? (Not at all +0, Several days +1, More than half the days +2, Nearly every day +3)  Score: Add points  Interpretation: A score of 3 or more suggests that major depression is likely.     3. Physical Function  Could this patient be frail?: Yes   Defined by presence of all of these: slowness, weakness, shrinking, inactivity, exhaustion  Is this patient at risk for falling?: Yes   Defined by having experienced a fall in the last 12 months.    4. Social Determinants of Health  Transportation " deficits?: No   Lack of insurance?: No   Poor financial resources?: No   Living conditions (homelessness, unstable home)?: No   Poor family/social support?: No   Poor personal care?: No   Food insecurity?: No       I provided the following heart failure education:  - Living With Heart Failure packet provided.  - HF signs and symptoms, heart failure zones and when to call cardiologist.   - Controlling Heart Failure at Home: medication adherence, following up with cardiologist at least once yearly, staying healthy and active, limiting sodium and fluid intake as directed by cardiologist.  - Daily Weight Education      Recommendations/ Plan   Recommend Inpatient consult to general cardiology.     Recommend scheduling an appointment with a heart failure cardiologist post-discharge (Patient is not on complete regimen of GDMT for heart failure).  Recommend evaluation by , geriatrician, or psychiatrist (Patient may have cognitive impairment and/or depression).  Recommend evaluation by physical therapist, nutritionist, and/or Palliative Medicine consultant (Patient may be frail and/or at risk of falling).

## 2025-01-03 NOTE — PROGRESS NOTES
Patient is going home daughter and her family have attached duplex. Patient had a lot of what he feels is [facility]ful[facility] for [facility] what he feels he needs with regard to medical care, hearing aids, transportation,  aids etc. He is however independent and daughter assists with driving. [facility].

## 2025-01-03 NOTE — DISCHARGE INSTRUCTIONS
-You have been started on metoprolol succinate 25 mg daily and Lasix 20 mg daily please continue to take these medications.  Please follow-up with your cardiologist    HEART FAILURE EDUCATION:  1. Weigh yourself daily and record on your weight log.  2. If you gain more than 2 or 3 pounds overnight, call your cardiologist.  3. Follow a low sodium diet. No more than 2000 mg in one day, or more than 650 mg per meal.  4. Limit total fluids to no more than 8 cups (or 2 liters) per day - this includes all fluids (water, coffee, juice, milk, tea, etc.)  5. Monitor your blood pressure daily and record on your weight log.  6. Call to schedule your follow-up appointments when you get home if they were not already scheduled for you.  7. Keep your follow-up appointments! Bring your weight log with you so the doctors can see your weight trend and blood pressure readings.  8. Be sure to  any new prescriptions and take them as directed. If unsure of the medications, be sure to call your cardiologist.  9. Stay as active as you can tolerate.   10. If you notice subtle change of symptoms (slight increase in swelling, slight shortness of breath, a new intolerance to laying flat, a new cough), be sure to call your cardiologist.  11. If you have any questions or concerns or you have not heard back from the cardiologist, feel free to call Giselle Torres heart failure navigator at [phone].

## 2025-01-03 NOTE — H&P
History Of Present Illness  Jose Byrne is a 79 y.o. male with a past medical history of hypertension, hyperlipidemia, diabetic neuropathy, CKD, type II DM, COPD (w/PRN O2 use, cpap), CAD, CHF s/p pacemaker placement (LVEF 65%, 02/10/2023), MI, and obesity who presented to the ED with shortness of breath. He reports several sick contacts at home. He denies any fever, chills, chest pain, nausea, or vomiting. He endorses shortness of breath, orthopnea, leg swelling, and productive cough with white/grey sputum. He appears to be a poor historian regarding his medications.     Past Medical History  No past medical history on file.    Surgical History  No past surgical history on file.     Social History  He has no history on file for tobacco use, alcohol use, and drug use.    Family History  No family history on file.     Allergies  Penicillins, Iodinated contrast media, Iodine, and Iodides    Review of Systems   Constitutional:  Negative for chills and fever.   Respiratory:  Positive for choking, shortness of breath and wheezing.    Cardiovascular:  Positive for leg swelling. Negative for chest pain and palpitations.   Gastrointestinal:  Negative for abdominal pain, constipation, diarrhea, nausea and vomiting.   Genitourinary:  Negative for dysuria, flank pain, frequency, hematuria and urgency.   All other systems reviewed and are negative.       Physical Exam  Vitals reviewed.   Constitutional:       Appearance: He is obese.   HENT:      Head: Normocephalic and atraumatic.   Cardiovascular:      Rate and Rhythm: Normal rate and regular rhythm.      Heart sounds: Normal heart sounds.   Pulmonary:      Effort: Pulmonary effort is normal.      Breath sounds: Normal air entry.   Abdominal:      General: Bowel sounds are normal.      Palpations: Abdomen is soft.      Tenderness: There is no abdominal tenderness.   Musculoskeletal:         General: No deformity.      Right lower leg: Edema present.      Left lower leg:  "Edema present.   Skin:     General: Skin is warm and dry.   Neurological:      General: No focal deficit present.      Mental Status: He is alert and oriented to person, place, and time.   Psychiatric:         Mood and Affect: Mood normal.         Behavior: Behavior normal.          Last Recorded Vitals  Blood pressure 142/82, pulse 79, temperature 36.4 °C (97.5 °F), temperature source Temporal, resp. rate (!) 22, height 1.676 m (5' 6\"), weight 98.9 kg (218 lb), SpO2 100%.    Relevant Results      Lab Results   Component Value Date    WBC 8.2 01/02/2025    HGB 11.2 (L) 01/02/2025    HCT 35.4 (L) 01/02/2025    MCV 85 01/02/2025     01/02/2025     Lab Results   Component Value Date    GLUCOSE 141 (H) 01/02/2025    CALCIUM 8.7 01/02/2025     (L) 01/02/2025    K 4.2 01/02/2025    CO2 21 01/02/2025    CL 97 (L) 01/02/2025    BUN 33 (H) 01/02/2025    CREATININE 1.73 (H) 01/02/2025     XR chest 1 view  Narrative: STUDY:  Chest Radiograph;  1/2/2025 4:00 PM  INDICATION:  Chest pain.  COMPARISON:  None Available.  ACCESSION NUMBER(S):  [id number]  ORDERING CLINICIAN:  MERLE ARANA  TECHNIQUE:  Frontal chest was obtained at 16:00 hours.  FINDINGS:  The patient status post median sternotomy.  There is a dual lead  pacemaker present.  CARDIOMEDIASTINAL SILHOUETTE:  Cardiomediastinal silhouette is enlarged.     LUNGS:  There is pulmonary vascular congestion.  There are bibasilar  infiltrates with pleural effusions.     ABDOMEN:  No remarkable upper abdominal findings.     BONES:  No acute osseous changes.  Impression: Cardiomegaly with pulmonary vascular congestion, bibasilar infiltrates  and pleural effusions.  Signed by Zach Arias MD    ED Medication Administration from 01/02/2025 1532 to 01/02/2025 1906         Date/Time Order Dose Route Action Action by     01/02/2025 1619 EST methylPREDNISolone sod succinate (SOLU-Medrol) injection 125 mg 125 mg intravenous Given GRETCHEN Gifford     01/02/2025 1627 EST " ipratropium-albuteroL (Duo-Neb) 0.5-2.5 mg/3 mL nebulizer solution 3 mL 3 mL nebulization Given ARMIN Guzman               Assessment/Plan   Assessment & Plan  Fluid overload      #HFpEF  #Fluid overload  #Hyponatremia  #Elevated troponin  -CXR with cardiomegaly, pulmonary vascular congestion  -BNP elevated  -Has not been taking lasix  -Lasix 40 given in ED, continue BID  -2000 mL fluid restriction  -Strict I&O  -Suspect troponin 2/2 ADHF, CKD, low suspicion for ACS  -Telemetry  -Consult cardiology for medication optimization    #COPD  -Given solumedrol in ED, will DC  -Duonebs  -2L oxygen is baseline  -Continue home inhalers    #CKD  -Appears near baseline  -Renal dosing  -Follow RFP    #HTN  #HLD  -Continue home meds pending nursing review           James Oneill, APRN-CNP

## 2025-01-04 ENCOUNTER — APPOINTMENT (OUTPATIENT)
Dept: CARDIOLOGY | Facility: HOSPITAL | Age: 80
DRG: 291 | End: 2025-01-04
Payer: MEDICARE

## 2025-01-04 LAB
ANION GAP SERPL CALC-SCNC: 14 MMOL/L (ref 10–20)
BUN SERPL-MCNC: 38 MG/DL (ref 6–23)
CALCIUM SERPL-MCNC: 9.1 MG/DL (ref 8.6–10.3)
CHLORIDE SERPL-SCNC: 97 MMOL/L (ref 98–107)
CO2 SERPL-SCNC: 29 MMOL/L (ref 21–32)
CREAT SERPL-MCNC: 1.84 MG/DL (ref 0.5–1.3)
EGFRCR SERPLBLD CKD-EPI 2021: 37 ML/MIN/1.73M*2
ERYTHROCYTE [DISTWIDTH] IN BLOOD BY AUTOMATED COUNT: 17 % (ref 11.5–14.5)
EST. AVERAGE GLUCOSE BLD GHB EST-MCNC: 114 MG/DL
GLUCOSE BLD MANUAL STRIP-MCNC: 223 MG/DL (ref 74–99)
GLUCOSE BLD MANUAL STRIP-MCNC: 231 MG/DL (ref 74–99)
GLUCOSE SERPL-MCNC: 117 MG/DL (ref 74–99)
HBA1C MFR BLD: 5.6 %
HCT VFR BLD AUTO: 34 % (ref 41–52)
HGB BLD-MCNC: 10.6 G/DL (ref 13.5–17.5)
MCH RBC QN AUTO: 26.4 PG (ref 26–34)
MCHC RBC AUTO-ENTMCNC: 31.2 G/DL (ref 32–36)
MCV RBC AUTO: 85 FL (ref 80–100)
NRBC BLD-RTO: 0 /100 WBCS (ref 0–0)
PLATELET # BLD AUTO: 222 X10*3/UL (ref 150–450)
POTASSIUM SERPL-SCNC: 4 MMOL/L (ref 3.5–5.3)
RBC # BLD AUTO: 4.01 X10*6/UL (ref 4.5–5.9)
SODIUM SERPL-SCNC: 136 MMOL/L (ref 136–145)
WBC # BLD AUTO: 7.6 X10*3/UL (ref 4.4–11.3)

## 2025-01-04 PROCEDURE — 2500000002 HC RX 250 W HCPCS SELF ADMINISTERED DRUGS (ALT 637 FOR MEDICARE OP, ALT 636 FOR OP/ED): Performed by: NURSE PRACTITIONER

## 2025-01-04 PROCEDURE — 93306 TTE W/DOPPLER COMPLETE: CPT

## 2025-01-04 PROCEDURE — 94640 AIRWAY INHALATION TREATMENT: CPT

## 2025-01-04 PROCEDURE — 83036 HEMOGLOBIN GLYCOSYLATED A1C: CPT | Mod: ELYLAB | Performed by: HOSPITALIST

## 2025-01-04 PROCEDURE — 2500000001 HC RX 250 WO HCPCS SELF ADMINISTERED DRUGS (ALT 637 FOR MEDICARE OP): Performed by: NURSE PRACTITIONER

## 2025-01-04 PROCEDURE — 80048 BASIC METABOLIC PNL TOTAL CA: CPT | Performed by: NURSE PRACTITIONER

## 2025-01-04 PROCEDURE — 93306 TTE W/DOPPLER COMPLETE: CPT | Performed by: INTERNAL MEDICINE

## 2025-01-04 PROCEDURE — 2500000004 HC RX 250 GENERAL PHARMACY W/ HCPCS (ALT 636 FOR OP/ED): Performed by: NURSE PRACTITIONER

## 2025-01-04 PROCEDURE — 85027 COMPLETE CBC AUTOMATED: CPT | Performed by: NURSE PRACTITIONER

## 2025-01-04 PROCEDURE — 36415 COLL VENOUS BLD VENIPUNCTURE: CPT | Performed by: NURSE PRACTITIONER

## 2025-01-04 PROCEDURE — 82947 ASSAY GLUCOSE BLOOD QUANT: CPT

## 2025-01-04 PROCEDURE — 2500000004 HC RX 250 GENERAL PHARMACY W/ HCPCS (ALT 636 FOR OP/ED): Performed by: HOSPITALIST

## 2025-01-04 PROCEDURE — 2500000001 HC RX 250 WO HCPCS SELF ADMINISTERED DRUGS (ALT 637 FOR MEDICARE OP): Performed by: HOSPITALIST

## 2025-01-04 PROCEDURE — 99232 SBSQ HOSP IP/OBS MODERATE 35: CPT | Performed by: HOSPITALIST

## 2025-01-04 PROCEDURE — 1200000002 HC GENERAL ROOM WITH TELEMETRY DAILY

## 2025-01-04 PROCEDURE — 2500000002 HC RX 250 W HCPCS SELF ADMINISTERED DRUGS (ALT 637 FOR MEDICARE OP, ALT 636 FOR OP/ED): Performed by: HOSPITALIST

## 2025-01-04 RX ORDER — INSULIN LISPRO 100 [IU]/ML
0-5 INJECTION, SOLUTION INTRAVENOUS; SUBCUTANEOUS
Status: DISCONTINUED | OUTPATIENT
Start: 2025-01-04 | End: 2025-01-05

## 2025-01-04 RX ORDER — DEXTROSE 50 % IN WATER (D50W) INTRAVENOUS SYRINGE
12.5
Status: ACTIVE | OUTPATIENT
Start: 2025-01-04

## 2025-01-04 RX ORDER — DEXTROSE 50 % IN WATER (D50W) INTRAVENOUS SYRINGE
25
Status: ACTIVE | OUTPATIENT
Start: 2025-01-04

## 2025-01-04 RX ORDER — HEPARIN SODIUM 5000 [USP'U]/ML
5000 INJECTION, SOLUTION INTRAVENOUS; SUBCUTANEOUS EVERY 8 HOURS
Status: DISCONTINUED | OUTPATIENT
Start: 2025-01-04 | End: 2025-01-05

## 2025-01-04 RX ORDER — FUROSEMIDE 40 MG/1
40 TABLET ORAL DAILY
Status: DISPENSED | OUTPATIENT
Start: 2025-01-04

## 2025-01-04 RX ADMIN — FLUTICASONE FUROATE AND VILANTEROL TRIFENATATE 1 PUFF: 200; 25 POWDER RESPIRATORY (INHALATION) at 06:10

## 2025-01-04 RX ADMIN — IPRATROPIUM BROMIDE AND ALBUTEROL SULFATE 3 ML: 2.5; .5 SOLUTION RESPIRATORY (INHALATION) at 06:55

## 2025-01-04 RX ADMIN — TRAMADOL HYDROCHLORIDE 50 MG: 50 TABLET, FILM COATED ORAL at 15:58

## 2025-01-04 RX ADMIN — TRAMADOL HYDROCHLORIDE 50 MG: 50 TABLET, FILM COATED ORAL at 20:34

## 2025-01-04 RX ADMIN — METHOCARBAMOL TABLETS 500 MG: 500 TABLET, COATED ORAL at 09:29

## 2025-01-04 RX ADMIN — IPRATROPIUM BROMIDE AND ALBUTEROL SULFATE 3 ML: 2.5; .5 SOLUTION RESPIRATORY (INHALATION) at 19:15

## 2025-01-04 RX ADMIN — HEPARIN SODIUM 5000 UNITS: 5000 INJECTION INTRAVENOUS; SUBCUTANEOUS at 15:58

## 2025-01-04 RX ADMIN — Medication 1000 MCG: at 09:29

## 2025-01-04 RX ADMIN — PREDNISONE 40 MG: 20 TABLET ORAL at 09:29

## 2025-01-04 RX ADMIN — IPRATROPIUM BROMIDE AND ALBUTEROL SULFATE 3 ML: 2.5; .5 SOLUTION RESPIRATORY (INHALATION) at 13:13

## 2025-01-04 RX ADMIN — FUROSEMIDE 40 MG: 10 INJECTION, SOLUTION INTRAMUSCULAR; INTRAVENOUS at 09:29

## 2025-01-04 RX ADMIN — SALINE NASAL SPRAY 1 SPRAY: 1.5 SOLUTION NASAL at 16:15

## 2025-01-04 RX ADMIN — TIOTROPIUM BROMIDE INHALATION SPRAY 2 PUFF: 3.12 SPRAY, METERED RESPIRATORY (INHALATION) at 06:10

## 2025-01-04 RX ADMIN — HEPARIN SODIUM 5000 UNITS: 5000 INJECTION INTRAVENOUS; SUBCUTANEOUS at 22:47

## 2025-01-04 RX ADMIN — METHOCARBAMOL TABLETS 500 MG: 500 TABLET, COATED ORAL at 20:35

## 2025-01-04 RX ADMIN — AZITHROMYCIN DIHYDRATE 500 MG: 250 TABLET, FILM COATED ORAL at 09:29

## 2025-01-04 RX ADMIN — TRAMADOL HYDROCHLORIDE 50 MG: 50 TABLET, FILM COATED ORAL at 09:28

## 2025-01-04 RX ADMIN — ACETAMINOPHEN 650 MG: 325 TABLET ORAL at 18:41

## 2025-01-04 ASSESSMENT — PAIN SCALES - GENERAL
PAINLEVEL_OUTOF10: 8
PAINLEVEL_OUTOF10: 4
PAINLEVEL_OUTOF10: 5 - MODERATE PAIN
PAINLEVEL_OUTOF10: 8

## 2025-01-04 ASSESSMENT — COGNITIVE AND FUNCTIONAL STATUS - GENERAL
CLIMB 3 TO 5 STEPS WITH RAILING: A LITTLE
DAILY ACTIVITIY SCORE: 24
CLIMB 3 TO 5 STEPS WITH RAILING: A LITTLE
DAILY ACTIVITIY SCORE: 24
MOBILITY SCORE: 22
WALKING IN HOSPITAL ROOM: A LITTLE
MOBILITY SCORE: 23

## 2025-01-04 ASSESSMENT — PAIN DESCRIPTION - DESCRIPTORS: DESCRIPTORS: ACHING

## 2025-01-04 ASSESSMENT — PAIN - FUNCTIONAL ASSESSMENT: PAIN_FUNCTIONAL_ASSESSMENT: 0-10

## 2025-01-04 NOTE — PROGRESS NOTES
PROGRESS NOTE    ASSESSMENT AND PLAN:     Jose Byrne is a 79 y.o. male with a past medical history of hypertension, hyperlipidemia, diabetic neuropathy, CKD, type II DM, COPD (w/PRN O2 use, cpap), CAD, CHF s/p pacemaker placement (LVEF 65%, 02/10/2023), MI, and obesity who presented to the ED with shortness of breath.      Shortness of breath, multifactorial in etiology  Acute COPD exacerbation  Acute on chronic heart failure with preserved EF with heart failure exacerbation  -Presented to the emergency room with worsening shortness of breath  -X-ray on admission showed cardiomegaly with bilateral effusions suggestive of heart failure  -BNP elevated at 518  -Echocardiogram from 2/10/2023: EF of 65%    Plan:  -Obtain echocardiogram, preliminary evaluation shows depressed EF, if formal evaluation shows low EF, plan to obtain cardiology consult.  -Reduce Lasix IV twice daily to Lasix 40 mg p.o. daily    COPD exacerbation  Chronic respiratory failure  -Currently stable, will reduce prednisone to 20 mg, continue nebulizer treatment.  Continue azithromycin  -On his baseline 2-1/2 L of oxygen    Coronary artery disease status post bypass  -Continue aspirin, statin.    History of paroxysmal atrial fibrillation  -History of paroxysmal atrial fibrillation, previously on a beta-blocker, unclear as to why it was discontinued.  -Hold Eliquis for now, we will see what the echocardiogram shows, if low EF, he may need a cardiac cath.  -Start heparin subcu    Diabetes with diabetic nephropathy  -Hemoglobin A1c from 2023 6.6, will plan to repeat.    CKD stage III  -Serum creatinine around baseline of 1.8.    Anemia  -His blood counts are within his previous range, most likely related to chronic kidney disease.  No evidence of overt bleeding.      SUBJECTIVE:   Admit Date: 1/2/2025    Interval History: Feels better today, would like to go home tomorrow      OBJECTIVE:   Vitals: /74 (BP Location: Right arm, Patient Position:  "Sitting)   Pulse 83   Temp 36.2 °C (97.2 °F) (Temporal)   Resp 18   Ht 1.676 m (5' 6\")   Wt 90.3 kg (199 lb 1.2 oz)   SpO2 98%   BMI 32.13 kg/m²    Wt Readings from Last 3 Encounters:   01/04/25 90.3 kg (199 lb 1.2 oz)      24HR INTAKE/OUTPUT:    Intake/Output Summary (Last 24 hours) at 1/4/2025 1420  Last data filed at 1/4/2025 0911  Gross per 24 hour   Intake 1000 ml   Output 3450 ml   Net -2450 ml       PHYSICAL EXAM:   GENERAL: Laying in bed, does not appear to be in any distress.   HEENT: HEAD: Normocephalic atraumatic.  Neck: Supple.  Eyes: Pupils are reactive to direct light.   CVS: S1, S2 heard. Regular rate and rhythm  LUNGS: Clear to auscultate bilaterally. No wheezing or rhonchi appreciated.  ABDOMEN: Soft, nontender to palpate. Positive bowel sounds. No guarding or rebound appreciated.  NEUROLOGICAL: No focal neurological deficits appreciated. Cranial nerves are grossly intact.  EXTREMITIES: No edema appreciated.  SKIN:  Grossly intact, warm and dry.      LABS/IMAGING AND MEDICATIONS:   Scheduled Meds:[Held by provider] apixaban, 5 mg, oral, BID  azithromycin, 500 mg, oral, q24h LINO  cyanocobalamin, 1,000 mcg, oral, Daily  tiotropium, 2 puff, inhalation, Daily   And  fluticasone furoate-vilanteroL, 1 puff, inhalation, Daily  furosemide, 40 mg, intravenous, q12h  ipratropium-albuteroL, 3 mL, nebulization, q6h  methocarbamol, 500 mg, oral, BID  predniSONE, 40 mg, oral, Daily  traMADol, 50 mg, oral, TID      PRN Meds:PRN medications: acetaminophen **OR** acetaminophen **OR** acetaminophen, guaiFENesin, melatonin, nitroglycerin, polyethylene glycol    No lab exists for component: \"CBC\"   No lab exists for component: \"CMP\"   No lab exists for component: \"TROPONIN\"  Results from last 7 days   Lab Units 01/02/25  1557   BNP pg/mL 518*         No lab exists for component: \"LIPIDS\"       No lab exists for component: \"URINALYSIS\"          BMP:  Results from last 7 days   Lab Units 01/04/25  0542 " 01/03/25  0546 01/02/25  1557   SODIUM mmol/L 136 132* 125*   POTASSIUM mmol/L 4.0 4.1 4.2   CHLORIDE mmol/L 97* 97* 97*   CO2 mmol/L 29 26 21   BUN mg/dL 38* 33* 33*   CREATININE mg/dL 1.84* 1.81* 1.73*       CBC:  Results from last 7 days   Lab Units 01/04/25  0542 01/03/25  0546 01/02/25  1557   WBC AUTO x10*3/uL 7.6 3.4* 8.2   RBC AUTO x10*6/uL 4.01* 4.08* 4.18*   HEMOGLOBIN g/dL 10.6* 10.6* 11.2*   HEMATOCRIT % 34.0* 34.4* 35.4*   MCV fL 85 84 85   MCH pg 26.4 26.0 26.8   MCHC g/dL 31.2* 30.8* 31.6*   RDW % 17.0* 16.4* 16.4*   PLATELETS AUTO x10*3/uL 222 217 230       Cardiac Enzymes:   Results from last 7 days   Lab Units 01/02/25  1711 01/02/25  1557   TROPHS ng/L 31* 31*         Hepatic Function Panel:  Results from last 7 days   Lab Units 01/02/25  1557   ALK PHOS U/L 88   ALT U/L 10   AST U/L 18   PROTEIN TOTAL g/dL 6.9   BILIRUBIN TOTAL mg/dL 0.4       Magnesium:  Results from last 7 days   Lab Units 01/02/25  1557   MAGNESIUM mg/dL 1.83         CMP:  Results from last 7 days   Lab Units 01/04/25  0542 01/03/25  0546 01/02/25  1557   SODIUM mmol/L 136 132* 125*   POTASSIUM mmol/L 4.0 4.1 4.2   CHLORIDE mmol/L 97* 97* 97*   CO2 mmol/L 29 26 21   BUN mg/dL 38* 33* 33*   CREATININE mg/dL 1.84* 1.81* 1.73*   GLUCOSE mg/dL 117* 181* 141*   CALCIUM mg/dL 9.1 8.8 8.7   PROTEIN TOTAL g/dL  --   --  6.9   BILIRUBIN TOTAL mg/dL  --   --  0.4   ALK PHOS U/L  --   --  88   AST U/L  --   --  18   ALT U/L  --   --  10      Discharged

## 2025-01-04 NOTE — CARE PLAN
"The patient's goals for the shift include \"to improve my breathing, without shortness of breath\"    The clinical goals for the shift include Patient will remain safe    Over the shift, the patient did meet goals of remaining safe but still is requiring supplemental oxygen. Recommendations to address these barriers include continuing plan of care.    "

## 2025-01-05 VITALS
TEMPERATURE: 95.7 F | RESPIRATION RATE: 16 BRPM | DIASTOLIC BLOOD PRESSURE: 65 MMHG | OXYGEN SATURATION: 96 % | BODY MASS INDEX: 31.99 KG/M2 | WEIGHT: 199.08 LBS | HEART RATE: 80 BPM | SYSTOLIC BLOOD PRESSURE: 122 MMHG | HEIGHT: 66 IN

## 2025-01-05 LAB
ANION GAP SERPL CALC-SCNC: 11 MMOL/L (ref 10–20)
AORTIC VALVE MEAN GRADIENT: 9 MMHG
AORTIC VALVE PEAK VELOCITY: 1.97 M/S
AV PEAK GRADIENT: 16 MMHG
BUN SERPL-MCNC: 38 MG/DL (ref 6–23)
CALCIUM SERPL-MCNC: 9.3 MG/DL (ref 8.6–10.3)
CHLORIDE SERPL-SCNC: 95 MMOL/L (ref 98–107)
CO2 SERPL-SCNC: 34 MMOL/L (ref 21–32)
CREAT SERPL-MCNC: 1.77 MG/DL (ref 0.5–1.3)
EGFRCR SERPLBLD CKD-EPI 2021: 39 ML/MIN/1.73M*2
EJECTION FRACTION APICAL 4 CHAMBER: 35.1
EJECTION FRACTION: 38 %
ERYTHROCYTE [DISTWIDTH] IN BLOOD BY AUTOMATED COUNT: 17.2 % (ref 11.5–14.5)
GLUCOSE BLD MANUAL STRIP-MCNC: 104 MG/DL (ref 74–99)
GLUCOSE BLD MANUAL STRIP-MCNC: 157 MG/DL (ref 74–99)
GLUCOSE BLD MANUAL STRIP-MCNC: 169 MG/DL (ref 74–99)
GLUCOSE BLD MANUAL STRIP-MCNC: 252 MG/DL (ref 74–99)
GLUCOSE SERPL-MCNC: 114 MG/DL (ref 74–99)
HCT VFR BLD AUTO: 37.5 % (ref 41–52)
HGB BLD-MCNC: 11.3 G/DL (ref 13.5–17.5)
HOLD SPECIMEN: NORMAL
LEFT ATRIUM VOLUME AREA LENGTH INDEX BSA: 35.1 ML/M2
LEFT VENTRICLE INTERNAL DIMENSION DIASTOLE: 5.6 CM (ref 3.5–6)
LEFT VENTRICULAR OUTFLOW TRACT DIAMETER: 2.39 CM
LV EJECTION FRACTION BIPLANE: 41 %
MAGNESIUM SERPL-MCNC: 1.97 MG/DL (ref 1.6–2.4)
MCH RBC QN AUTO: 26.4 PG (ref 26–34)
MCHC RBC AUTO-ENTMCNC: 30.1 G/DL (ref 32–36)
MCV RBC AUTO: 88 FL (ref 80–100)
MITRAL VALVE E/A RATIO: 2.78
NRBC BLD-RTO: 0 /100 WBCS (ref 0–0)
PLATELET # BLD AUTO: 216 X10*3/UL (ref 150–450)
POTASSIUM SERPL-SCNC: 4.1 MMOL/L (ref 3.5–5.3)
RBC # BLD AUTO: 4.28 X10*6/UL (ref 4.5–5.9)
RIGHT VENTRICLE FREE WALL PEAK S': 4.68 CM/S
RIGHT VENTRICLE PEAK SYSTOLIC PRESSURE: 64.2 MMHG
SODIUM SERPL-SCNC: 136 MMOL/L (ref 136–145)
TRICUSPID ANNULAR PLANE SYSTOLIC EXCURSION: 0.9 CM
WBC # BLD AUTO: 7 X10*3/UL (ref 4.4–11.3)

## 2025-01-05 PROCEDURE — 2500000005 HC RX 250 GENERAL PHARMACY W/O HCPCS: Performed by: HOSPITALIST

## 2025-01-05 PROCEDURE — 2500000001 HC RX 250 WO HCPCS SELF ADMINISTERED DRUGS (ALT 637 FOR MEDICARE OP): Performed by: HOSPITALIST

## 2025-01-05 PROCEDURE — 1200000002 HC GENERAL ROOM WITH TELEMETRY DAILY

## 2025-01-05 PROCEDURE — 2500000004 HC RX 250 GENERAL PHARMACY W/ HCPCS (ALT 636 FOR OP/ED): Performed by: HOSPITALIST

## 2025-01-05 PROCEDURE — 85027 COMPLETE CBC AUTOMATED: CPT | Performed by: HOSPITALIST

## 2025-01-05 PROCEDURE — 82947 ASSAY GLUCOSE BLOOD QUANT: CPT

## 2025-01-05 PROCEDURE — 2500000002 HC RX 250 W HCPCS SELF ADMINISTERED DRUGS (ALT 637 FOR MEDICARE OP, ALT 636 FOR OP/ED): Performed by: NURSE PRACTITIONER

## 2025-01-05 PROCEDURE — 99232 SBSQ HOSP IP/OBS MODERATE 35: CPT | Performed by: HOSPITALIST

## 2025-01-05 PROCEDURE — 2500000001 HC RX 250 WO HCPCS SELF ADMINISTERED DRUGS (ALT 637 FOR MEDICARE OP): Performed by: NURSE PRACTITIONER

## 2025-01-05 PROCEDURE — 2500000002 HC RX 250 W HCPCS SELF ADMINISTERED DRUGS (ALT 637 FOR MEDICARE OP, ALT 636 FOR OP/ED): Performed by: HOSPITALIST

## 2025-01-05 PROCEDURE — 36415 COLL VENOUS BLD VENIPUNCTURE: CPT | Performed by: HOSPITALIST

## 2025-01-05 PROCEDURE — 80048 BASIC METABOLIC PNL TOTAL CA: CPT | Performed by: HOSPITALIST

## 2025-01-05 PROCEDURE — 94660 CPAP INITIATION&MGMT: CPT

## 2025-01-05 PROCEDURE — 94640 AIRWAY INHALATION TREATMENT: CPT

## 2025-01-05 PROCEDURE — 94760 N-INVAS EAR/PLS OXIMETRY 1: CPT

## 2025-01-05 PROCEDURE — 83735 ASSAY OF MAGNESIUM: CPT | Performed by: HOSPITALIST

## 2025-01-05 RX ORDER — METOPROLOL SUCCINATE 25 MG/1
25 TABLET, EXTENDED RELEASE ORAL DAILY
Status: DISPENSED | OUTPATIENT
Start: 2025-01-05

## 2025-01-05 RX ORDER — IPRATROPIUM BROMIDE AND ALBUTEROL SULFATE 2.5; .5 MG/3ML; MG/3ML
3 SOLUTION RESPIRATORY (INHALATION) EVERY 4 HOURS PRN
Status: ACTIVE | OUTPATIENT
Start: 2025-01-05

## 2025-01-05 RX ADMIN — INSULIN LISPRO 1 UNITS: 100 INJECTION, SOLUTION INTRAVENOUS; SUBCUTANEOUS at 17:04

## 2025-01-05 RX ADMIN — METHOCARBAMOL TABLETS 500 MG: 500 TABLET, COATED ORAL at 20:11

## 2025-01-05 RX ADMIN — INSULIN LISPRO 1 UNITS: 100 INJECTION, SOLUTION INTRAVENOUS; SUBCUTANEOUS at 11:26

## 2025-01-05 RX ADMIN — TIOTROPIUM BROMIDE INHALATION SPRAY 2 PUFF: 3.12 SPRAY, METERED RESPIRATORY (INHALATION) at 06:01

## 2025-01-05 RX ADMIN — TRAMADOL HYDROCHLORIDE 50 MG: 50 TABLET, FILM COATED ORAL at 14:07

## 2025-01-05 RX ADMIN — Medication 1000 MCG: at 08:25

## 2025-01-05 RX ADMIN — METOPROLOL SUCCINATE 25 MG: 25 TABLET, EXTENDED RELEASE ORAL at 20:11

## 2025-01-05 RX ADMIN — Medication 2 L/MIN: at 07:42

## 2025-01-05 RX ADMIN — FUROSEMIDE 40 MG: 40 TABLET ORAL at 08:25

## 2025-01-05 RX ADMIN — APIXABAN 5 MG: 5 TABLET, FILM COATED ORAL at 20:11

## 2025-01-05 RX ADMIN — IPRATROPIUM BROMIDE AND ALBUTEROL SULFATE 3 ML: 2.5; .5 SOLUTION RESPIRATORY (INHALATION) at 07:42

## 2025-01-05 RX ADMIN — IPRATROPIUM BROMIDE AND ALBUTEROL SULFATE 3 ML: 2.5; .5 SOLUTION RESPIRATORY (INHALATION) at 01:18

## 2025-01-05 RX ADMIN — AZITHROMYCIN DIHYDRATE 500 MG: 250 TABLET, FILM COATED ORAL at 08:25

## 2025-01-05 RX ADMIN — GUAIFENESIN 600 MG: 600 TABLET, EXTENDED RELEASE ORAL at 05:57

## 2025-01-05 RX ADMIN — TRAMADOL HYDROCHLORIDE 50 MG: 50 TABLET, FILM COATED ORAL at 08:25

## 2025-01-05 RX ADMIN — METHOCARBAMOL TABLETS 500 MG: 500 TABLET, COATED ORAL at 08:25

## 2025-01-05 RX ADMIN — ACETAMINOPHEN 650 MG: 325 TABLET ORAL at 23:17

## 2025-01-05 RX ADMIN — PREDNISONE 40 MG: 20 TABLET ORAL at 08:25

## 2025-01-05 RX ADMIN — TRAMADOL HYDROCHLORIDE 50 MG: 50 TABLET, FILM COATED ORAL at 20:11

## 2025-01-05 RX ADMIN — FLUTICASONE FUROATE AND VILANTEROL TRIFENATATE 1 PUFF: 200; 25 POWDER RESPIRATORY (INHALATION) at 06:01

## 2025-01-05 RX ADMIN — HEPARIN SODIUM 5000 UNITS: 5000 INJECTION INTRAVENOUS; SUBCUTANEOUS at 06:46

## 2025-01-05 ASSESSMENT — COGNITIVE AND FUNCTIONAL STATUS - GENERAL
MOBILITY SCORE: 23
CLIMB 3 TO 5 STEPS WITH RAILING: A LITTLE
DAILY ACTIVITIY SCORE: 24
MOBILITY SCORE: 24
DAILY ACTIVITIY SCORE: 24

## 2025-01-05 ASSESSMENT — PAIN SCALES - GENERAL
PAINLEVEL_OUTOF10: 6
PAINLEVEL_OUTOF10: 0 - NO PAIN
PAINLEVEL_OUTOF10: 2

## 2025-01-05 ASSESSMENT — PAIN - FUNCTIONAL ASSESSMENT: PAIN_FUNCTIONAL_ASSESSMENT: 0-10

## 2025-01-05 ASSESSMENT — PAIN DESCRIPTION - LOCATION: LOCATION: FOOT

## 2025-01-05 ASSESSMENT — PAIN DESCRIPTION - ORIENTATION: ORIENTATION: RIGHT;LEFT

## 2025-01-05 NOTE — CARE PLAN
"The patient's goals for the shift include \"to improve my breathing, without shortness of breath\"    The clinical goals for the shift include pt will remain free from injury      Problem: Fall/Injury  Goal: Verbalize understanding of personal risk factors for fall in the hospital  Outcome: Progressing     Problem: Fall/Injury  Goal: Verbalize understanding of risk factor reduction measures to prevent injury from fall in the home  Outcome: Progressing     Problem: Fall/Injury  Goal: Pace activities to prevent fatigue by end of the shift  Outcome: Progressing     Problem: Fall/Injury  Goal: Not fall by end of shift  Outcome: Progressing     Problem: Fall/Injury  Goal: Be free from injury by end of the shift  Outcome: Progressing     Problem: Pain  Goal: Takes deep breaths with improved pain control throughout the shift  Outcome: Progressing     Problem: Pain  Goal: Turns in bed with improved pain control throughout the shift  Outcome: Progressing     Problem: Pain  Goal: Walks with improved pain control throughout the shift  Outcome: Progressing     Problem: Pain  Goal: Performs ADL's with improved pain control throughout shift  Outcome: Progressing     Problem: Pain  Goal: Participates in PT with improved pain control throughout the shift  Outcome: Progressing     Problem: Pain  Goal: Free from opioid side effects throughout the shift  Outcome: Progressing     Problem: Pain  Goal: Free from acute confusion related to pain meds throughout the shift  Outcome: Progressing     "

## 2025-01-05 NOTE — PROGRESS NOTES
PROGRESS NOTE    ASSESSMENT AND PLAN:     Jose Byrne is a 79 y.o. male with a past medical history of hypertension, hyperlipidemia, diabetic neuropathy, CKD, type II DM, COPD (w/PRN O2 use, cpap), CAD, CHF s/p pacemaker placement (LVEF 65%, 02/10/2023), MI, and obesity who presented to the ED with shortness of breath.       Shortness of breath, multifactorial in etiology  Acute on chronic heart failure with preserved EF with heart failure exacerbation  -Presented to the emergency room with worsening shortness of breath  -X-ray on admission showed cardiomegaly with bilateral effusions suggestive of heart failure  -BNP elevated at 518  -Echocardiogram from 2/10/2023: EF of 65%     Plan:  -Echocardiogram shows new onset low EF of 35%.  -Spoke with cardiology, will start low-dose metoprolol succinate 25 mg p.o. daily, given his kidney disease, plan to start Entresto as an outpatient basis.  -Will interrogate his ICD tomorrow to evaluate for arrhythmias.  -Currently hemodynamically stable back to his baseline oxygen, continue Lasix 40 mg p.o. daily     COPD exacerbation  Chronic respiratory failure  -Currently stable, will reduce prednisone to 20 mg, continue nebulizer treatment.  Continue azithromycin  -On his baseline 2-1/2 L of oxygen     Coronary artery disease status post bypass  -Continue aspirin, statin.     History of paroxysmal atrial fibrillation  -History of paroxysmal atrial fibrillation, previously on a beta-blocker, unclear as to why it was discontinued.  -Hold Eliquis for now, we will see what the echocardiogram shows, if low EF, he may need a cardiac cath.  -Start heparin subcu     Diabetes with diabetic nephropathy  -Hemoglobin A1c from 2023 6.6, will plan to repeat.     CKD stage III  -Serum creatinine around baseline of 1.8.     Anemia  -His blood counts are within his previous range, most likely related to chronic kidney disease.  No evidence of overt bleeding.      SUBJECTIVE:   Admit Date:  "1/2/2025    Interval History: He has no active complaints, denies chest pain, denies fevers, chills      OBJECTIVE:   Vitals: /56 (Patient Position: Sitting)   Pulse 80   Temp 36.5 °C (97.7 °F)   Resp 20   Ht 1.676 m (5' 6\")   Wt 90.3 kg (199 lb 1.2 oz)   SpO2 98%   BMI 32.13 kg/m²    Wt Readings from Last 3 Encounters:   01/04/25 90.3 kg (199 lb 1.2 oz)      24HR INTAKE/OUTPUT:    Intake/Output Summary (Last 24 hours) at 1/5/2025 1344  Last data filed at 1/5/2025 1300  Gross per 24 hour   Intake 1946 ml   Output 2350 ml   Net -404 ml       PHYSICAL EXAM:   GENERAL: Laying in bed, does not appear to be in any distress.   HEENT: HEAD: Normocephalic atraumatic.  Neck: Supple.  Eyes: Pupils are reactive to direct light.   CVS: S1, S2 heard. Regular rate and rhythm  LUNGS: Clear to auscultate bilaterally. No wheezing or rhonchi appreciated.  ABDOMEN: Soft, nontender to palpate. Positive bowel sounds. No guarding or rebound appreciated.  NEUROLOGICAL: No focal neurological deficits appreciated. Cranial nerves are grossly intact.  EXTREMITIES: No edema appreciated.  SKIN:  Grossly intact, warm and dry.      LABS/IMAGING AND MEDICATIONS:   Scheduled Meds:apixaban, 5 mg, oral, BID  azithromycin, 500 mg, oral, q24h LINO  cyanocobalamin, 1,000 mcg, oral, Daily  tiotropium, 2 puff, inhalation, Daily   And  fluticasone furoate-vilanteroL, 1 puff, inhalation, Daily  furosemide, 40 mg, oral, Daily  insulin lispro, 0-5 Units, subcutaneous, TID AC  methocarbamol, 500 mg, oral, BID  perflutren lipid microspheres, 0.5-10 mL of dilution, intravenous, Once in imaging  perflutren protein A microsphere, 0.5 mL, intravenous, Once in imaging  predniSONE, 40 mg, oral, Daily  sulfur hexafluoride microsphr, 2 mL, intravenous, Once in imaging  traMADol, 50 mg, oral, TID      PRN Meds:PRN medications: acetaminophen **OR** acetaminophen **OR** acetaminophen, dextrose, dextrose, glucagon, glucagon, guaiFENesin, ipratropium-albuteroL, " "melatonin, nitroglycerin, polyethylene glycol, sodium chloride    No lab exists for component: \"CBC\"   No lab exists for component: \"CMP\"   No lab exists for component: \"TROPONIN\"  Results from last 7 days   Lab Units 01/02/25  1557   BNP pg/mL 518*         No lab exists for component: \"LIPIDS\"       No lab exists for component: \"URINALYSIS\"          BMP:  Results from last 7 days   Lab Units 01/05/25  0536 01/04/25  0542 01/03/25  0546   SODIUM mmol/L 136 136 132*   POTASSIUM mmol/L 4.1 4.0 4.1   CHLORIDE mmol/L 95* 97* 97*   CO2 mmol/L 34* 29 26   BUN mg/dL 38* 38* 33*   CREATININE mg/dL 1.77* 1.84* 1.81*       CBC:  Results from last 7 days   Lab Units 01/05/25  0536 01/04/25  0542 01/03/25  0546   WBC AUTO x10*3/uL 7.0 7.6 3.4*   RBC AUTO x10*6/uL 4.28* 4.01* 4.08*   HEMOGLOBIN g/dL 11.3* 10.6* 10.6*   HEMATOCRIT % 37.5* 34.0* 34.4*   MCV fL 88 85 84   MCH pg 26.4 26.4 26.0   MCHC g/dL 30.1* 31.2* 30.8*   RDW % 17.2* 17.0* 16.4*   PLATELETS AUTO x10*3/uL 216 222 217       Cardiac Enzymes:   Results from last 7 days   Lab Units 01/02/25  1711 01/02/25  1557   TROPHS ng/L 31* 31*         Hepatic Function Panel:  Results from last 7 days   Lab Units 01/02/25  1557   ALK PHOS U/L 88   ALT U/L 10   AST U/L 18   PROTEIN TOTAL g/dL 6.9   BILIRUBIN TOTAL mg/dL 0.4       Magnesium:  Results from last 7 days   Lab Units 01/05/25  0536   MAGNESIUM mg/dL 1.97           CMP:  Results from last 7 days   Lab Units 01/05/25  0536 01/04/25  0542 01/03/25  0546 01/02/25  1557   SODIUM mmol/L 136 136 132* 125*   POTASSIUM mmol/L 4.1 4.0 4.1 4.2   CHLORIDE mmol/L 95* 97* 97* 97*   CO2 mmol/L 34* 29 26 21   BUN mg/dL 38* 38* 33* 33*   CREATININE mg/dL 1.77* 1.84* 1.81* 1.73*   GLUCOSE mg/dL 114* 117* 181* 141*   CALCIUM mg/dL 9.3 9.1 8.8 8.7   PROTEIN TOTAL g/dL  --   --   --  6.9   BILIRUBIN TOTAL mg/dL  --   --   --  0.4   ALK PHOS U/L  --   --   --  88   AST U/L  --   --   --  18   ALT U/L  --   --   --  10     "

## 2025-01-05 NOTE — CARE PLAN
"The patient's goals for the shift include \"to improve my breathing, without shortness of breath\"    The clinical goals for the shift include pt will remain free from injury    "

## 2025-01-06 ENCOUNTER — APPOINTMENT (OUTPATIENT)
Dept: CARDIOLOGY | Facility: HOSPITAL | Age: 80
DRG: 291 | End: 2025-01-06
Payer: MEDICARE

## 2025-01-06 VITALS
OXYGEN SATURATION: 93 % | HEIGHT: 66 IN | HEART RATE: 80 BPM | WEIGHT: 199.08 LBS | RESPIRATION RATE: 17 BRPM | TEMPERATURE: 96.8 F | BODY MASS INDEX: 31.99 KG/M2 | DIASTOLIC BLOOD PRESSURE: 59 MMHG | SYSTOLIC BLOOD PRESSURE: 104 MMHG

## 2025-01-06 LAB
ANION GAP SERPL CALC-SCNC: 9 MMOL/L (ref 10–20)
BASE EXCESS BLDV CALC-SCNC: -4.1 MMOL/L (ref -2–3)
BODY TEMPERATURE: ABNORMAL
BUN SERPL-MCNC: 40 MG/DL (ref 6–23)
CALCIUM SERPL-MCNC: 9 MG/DL (ref 8.6–10.3)
CHLORIDE SERPL-SCNC: 95 MMOL/L (ref 98–107)
CO2 SERPL-SCNC: 35 MMOL/L (ref 21–32)
CREAT SERPL-MCNC: 1.65 MG/DL (ref 0.5–1.3)
EGFRCR SERPLBLD CKD-EPI 2021: 42 ML/MIN/1.73M*2
ERYTHROCYTE [DISTWIDTH] IN BLOOD BY AUTOMATED COUNT: 17 % (ref 11.5–14.5)
GLUCOSE SERPL-MCNC: 121 MG/DL (ref 74–99)
HCO3 BLDV-SCNC: 22.2 MMOL/L (ref 22–26)
HCT VFR BLD AUTO: 35.8 % (ref 41–52)
HGB BLD-MCNC: 10.9 G/DL (ref 13.5–17.5)
HOLD SPECIMEN: NORMAL
INHALED O2 CONCENTRATION: 25 %
MAGNESIUM SERPL-MCNC: 1.85 MG/DL (ref 1.6–2.4)
MCH RBC QN AUTO: 26.1 PG (ref 26–34)
MCHC RBC AUTO-ENTMCNC: 30.4 G/DL (ref 32–36)
MCV RBC AUTO: 86 FL (ref 80–100)
NRBC BLD-RTO: 0 /100 WBCS (ref 0–0)
OXYHGB MFR BLDV: 92.1 % (ref 45–75)
PCO2 BLDV: 44 MM HG (ref 41–51)
PH BLDV: 7.31 PH (ref 7.33–7.43)
PLATELET # BLD AUTO: 207 X10*3/UL (ref 150–450)
PO2 BLDV: 70 MM HG (ref 35–45)
POTASSIUM SERPL-SCNC: 4 MMOL/L (ref 3.5–5.3)
RBC # BLD AUTO: 4.17 X10*6/UL (ref 4.5–5.9)
SAO2 % BLDV: 92 % (ref 45–75)
SODIUM SERPL-SCNC: 135 MMOL/L (ref 136–145)
WBC # BLD AUTO: 7.9 X10*3/UL (ref 4.4–11.3)

## 2025-01-06 PROCEDURE — 36415 COLL VENOUS BLD VENIPUNCTURE: CPT | Performed by: HOSPITALIST

## 2025-01-06 PROCEDURE — 85027 COMPLETE CBC AUTOMATED: CPT | Performed by: HOSPITALIST

## 2025-01-06 PROCEDURE — 93280 PM DEVICE PROGR EVAL DUAL: CPT

## 2025-01-06 PROCEDURE — 94761 N-INVAS EAR/PLS OXIMETRY MLT: CPT

## 2025-01-06 PROCEDURE — 94760 N-INVAS EAR/PLS OXIMETRY 1: CPT

## 2025-01-06 PROCEDURE — 83735 ASSAY OF MAGNESIUM: CPT | Performed by: HOSPITALIST

## 2025-01-06 PROCEDURE — 82565 ASSAY OF CREATININE: CPT | Performed by: HOSPITALIST

## 2025-01-06 PROCEDURE — 99239 HOSP IP/OBS DSCHRG MGMT >30: CPT | Performed by: HOSPITALIST

## 2025-01-06 PROCEDURE — 2500000001 HC RX 250 WO HCPCS SELF ADMINISTERED DRUGS (ALT 637 FOR MEDICARE OP): Performed by: NURSE PRACTITIONER

## 2025-01-06 PROCEDURE — 93280 PM DEVICE PROGR EVAL DUAL: CPT | Performed by: INTERNAL MEDICINE

## 2025-01-06 PROCEDURE — 2500000004 HC RX 250 GENERAL PHARMACY W/ HCPCS (ALT 636 FOR OP/ED): Performed by: HOSPITALIST

## 2025-01-06 PROCEDURE — 2500000002 HC RX 250 W HCPCS SELF ADMINISTERED DRUGS (ALT 637 FOR MEDICARE OP, ALT 636 FOR OP/ED): Performed by: HOSPITALIST

## 2025-01-06 PROCEDURE — 2500000001 HC RX 250 WO HCPCS SELF ADMINISTERED DRUGS (ALT 637 FOR MEDICARE OP): Performed by: HOSPITALIST

## 2025-01-06 RX ORDER — IPRATROPIUM BROMIDE AND ALBUTEROL SULFATE 2.5; .5 MG/3ML; MG/3ML
3 SOLUTION RESPIRATORY (INHALATION) EVERY 4 HOURS PRN
Qty: 180 ML | Refills: 11 | Status: SHIPPED | OUTPATIENT
Start: 2025-01-06

## 2025-01-06 RX ORDER — PREDNISONE 20 MG/1
TABLET ORAL
Qty: 7 TABLET | Refills: 0 | Status: SHIPPED | OUTPATIENT
Start: 2025-01-06 | End: 2025-01-06

## 2025-01-06 RX ORDER — IPRATROPIUM BROMIDE AND ALBUTEROL SULFATE 2.5; .5 MG/3ML; MG/3ML
3 SOLUTION RESPIRATORY (INHALATION) EVERY 4 HOURS PRN
Qty: 180 ML | Refills: 11 | Status: SHIPPED | OUTPATIENT
Start: 2025-01-06 | End: 2025-01-06

## 2025-01-06 RX ORDER — GUAIFENESIN 600 MG/1
1200 TABLET, EXTENDED RELEASE ORAL 2 TIMES DAILY
Qty: 60 TABLET | Refills: 0 | Status: SHIPPED | OUTPATIENT
Start: 2025-01-06 | End: 2025-01-21

## 2025-01-06 RX ORDER — FUROSEMIDE 20 MG/1
20 TABLET ORAL DAILY
Qty: 30 TABLET | Refills: 0 | Status: SHIPPED | OUTPATIENT
Start: 2025-01-06 | End: 2025-02-05

## 2025-01-06 RX ORDER — METOPROLOL SUCCINATE 25 MG/1
25 TABLET, EXTENDED RELEASE ORAL DAILY
Qty: 30 TABLET | Refills: 0 | Status: SHIPPED | OUTPATIENT
Start: 2025-01-06 | End: 2025-02-05

## 2025-01-06 RX ORDER — METOPROLOL SUCCINATE 25 MG/1
25 TABLET, EXTENDED RELEASE ORAL DAILY
Qty: 30 TABLET | Refills: 0 | Status: SHIPPED | OUTPATIENT
Start: 2025-01-06 | End: 2025-01-06

## 2025-01-06 RX ORDER — PREDNISONE 20 MG/1
TABLET ORAL
Qty: 7 TABLET | Refills: 0 | Status: SHIPPED | OUTPATIENT
Start: 2025-01-06 | End: 2025-01-12

## 2025-01-06 RX ORDER — AZITHROMYCIN 500 MG/1
500 TABLET, FILM COATED ORAL DAILY
Qty: 2 TABLET | Refills: 0 | Status: SHIPPED | OUTPATIENT
Start: 2025-01-06 | End: 2025-01-08

## 2025-01-06 RX ADMIN — METOPROLOL SUCCINATE 25 MG: 25 TABLET, EXTENDED RELEASE ORAL at 09:07

## 2025-01-06 RX ADMIN — APIXABAN 5 MG: 5 TABLET, FILM COATED ORAL at 09:07

## 2025-01-06 RX ADMIN — TIOTROPIUM BROMIDE INHALATION SPRAY 2 PUFF: 3.12 SPRAY, METERED RESPIRATORY (INHALATION) at 09:06

## 2025-01-06 RX ADMIN — PREDNISONE 40 MG: 20 TABLET ORAL at 09:07

## 2025-01-06 RX ADMIN — AZITHROMYCIN DIHYDRATE 500 MG: 250 TABLET, FILM COATED ORAL at 09:06

## 2025-01-06 RX ADMIN — METHOCARBAMOL TABLETS 500 MG: 500 TABLET, COATED ORAL at 09:07

## 2025-01-06 RX ADMIN — TRAMADOL HYDROCHLORIDE 50 MG: 50 TABLET, FILM COATED ORAL at 09:07

## 2025-01-06 RX ADMIN — Medication 1000 MCG: at 09:06

## 2025-01-06 RX ADMIN — GUAIFENESIN 600 MG: 600 TABLET, EXTENDED RELEASE ORAL at 05:44

## 2025-01-06 RX ADMIN — FUROSEMIDE 40 MG: 40 TABLET ORAL at 09:07

## 2025-01-06 RX ADMIN — FLUTICASONE FUROATE AND VILANTEROL TRIFENATATE 1 PUFF: 200; 25 POWDER RESPIRATORY (INHALATION) at 09:06

## 2025-01-06 ASSESSMENT — COGNITIVE AND FUNCTIONAL STATUS - GENERAL
DRESSING REGULAR LOWER BODY CLOTHING: A LITTLE
MOBILITY SCORE: 23
DAILY ACTIVITIY SCORE: 22
CLIMB 3 TO 5 STEPS WITH RAILING: A LITTLE
HELP NEEDED FOR BATHING: A LITTLE

## 2025-01-06 ASSESSMENT — PAIN SCALES - GENERAL
PAINLEVEL_OUTOF10: 0 - NO PAIN
PAINLEVEL_OUTOF10: 0 - NO PAIN

## 2025-01-06 ASSESSMENT — PAIN - FUNCTIONAL ASSESSMENT
PAIN_FUNCTIONAL_ASSESSMENT: 0-10
PAIN_FUNCTIONAL_ASSESSMENT: 0-10

## 2025-01-06 NOTE — NURSING NOTE
Met with patient for education reinforcement. Discussed CHF, signs and symptoms, and when to call cardiologist. Reinforced the importance of following a Low Sodium Diet and monitoring daily weight, lower leg edema, and shortness of breath. Reviewed importance of taking prescribed medications after discharge. Reinforced importance of following up with cardiologist within a week of discharge. Patient is enrolled in [facility] program. Reminded patient that they have my contact information should any questions or concerns arise.

## 2025-01-06 NOTE — PROGRESS NOTES
DME is working to see if she can assist in working with [facility] for [facility] unit for travel. Patient has no new needs.

## 2025-01-06 NOTE — TREATMENT PLAN
Home Oxygen Evaluation        Date/Time SpO2 Medical Gas Therapy Medical Gas Delivery Method Oxygen L/min Patient is on During the Study Patient Activity During Study    01/06/25 1251 90 %  None (Room air)  --  0 L/min  At rest     01/06/25 1300 89 %  None (Room air)  --  0 L/min  Ambulating     01/06/25 1305 91 %  Supplemental oxygen  Nasal cannula  2 L/min  Ambulating     01/06/25 1310 93 %  Supplemental oxygen  Nasal cannula  2 L/min  At rest                     Was a Home Oxygen Evaluation Performed? Yes  Based on the Home Oxygen Evaluation, Does the Patient Qualify for Home Oxygen Therapy? Yes       Was the [employer] Notified of Home Oxygen Therapy Needs? Yes    Recommendations:    Recommended Oxygen Dose at Rest is 2 L/min   Recommended Oxygen Dose with Activity is 2 L/min

## 2025-01-06 NOTE — DISCHARGE SUMMARY
DISCHARGE DIAGNOSIS     Shortness of breath, multifactorial in etiology  Acute COPD exacerbation  Acute on chronic heart failure with reduced EF with exacerbation.  Coronary artery disease status post bypass  Paroxysmal atrial fibrillation  Diabetes with diabetic nephropathy  CKD stage III  Anemia of chronic kidney disease    HOSPITAL COURSE AND DETAILS     Jose Byrne is a 79 y.o. male with a past medical history of hypertension, hyperlipidemia, diabetic neuropathy, CKD, type II DM, COPD (w/PRN O2 use, cpap), CAD, CHF s/p pacemaker placement (LVEF 65%, 02/10/2023), MI, and obesity who presented to the ED with shortness of breath.        Shortness of breath, multifactorial in etiology  Acute on chronic heart failure with reduced EF with heart failure   He presented to the emergency room with worsening shortness of breath, his x-ray on admission showed cardiomegaly with bilateral pleural effusion suggestive of heart failure exacerbation.  His BNP was elevated at 518.  His most recent echocardiogram in 2023 showed an EF of 65%.    He had a repeat echocardiogram done which showed new onset heart failure with reduced EF of 35%. Dr. Sears evaluated the patient, he felt that patient does not need an ischemic evaluation at this point.  He was started on metoprolol succinate 25 mg p.o. daily and Lasix 20 mg daily.  All other goal-directed therapy medications will be deferred until he sees his cardiologist as outpatient.    Prior to his discharge, he did have his pacemaker interrogated which showed no evidence of ventricular arrhythmias.    COPD exacerbation  Chronic respiratory failure  On admission, he did have mild COPD exacerbation, remained on prednisone and nebulizers.  Will be discharged on a prednisone taper, azithromycin and outpatient follow-up.     Coronary artery disease status post bypass  During his hospitalization, he did not complain of chest pain, he had a repeat cardiac cath 5 years ago which showed  minimal coronary disease.       Permanent atrial fibrillation  He remains in atrial fibrillation, he was started on metoprolol succinate p.o. daily and continued on Eliquis.  He was advised to follow-up with his cardiologist outpatient.       Total time spent on discharge planning and coordination of care 42 minutes.  Discharge planning was discussed with patient, he understands and agrees with plan of care.    * Some of these notes were completed using Dragon voice recognition technology and may include unintended areas with respect to translation of word, typographical errors or primary areas which may not have been identified prior to finalization of the document.    DISCHARGE PHYSICAL EXAM     Last Recorded Vitals:  Vitals:    01/05/25 1535 01/05/25 1958 01/06/25 0054 01/06/25 0748   BP:  122/65 118/70 104/59   BP Location:       Patient Position:       Pulse:  80 78 80   Resp: 24 16 16 17   Temp:  35.4 °C (95.7 °F) 35.9 °C (96.6 °F) 36 °C (96.8 °F)   TempSrc:       SpO2:  96% 100% 97%   Weight:       Height:           Physical Exam  PHYSICAL EXAM:   GENERAL: Laying in bed, does not appear to be in any distress.   HEENT: HEAD: Normocephalic atraumatic.  Neck: Supple.  Eyes: Pupils are reactive to direct light.   CVS: S1, S2 heard. Regular rate and rhythm  LUNGS: Clear to auscultate bilaterally. No wheezing or rhonchi appreciated.  ABDOMEN: Soft, nontender to palpate. Positive bowel sounds. No guarding or rebound appreciated.  NEUROLOGICAL: No focal neurological deficits appreciated. Cranial nerves are grossly intact.  EXTREMITIES: Dorsalis pedis pulses can be appreciated. No edema appreciated.  SKIN:  Grossly intact, warm and dry.    DISCHARGE MEDICATIONS        Your medication list        START taking these medications        Instructions Last Dose Given Next Dose Due   azithromycin 500 mg tablet  Commonly known as: Zithromax      Take 1 tablet (500 mg) by mouth once daily.       furosemide 20 mg  tablet  Commonly known as: Lasix      Take 1 tablet (20 mg) by mouth once daily.       guaiFENesin 600 mg 12 hr tablet  Commonly known as: Mucinex      Take 2 tablets (1,200 mg) by mouth 2 times a day. Do not crush, chew, or split.       ipratropium-albuteroL 0.5-2.5 mg/3 mL nebulizer solution  Commonly known as: Duo-Neb      Take 3 mL by nebulization every 4 hours if needed for wheezing.       metoprolol succinate XL 25 mg 24 hr tablet  Commonly known as: Toprol-XL      Take 1 tablet (25 mg) by mouth once daily. Do not crush or chew.       predniSONE 20 mg tablet  Commonly known as: Deltasone  Start taking on: January 6, 2025      Take 2 tablets (40 mg) by mouth once daily for 2 days, THEN 1 tablet (20 mg) once daily for 2 days, THEN 0.5 tablets (10 mg) once daily for 2 days.              CONTINUE taking these medications        Instructions Last Dose Given Next Dose Due   apixaban 5 mg tablet  Commonly known as: Eliquis           Breztri Aerosphere 160-9-4.8 mcg/actuation HFA aerosol inhaler  Generic drug: budesonide-glycopyr-formoterol           cholecalciferol 25 MCG (1000 UT) capsule  Commonly known as: Vitamin D-3           cyanocobalamin 1,000 mcg tablet  Commonly known as: Vitamin B-12           ferrous sulfate (325 mg ferrous sulfate) tablet           metFORMIN 500 mg tablet  Commonly known as: Glucophage           methocarbamol 500 mg tablet  Commonly known as: Robaxin           nitroglycerin 0.4 mg SL tablet  Commonly known as: Nitrostat           traMADol 50 mg tablet  Commonly known as: Ultram                     Where to Get Your Medications        These medications were sent to [facility]  [address] [place] [id number]      Hours: 9 AM to 5:30 PM Mon-Fri, 9 AM to 1 PM Saturday Phone: [phone]   ipratropium-albuteroL 0.5-2.5 mg/3 mL nebulizer solution  metoprolol succinate XL 25 mg 24 hr tablet  predniSONE 20 mg tablet       You can get these medications from any  pharmacy    Bring a paper prescription for each of these medications  azithromycin 500 mg tablet  furosemide 20 mg tablet  guaiFENesin 600 mg 12 hr tablet           OUTPATIENT FOLLOW-UP     No future appointments.

## 2025-01-06 NOTE — NURSING NOTE
This nurse introduced self and role to patient and family, prepared and provided AVS, sat with both, started and completed discharge education, pt verbalized understanding, without further questions or concerns, agreeable and comfortable with discharge at this time, no piv, tele returned to desk, pt states they have all their belongings, RT doing home o2 eval, encouraged to let staff know when ready for transport to facility exit, discharge complete.  Daughter states pt has [facility]

## 2025-01-06 NOTE — CONSULTS
Consults  PODIATRY CONSULT NOTE    SERVICE DATE: 1/6/2025   SERVICE TIME:  0930    REASON FOR CONSULT: Left great toe pain  REQUESTING PHYSICIAN: Dr. Shantanu El MD   PRIMARY CARE PHYSICIAN: No Assigned PCP Generic Provider, MD    Subjective   HPI:  Mr. Byrne is a 79 y.o. male who with a past medical history of hypertension, hyperlipidemia, diabetic neuropathy, CKD, type II DM, COPD (w/PRN O2 use, cpap), CAD, CHF s/p pacemaker placement), MI, and obesity presented to ER for worsening shortness of breath. During hospitalization patient complained of left great toe pain in which podiatry was consulted for further evaluation and treatment recommendations.     Patient states he has had pain to his left great toe nail on and off for many years. The pain started most recently again about 5 days ago. He denies any trauma or injury. Denies any drainage. No warmth or erythema present. States he has been using an over the counter anti fungal medication that helps relieve the pain. He reports he has not seen podiatry for this in a while due [facility].       ROS: 10-point review of systems was performed and is otherwise negative except as noted in HPI.  PMH: Reviewed/documented below.  PSH:  Noncontributory except per HPI   FH: Reviewed and noncontributory   SOCIAL:  Reviewed/documented below.  ALLERGIES: Reviewed/documented below.  MEDS: Reviewed/documented below.  VS: Reviewed/documented below.    No past medical history on file.  No past surgical history on file.  No family history on file.  Social History     Tobacco Use    Smoking status: Former     Types: Cigarettes     Passive exposure: Past      No medications prior to admission.        Medications:  Scheduled Meds: apixaban, 5 mg, oral, BID  azithromycin, 500 mg, oral, q24h LINO  cyanocobalamin, 1,000 mcg, oral, Daily  tiotropium, 2 puff, inhalation, Daily   And  fluticasone furoate-vilanteroL, 1 puff, inhalation, Daily  furosemide, 40 mg,  oral, Daily  methocarbamol, 500 mg, oral, BID  metoprolol succinate XL, 25 mg, oral, Daily  perflutren lipid microspheres, 0.5-10 mL of dilution, intravenous, Once in imaging  perflutren protein A microsphere, 0.5 mL, intravenous, Once in imaging  predniSONE, 40 mg, oral, Daily  sulfur hexafluoride microsphr, 2 mL, intravenous, Once in imaging  traMADol, 50 mg, oral, TID      Continuous Infusions: oxygen, , Last Rate: 2 L/min (01/05/25 0742)      PRN Meds: PRN medications: acetaminophen **OR** acetaminophen **OR** acetaminophen, dextrose, dextrose, glucagon, glucagon, guaiFENesin, ipratropium-albuteroL, melatonin, nitroglycerin, polyethylene glycol, sodium chloride    Allergies as of 01/02/2025 - Reviewed 01/02/2025   Allergen Reaction Noted    Penicillins Anaphylaxis, Hives, Rash, and Unknown 03/01/2005    Iodinated contrast media Unknown 01/02/2025    Iodine Hives 01/19/2022    Iodides Hives, Itching, and Rash 02/10/2022            Objective   PHYSICAL EXAM:  Physical Exam Performed:  Vitals:    01/06/25 1310   BP:    Pulse:    Resp:    Temp:    SpO2: 93%     Body mass index is 32.13 kg/m².    Patient is AOx3 and in no acute distress. Patient is alert and cooperative. Sitting comfortably in bed with dressing clean, dry and intact.     Vascular: Palpable DP/PT pulses B/L. No edema noted B/L. Hair growth absent B/L. CFT<5 to B/L hallux. Temperature is warm to cool from tibial tuberosity to distal digits B/L. No lymphatic streaking noted B/L.    Musculoskeletal: Gross active and passive ROM intact to age and activity level. Moves all extremities spontaneously. No pain to palpation at feet B/L.     Neurological: Intact light touch sensation B/L. Pain stimuli intact B/L. Baseline neuropathy to BLE.    Dermatologic: Nails 1-5 are thickened, elongated, discolored with subungual debris B/L. Skin appears waxy B/L. Web spaces 1-4 B/L are clean, dry and intact. No rashes or nodules noted B/L. No hyperkeratotic tissue noted  "B/L. Patient TTP of right great toe nail. No erythema, edema, or drainage noted.     LABS:   Results for orders placed or performed during the hospital encounter of 01/02/25 (from the past 24 hours)   POCT GLUCOSE   Result Value Ref Range    POCT Glucose 169 (H) 74 - 99 mg/dL   POCT GLUCOSE   Result Value Ref Range    POCT Glucose 252 (H) 74 - 99 mg/dL   SST TOP   Result Value Ref Range    Extra Tube Hold for add-ons.    Magnesium   Result Value Ref Range    Magnesium 1.85 1.60 - 2.40 mg/dL   Basic Metabolic Panel   Result Value Ref Range    Glucose 121 (H) 74 - 99 mg/dL    Sodium 135 (L) 136 - 145 mmol/L    Potassium 4.0 3.5 - 5.3 mmol/L    Chloride 95 (L) 98 - 107 mmol/L    Bicarbonate 35 (H) 21 - 32 mmol/L    Anion Gap 9 (L) 10 - 20 mmol/L    Urea Nitrogen 40 (H) 6 - 23 mg/dL    Creatinine 1.65 (H) 0.50 - 1.30 mg/dL    eGFR 42 (L) >60 mL/min/1.73m*2    Calcium 9.0 8.6 - 10.3 mg/dL   CBC   Result Value Ref Range    WBC 7.9 4.4 - 11.3 x10*3/uL    nRBC 0.0 0.0 - 0.0 /100 WBCs    RBC 4.17 (L) 4.50 - 5.90 x10*6/uL    Hemoglobin 10.9 (L) 13.5 - 17.5 g/dL    Hematocrit 35.8 (L) 41.0 - 52.0 %    MCV 86 80 - 100 fL    MCH 26.1 26.0 - 34.0 pg    MCHC 30.4 (L) 32.0 - 36.0 g/dL    RDW 17.0 (H) 11.5 - 14.5 %    Platelets 207 150 - 450 x10*3/uL   Cardiac device check - Inpatient   Result Value Ref Range    BSA 2.05 m2      Lab Results   Component Value Date    HGBA1C 5.6 01/04/2025      No results found for: \"CRP\"   No results found for: \"SEDRATE\"     Results from last 7 days   Lab Units 01/06/25  0536   WBC AUTO x10*3/uL 7.9   RBC AUTO x10*6/uL 4.17*   HEMOGLOBIN g/dL 10.9*   HEMATOCRIT % 35.8*     Results from last 7 days   Lab Units 01/06/25  0536 01/03/25  0546 01/02/25  1557   SODIUM mmol/L 135*   < > 125*   POTASSIUM mmol/L 4.0   < > 4.2   CHLORIDE mmol/L 95*   < > 97*   CO2 mmol/L 35*   < > 21   BUN mg/dL 40*   < > 33*   CREATININE mg/dL 1.65*   < > 1.73*   CALCIUM mg/dL 9.0   < > 8.7   MAGNESIUM mg/dL 1.85   < > 1.83 "   BILIRUBIN TOTAL mg/dL  --   --  0.4   ALT U/L  --   --  10   AST U/L  --   --  18    < > = values in this interval not displayed.           IMAGING REVIEW:  Transthoracic Echo (TTE) Complete    Result Date: 1/5/2025          [place] [id number]  Tel [phone] Fax [phone] TRANSTHORACIC ECHOCARDIOGRAM REPORT Patient Name:       AGATHA Stapleton Physician:    [id number] Dmitriy Mcclendon MD Study Date:         1/4/2025             Ordering Provider:    [id number] BEN HODGES MRN/PID:            [id number]             Fellow: Accession#:         [id number]         Nurse: Date of Birth/Age:  1945 / 79      Sonographer:          Familia light RDCS Gender Assigned at                      Additional Staff: Birth: Height:             167.64 cm            Admit Date:           1/2/2025 Weight:             90.27 kg             Admission Status:     Inpatient -                                                                Routine BSA / BMI:          2.00 m2 / 32.12      Department Location:  [address] [place] [address]/ Blood Pressure: 135 /74 mmHg Study Type:    TRANSTHORACIC ECHO (TTE) COMPLETE Diagnosis/ICD: Heart failure, unspecified-I50.9 Indication:    SOB CPT Codes:     Echo Complete w Full [id number] Patient History: BMI:               Obese >30 Pertinent History: HTN, Hyperlipidemia, COPD, CAD, CHF and Vein Stripping. CKD,                    DM, pacemaker and MI. Study Detail: The following Echo studies were performed: 2D, M-Mode, Doppler and               color flow. Technically challenging study due to body habitus and               prominent lung artifact.  PHYSICIAN INTERPRETATION: Left Ventricle: The left ventricular systolic function is moderately  decreased, with a visually estimated ejection fraction of 35-40%. There are no regional wall motion abnormalities. The left ventricular cavity size is normal. There is normal septal and mildly increased posterior left ventricular wall thickness. Spectral Doppler shows a Grade II (pseudonormal pattern) of left ventricular diastolic filling with an elevated left atrial pressure. LV Wall Scoring: The entire septum is akinetic. The entire inferior wall, basal and mid anterolateral wall, and apical lateral segment are hypokinetic. Left Atrium: The left atrium is moderately dilated. Right Ventricle: The right ventricle is normal in size. There is normal right ventricular global systolic function. A device is visualized in the right ventricle. Right Atrium: The right atrium is moderately dilated. Aortic Valve: The aortic valve was not well visualized. There is moderate aortic valve cusp calcification. There is mild aortic valve regurgitation. The peak instantaneous gradient of the aortic valve is 16 mmHg. The mean gradient of the aortic valve is 9 mmHg. Mitral Valve: The mitral valve is moderately thickened. There is moderate mitral valve regurgitation. Tricuspid Valve: The tricuspid valve was not well visualized. There is moderate to severe tricuspid regurgitation. Pulmonic Valve: The pulmonic valve is not well visualized. There is mild pulmonic valve regurgitation. Pericardium: No pericardial effusion noted. Aorta: The aortic root is normal. Pulmonary Artery: The tricuspid regurgitant velocity is 3.91 m/s, and with an estimated right atrial pressure of 3 mmHg, the estimated pulmonary artery pressure is moderately elevated with the RVSP at 64.2 mmHg. Systemic Veins: The inferior vena cava appears dilated, with IVC inspiratory collapse less than 50%. In comparison to the previous echocardiogram(s): There are no prior studies on this patient for comparison purposes.  CONCLUSIONS:  1. Entire septum, entire inferior wall,  basal and mid anterolateral wall, and apical lateral segment are abnormal.  2. The left ventricular systolic function is moderately decreased, with a visually estimated ejection fraction of 35-40%.  3. Spectral Doppler shows a Grade II (pseudonormal pattern) of left ventricular diastolic filling with an elevated left atrial pressure.  4. There is normal right ventricular global systolic function.  5. The left atrium is moderately dilated.  6. The right atrium is moderately dilated.  7. The mitral valve is moderately thickened.  8. Moderate mitral valve regurgitation.  9. Moderate to severe tricuspid regurgitation. 10. There is moderate aortic valve cusp calcification. 11. Mild aortic valve regurgitation. 12. Moderately elevated pulmonary artery pressure. QUANTITATIVE DATA SUMMARY:  2D MEASUREMENTS:            Normal Ranges: IVSd:            0.80 cm    (0.6-1.1cm) LVPWd:           1.10 cm    (0.6-1.1cm) LVIDd:           5.60 cm    (3.9-5.9cm) LVIDs:           5.30 cm LV Mass Index:   103.0 g/m2 LV % FS          5.4 %  LA VOLUME:                    Normal Ranges: LA Vol A4C:        52.6 ml    (22+/-6mL/m2) LA Vol A2C:        91.9 ml LA Vol BP:         70.0 ml LA Vol Index A4C:  26.3ml/m2 LA Vol Index A2C:  46.1 ml/m2 LA Vol Index BP:   35.1 ml/m2 LA Area A4C:       20.4 cm2 LA Area A2C:       26.8 cm2 LA Major Axis A4C: 6.7 cm LA Major Axis A2C: 6.6 cm LA Volume Index:   35.1 ml/m2  RA VOLUME BY A/L METHOD:            Normal Ranges: RA Vol A4C:              82.8 ml    (8.3-19.5ml) RA Vol Index A4C:        41.5 ml/m2 RA Area A4C:             24.5 cm2 RA Major Axis A4C:       6.2 cm  AORTA MEASUREMENTS:         Normal Ranges: Ao Sinus, d:        3.62 cm (2.1-3.5cm)  LV SYSTOLIC FUNCTION BY 2D PLANIMETRY (MOD):                      Normal Ranges: EF-A4C View:    35 % (>=55%) EF-A2C View:    42 % EF-Biplane:     41 % EF-Visual:      38 % LV EF Reported: 38 %  LV DIASTOLIC FUNCTION:           Normal Ranges: MV Peak E:              1.43 m/s  (0.7-1.2 m/s) MV Peak A:             0.51 m/s  (0.42-0.7 m/s) E/A Ratio:             2.78      (1.0-2.2) MV e'                  0.057 m/s (>8.0) MV lateral e'          0.07 m/s MV medial e'           0.05 m/s E/e' Ratio:            24.93     (<8.0)  MITRAL VALVE:          Normal Ranges: MV DT:        126 msec (150-240msec)  AORTIC VALVE:            Normal Ranges: AoV Vmax:      1.97 m/s  (<=1.7m/s) AoV Peak PG:   15.5 mmHg (<20mmHg) AoV Mean P.0 mmHg  (1.7-11.5mmHg) AoV VTI:       38.80 cm  (18-25cm) LVOT Diameter: 2.39 cm   (1.8-2.4cm)  AORTIC INSUFFICIENCY: AI Vmax:       3.79 m/s AI Half-time:  372 msec AI Decel Rate: 298.00 cm/s2  RIGHT VENTRICLE: RV Basal 3.56 cm TAPSE:   9.0 mm RV s'    0.05 m/s  TRICUSPID VALVE/RVSP:          Normal Ranges: Peak TR Velocity:     3.91 m/s Est. RA Pressure:     3 mmHg RV Syst Pressure:     64 mmHg  (< 30mmHg) IVC Diam:             2.17 cm  PULMONIC VALVE:          Normal Ranges: PV Max Deric:     1.0 m/s  (0.6-0.9m/s) PV Max PG:      3.7 mmHg  [id number] Dmitriy Mcclendon MD Electronically signed on 2025 at 12:47:04 PM  Wall Scoring  ** Final **     ECG 12 lead    Result Date: 2025  Ventricular-paced rhythm Abnormal ECG No previous ECGs available See ED provider note for full interpretation and clinical correlation    XR chest 1 view    Result Date: 2025  STUDY: Chest Radiograph;  2025 4:00 PM INDICATION: Chest pain. COMPARISON: None Available. ACCESSION NUMBER(S): [id number] ORDERING CLINICIAN: MERLE ARANA TECHNIQUE:  Frontal chest was obtained at 16:00 hours. FINDINGS: The patient status post median sternotomy.  There is a dual lead pacemaker present. CARDIOMEDIASTINAL SILHOUETTE: Cardiomediastinal silhouette is enlarged.  LUNGS: There is pulmonary vascular congestion.  There are bibasilar infiltrates with pleural effusions.  ABDOMEN: No remarkable upper abdominal findings.  BONES: No acute osseous changes.    Cardiomegaly with pulmonary  vascular congestion, bibasilar infiltrates and pleural effusions. Signed by Zach Arias MD            Assessment/Plan   ASSESSMENT & PLAN:        #Onychomycosis  - Patient was seen and evaluated; all findings were discussed and all questions were answered to patient's satisfaction.  - Charts, labs, vitals and imaging all reviewed.   - Imaging: none  - Labs: no leukocytosis  - PVRs: palpable  - Wound culture: none  - Blood culture: none    Plan:  - Pain Regimen: per primary  - Bowel Regimen: per primary  - Can consider bedside debridement tomorrow of right great toe nail. Dr Kaur to see patient to discuss further.   - Podiatry will continue to follow while in house.    DVT ppx: per primary  Weightbearing: WBAT  Discharge: Pt to follow up as needed or for new or worsening symptoms with Dr. Kaur    Case to be discussed with attending, A&P above reflects a tentative plan. Please await for the final signature from the attending physician on service.             SIGNATURE: HUNG Calix-CNP PATIENT NAME: Jose Byrne   DATE: January 6, 2025 MRN: [id number]   TIME: 3:14 PM CONTACT: Haiku message

## 2025-01-08 ENCOUNTER — PATIENT OUTREACH (OUTPATIENT)
Dept: CARE COORDINATION | Age: 80
End: 2025-01-08
Payer: MEDICARE

## 2025-01-09 ENCOUNTER — TELEPHONE (OUTPATIENT)
Dept: INPATIENT UNIT | Facility: HOSPITAL | Age: 80
End: 2025-01-09
Payer: MEDICARE

## 2025-01-09 NOTE — NURSING NOTE
Heart Failure Nurse Navigator Transition of Care Phone Call    The role of the HF nurse navigator is to (1) characterize risk profiles of patients with heart failure transitioning from [id number] after hospitalization, (2) recommend interventions to improve care and reduce risks of worse post-hospitalization outcomes.     Assessment  Call attempted to family.     HF Symptoms  Chest pain? No  Shortness of breath? none  Orthopnea? No  Paroxysmal nocturnal dyspnea? No  Edema? No  Weight gain >2lbs in 3 days or 5lbs in 1 week? No    Medications  Is the patient prescribed the following medications?  ARNi/ACEi/ARB: None  BB: Metoprolol succinate 25 mg daily  MRA: None  SGLT2i: None  Diuretic: Furosemide 20 mg daily  Is the patient adherent to prescribed medications? YES    Management    Is the patient obtaining daily weights? NO    If yes, current weight? N/A  Is the patient following diet limitations (2-3g Na, fluid restriction)? YES  Does the patient have a  cardiology follow-up scheduled? NO    If yes, appointment details? N/A  If no, what is the reason? Planning to schedule.   Does the patient require HF education reinforcement? Yes  If yes, what was discussed? Daily weights, follow up appointments, low sodium diet    Recommendations/Comments:  Spoke with patients daughter, Kimi. She states patient is doing ok. She states patient has all his medications and is taking everything as prescribed. He currently has no follow up appointments scheduled. His daughter states they are planning to schedule follow up. No questions or concerns at this time. Provided my contact information should any questions or concerns arise.

## 2025-01-12 LAB
ATRIAL RATE: 53 BPM
Q ONSET: 185 MS
QRS COUNT: 14 BEATS
QRS DURATION: 162 MS
QT INTERVAL: 486 MS
QTC CALCULATION(BAZETT): 560 MS
QTC FREDERICIA: 535 MS
R AXIS: -46 DEGREES
T AXIS: 116 DEGREES
T OFFSET: 428 MS
VENTRICULAR RATE: 80 BPM

## 2025-03-22 ENCOUNTER — HOSPITAL ENCOUNTER (EMERGENCY)
Facility: HOSPITAL | Age: 80
Discharge: HOME | End: 2025-03-22
Attending: STUDENT IN AN ORGANIZED HEALTH CARE EDUCATION/TRAINING PROGRAM
Payer: MEDICARE

## 2025-03-22 VITALS
HEART RATE: 80 BPM | WEIGHT: 210 LBS | HEIGHT: 66 IN | BODY MASS INDEX: 33.75 KG/M2 | RESPIRATION RATE: 17 BRPM | DIASTOLIC BLOOD PRESSURE: 78 MMHG | TEMPERATURE: 97.7 F | SYSTOLIC BLOOD PRESSURE: 154 MMHG | OXYGEN SATURATION: 96 %

## 2025-03-22 DIAGNOSIS — R58 BLEEDING OF BLOOD VESSEL: Primary | ICD-10-CM

## 2025-03-22 PROCEDURE — 99281 EMR DPT VST MAYX REQ PHY/QHP: CPT | Performed by: STUDENT IN AN ORGANIZED HEALTH CARE EDUCATION/TRAINING PROGRAM

## 2025-03-22 PROCEDURE — 2500000005 HC RX 250 GENERAL PHARMACY W/O HCPCS: Performed by: STUDENT IN AN ORGANIZED HEALTH CARE EDUCATION/TRAINING PROGRAM

## 2025-03-22 PROCEDURE — 99282 EMERGENCY DEPT VISIT SF MDM: CPT

## 2025-03-22 RX ORDER — SILVER NITRATE 38.21; 12.74 MG/1; MG/1
STICK TOPICAL ONCE
Status: COMPLETED | OUTPATIENT
Start: 2025-03-22 | End: 2025-03-22

## 2025-03-22 RX ADMIN — SILVER NITRATE APPLICATORS 1 APPLICATION: 25; 75 STICK TOPICAL at 22:39

## 2025-03-22 ASSESSMENT — COLUMBIA-SUICIDE SEVERITY RATING SCALE - C-SSRS
1. IN THE PAST MONTH, HAVE YOU WISHED YOU WERE DEAD OR WISHED YOU COULD GO TO SLEEP AND NOT WAKE UP?: NO
6. HAVE YOU EVER DONE ANYTHING, STARTED TO DO ANYTHING, OR PREPARED TO DO ANYTHING TO END YOUR LIFE?: NO
2. HAVE YOU ACTUALLY HAD ANY THOUGHTS OF KILLING YOURSELF?: NO

## 2025-03-22 ASSESSMENT — LIFESTYLE VARIABLES
TOTAL SCORE: 0
EVER HAD A DRINK FIRST THING IN THE MORNING TO STEADY YOUR NERVES TO GET RID OF A HANGOVER: NO
HAVE PEOPLE ANNOYED YOU BY CRITICIZING YOUR DRINKING: NO
EVER FELT BAD OR GUILTY ABOUT YOUR DRINKING: NO
HAVE YOU EVER FELT YOU SHOULD CUT DOWN ON YOUR DRINKING: NO

## 2025-03-22 ASSESSMENT — PAIN SCALES - GENERAL: PAINLEVEL_OUTOF10: 0 - NO PAIN

## 2025-03-22 ASSESSMENT — PAIN - FUNCTIONAL ASSESSMENT: PAIN_FUNCTIONAL_ASSESSMENT: 0-10

## 2025-03-23 NOTE — ED PROVIDER NOTES
HPI   Chief Complaint   Patient presents with    Epistaxis (Nose Bleed)     Bleeding on the outside of the nose. +thinners       Patient is a 79-year-old male who presents to the ED for bleeding from the outside of his right nostril.  Patient states that this started around 1400 today.  He has been holding pressure to the area and has tried over-the-counter clotting aids as well as different bandages in addition to direct pressure but every time he releases pressure he starts to bleed again.  He does take blood thinners.  He has had similar issues with bleeding but never to the point where it has continued this long.  He otherwise denies any complaints.              Patient History   No past medical history on file.  No past surgical history on file.  No family history on file.  Social History     Tobacco Use    Smoking status: Former     Types: Cigarettes     Passive exposure: Past    Smokeless tobacco: Not on file   Substance Use Topics    Alcohol use: Not on file    Drug use: Not on file       Physical Exam   ED Triage Vitals [03/22/25 2128]   Temperature Heart Rate Respirations BP   36.5 °C (97.7 °F) 80 17 154/78      Pulse Ox Temp Source Heart Rate Source Patient Position   96 % Temporal -- Sitting      BP Location FiO2 (%)     Left arm --       Physical Exam  Vitals and nursing note reviewed.   Constitutional:       General: He is not in acute distress.     Appearance: He is not toxic-appearing.   HENT:      Head: Normocephalic.      Nose:      Comments: Small punctate area of bleeding on the outside of the right nostril     Mouth/Throat:      Mouth: Mucous membranes are moist.   Cardiovascular:      Rate and Rhythm: Normal rate and regular rhythm.      Heart sounds: No murmur heard.     No gallop.   Pulmonary:      Effort: Pulmonary effort is normal. No respiratory distress.      Breath sounds: Normal breath sounds. No wheezing.   Musculoskeletal:      Cervical back: Neck supple.   Skin:     General: Skin is  warm and dry.   Neurological:      Mental Status: He is alert.           ED Course & MDM   Diagnoses as of 03/22/25 2239   Bleeding of blood vessel                 No data recorded     [place] Coma Scale Score: 15 (03/22/25 2129 : Justine Infante RN)                           Medical Decision Making  Patient presented with a punctate area of bleeding likely from a superficial vessel on the outside of his right nares.  I applied Surgicel as well as a nasal clamp and left this in place for approximately 30 minutes.  Upon removing the nasal clamp he did start to have some bleeding again.  The Surgicel was removed.  The bleeding had slowed enough that I was able to apply Dermabond over the area which did achieve hemostasis.  We discussed follow-up with his primary care physician as well as return precautions.  Patient was discharged stable condition.        Procedure  Procedures     Mitch Giang,   03/22/25 2239

## 2025-07-05 ENCOUNTER — APPOINTMENT (OUTPATIENT)
Dept: CARDIOLOGY | Facility: HOSPITAL | Age: 80
End: 2025-07-05
Payer: MEDICARE

## 2025-07-05 ENCOUNTER — APPOINTMENT (OUTPATIENT)
Dept: RADIOLOGY | Facility: HOSPITAL | Age: 80
End: 2025-07-05
Payer: MEDICARE

## 2025-07-05 ENCOUNTER — HOSPITAL ENCOUNTER (INPATIENT)
Facility: HOSPITAL | Age: 80
End: 2025-07-05
Attending: STUDENT IN AN ORGANIZED HEALTH CARE EDUCATION/TRAINING PROGRAM | Admitting: STUDENT IN AN ORGANIZED HEALTH CARE EDUCATION/TRAINING PROGRAM
Payer: MEDICARE

## 2025-07-05 DIAGNOSIS — R06.02 SHORTNESS OF BREATH: Primary | ICD-10-CM

## 2025-07-05 DIAGNOSIS — N17.9 AKI (ACUTE KIDNEY INJURY): ICD-10-CM

## 2025-07-05 DIAGNOSIS — I50.43 HEART FAILURE, ACUTE ON CHRONIC, SYSTOLIC AND DIASTOLIC: ICD-10-CM

## 2025-07-05 DIAGNOSIS — R79.89 ELEVATED BRAIN NATRIURETIC PEPTIDE (BNP) LEVEL: ICD-10-CM

## 2025-07-05 DIAGNOSIS — J44.1 CHRONIC OBSTRUCTIVE PULMONARY DISEASE WITH ACUTE EXACERBATION (MULTI): ICD-10-CM

## 2025-07-05 DIAGNOSIS — I50.9 CONGESTIVE HEART FAILURE, UNSPECIFIED HF CHRONICITY, UNSPECIFIED HEART FAILURE TYPE: ICD-10-CM

## 2025-07-05 DIAGNOSIS — D64.9 ANEMIA, UNSPECIFIED TYPE: ICD-10-CM

## 2025-07-05 DIAGNOSIS — E87.70 HYPERVOLEMIA, UNSPECIFIED HYPERVOLEMIA TYPE: ICD-10-CM

## 2025-07-05 PROBLEM — K76.0 STEATOSIS OF LIVER: Status: ACTIVE | Noted: 2022-02-10

## 2025-07-05 PROBLEM — Z95.0 PRESENCE OF PERMANENT CARDIAC PACEMAKER: Status: ACTIVE | Noted: 2019-09-10

## 2025-07-05 PROBLEM — J44.9 CHRONIC OBSTRUCTIVE PULMONARY DISEASE (MULTI): Status: ACTIVE | Noted: 2025-07-05

## 2025-07-05 PROBLEM — G47.33 OBSTRUCTIVE SLEEP APNEA OF ADULT: Status: ACTIVE | Noted: 2019-09-10

## 2025-07-05 PROBLEM — E11.42 TYPE 2 DIABETES MELLITUS WITH PERIPHERAL NEUROPATHY: Status: ACTIVE | Noted: 2019-09-10

## 2025-07-05 LAB
ALBUMIN SERPL BCP-MCNC: 3.1 G/DL (ref 3.4–5)
ALP SERPL-CCNC: 91 U/L (ref 33–136)
ALT SERPL W P-5'-P-CCNC: 8 U/L (ref 10–52)
ANION GAP SERPL CALC-SCNC: 10 MMOL/L (ref 10–20)
AST SERPL W P-5'-P-CCNC: 9 U/L (ref 9–39)
BASOPHILS # BLD AUTO: 0.04 X10*3/UL (ref 0–0.1)
BASOPHILS NFR BLD AUTO: 0.5 %
BILIRUB SERPL-MCNC: 0.4 MG/DL (ref 0–1.2)
BNP SERPL-MCNC: 663 PG/ML (ref 0–99)
BUN SERPL-MCNC: 32 MG/DL (ref 6–23)
CALCIUM SERPL-MCNC: 8.4 MG/DL (ref 8.6–10.3)
CARDIAC TROPONIN I PNL SERPL HS: 24 NG/L (ref 0–20)
CARDIAC TROPONIN I PNL SERPL HS: 25 NG/L (ref 0–20)
CHLORIDE SERPL-SCNC: 107 MMOL/L (ref 98–107)
CO2 SERPL-SCNC: 22 MMOL/L (ref 21–32)
CREAT SERPL-MCNC: 2.2 MG/DL (ref 0.5–1.3)
EGFRCR SERPLBLD CKD-EPI 2021: 30 ML/MIN/1.73M*2
EOSINOPHIL # BLD AUTO: 0.23 X10*3/UL (ref 0–0.4)
EOSINOPHIL NFR BLD AUTO: 3 %
ERYTHROCYTE [DISTWIDTH] IN BLOOD BY AUTOMATED COUNT: 17.2 % (ref 11.5–14.5)
FLUAV RNA RESP QL NAA+PROBE: NOT DETECTED
FLUBV RNA RESP QL NAA+PROBE: NOT DETECTED
GLUCOSE SERPL-MCNC: 158 MG/DL (ref 74–99)
HCT VFR BLD AUTO: 28.3 % (ref 41–52)
HGB BLD-MCNC: 8.1 G/DL (ref 13.5–17.5)
HOLD SPECIMEN: NORMAL
HOLD SPECIMEN: NORMAL
IMM GRANULOCYTES # BLD AUTO: 0.02 X10*3/UL (ref 0–0.5)
IMM GRANULOCYTES NFR BLD AUTO: 0.3 % (ref 0–0.9)
LYMPHOCYTES # BLD AUTO: 0.31 X10*3/UL (ref 0.8–3)
LYMPHOCYTES NFR BLD AUTO: 4 %
MCH RBC QN AUTO: 23.7 PG (ref 26–34)
MCHC RBC AUTO-ENTMCNC: 28.6 G/DL (ref 32–36)
MCV RBC AUTO: 83 FL (ref 80–100)
MONOCYTES # BLD AUTO: 0.52 X10*3/UL (ref 0.05–0.8)
MONOCYTES NFR BLD AUTO: 6.8 %
NEUTROPHILS # BLD AUTO: 6.55 X10*3/UL (ref 1.6–5.5)
NEUTROPHILS NFR BLD AUTO: 85.4 %
NRBC BLD-RTO: 0 /100 WBCS (ref 0–0)
PLATELET # BLD AUTO: 215 X10*3/UL (ref 150–450)
POTASSIUM SERPL-SCNC: 4.2 MMOL/L (ref 3.5–5.3)
PROT SERPL-MCNC: 6.6 G/DL (ref 6.4–8.2)
RBC # BLD AUTO: 3.42 X10*6/UL (ref 4.5–5.9)
SARS-COV-2 RNA RESP QL NAA+PROBE: NOT DETECTED
SODIUM SERPL-SCNC: 135 MMOL/L (ref 136–145)
WBC # BLD AUTO: 7.7 X10*3/UL (ref 4.4–11.3)

## 2025-07-05 PROCEDURE — 71045 X-RAY EXAM CHEST 1 VIEW: CPT | Performed by: STUDENT IN AN ORGANIZED HEALTH CARE EDUCATION/TRAINING PROGRAM

## 2025-07-05 PROCEDURE — 71045 X-RAY EXAM CHEST 1 VIEW: CPT

## 2025-07-05 PROCEDURE — 4B02XSZ MEASUREMENT OF CARDIAC PACEMAKER, EXTERNAL APPROACH: ICD-10-PCS | Performed by: NURSE PRACTITIONER

## 2025-07-05 PROCEDURE — 84484 ASSAY OF TROPONIN QUANT: CPT | Performed by: STUDENT IN AN ORGANIZED HEALTH CARE EDUCATION/TRAINING PROGRAM

## 2025-07-05 PROCEDURE — 99223 1ST HOSP IP/OBS HIGH 75: CPT | Performed by: NURSE PRACTITIONER

## 2025-07-05 PROCEDURE — 2500000004 HC RX 250 GENERAL PHARMACY W/ HCPCS (ALT 636 FOR OP/ED): Performed by: STUDENT IN AN ORGANIZED HEALTH CARE EDUCATION/TRAINING PROGRAM

## 2025-07-05 PROCEDURE — 80053 COMPREHEN METABOLIC PANEL: CPT | Performed by: STUDENT IN AN ORGANIZED HEALTH CARE EDUCATION/TRAINING PROGRAM

## 2025-07-05 PROCEDURE — 94640 AIRWAY INHALATION TREATMENT: CPT

## 2025-07-05 PROCEDURE — 83880 ASSAY OF NATRIURETIC PEPTIDE: CPT | Performed by: STUDENT IN AN ORGANIZED HEALTH CARE EDUCATION/TRAINING PROGRAM

## 2025-07-05 PROCEDURE — 5A09357 ASSISTANCE WITH RESPIRATORY VENTILATION, LESS THAN 24 CONSECUTIVE HOURS, CONTINUOUS POSITIVE AIRWAY PRESSURE: ICD-10-PCS | Performed by: NURSE PRACTITIONER

## 2025-07-05 PROCEDURE — 93005 ELECTROCARDIOGRAM TRACING: CPT

## 2025-07-05 PROCEDURE — 2060000001 HC INTERMEDIATE ICU ROOM DAILY

## 2025-07-05 PROCEDURE — 2500000005 HC RX 250 GENERAL PHARMACY W/O HCPCS: Performed by: NURSE PRACTITIONER

## 2025-07-05 PROCEDURE — 99285 EMERGENCY DEPT VISIT HI MDM: CPT | Performed by: STUDENT IN AN ORGANIZED HEALTH CARE EDUCATION/TRAINING PROGRAM

## 2025-07-05 PROCEDURE — 36415 COLL VENOUS BLD VENIPUNCTURE: CPT | Performed by: STUDENT IN AN ORGANIZED HEALTH CARE EDUCATION/TRAINING PROGRAM

## 2025-07-05 PROCEDURE — 85025 COMPLETE CBC W/AUTO DIFF WBC: CPT | Performed by: STUDENT IN AN ORGANIZED HEALTH CARE EDUCATION/TRAINING PROGRAM

## 2025-07-05 PROCEDURE — 2500000002 HC RX 250 W HCPCS SELF ADMINISTERED DRUGS (ALT 637 FOR MEDICARE OP, ALT 636 FOR OP/ED): Performed by: STUDENT IN AN ORGANIZED HEALTH CARE EDUCATION/TRAINING PROGRAM

## 2025-07-05 PROCEDURE — 87636 SARSCOV2 & INF A&B AMP PRB: CPT | Performed by: STUDENT IN AN ORGANIZED HEALTH CARE EDUCATION/TRAINING PROGRAM

## 2025-07-05 PROCEDURE — 96365 THER/PROPH/DIAG IV INF INIT: CPT

## 2025-07-05 RX ORDER — FLUTICASONE FUROATE AND VILANTEROL 200; 25 UG/1; UG/1
1 POWDER RESPIRATORY (INHALATION)
Status: DISPENSED | OUTPATIENT
Start: 2025-07-06

## 2025-07-05 RX ORDER — IPRATROPIUM BROMIDE AND ALBUTEROL SULFATE 2.5; .5 MG/3ML; MG/3ML
3 SOLUTION RESPIRATORY (INHALATION) EVERY 6 HOURS PRN
Status: DISPENSED | OUTPATIENT
Start: 2025-07-05

## 2025-07-05 RX ORDER — CHOLECALCIFEROL (VITAMIN D3) 25 MCG
50 TABLET ORAL DAILY
Status: DISPENSED | OUTPATIENT
Start: 2025-07-06

## 2025-07-05 RX ORDER — ACETAMINOPHEN 650 MG/1
650 SUPPOSITORY RECTAL EVERY 4 HOURS PRN
Status: ACTIVE | OUTPATIENT
Start: 2025-07-05

## 2025-07-05 RX ORDER — ONDANSETRON 4 MG/1
4 TABLET, FILM COATED ORAL EVERY 8 HOURS PRN
Status: ACTIVE | OUTPATIENT
Start: 2025-07-05

## 2025-07-05 RX ORDER — PREDNISONE 20 MG/1
60 TABLET ORAL ONCE
Status: COMPLETED | OUTPATIENT
Start: 2025-07-05 | End: 2025-07-05

## 2025-07-05 RX ORDER — FERROUS SULFATE 325(65) MG
1 TABLET ORAL 2 TIMES DAILY
Status: DISPENSED | OUTPATIENT
Start: 2025-07-06

## 2025-07-05 RX ORDER — IPRATROPIUM BROMIDE AND ALBUTEROL SULFATE 2.5; .5 MG/3ML; MG/3ML
3 SOLUTION RESPIRATORY (INHALATION) EVERY 20 MIN
Status: COMPLETED | OUTPATIENT
Start: 2025-07-05 | End: 2025-07-05

## 2025-07-05 RX ORDER — IPRATROPIUM BROMIDE AND ALBUTEROL SULFATE 2.5; .5 MG/3ML; MG/3ML
3 SOLUTION RESPIRATORY (INHALATION)
Status: DISCONTINUED | OUTPATIENT
Start: 2025-07-06 | End: 2025-07-06

## 2025-07-05 RX ORDER — MAGNESIUM SULFATE HEPTAHYDRATE 40 MG/ML
2 INJECTION, SOLUTION INTRAVENOUS ONCE
Status: COMPLETED | OUTPATIENT
Start: 2025-07-05 | End: 2025-07-05

## 2025-07-05 RX ORDER — ACETAMINOPHEN 325 MG/1
650 TABLET ORAL EVERY 4 HOURS PRN
Status: ACTIVE | OUTPATIENT
Start: 2025-07-05

## 2025-07-05 RX ORDER — ONDANSETRON HYDROCHLORIDE 2 MG/ML
4 INJECTION, SOLUTION INTRAVENOUS EVERY 8 HOURS PRN
Status: ACTIVE | OUTPATIENT
Start: 2025-07-05

## 2025-07-05 RX ORDER — FUROSEMIDE 10 MG/ML
20 INJECTION INTRAMUSCULAR; INTRAVENOUS 2 TIMES DAILY
Status: DISPENSED | OUTPATIENT
Start: 2025-07-06

## 2025-07-05 RX ORDER — FUROSEMIDE 10 MG/ML
40 INJECTION INTRAMUSCULAR; INTRAVENOUS ONCE
Status: DISCONTINUED | OUTPATIENT
Start: 2025-07-05 | End: 2025-07-05

## 2025-07-05 RX ORDER — VIT C/E/ZN/COPPR/LUTEIN/ZEAXAN 250MG-90MG
1000 CAPSULE ORAL DAILY
Status: DISPENSED | OUTPATIENT
Start: 2025-07-06

## 2025-07-05 RX ORDER — ACETAMINOPHEN 160 MG/5ML
650 SOLUTION ORAL EVERY 4 HOURS PRN
Status: ACTIVE | OUTPATIENT
Start: 2025-07-05

## 2025-07-05 RX ORDER — METOPROLOL SUCCINATE 25 MG/1
25 TABLET, EXTENDED RELEASE ORAL DAILY
Status: DISPENSED | OUTPATIENT
Start: 2025-07-06

## 2025-07-05 RX ADMIN — IPRATROPIUM BROMIDE AND ALBUTEROL SULFATE 3 ML: 2.5; .5 SOLUTION RESPIRATORY (INHALATION) at 18:34

## 2025-07-05 RX ADMIN — MAGNESIUM SULFATE HEPTAHYDRATE 2 G: 40 INJECTION, SOLUTION INTRAVENOUS at 18:31

## 2025-07-05 RX ADMIN — PREDNISONE 60 MG: 20 TABLET ORAL at 18:30

## 2025-07-05 RX ADMIN — IPRATROPIUM BROMIDE AND ALBUTEROL SULFATE 3 ML: 2.5; .5 SOLUTION RESPIRATORY (INHALATION) at 18:31

## 2025-07-05 RX ADMIN — Medication 3 L/MIN: at 22:44

## 2025-07-05 RX ADMIN — IPRATROPIUM BROMIDE AND ALBUTEROL SULFATE 3 ML: 2.5; .5 SOLUTION RESPIRATORY (INHALATION) at 18:35

## 2025-07-05 SDOH — HEALTH STABILITY: MENTAL HEALTH: HOW OFTEN DO YOU HAVE A DRINK CONTAINING ALCOHOL?: NEVER

## 2025-07-05 SDOH — SOCIAL STABILITY: SOCIAL INSECURITY: DO YOU FEEL ANYONE HAS EXPLOITED OR TAKEN ADVANTAGE OF YOU FINANCIALLY OR OF YOUR PERSONAL PROPERTY?: NO

## 2025-07-05 SDOH — ECONOMIC STABILITY: FOOD INSECURITY: HOW HARD IS IT FOR YOU TO PAY FOR THE VERY BASICS LIKE FOOD, HOUSING, MEDICAL CARE, AND HEATING?: NOT VERY HARD

## 2025-07-05 SDOH — ECONOMIC STABILITY: FOOD INSECURITY: WITHIN THE PAST 12 MONTHS, THE FOOD YOU BOUGHT JUST DIDN'T LAST AND YOU DIDN'T HAVE MONEY TO GET MORE.: NEVER TRUE

## 2025-07-05 SDOH — SOCIAL STABILITY: SOCIAL INSECURITY: WITHIN THE LAST YEAR, HAVE YOU BEEN HUMILIATED OR EMOTIONALLY ABUSED IN OTHER WAYS BY YOUR PARTNER OR EX-PARTNER?: NO

## 2025-07-05 SDOH — HEALTH STABILITY: MENTAL HEALTH: HOW OFTEN DO YOU HAVE SIX OR MORE DRINKS ON ONE OCCASION?: NEVER

## 2025-07-05 SDOH — HEALTH STABILITY: MENTAL HEALTH: HOW MANY DRINKS CONTAINING ALCOHOL DO YOU HAVE ON A TYPICAL DAY WHEN YOU ARE DRINKING?: PATIENT DOES NOT DRINK

## 2025-07-05 SDOH — ECONOMIC STABILITY: HOUSING INSECURITY: AT ANY TIME IN THE PAST 12 MONTHS, WERE YOU HOMELESS OR LIVING IN A SHELTER (INCLUDING NOW)?: NO

## 2025-07-05 SDOH — SOCIAL STABILITY: SOCIAL INSECURITY: ARE THERE ANY APPARENT SIGNS OF INJURIES/BEHAVIORS THAT COULD BE RELATED TO ABUSE/NEGLECT?: NO

## 2025-07-05 SDOH — ECONOMIC STABILITY: HOUSING INSECURITY: IN THE LAST 12 MONTHS, WAS THERE A TIME WHEN YOU WERE NOT ABLE TO PAY THE MORTGAGE OR RENT ON TIME?: NO

## 2025-07-05 SDOH — SOCIAL STABILITY: SOCIAL INSECURITY: WERE YOU ABLE TO COMPLETE ALL THE BEHAVIORAL HEALTH SCREENINGS?: YES

## 2025-07-05 SDOH — ECONOMIC STABILITY: TRANSPORTATION INSECURITY: IN THE PAST 12 MONTHS, HAS LACK OF TRANSPORTATION KEPT YOU FROM MEDICAL APPOINTMENTS OR FROM GETTING MEDICATIONS?: NO

## 2025-07-05 SDOH — SOCIAL STABILITY: SOCIAL INSECURITY: WITHIN THE LAST YEAR, HAVE YOU BEEN AFRAID OF YOUR PARTNER OR EX-PARTNER?: NO

## 2025-07-05 SDOH — ECONOMIC STABILITY: INCOME INSECURITY: IN THE PAST 12 MONTHS HAS THE ELECTRIC, GAS, OIL, OR WATER COMPANY THREATENED TO SHUT OFF SERVICES IN YOUR HOME?: NO

## 2025-07-05 SDOH — ECONOMIC STABILITY: FOOD INSECURITY: WITHIN THE PAST 12 MONTHS, YOU WORRIED THAT YOUR FOOD WOULD RUN OUT BEFORE YOU GOT THE MONEY TO BUY MORE.: NEVER TRUE

## 2025-07-05 SDOH — ECONOMIC STABILITY: HOUSING INSECURITY: IN THE PAST 12 MONTHS, HOW MANY TIMES HAVE YOU MOVED WHERE YOU WERE LIVING?: 0

## 2025-07-05 SDOH — SOCIAL STABILITY: SOCIAL INSECURITY: HAS ANYONE EVER THREATENED TO HURT YOUR FAMILY OR YOUR PETS?: NO

## 2025-07-05 SDOH — SOCIAL STABILITY: SOCIAL INSECURITY: ARE YOU OR HAVE YOU BEEN THREATENED OR ABUSED PHYSICALLY, EMOTIONALLY, OR SEXUALLY BY ANYONE?: NO

## 2025-07-05 SDOH — SOCIAL STABILITY: SOCIAL INSECURITY: ABUSE: ADULT

## 2025-07-05 SDOH — SOCIAL STABILITY: SOCIAL INSECURITY: DO YOU FEEL UNSAFE GOING BACK TO THE PLACE WHERE YOU ARE LIVING?: NO

## 2025-07-05 SDOH — SOCIAL STABILITY: SOCIAL INSECURITY: HAVE YOU HAD ANY THOUGHTS OF HARMING ANYONE ELSE?: NO

## 2025-07-05 SDOH — SOCIAL STABILITY: SOCIAL INSECURITY: DOES ANYONE TRY TO KEEP YOU FROM HAVING/CONTACTING OTHER FRIENDS OR DOING THINGS OUTSIDE YOUR HOME?: NO

## 2025-07-05 SDOH — SOCIAL STABILITY: SOCIAL INSECURITY: HAVE YOU HAD THOUGHTS OF HARMING ANYONE ELSE?: NO

## 2025-07-05 ASSESSMENT — COGNITIVE AND FUNCTIONAL STATUS - GENERAL
PATIENT BASELINE BEDBOUND: NO
MOBILITY SCORE: 21
MOVING TO AND FROM BED TO CHAIR: A LITTLE
DAILY ACTIVITIY SCORE: 24
CLIMB 3 TO 5 STEPS WITH RAILING: A LITTLE
WALKING IN HOSPITAL ROOM: A LITTLE

## 2025-07-05 ASSESSMENT — ACTIVITIES OF DAILY LIVING (ADL)
HEARING - RIGHT EAR: DIFFICULTY WITH NOISE
WALKS IN HOME: INDEPENDENT
ADEQUATE_TO_COMPLETE_ADL: YES
PATIENT'S MEMORY ADEQUATE TO SAFELY COMPLETE DAILY ACTIVITIES?: YES
DRESSING YOURSELF: INDEPENDENT
HEARING - LEFT EAR: DIFFICULTY WITH NOISE
ASSISTIVE_DEVICE: EYEGLASSES
LACK_OF_TRANSPORTATION: NO
GROOMING: INDEPENDENT
BATHING: INDEPENDENT
TOILETING: INDEPENDENT
JUDGMENT_ADEQUATE_SAFELY_COMPLETE_DAILY_ACTIVITIES: YES
FEEDING YOURSELF: INDEPENDENT

## 2025-07-05 ASSESSMENT — LIFESTYLE VARIABLES
TOTAL SCORE: 0
HOW OFTEN DO YOU HAVE A DRINK CONTAINING ALCOHOL: NEVER
HAVE YOU EVER FELT YOU SHOULD CUT DOWN ON YOUR DRINKING: NO
EVER HAD A DRINK FIRST THING IN THE MORNING TO STEADY YOUR NERVES TO GET RID OF A HANGOVER: NO
SKIP TO QUESTIONS 9-10: 1
AUDIT-C TOTAL SCORE: 0
EVER FELT BAD OR GUILTY ABOUT YOUR DRINKING: NO
HAVE PEOPLE ANNOYED YOU BY CRITICIZING YOUR DRINKING: NO
AUDIT-C TOTAL SCORE: 0
HOW MANY STANDARD DRINKS CONTAINING ALCOHOL DO YOU HAVE ON A TYPICAL DAY: PATIENT DOES NOT DRINK
HOW OFTEN DO YOU HAVE 6 OR MORE DRINKS ON ONE OCCASION: NEVER
SKIP TO QUESTIONS 9-10: 1
AUDIT-C TOTAL SCORE: 0

## 2025-07-05 ASSESSMENT — PATIENT HEALTH QUESTIONNAIRE - PHQ9
2. FEELING DOWN, DEPRESSED OR HOPELESS: NOT AT ALL
SUM OF ALL RESPONSES TO PHQ9 QUESTIONS 1 & 2: 0
1. LITTLE INTEREST OR PLEASURE IN DOING THINGS: NOT AT ALL

## 2025-07-05 ASSESSMENT — PAIN - FUNCTIONAL ASSESSMENT: PAIN_FUNCTIONAL_ASSESSMENT: 0-10

## 2025-07-05 ASSESSMENT — PAIN SCALES - GENERAL
PAINLEVEL_OUTOF10: 0 - NO PAIN
PAINLEVEL_OUTOF10: 0 - NO PAIN

## 2025-07-05 NOTE — ED PROVIDER NOTES
"HPI   Chief Complaint   Patient presents with    Shortness of Breath     \"Here for worseneing shortness of breath that has been going on for about 3 weeks.\"       Patient is a 79-year-old male with history of CHF with EF 35% in early 2025, hyperlipidemia, pretension, diabetes, A-fib on Eliquis, COPD on 2 L at all times, CAD status post cardiac stenting, pacemaker who presents for shortness of breath.  Patient states has been going on for 3 weeks.  States [facility]  No orthopnea.  Denies chest pain, fevers, chills, runny nose, sore throat.  Does have a cough which he states is at baseline.  Has had diarrhea for the past 3 days that is nonbloody, no black stools.  No nausea or vomiting, abdominal pain.  No history of DVT or PE.                Patient History   Medical History[1]  Surgical History[2]  Family History[3]  Social History[4]    Physical Exam   ED Triage Vitals [07/05/25 1752]   Temperature Heart Rate Respirations BP   36.7 °C (98.1 °F) 81 (!) 22 118/64      Pulse Ox Temp Source Heart Rate Source Patient Position   98 % Temporal -- Sitting      BP Location FiO2 (%)     Right arm --       Physical Exam  Constitutional:       General: He is not in acute distress.  HENT:      Head: Normocephalic.   Eyes:      Extraocular Movements: Extraocular movements intact.      Conjunctiva/sclera: Conjunctivae normal.      Pupils: Pupils are equal, round, and reactive to light.   Cardiovascular:      Rate and Rhythm: Normal rate and regular rhythm.      Pulses: Normal pulses.      Heart sounds: Normal heart sounds.   Pulmonary:      Effort: Pulmonary effort is normal.      Breath sounds: Decreased breath sounds and wheezing (slight expiratory) present.   Abdominal:      General: There is no distension.      Palpations: Abdomen is soft. There is no mass.      Tenderness: There is no abdominal tenderness. There is no guarding.   Musculoskeletal:         General: No deformity.      Cervical " back: Normal range of motion and neck supple.      Right lower leg: Edema (trace) present.      Left lower leg: Edema (trace) present.   Skin:     General: Skin is warm and dry.      Findings: No lesion or rash.   Neurological:      General: No focal deficit present.      Mental Status: He is alert and oriented to person, place, and time. Mental status is at baseline.      Cranial Nerves: No cranial nerve deficit.      Sensory: No sensory deficit.      Motor: No weakness.   Psychiatric:         Mood and Affect: Mood normal.           ED Course & MDM   Diagnoses as of 07/05/25 2031   Shortness of breath   LINDA (acute kidney injury)   Elevated brain natriuretic peptide (BNP) level   Anemia, unspecified type                 No data recorded                                 Medical Decision Making  EKG on interpretation: Rate 80, rhythm regular, axis leftward, ND unavailable, , QTc 537, T waves: Inversion in lead I, aVL, ST segments: No elevations or depressions, to rotation: Ventricular paced rhythm, no STEMI, similar to EKG from January 2, 2025.    EKG on my interpretation: Rate 80, rhythm regular, axis leftward, ND unavailable, , QTc 542, T waves: Inversion in lead I, aVL, ST segments: No elevations or depressions, interpretation: Ventricular paced rhythm, no STEMI      Patient is a 79-year-old male with the above-stated past medical history presents for shortness of breath.  Patient's EKGs are nonischemic, troponins are mildly elevated, though improved versus patient's previous levels.  Low clinical suspicion for ACS.  Patient CMP is notable for an LINDA.  He does have fluid on his chest x-ray, therefore IV fluids were deferred.  Patient's CBC shows a moderate anemia, slightly worse versus previous values.  He states he is not been having any black or bloody stools.  States he is still taking his iron to treat his anemia.  Influenza, COVID are negative.  Chest x-ray shows pulmonary edema and pleural  effusions, no sign of pneumonia or pneumothorax.  Patient states he does feel some improvement from his breathing treatments, however does not feel comfortable being discharged home.  Given his LINDA, worsening anemia and signs of fluid overload, I believe he would benefit from admission.  I discussed this case with the medicine service who accepted the patient for admission. Pacemaker interrogation is pending.    Disclaimer: This note was dictated using speech recognition software. Minor errors in transcription may be present. Please call if questions.     Jarod Malik MD  [facility] Emergency Medicine  Contact on [employer]            Problems Addressed:  LINDA (acute kidney injury): acute illness or injury  Anemia, unspecified type: acute illness or injury  Elevated brain natriuretic peptide (BNP) level: acute illness or injury  Shortness of breath: acute illness or injury    Amount and/or Complexity of Data Reviewed  Labs: ordered.  Radiology: ordered.  ECG/medicine tests: ordered and independent interpretation performed.        Procedure  Procedures         [1]   Past Medical History:  Diagnosis Date    CHF (congestive heart failure)     COPD (chronic obstructive pulmonary disease) (Multi)     High cholesterol     MI (myocardial infarction) (Multi)     Pacemaker    [2] History reviewed. No pertinent surgical history.  [3] No family history on file.  [4]   Social History  Tobacco Use    Smoking status: Former     Types: Cigarettes     Passive exposure: Past    Smokeless tobacco: Never   Vaping Use    Vaping status: Never Used   Substance Use Topics    Alcohol use: Never    Drug use: Never        Jarod Malik MD  07/05/25 2101

## 2025-07-06 ENCOUNTER — APPOINTMENT (OUTPATIENT)
Dept: CARDIOLOGY | Facility: HOSPITAL | Age: 80
End: 2025-07-06
Payer: MEDICARE

## 2025-07-06 VITALS
OXYGEN SATURATION: 98 % | BODY MASS INDEX: 30.97 KG/M2 | TEMPERATURE: 97.3 F | DIASTOLIC BLOOD PRESSURE: 62 MMHG | SYSTOLIC BLOOD PRESSURE: 115 MMHG | WEIGHT: 192.68 LBS | RESPIRATION RATE: 18 BRPM | HEIGHT: 66 IN | HEART RATE: 80 BPM

## 2025-07-06 LAB
ALBUMIN SERPL BCP-MCNC: 3.1 G/DL (ref 3.4–5)
ALP SERPL-CCNC: 89 U/L (ref 33–136)
ALT SERPL W P-5'-P-CCNC: 7 U/L (ref 10–52)
ANION GAP SERPL CALC-SCNC: 9 MMOL/L (ref 10–20)
AST SERPL W P-5'-P-CCNC: 8 U/L (ref 9–39)
ATRIAL RATE: 68 BPM
ATRIAL RATE: 70 BPM
ATRIAL RATE: 73 BPM
BASOPHILS # BLD AUTO: 0.01 X10*3/UL (ref 0–0.1)
BASOPHILS NFR BLD AUTO: 0.2 %
BILIRUB SERPL-MCNC: 0.3 MG/DL (ref 0–1.2)
BUN SERPL-MCNC: 31 MG/DL (ref 6–23)
CALCIUM SERPL-MCNC: 8.6 MG/DL (ref 8.6–10.3)
CARDIAC TROPONIN I PNL SERPL HS: 22 NG/L (ref 0–20)
CHLORIDE SERPL-SCNC: 105 MMOL/L (ref 98–107)
CO2 SERPL-SCNC: 25 MMOL/L (ref 21–32)
CREAT SERPL-MCNC: 2.28 MG/DL (ref 0.5–1.3)
EGFRCR SERPLBLD CKD-EPI 2021: 29 ML/MIN/1.73M*2
EOSINOPHIL # BLD AUTO: 0.01 X10*3/UL (ref 0–0.4)
EOSINOPHIL NFR BLD AUTO: 0.2 %
ERYTHROCYTE [DISTWIDTH] IN BLOOD BY AUTOMATED COUNT: 17.3 % (ref 11.5–14.5)
EST. AVERAGE GLUCOSE BLD GHB EST-MCNC: 117 MG/DL
GLUCOSE BLD MANUAL STRIP-MCNC: 116 MG/DL (ref 74–99)
GLUCOSE BLD MANUAL STRIP-MCNC: 137 MG/DL (ref 74–99)
GLUCOSE BLD MANUAL STRIP-MCNC: 188 MG/DL (ref 74–99)
GLUCOSE BLD MANUAL STRIP-MCNC: 97 MG/DL (ref 74–99)
GLUCOSE SERPL-MCNC: 243 MG/DL (ref 74–99)
HBA1C MFR BLD: 5.7 % (ref ?–5.7)
HCT VFR BLD AUTO: 27.3 % (ref 41–52)
HGB BLD-MCNC: 7.9 G/DL (ref 13.5–17.5)
IMM GRANULOCYTES # BLD AUTO: 0.01 X10*3/UL (ref 0–0.5)
IMM GRANULOCYTES NFR BLD AUTO: 0.2 % (ref 0–0.9)
LACTATE SERPL-SCNC: 1.6 MMOL/L (ref 0.4–2)
LYMPHOCYTES # BLD AUTO: 0.14 X10*3/UL (ref 0.8–3)
LYMPHOCYTES NFR BLD AUTO: 2.8 %
MAGNESIUM SERPL-MCNC: 2.13 MG/DL (ref 1.6–2.4)
MCH RBC QN AUTO: 23.8 PG (ref 26–34)
MCHC RBC AUTO-ENTMCNC: 28.9 G/DL (ref 32–36)
MCV RBC AUTO: 82 FL (ref 80–100)
MONOCYTES # BLD AUTO: 0.09 X10*3/UL (ref 0.05–0.8)
MONOCYTES NFR BLD AUTO: 1.8 %
NEUTROPHILS # BLD AUTO: 4.79 X10*3/UL (ref 1.6–5.5)
NEUTROPHILS NFR BLD AUTO: 94.8 %
NRBC BLD-RTO: 0 /100 WBCS (ref 0–0)
PLATELET # BLD AUTO: 212 X10*3/UL (ref 150–450)
POTASSIUM SERPL-SCNC: 4.3 MMOL/L (ref 3.5–5.3)
PROT SERPL-MCNC: 6.7 G/DL (ref 6.4–8.2)
Q ONSET: 184 MS
Q ONSET: 186 MS
Q ONSET: 186 MS
QRS COUNT: 13 BEATS
QRS DURATION: 204 MS
QRS DURATION: 206 MS
QRS DURATION: 206 MS
QT INTERVAL: 466 MS
QT INTERVAL: 470 MS
QT INTERVAL: 478 MS
QTC CALCULATION(BAZETT): 537 MS
QTC CALCULATION(BAZETT): 542 MS
QTC CALCULATION(BAZETT): 551 MS
QTC FREDERICIA: 513 MS
QTC FREDERICIA: 517 MS
QTC FREDERICIA: 526 MS
R AXIS: -40 DEGREES
R AXIS: -42 DEGREES
R AXIS: -48 DEGREES
RBC # BLD AUTO: 3.32 X10*6/UL (ref 4.5–5.9)
SODIUM SERPL-SCNC: 135 MMOL/L (ref 136–145)
T AXIS: 101 DEGREES
T AXIS: 105 DEGREES
T AXIS: 121 DEGREES
T OFFSET: 419 MS
T OFFSET: 421 MS
T OFFSET: 423 MS
VENTRICULAR RATE: 80 BPM
WBC # BLD AUTO: 5.1 X10*3/UL (ref 4.4–11.3)

## 2025-07-06 PROCEDURE — 83605 ASSAY OF LACTIC ACID: CPT | Performed by: NURSE PRACTITIONER

## 2025-07-06 PROCEDURE — 85025 COMPLETE CBC W/AUTO DIFF WBC: CPT | Performed by: NURSE PRACTITIONER

## 2025-07-06 PROCEDURE — 2500000001 HC RX 250 WO HCPCS SELF ADMINISTERED DRUGS (ALT 637 FOR MEDICARE OP): Performed by: STUDENT IN AN ORGANIZED HEALTH CARE EDUCATION/TRAINING PROGRAM

## 2025-07-06 PROCEDURE — 93005 ELECTROCARDIOGRAM TRACING: CPT

## 2025-07-06 PROCEDURE — 82947 ASSAY GLUCOSE BLOOD QUANT: CPT

## 2025-07-06 PROCEDURE — 94640 AIRWAY INHALATION TREATMENT: CPT

## 2025-07-06 PROCEDURE — 99232 SBSQ HOSP IP/OBS MODERATE 35: CPT | Performed by: STUDENT IN AN ORGANIZED HEALTH CARE EDUCATION/TRAINING PROGRAM

## 2025-07-06 PROCEDURE — 80053 COMPREHEN METABOLIC PANEL: CPT | Performed by: NURSE PRACTITIONER

## 2025-07-06 PROCEDURE — 2060000001 HC INTERMEDIATE ICU ROOM DAILY

## 2025-07-06 PROCEDURE — 83036 HEMOGLOBIN GLYCOSYLATED A1C: CPT | Mod: ELYLAB | Performed by: NURSE PRACTITIONER

## 2025-07-06 PROCEDURE — 36415 COLL VENOUS BLD VENIPUNCTURE: CPT | Performed by: INTERNAL MEDICINE

## 2025-07-06 PROCEDURE — 84484 ASSAY OF TROPONIN QUANT: CPT | Performed by: INTERNAL MEDICINE

## 2025-07-06 PROCEDURE — 2500000004 HC RX 250 GENERAL PHARMACY W/ HCPCS (ALT 636 FOR OP/ED): Mod: JW | Performed by: NURSE PRACTITIONER

## 2025-07-06 PROCEDURE — 93010 ELECTROCARDIOGRAM REPORT: CPT | Performed by: INTERNAL MEDICINE

## 2025-07-06 PROCEDURE — 2500000002 HC RX 250 W HCPCS SELF ADMINISTERED DRUGS (ALT 637 FOR MEDICARE OP, ALT 636 FOR OP/ED): Performed by: NURSE PRACTITIONER

## 2025-07-06 PROCEDURE — 36415 COLL VENOUS BLD VENIPUNCTURE: CPT | Performed by: NURSE PRACTITIONER

## 2025-07-06 PROCEDURE — 83735 ASSAY OF MAGNESIUM: CPT | Performed by: NURSE PRACTITIONER

## 2025-07-06 PROCEDURE — 2500000005 HC RX 250 GENERAL PHARMACY W/O HCPCS: Performed by: NURSE PRACTITIONER

## 2025-07-06 RX ORDER — INSULIN LISPRO 100 [IU]/ML
0-5 INJECTION, SOLUTION INTRAVENOUS; SUBCUTANEOUS
Status: DISPENSED | OUTPATIENT
Start: 2025-07-06

## 2025-07-06 RX ORDER — TRAMADOL HYDROCHLORIDE 50 MG/1
50 TABLET, FILM COATED ORAL EVERY 8 HOURS PRN
Status: DISPENSED | OUTPATIENT
Start: 2025-07-06

## 2025-07-06 RX ORDER — DEXTROSE 50 % IN WATER (D50W) INTRAVENOUS SYRINGE
12.5
Status: ACTIVE | OUTPATIENT
Start: 2025-07-06

## 2025-07-06 RX ORDER — DEXTROSE 50 % IN WATER (D50W) INTRAVENOUS SYRINGE
25
Status: ACTIVE | OUTPATIENT
Start: 2025-07-06

## 2025-07-06 RX ADMIN — TRAMADOL HYDROCHLORIDE 50 MG: 50 TABLET, FILM COATED ORAL at 21:21

## 2025-07-06 RX ADMIN — IPRATROPIUM BROMIDE AND ALBUTEROL SULFATE 3 ML: .5; 3 SOLUTION RESPIRATORY (INHALATION) at 00:10

## 2025-07-06 RX ADMIN — FUROSEMIDE 20 MG: 10 INJECTION, SOLUTION INTRAMUSCULAR; INTRAVENOUS at 08:01

## 2025-07-06 RX ADMIN — FERROUS SULFATE TAB 325 MG (65 MG ELEMENTAL FE) 1 TABLET: 325 (65 FE) TAB at 08:00

## 2025-07-06 RX ADMIN — FLUTICASONE FUROATE AND VILANTEROL TRIFENATATE 1 PUFF: 200; 25 POWDER RESPIRATORY (INHALATION) at 07:47

## 2025-07-06 RX ADMIN — IPRATROPIUM BROMIDE AND ALBUTEROL SULFATE 3 ML: .5; 3 SOLUTION RESPIRATORY (INHALATION) at 07:43

## 2025-07-06 RX ADMIN — FUROSEMIDE 20 MG: 10 INJECTION, SOLUTION INTRAMUSCULAR; INTRAVENOUS at 21:01

## 2025-07-06 RX ADMIN — FUROSEMIDE 20 MG: 10 INJECTION, SOLUTION INTRAMUSCULAR; INTRAVENOUS at 00:44

## 2025-07-06 RX ADMIN — EMPAGLIFLOZIN 10 MG: 10 TABLET, FILM COATED ORAL at 08:00

## 2025-07-06 RX ADMIN — APIXABAN 5 MG: 5 TABLET, FILM COATED ORAL at 00:44

## 2025-07-06 RX ADMIN — TRAMADOL HYDROCHLORIDE 50 MG: 50 TABLET, FILM COATED ORAL at 13:46

## 2025-07-06 RX ADMIN — METOPROLOL SUCCINATE 25 MG: 25 TABLET, EXTENDED RELEASE ORAL at 08:00

## 2025-07-06 RX ADMIN — FERROUS SULFATE TAB 325 MG (65 MG ELEMENTAL FE) 1 TABLET: 325 (65 FE) TAB at 21:00

## 2025-07-06 RX ADMIN — SACUBITRIL AND VALSARTAN 1 TABLET: 24; 26 TABLET, FILM COATED ORAL at 21:00

## 2025-07-06 RX ADMIN — INSULIN LISPRO 1 UNITS: 100 INJECTION, SOLUTION INTRAVENOUS; SUBCUTANEOUS at 07:42

## 2025-07-06 RX ADMIN — APIXABAN 5 MG: 5 TABLET, FILM COATED ORAL at 21:00

## 2025-07-06 RX ADMIN — Medication 4 L/MIN: at 07:44

## 2025-07-06 RX ADMIN — TIOTROPIUM BROMIDE INHALATION SPRAY 2 PUFF: 3.12 SPRAY, METERED RESPIRATORY (INHALATION) at 07:48

## 2025-07-06 RX ADMIN — Medication 3 L/MIN: at 20:17

## 2025-07-06 RX ADMIN — SACUBITRIL AND VALSARTAN 1 TABLET: 24; 26 TABLET, FILM COATED ORAL at 08:00

## 2025-07-06 RX ADMIN — APIXABAN 5 MG: 5 TABLET, FILM COATED ORAL at 08:00

## 2025-07-06 RX ADMIN — Medication 1000 MCG: at 08:00

## 2025-07-06 RX ADMIN — Medication 50 MCG: at 08:00

## 2025-07-06 ASSESSMENT — COGNITIVE AND FUNCTIONAL STATUS - GENERAL
MOVING TO AND FROM BED TO CHAIR: A LITTLE
DAILY ACTIVITIY SCORE: 24
TOILETING: A LITTLE
MOBILITY SCORE: 22
CLIMB 3 TO 5 STEPS WITH RAILING: A LITTLE
DAILY ACTIVITIY SCORE: 23
DAILY ACTIVITIY SCORE: 24
MOVING TO AND FROM BED TO CHAIR: A LITTLE
CLIMB 3 TO 5 STEPS WITH RAILING: A LITTLE
WALKING IN HOSPITAL ROOM: A LITTLE
DAILY ACTIVITIY SCORE: 24
WALKING IN HOSPITAL ROOM: A LITTLE
CLIMB 3 TO 5 STEPS WITH RAILING: A LITTLE
MOBILITY SCORE: 22
MOBILITY SCORE: 21
WALKING IN HOSPITAL ROOM: A LITTLE
CLIMB 3 TO 5 STEPS WITH RAILING: A LITTLE
MOBILITY SCORE: 21
WALKING IN HOSPITAL ROOM: A LITTLE

## 2025-07-06 ASSESSMENT — PAIN SCALES - GENERAL
PAINLEVEL_OUTOF10: 4
PAINLEVEL_OUTOF10: 0 - NO PAIN
PAINLEVEL_OUTOF10: 0 - NO PAIN
PAINLEVEL_OUTOF10: 7
PAINLEVEL_OUTOF10: 7

## 2025-07-06 ASSESSMENT — PAIN - FUNCTIONAL ASSESSMENT
PAIN_FUNCTIONAL_ASSESSMENT: 0-10

## 2025-07-06 ASSESSMENT — ACTIVITIES OF DAILY LIVING (ADL): EFFECT OF PAIN ON DAILY ACTIVITIES: NO

## 2025-07-06 ASSESSMENT — PAIN DESCRIPTION - DESCRIPTORS
DESCRIPTORS: ACHING;DISCOMFORT
DESCRIPTORS: NAGGING

## 2025-07-06 NOTE — SIGNIFICANT EVENT
07/06/25 0743   Inhalation Therapy   Breath Sounds Pre-Treatment Right Diminished   Breath Sounds Pre-Treatment Left Diminished   Pre-Treatment Pulse 80   Pre-Treatment Respirations 18   Breath Sounds Post-Treatment Right Diminished   Breath Sounds Post-Treatment Left Diminished   Post-Treatment Pulse 80   Post-Treatment Respirations 18   Delivery Source Oxygen   Position Semi Donohue's   Treatment Tolerance Tolerated well   $ Aerosol/MDI Treatment Charge Aerosol/inhalation treatment     Upon assessment, I observed patient on 4LNC, Treatment administered per order

## 2025-07-06 NOTE — CARE PLAN
The patient's goals for the shift include rest     The clinical goals for the shift include Patient will remain safe

## 2025-07-06 NOTE — PROGRESS NOTES
Medical Group Progress Note  ASSESSMENT & PLAN:   Jose Byrne is a 79 y.o. male admitted to [facility] [place] for management of:    Acute on Chronic Systolic CHF exacerbation - EF 35% - on Eliquis   - S/p PPM [[place]] - [facility] - interrogation in ER  LINDA on CKD stage 3  Volume overload  Worsening anemia [hgb 8.1 today]  O2 dependent     Presenting to [place] [facility] w c/o shortness of breath [facility]3 weeks[facility]; no access to metformin or nebulizer treatments in over 2 months; hx CHF w early evidence of decompensation; device interrogation showing underlying rhythm Afib already on Eliquis; he has 3+ pitting edema up to the shins, [+] increase with oral intake d/t heat; [+] evidence of LINDA on CKD; will admit for cardiology eval and further fluid status management.      - resume home meds  - consult cardiology - CHF and fluid management  - BNP 600s; trop flat 25_24, no EKG changes  - CXR w pulm vasc congestion and mod pleural effusions - not in acute distress on exam - requiring 3L NC [uses 2L at home]  - FR 1.8L daily  - monitor on tele  - daily labs and A1c  - oxygen / CPAP at night  - consult TCC     Hx: DM2  - pt reports not having access to his metformin for over 2 months ? states [facility]  - check A1c  - SSI and carb controlled diet  - ok to eat  - fluid restrict 1.8L daily     Hx: Cataracts - has surgery planned for Thursday, blind in Left eye, occasional floaters in the right eye    VTE Prophylaxis: Eliquis    Disposition: ADOD 1-2 days    Level of MDM:  Moderate   Risk: Moderate   Data Reviewed and/or Analyzed:   Included: Prior external notes from at least 1 unique source, Results, including laboratory findings and imaging reports, listed above, Orders and notes from all consultants involved, and Notes for this encounter.  I personally reviewed the tests referenced above.    The patient/family had opportunity to ask questions. All questions were answered to the best of my ability.    Liu Gold,  DO  Hospital Medicine    SUBJECTIVE     Patient report feeling better but not quite back to normal. He is still quick to fatigue and short of breath.    OBJECTIVE:     Last Recorded Vitals:  Vitals:    07/06/25 0743 07/06/25 0752 07/06/25 1048 07/06/25 1448   BP:  130/66 133/67 104/58   BP Location:  Left arm Left arm    Patient Position:  Sitting Sitting    Pulse:  81 80 79   Resp:       Temp:  36 °C (96.8 °F) 35.5 °C (95.9 °F) 35.3 °C (95.5 °F)   TempSrc:  Temporal Temporal    SpO2: 100% 100% 100% 99%   Weight:       Height:         Last I/O:  I/O last 3 completed shifts:  In: 408 (4.7 mL/kg) [P.O.:358; I.V.:50 (0.6 mL/kg)]  Out: 1500 (17.2 mL/kg) [Urine:1500 (0.5 mL/kg/hr)]  Weight: 87.4 kg     Physical Exam  Vitals and nursing note reviewed.   Constitutional:       General: He is not in acute distress.     Appearance: Normal appearance.   HENT:      Mouth/Throat:      Mouth: Mucous membranes are moist.   Eyes:      Extraocular Movements: Extraocular movements intact.      Conjunctiva/sclera: Conjunctivae normal.   Cardiovascular:      Rate and Rhythm: Normal rate and regular rhythm.      Pulses: Normal pulses.      Heart sounds: Normal heart sounds.   Pulmonary:      Effort: Pulmonary effort is normal.      Breath sounds: Normal breath sounds.      Comments: On 2.5 L  Abdominal:      General: Abdomen is flat. Bowel sounds are normal.      Palpations: Abdomen is soft.   Musculoskeletal:      Right lower leg: Edema present.      Left lower leg: Edema present.   Skin:     General: Skin is warm.   Neurological:      General: No focal deficit present.      Mental Status: He is alert and oriented to person, place, and time. Mental status is at baseline.   Psychiatric:         Mood and Affect: Mood normal.         Behavior: Behavior normal.         Thought Content: Thought content normal.         Inpatient Medications:  Scheduled Medications[1]PRN Medications  PRN Medications[2]  Continuous Medications:  Continuous  Medications[3]  LABS AND IMAGING:     Labs:  Results from last 7 days   Lab Units 07/06/25  0541 07/05/25  1824   WBC AUTO x10*3/uL 5.1 7.7   RBC AUTO x10*6/uL 3.32* 3.42*   HEMOGLOBIN g/dL 7.9* 8.1*   HEMATOCRIT % 27.3* 28.3*   MCV fL 82 83   MCH pg 23.8* 23.7*   MCHC g/dL 28.9* 28.6*   RDW % 17.3* 17.2*   PLATELETS AUTO x10*3/uL 212 215     Results from last 7 days   Lab Units 07/06/25  0541 07/05/25  1824   SODIUM mmol/L 135* 135*   POTASSIUM mmol/L 4.3 4.2   CHLORIDE mmol/L 105 107   CO2 mmol/L 25 22   BUN mg/dL 31* 32*   CREATININE mg/dL 2.28* 2.20*   GLUCOSE mg/dL 243* 158*   PROTEIN TOTAL g/dL 6.7 6.6   CALCIUM mg/dL 8.6 8.4*   BILIRUBIN TOTAL mg/dL 0.3 0.4   ALK PHOS U/L 89 91   AST U/L 8* 9   ALT U/L 7* 8*     Results from last 7 days   Lab Units 07/06/25  0541   MAGNESIUM mg/dL 2.13     Results from last 7 days   Lab Units 07/05/25  1946/25  1824   TROPHS ng/L 24* 25*     Imaging:  ECG 12 lead  Ventricular-paced rhythm  Abnormal ECG  When compared with ECG of 02-JAN-2025 19:17,  No significant change was found  ECG 12 lead  Ventricular-paced rhythm with occasional atrial-paced complexes  Abnormal ECG  When compared with ECG of 05-JUL-2025 18:14, (unconfirmed)  No significant change was found           [1] apixaban, 5 mg, oral, BID  cholecalciferol, 50 mcg, oral, Daily  cyanocobalamin, 1,000 mcg, oral, Daily  empagliflozin, 10 mg, oral, Daily  ferrous sulfate, 1 tablet, oral, BID  tiotropium, 2 puff, inhalation, Daily   And  fluticasone furoate-vilanteroL, 1 puff, inhalation, Daily  furosemide, 20 mg, intravenous, BID  insulin lispro, 0-5 Units, subcutaneous, TID AC  metoprolol succinate XL, 25 mg, oral, Daily  oxygen, , inhalation, Continuous - Inhalation  sacubitriL-valsartan, 1 tablet, oral, BID  [2] PRN medications: acetaminophen **OR** acetaminophen **OR** acetaminophen, benzocaine-menthol, dextrose, dextrose, glucagon, glucagon, ipratropium-albuteroL, ondansetron **OR** ondansetron,  traMADol  [3]

## 2025-07-06 NOTE — CONSULTS
Consults  History Of Present Illness:    Jose Byrne is a 79 y.o. male presenting with SOB.    79-year-old male with history of CAD status post CABG, ischemic cardiomyopathy EF 35 to 40% by TTE 1/4/2025, sick sinus syndrome status post pacemaker placement, atrial fibrillation on Eliquis, hypertension, hyperlipidemia, obesity former tobacco abuse, diabetes, COPD CKD who was admitted to the hospital for shortness of breath    Patient admitted with leg swelling  Hypoxic requiring higher oxygen than baseline  Chest x-ray consistent with pulmonary congestion  EKG V-paced  TTE 1/4/2025 EF 35 to 40% moderate to severe TR     Last Recorded Vitals:  Vitals:    07/06/25 0743 07/06/25 0752 07/06/25 1048 07/06/25 1448   BP:  130/66 133/67 104/58   BP Location:  Left arm Left arm    Patient Position:  Sitting Sitting    Pulse:  81 80 79   Resp:       Temp:  36 °C (96.8 °F) 35.5 °C (95.9 °F) 35.3 °C (95.5 °F)   TempSrc:  Temporal Temporal    SpO2: 100% 100% 100% 99%   Weight:       Height:           Last Labs:  CBC - 7/6/2025:  5:41 AM  5.1 7.9 212    27.3      CMP - 7/6/2025:  5:41 AM  8.6 6.7 8 --- 0.3   _ 3.1 7 89      PTT - No results in last year.  _   _ _     Troponin I, High Sensitivity   Date/Time Value Ref Range Status   07/05/2025 07:46 PM 24 (H) 0 - 20 ng/L Final   07/05/2025 06:24 PM 25 (H) 0 - 20 ng/L Final   01/02/2025 05:11 PM 31 (H) 0 - 20 ng/L Final     BNP   Date/Time Value Ref Range Status   07/05/2025 06:24  (H) 0 - 99 pg/mL Final   01/02/2025 03:57  (H) 0 - 99 pg/mL Final     Hemoglobin A1C   Date/Time Value Ref Range Status   07/06/2025 05:41 AM 5.7 (H) See comment % Final   01/04/2025 05:42 AM 5.6 See comment % Final      Last I/O:  I/O last 3 completed shifts:  In: 408 (4.7 mL/kg) [P.O.:358; I.V.:50 (0.6 mL/kg)]  Out: 1500 (17.2 mL/kg) [Urine:1500 (0.5 mL/kg/hr)]  Weight: 87.4 kg     Past Cardiology Tests (Last 3 Years):  EKG:  ECG 12 lead 07/05/2025 (Preliminary)      ECG 12 lead 07/05/2025  (Preliminary)      ECG 12 lead 01/02/2025    Echo:  Transthoracic Echo (TTE) Complete 01/04/2025    Ejection Fractions:  EF   Date/Time Value Ref Range Status   01/04/2025 12:43 PM 38 %      Cath:  No results found for this or any previous visit from the past 1095 days.    Stress Test:  No results found for this or any previous visit from the past 1095 days.    Cardiac Imaging:  No results found for this or any previous visit from the past 1095 days.      Past Medical History:  He has a past medical history of CHF (congestive heart failure), COPD (chronic obstructive pulmonary disease) (Multi), High cholesterol, MI (myocardial infarction) (Multi), and Pacemaker.    Past Surgical History:  He has no past surgical history on file.      Social History:  He reports that he has quit smoking. His smoking use included cigarettes. He has been exposed to tobacco smoke. He has never used smokeless tobacco. He reports that he does not drink alcohol and does not use drugs.    Family History:  Family History[1]     Allergies:  Penicillins, Gadolinium-containing contrast media, Iodinated contrast media, Iodine, and Iodides    Inpatient Medications:  Scheduled Medications[2]  PRN Medications[3]  Continuous Medications[4]  Outpatient Medications:  Current Outpatient Medications   Medication Instructions    apixaban (ELIQUIS) 5 mg, 2 times daily    budesonide-glycopyr-formoterol (Breztri Aerosphere) 160-9-4.8 mcg/actuation HFA aerosol inhaler 2 times daily RT    cholecalciferol (VITAMIN D-3) 50 mcg, Daily    cyanocobalamin (VITAMIN B-12) 1,000 mcg, Daily    ferrous sulfate 325 mg, 2 times daily    furosemide (LASIX) 20 mg, oral, Daily    ipratropium-albuteroL (Duo-Neb) 0.5-2.5 mg/3 mL nebulizer solution 3 mL, nebulization, Every 4 hours PRN    metFORMIN (GLUCOPHAGE) 500 mg, 2 times daily (morning and late afternoon)    methocarbamol (ROBAXIN) 500 mg, 2 times daily    metoprolol succinate XL (TOPROL-XL) 25 mg, oral, Daily, Do not  crush or chew.    nitroglycerin (NITROSTAT) 0.4 mg, Every 5 min PRN    traMADol (ULTRAM) 50 mg, 3 times daily       Physical Exam:  General: Alert oriented x 4 no acute distress  CV: Regular rate and rhythm  Lungs: Bibasilar crackles  Extremities: 1+ pitting edema bilaterally     Assessment/Plan     Acute on chronic decompensated heart failure with reduced ejection fraction EF 35 to 40% by TTE 1/2025  Patient admitted with 2+ pitting edema bilaterally to the shins  Chest x-ray at admission bilateral pulmonary congestion  Continue diuresis currently 20 mg IV twice daily.  Please monitor creatinine daily  Obtain an echocardiogram  Continue Entresto  Continue metoprolol 25 mg daily  Continue Jardiance    Elevated troponins  Troponins flat and not significant at 25, 24  Likely in the setting of decompensated heart failure  Further ischemic evaluation as an outpatient    Sick sinus syndrome status post pacemaker  Pacemaker interrogation    Atrial fibrillation  Continue Eliquis  Continue metoprolol    History of CAD with previous CABG  Continue aspirin and atorvastatin    Other medical problems  COPD  Former tobacco abuse  Diabetes  Hypertension  Hyperlipidemia  ZOLTAN  CKD  Peripheral IV 07/05/25 20 G Posterior;Right Forearm (Active)   Site Assessment Clean;Dry;Intact;Soft;Other (Comment) 07/06/25 0808   Dressing Status Clean;Dry;Occlusive 07/06/25 0808   Number of days: 1       Code Status:  Full Code  patient.        Cirilo Sears MD         [1] No family history on file.  [2]   Scheduled medications   Medication Dose Route Frequency    apixaban  5 mg oral BID    cholecalciferol  50 mcg oral Daily    cyanocobalamin  1,000 mcg oral Daily    empagliflozin  10 mg oral Daily    ferrous sulfate  1 tablet oral BID    tiotropium  2 puff inhalation Daily    And    fluticasone furoate-vilanteroL  1 puff inhalation Daily    furosemide  20 mg intravenous BID    insulin lispro  0-5 Units subcutaneous TID AC    metoprolol succinate  XL  25 mg oral Daily    oxygen   inhalation Continuous - Inhalation    sacubitriL-valsartan  1 tablet oral BID   [3]   PRN medications   Medication    acetaminophen    Or    acetaminophen    Or    acetaminophen    benzocaine-menthol    dextrose    dextrose    glucagon    glucagon    ipratropium-albuteroL    ondansetron    Or    ondansetron    traMADol   [4]   Continuous Medications   Medication Dose Last Rate

## 2025-07-06 NOTE — H&P
Medical Group History and Physical    ASSESSMENT & PLAN:     Acute on Chronic Systolic CHF exacerbation - EF 35% - on Eliquis   - S/p PPM [[facility]] - [facility] - interrogation in ER  LINDA on CKD stage 3  Volume overload  Worsening anemia [hgb 8.1 today]  O2 dependent    Presenting to [place] [facility] w c/o shortness [facility] breath [facility]; no access to metformin or nebulizer treatments in over 2 months; hx CHF w early evidence of decompensation; device interrogation showing underlying rhythm Afib already on Eliquis; he has 3+ pitting edema up to the shins, [+] increase with oral intake d/t heat; [+] evidence of LINDA on CKD; will admit for cardiology eval and further fluid status management.     - resume home meds  - consult cardiology - CHF and fluid management  - BNP 600s; trop flat 25_24, no EKG changes  - CXR w pulm vasc congestion and mod pleural effusions - not in acute distress on exam - requiring 3L NC [uses 2L at home]  - FR 1.8L daily  - monitor on tele  - daily labs and A1c  - oxygen / CPAP at night  - consult TCC    Hx: DM2  - pt reports not having access to his metformin for over 2 months ? states [facility] not [facility]  - check A1c  - SSI and carb controlled diet  - ok to eat  - fluid restrict 1.8L daily    Hx: Cataracts - has surgery planned for Thursday, blind in Left eye, occasional floaters in the right eye    VTE Prophylaxis: cont eliquis    Luna Mayer, APRN-CNP    HISTORY OF PRESENT ILLNESS:   Chief Complaint: shortness of breath    History Of Present Illness:    Jose Byrne is a 79 y.o. male with a significant past medical history of COPD - o2 dependent, ZOLTAN on CPAP, CKD stage 3, HTN, DM2 Presenting to [place] ER w c/o shortness of breath x3 weeks, [facility]; no access to metformin or nebulizer treatments x2 months; known hx of CHF - EF 35% s/p PPM; signs of early acute decompensation are present, device interrogation showing underlying rhythm - Afib pt already on Eliquis; he has 3+  pitting edema up to the shins, [+] increased oral intake d/t heat; [+] evidence of LINDA on CKD; worsening anemia w hgb down to 8.1 from a baseline hgb ~10; no evidence of acute bleeding; maybe d/t vol overload; BP and HR stable.  will admit for cardiology eval and further fluid management. No fever or chills; no n/v/d. He reports improved respiratory status after treatment in the ER. He has been requiring higher levels of o2 [up to 3L NC at all times], wears CPAP at night;     VSS ready for admission to general medicine for shortness of breath w LINDA on CKD, worsening anemia and vol overload in stable HF patient.     Review of systems: 10 point review of systems is otherwise negative except as mentioned above.    PAST HISTORIES:       Past Medical History:  Medical Problems       Problem List       * (Principal) Shortness of breath    Fluid overload    Congestive heart failure, unspecified HF chronicity, unspecified heart failure type    Chronic obstructive pulmonary disease (Multi)    Obstructive sleep apnea of adult    Presence of permanent cardiac pacemaker    Steatosis of liver    Type 2 diabetes mellitus with peripheral neuropathy           Past Surgical History:  Surgical History[1]       Social History:  He reports that he has quit smoking. His smoking use included cigarettes. He has been exposed to tobacco smoke. He has never used smokeless tobacco. He reports that he does not drink alcohol and does not use drugs.    Family History:  Family History[2]     Allergies:  Penicillins, Gadolinium-containing contrast media, Iodinated contrast media, Iodine, and Iodides    OBJECTIVE:       Last Recorded Vitals:  Vitals:    07/05/25 1752 07/05/25 1837 07/05/25 1904 07/05/25 2000   BP: 118/64 127/82 131/74    BP Location: Right arm Right arm     Patient Position: Sitting      Pulse: 81 78 77 78   Resp: (!) 22 (!) 21 20    Temp: 36.7 °C (98.1 °F) 36.6 °C (97.9 °F) 36.7 °C (98.1 °F)    TempSrc: Temporal Temporal Temporal   "  SpO2: 98% 100% 100% 99%   Weight: 90.7 kg (200 lb)      Height: 1.676 m (5' 6\")          Last I/O:  No intake/output data recorded.    Physical Exam  Constitutional:       Appearance: Normal appearance. He is obese.   HENT:      Head: Normocephalic and atraumatic.      Nose: Nose normal.      Mouth/Throat:      Mouth: Mucous membranes are moist.      Pharynx: Oropharynx is clear.   Eyes:      Extraocular Movements: Extraocular movements intact.      Conjunctiva/sclera: Conjunctivae normal.      Pupils: Pupils are equal, round, and reactive to light.   Cardiovascular:      Rate and Rhythm: Normal rate and regular rhythm.      Pulses: Normal pulses.      Heart sounds: Normal heart sounds.   Pulmonary:      Effort: Pulmonary effort is normal.      Breath sounds: Normal breath sounds.      Comments: 3L NC; CPAP at night  Abdominal:      General: Bowel sounds are normal.      Palpations: Abdomen is soft.   Genitourinary:     Comments: Not assessed  Musculoskeletal:         General: Swelling present.      Cervical back: Normal range of motion and neck supple.      Right lower leg: Edema present.      Left lower leg: Edema present.      Comments: 3+ pitting edema; L>R at baseline;    Skin:     General: Skin is warm and dry.      Capillary Refill: Capillary refill takes less than 2 seconds.   Neurological:      General: No focal deficit present.      Mental Status: He is alert and oriented to person, place, and time. Mental status is at baseline.   Psychiatric:         Mood and Affect: Mood normal.         Behavior: Behavior normal.         Thought Content: Thought content normal.         Judgment: Judgment normal.           Scheduled Medications  Scheduled Medications[3]  PRN Medications  PRN Medications[4]  Continuous Medications  Continuous Medications[5]    Outpatient Medications:  Prior to Admission medications    Medication Sig Start Date End Date Taking? Authorizing Provider   apixaban (Eliquis) 5 mg tablet Take 1 " tablet (5 mg) by mouth 2 times a day.    Historical Provider, MD   budesonide-glycopyr-formoterol (Breztri Aerosphere) 160-9-4.8 mcg/actuation HFA aerosol inhaler Inhale 2 times a day.    Historical Provider, MD   cholecalciferol (Vitamin D-3) 25 MCG (1000 UT) capsule Take 2 capsules (50 mcg) by mouth once daily.    Historical Provider, MD   cyanocobalamin (Vitamin B-12) 1,000 mcg tablet Take 1 tablet (1,000 mcg) by mouth once daily.    Historical Provider, MD   ferrous sulfate, 325 mg ferrous sulfate, tablet Take 1 tablet (325 mg) by mouth 2 times a day.    Historical Provider, MD   furosemide (Lasix) 20 mg tablet Take 1 tablet (20 mg) by mouth once daily. 1/6/25 2/5/25  Shantanu El MD   ipratropium-albuteroL (Duo-Neb) 0.5-2.5 mg/3 mL nebulizer solution Take 3 mL by nebulization every 4 hours if needed for wheezing. 1/6/25   Shantanu El MD   metFORMIN (Glucophage) 500 mg tablet Take 1 tablet (500 mg) by mouth 2 times daily (morning and late afternoon).    Historical Provider, MD   methocarbamol (Robaxin) 500 mg tablet Take 1 tablet (500 mg) by mouth 2 times a day.    Historical Provider, MD   metoprolol succinate XL (Toprol-XL) 25 mg 24 hr tablet Take 1 tablet (25 mg) by mouth once daily. Do not crush or chew. 1/6/25 2/5/25  Shantanu El MD   nitroglycerin (Nitrostat) 0.4 mg SL tablet Place 1 tablet (0.4 mg) under the tongue every 5 minutes if needed for chest pain.    Historical Provider, MD   traMADol (Ultram) 50 mg tablet Take 1 tablet (50 mg) by mouth 3 times a day.    Historical Provider, MD       LABS AND IMAGING:     Labs:  Results for orders placed or performed during the hospital encounter of 07/05/25 (from the past 24 hours)   CBC and Auto Differential   Result Value Ref Range    WBC 7.7 4.4 - 11.3 x10*3/uL    nRBC 0.0 0.0 - 0.0 /100 WBCs    RBC 3.42 (L) 4.50 - 5.90 x10*6/uL    Hemoglobin 8.1 (L) 13.5 - 17.5 g/dL    Hematocrit 28.3 (L) 41.0 - 52.0 %    MCV 83  80 - 100 fL    MCH 23.7 (L) 26.0 - 34.0 pg    MCHC 28.6 (L) 32.0 - 36.0 g/dL    RDW 17.2 (H) 11.5 - 14.5 %    Platelets 215 150 - 450 x10*3/uL    Neutrophils % 85.4 40.0 - 80.0 %    Immature Granulocytes %, Automated 0.3 0.0 - 0.9 %    Lymphocytes % 4.0 13.0 - 44.0 %    Monocytes % 6.8 2.0 - 10.0 %    Eosinophils % 3.0 0.0 - 6.0 %    Basophils % 0.5 0.0 - 2.0 %    Neutrophils Absolute 6.55 (H) 1.60 - 5.50 x10*3/uL    Immature Granulocytes Absolute, Automated 0.02 0.00 - 0.50 x10*3/uL    Lymphocytes Absolute 0.31 (L) 0.80 - 3.00 x10*3/uL    Monocytes Absolute 0.52 0.05 - 0.80 x10*3/uL    Eosinophils Absolute 0.23 0.00 - 0.40 x10*3/uL    Basophils Absolute 0.04 0.00 - 0.10 x10*3/uL   Comprehensive Metabolic Panel   Result Value Ref Range    Glucose 158 (H) 74 - 99 mg/dL    Sodium 135 (L) 136 - 145 mmol/L    Potassium 4.2 3.5 - 5.3 mmol/L    Chloride 107 98 - 107 mmol/L    Bicarbonate 22 21 - 32 mmol/L    Anion Gap 10 10 - 20 mmol/L    Urea Nitrogen 32 (H) 6 - 23 mg/dL    Creatinine 2.20 (H) 0.50 - 1.30 mg/dL    eGFR 30 (L) >60 mL/min/1.73m*2    Calcium 8.4 (L) 8.6 - 10.3 mg/dL    Albumin 3.1 (L) 3.4 - 5.0 g/dL    Alkaline Phosphatase 91 33 - 136 U/L    Total Protein 6.6 6.4 - 8.2 g/dL    AST 9 9 - 39 U/L    Bilirubin, Total 0.4 0.0 - 1.2 mg/dL    ALT 8 (L) 10 - 52 U/L   Troponin I, High Sensitivity, Initial   Result Value Ref Range    Troponin I, High Sensitivity 25 (H) 0 - 20 ng/L   B-type natriuretic peptide   Result Value Ref Range     (H) 0 - 99 pg/mL   Light Blue Top   Result Value Ref Range    Extra Tube Hold for add-ons.    Gray Top   Result Value Ref Range    Extra Tube Hold for add-ons.    Sars-CoV-2 and Influenza A/B PCR   Result Value Ref Range    Flu A Result Not Detected Not Detected    Flu B Result Not Detected Not Detected    Coronavirus 2019, PCR Not Detected Not Detected   Troponin, High Sensitivity, 1 Hour   Result Value Ref Range    Troponin I, High Sensitivity 24 (H) 0 - 20 ng/L         Imaging:  XR chest 1 view   Final Result   Pulmonary edema and moderate pleural effusions.             MACRO:   None.        Signed by: James Garcia 7/5/2025 6:39 PM   Dictation workstation:   [id number]                [1] History reviewed. No pertinent surgical history.  [2] No family history on file.  [3] [4] [5]

## 2025-07-06 NOTE — SIGNIFICANT EVENT
Mr. Byrne complained of chest  pain. I went to assess patient. He reported that chest pain has resolved.  He noted that this pain is on and off even at home and last time was at home about a month ago.  He stated that pain is located on the right side of the chest without radiation.  At the time of exam, pain has resolved and he appears comfortable    EKG only showed prolonged Qtc  Mag and K this morning were normal  Trop were non trending  Will continue to monitor  Discussed with RN at bed side of treatment plans

## 2025-07-06 NOTE — SIGNIFICANT EVENT
07/06/25 0745   Non-Invasive Ventilation   Mode - Non-Invasive Auto titrating   NIV ON/OFF Off     Upon entrance into the room, I observed patient on 4LNC and Cpap off at this time

## 2025-07-06 NOTE — CARE PLAN
The patient's goals for the shift include      The clinical goals for the shift include Patient will have decreased shortness of breath

## 2025-07-07 ENCOUNTER — APPOINTMENT (OUTPATIENT)
Dept: CARDIOLOGY | Facility: HOSPITAL | Age: 80
End: 2025-07-07
Payer: MEDICARE

## 2025-07-07 LAB
ALBUMIN SERPL BCP-MCNC: 3 G/DL (ref 3.4–5)
ALP SERPL-CCNC: 85 U/L (ref 33–136)
ALT SERPL W P-5'-P-CCNC: 7 U/L (ref 10–52)
ANION GAP SERPL CALC-SCNC: 10 MMOL/L (ref 10–20)
AST SERPL W P-5'-P-CCNC: 9 U/L (ref 9–39)
ATRIAL RATE: 68 BPM
BASOPHILS # BLD AUTO: 0.06 X10*3/UL (ref 0–0.1)
BASOPHILS NFR BLD AUTO: 0.7 %
BILIRUB SERPL-MCNC: 0.4 MG/DL (ref 0–1.2)
BUN SERPL-MCNC: 40 MG/DL (ref 6–23)
CALCIUM SERPL-MCNC: 8.4 MG/DL (ref 8.6–10.3)
CHLORIDE SERPL-SCNC: 102 MMOL/L (ref 98–107)
CO2 SERPL-SCNC: 29 MMOL/L (ref 21–32)
CREAT SERPL-MCNC: 2.49 MG/DL (ref 0.5–1.3)
EGFRCR SERPLBLD CKD-EPI 2021: 26 ML/MIN/1.73M*2
EJECTION FRACTION APICAL 4 CHAMBER: 36.5
EJECTION FRACTION: 35 %
EOSINOPHIL # BLD AUTO: 0.39 X10*3/UL (ref 0–0.4)
EOSINOPHIL NFR BLD AUTO: 4.5 %
ERYTHROCYTE [DISTWIDTH] IN BLOOD BY AUTOMATED COUNT: 17.2 % (ref 11.5–14.5)
GLUCOSE BLD MANUAL STRIP-MCNC: 155 MG/DL (ref 74–99)
GLUCOSE BLD MANUAL STRIP-MCNC: 168 MG/DL (ref 74–99)
GLUCOSE BLD MANUAL STRIP-MCNC: 90 MG/DL (ref 74–99)
GLUCOSE BLD MANUAL STRIP-MCNC: 95 MG/DL (ref 74–99)
GLUCOSE SERPL-MCNC: 105 MG/DL (ref 74–99)
HCT VFR BLD AUTO: 29.5 % (ref 41–52)
HGB BLD-MCNC: 8.5 G/DL (ref 13.5–17.5)
HOLD SPECIMEN: NORMAL
HOLD SPECIMEN: NORMAL
IMM GRANULOCYTES # BLD AUTO: 0.04 X10*3/UL (ref 0–0.5)
IMM GRANULOCYTES NFR BLD AUTO: 0.5 % (ref 0–0.9)
LEFT ATRIUM VOLUME AREA LENGTH INDEX BSA: 42.9 ML/M2
LEFT VENTRICLE INTERNAL DIMENSION DIASTOLE: 5.62 CM (ref 3.5–6)
LV EJECTION FRACTION BIPLANE: 39 %
LYMPHOCYTES # BLD AUTO: 0.93 X10*3/UL (ref 0.8–3)
LYMPHOCYTES NFR BLD AUTO: 10.8 %
MAGNESIUM SERPL-MCNC: 1.96 MG/DL (ref 1.6–2.4)
MCH RBC QN AUTO: 23.5 PG (ref 26–34)
MCHC RBC AUTO-ENTMCNC: 28.8 G/DL (ref 32–36)
MCV RBC AUTO: 82 FL (ref 80–100)
MONOCYTES # BLD AUTO: 0.71 X10*3/UL (ref 0.05–0.8)
MONOCYTES NFR BLD AUTO: 8.2 %
NEUTROPHILS # BLD AUTO: 6.52 X10*3/UL (ref 1.6–5.5)
NEUTROPHILS NFR BLD AUTO: 75.3 %
NRBC BLD-RTO: 0 /100 WBCS (ref 0–0)
PLATELET # BLD AUTO: 276 X10*3/UL (ref 150–450)
POTASSIUM SERPL-SCNC: 4 MMOL/L (ref 3.5–5.3)
PROT SERPL-MCNC: 6.6 G/DL (ref 6.4–8.2)
Q ONSET: 184 MS
QRS COUNT: 13 BEATS
QRS DURATION: 206 MS
QT INTERVAL: 478 MS
QTC CALCULATION(BAZETT): 551 MS
QTC FREDERICIA: 526 MS
R AXIS: -42 DEGREES
RBC # BLD AUTO: 3.62 X10*6/UL (ref 4.5–5.9)
RIGHT VENTRICLE PEAK SYSTOLIC PRESSURE: 43 MMHG
SODIUM SERPL-SCNC: 137 MMOL/L (ref 136–145)
T AXIS: 121 DEGREES
T OFFSET: 423 MS
VENTRICULAR RATE: 80 BPM
WBC # BLD AUTO: 8.7 X10*3/UL (ref 4.4–11.3)

## 2025-07-07 PROCEDURE — 2500000004 HC RX 250 GENERAL PHARMACY W/ HCPCS (ALT 636 FOR OP/ED): Performed by: STUDENT IN AN ORGANIZED HEALTH CARE EDUCATION/TRAINING PROGRAM

## 2025-07-07 PROCEDURE — 94660 CPAP INITIATION&MGMT: CPT

## 2025-07-07 PROCEDURE — 93308 TTE F-UP OR LMTD: CPT | Performed by: INTERNAL MEDICINE

## 2025-07-07 PROCEDURE — 2500000002 HC RX 250 W HCPCS SELF ADMINISTERED DRUGS (ALT 637 FOR MEDICARE OP, ALT 636 FOR OP/ED): Performed by: NURSE PRACTITIONER

## 2025-07-07 PROCEDURE — 2500000004 HC RX 250 GENERAL PHARMACY W/ HCPCS (ALT 636 FOR OP/ED): Performed by: NURSE PRACTITIONER

## 2025-07-07 PROCEDURE — 2500000001 HC RX 250 WO HCPCS SELF ADMINISTERED DRUGS (ALT 637 FOR MEDICARE OP): Performed by: STUDENT IN AN ORGANIZED HEALTH CARE EDUCATION/TRAINING PROGRAM

## 2025-07-07 PROCEDURE — 2500000005 HC RX 250 GENERAL PHARMACY W/O HCPCS: Performed by: NURSE PRACTITIONER

## 2025-07-07 PROCEDURE — C8924 2D TTE W OR W/O FOL W/CON,FU: HCPCS

## 2025-07-07 PROCEDURE — 93321 DOPPLER ECHO F-UP/LMTD STD: CPT | Performed by: INTERNAL MEDICINE

## 2025-07-07 PROCEDURE — 93325 DOPPLER ECHO COLOR FLOW MAPG: CPT | Performed by: INTERNAL MEDICINE

## 2025-07-07 PROCEDURE — 83735 ASSAY OF MAGNESIUM: CPT | Performed by: NURSE PRACTITIONER

## 2025-07-07 PROCEDURE — 99232 SBSQ HOSP IP/OBS MODERATE 35: CPT | Performed by: STUDENT IN AN ORGANIZED HEALTH CARE EDUCATION/TRAINING PROGRAM

## 2025-07-07 PROCEDURE — 85025 COMPLETE CBC W/AUTO DIFF WBC: CPT | Performed by: NURSE PRACTITIONER

## 2025-07-07 PROCEDURE — 2500000001 HC RX 250 WO HCPCS SELF ADMINISTERED DRUGS (ALT 637 FOR MEDICARE OP): Performed by: NURSE PRACTITIONER

## 2025-07-07 PROCEDURE — 2060000001 HC INTERMEDIATE ICU ROOM DAILY

## 2025-07-07 PROCEDURE — 36415 COLL VENOUS BLD VENIPUNCTURE: CPT | Performed by: NURSE PRACTITIONER

## 2025-07-07 PROCEDURE — 82947 ASSAY GLUCOSE BLOOD QUANT: CPT

## 2025-07-07 PROCEDURE — 80053 COMPREHEN METABOLIC PANEL: CPT | Performed by: NURSE PRACTITIONER

## 2025-07-07 RX ADMIN — BENZOCAINE AND MENTHOL 1 LOZENGE: 15; 3.6 LOZENGE ORAL at 05:26

## 2025-07-07 RX ADMIN — INSULIN LISPRO 1 UNITS: 100 INJECTION, SOLUTION INTRAVENOUS; SUBCUTANEOUS at 11:12

## 2025-07-07 RX ADMIN — FERROUS SULFATE TAB 325 MG (65 MG ELEMENTAL FE) 1 TABLET: 325 (65 FE) TAB at 20:54

## 2025-07-07 RX ADMIN — SACUBITRIL AND VALSARTAN 1 TABLET: 24; 26 TABLET, FILM COATED ORAL at 08:02

## 2025-07-07 RX ADMIN — FUROSEMIDE 20 MG: 10 INJECTION, SOLUTION INTRAMUSCULAR; INTRAVENOUS at 08:02

## 2025-07-07 RX ADMIN — SACUBITRIL AND VALSARTAN 1 TABLET: 24; 26 TABLET, FILM COATED ORAL at 20:53

## 2025-07-07 RX ADMIN — TIOTROPIUM BROMIDE INHALATION SPRAY 2 PUFF: 3.12 SPRAY, METERED RESPIRATORY (INHALATION) at 07:58

## 2025-07-07 RX ADMIN — APIXABAN 5 MG: 5 TABLET, FILM COATED ORAL at 20:54

## 2025-07-07 RX ADMIN — EMPAGLIFLOZIN 10 MG: 10 TABLET, FILM COATED ORAL at 08:02

## 2025-07-07 RX ADMIN — TRAMADOL HYDROCHLORIDE 50 MG: 50 TABLET, FILM COATED ORAL at 17:58

## 2025-07-07 RX ADMIN — FUROSEMIDE 20 MG: 10 INJECTION, SOLUTION INTRAMUSCULAR; INTRAVENOUS at 20:54

## 2025-07-07 RX ADMIN — Medication 50 MCG: at 08:02

## 2025-07-07 RX ADMIN — PERFLUTREN 3 ML OF DILUTION: 6.52 INJECTION, SUSPENSION INTRAVENOUS at 14:35

## 2025-07-07 RX ADMIN — FERROUS SULFATE TAB 325 MG (65 MG ELEMENTAL FE) 1 TABLET: 325 (65 FE) TAB at 08:02

## 2025-07-07 RX ADMIN — Medication 1000 MCG: at 08:02

## 2025-07-07 RX ADMIN — METOPROLOL SUCCINATE 25 MG: 25 TABLET, EXTENDED RELEASE ORAL at 08:02

## 2025-07-07 RX ADMIN — FLUTICASONE FUROATE AND VILANTEROL TRIFENATATE 1 PUFF: 200; 25 POWDER RESPIRATORY (INHALATION) at 07:58

## 2025-07-07 RX ADMIN — Medication 4 L/MIN: at 20:54

## 2025-07-07 RX ADMIN — Medication 3 L/MIN: at 08:02

## 2025-07-07 RX ADMIN — APIXABAN 5 MG: 5 TABLET, FILM COATED ORAL at 08:02

## 2025-07-07 ASSESSMENT — COGNITIVE AND FUNCTIONAL STATUS - GENERAL
MOBILITY SCORE: 22
DAILY ACTIVITIY SCORE: 23
TOILETING: A LITTLE
CLIMB 3 TO 5 STEPS WITH RAILING: A LITTLE
WALKING IN HOSPITAL ROOM: A LITTLE

## 2025-07-07 ASSESSMENT — PAIN DESCRIPTION - DESCRIPTORS: DESCRIPTORS: ACHING;BURNING;NUMBNESS;PINS AND NEEDLES

## 2025-07-07 ASSESSMENT — ACTIVITIES OF DAILY LIVING (ADL): LACK_OF_TRANSPORTATION: NO

## 2025-07-07 ASSESSMENT — PAIN SCALES - GENERAL: PAINLEVEL_OUTOF10: 8

## 2025-07-07 ASSESSMENT — PAIN - FUNCTIONAL ASSESSMENT: PAIN_FUNCTIONAL_ASSESSMENT: 0-10

## 2025-07-07 NOTE — PROGRESS NOTES
07/07/25 1512   Discharge Planning   Living Arrangements Alone   Support Systems Children   Assistance Needed pt uses cane, is not currently driving due to pending need for cataract surgery.  pt resides in duplex and daughter resides in other half with her spouse and 15 year old. pt states family assists with any needs.   Type of Residence Private residence   Number of Stairs to Enter Residence 3   Number of Stairs Within Residence 13   Do you have animals or pets at home? Yes   Type of Animals or Pets dog   Who is requesting discharge planning? Provider   Home or Post Acute Services Community services  ([facility] declines any services)   Expected Discharge Disposition Home   Housing Stability   At any time in the past 12 months, were you homeless or living in a shelter (including now)? N   Transportation Needs   In the past 12 months, has lack of transportation kept you from medical appointments or from getting medications? no   In the past 12 months, has lack of transportation kept you from meetings, work, or from getting things needed for daily living? No   Patient Choice   Patient / Family choosing to utilize agency / facility established prior to hospitalization No   Stroke Family Assessment   Stroke Family Assessment Needed No   Intensity of Service   Intensity of Service 0-30 min      Per review, pt presented to ED with  c/o shortness of breath x3 weeks, [facility]val[facility]; states he has had no access to metformin or nebulizer treatments in over 2 months; normal [facility], but requests prescriptions be sent to [facility] and pt states he will use his [employer] benefits for prescriptions.  hx CHF w early evidence of decompensation; device interrogation showing underlying rhythm Afib already on Eliquis;   Pt declines HHC / SNF and plan is home once medically able to discharge.  ADOD 1-2 days

## 2025-07-07 NOTE — PROGRESS NOTES
Medical Group Progress Note  ASSESSMENT & PLAN:   Jose Byrne is a 79 y.o. male admitted to [facility] for management of:    Acute on Chronic Systolic CHF exacerbation - EF 35% - on Eliquis   - S/p PPM [[facility]] - [facility] - interrogation in ER  LINDA on CKD stage 3  Volume overload  Anemia [hgb 8.5 today]  O2 dependent     Presenting to [place] ER w c/o shortness of breath x3 weeks, [facility]; no access to metformin or nebulizer treatments in over 2 months; hx CHF w early evidence of decompensation; device interrogation showing underlying rhythm Afib already on Eliquis; he has 3+ pitting edema up to the shins, [+] increase with oral intake d/t heat; [+] evidence of LINDA on CKD; will admit for cardiology eval and further fluid status management.      - resume home meds  - Cardiology following, recommend TTE  - BNP 600s; trop flat 25->24, no EKG changes  - CXR w pulm vasc congestion and mod pleural effusions - not in acute distress on exam - requiring 3L NC [uses 2L at home]  - FR 1.8L daily  - monitor on tele  - daily labs and A1c  - oxygen / CPAP at night  - consult TCC     Hx: DM2  - pt reports not having access to his metformin for over 2 months ? states [facility] not [facility]  - check A1c  - SSI and carb controlled diet  - ok to eat  - fluid restrict 1.8L daily     Hx: Cataracts - has surgery planned for Thursday, blind in Left eye, occasional floaters in the right eye    VTE Prophylaxis: Eliquis    Disposition: ADOD 1-2 days    Level of MDM:  Moderate   Risk: Moderate   Data Reviewed and/or Analyzed:   Included: Prior external notes from at least 1 unique source, Results, including laboratory findings and imaging reports, listed above, Orders and notes from all consultants involved, and Notes for this encounter.  I personally reviewed the tests referenced above.    The patient/family had opportunity to ask questions. All questions were answered to the best of my ability.    Liu Gold, [facility]  Medicine    SUBJECTIVE     Patient states his breathing is significantly better today, hoping to be discharged home soon    OBJECTIVE:     Last Recorded Vitals:  Vitals:    07/07/25 0348 07/07/25 0636 07/07/25 0747 07/07/25 1044   BP: 111/69  109/58 97/52   BP Location: Left arm  Left arm Left arm   Patient Position: Lying  Lying Lying   Pulse: 82  86 81   Resp: 22  18 18   Temp: 36.2 °C (97.2 °F)  36.2 °C (97.2 °F) 36 °C (96.8 °F)   TempSrc: Temporal  Temporal Temporal   SpO2: 98%  93% 100%   Weight:  84.3 kg (185 lb 13.6 oz)     Height:         Last I/O:  I/O last 3 completed shifts:  In: 1008 (12 mL/kg) [P.O.:958; I.V.:50 (0.6 mL/kg)]  Out: 5400 (64.1 mL/kg) [Urine:5400 (1.8 mL/kg/hr)]  Weight: 84.3 kg     Physical Exam  Vitals and nursing note reviewed.   Constitutional:       General: He is not in acute distress.     Appearance: Normal appearance.   HENT:      Mouth/Throat:      Mouth: Mucous membranes are moist.   Eyes:      Extraocular Movements: Extraocular movements intact.      Conjunctiva/sclera: Conjunctivae normal.   Cardiovascular:      Rate and Rhythm: Normal rate and regular rhythm.      Pulses: Normal pulses.      Heart sounds: Normal heart sounds.   Pulmonary:      Effort: Pulmonary effort is normal.      Breath sounds: Normal breath sounds.      Comments: On 2.5 L  Abdominal:      General: Abdomen is flat. Bowel sounds are normal.      Palpations: Abdomen is soft.   Musculoskeletal:      Right lower leg: Edema present.      Left lower leg: Edema present.   Skin:     General: Skin is warm.   Neurological:      General: No focal deficit present.      Mental Status: He is alert and oriented to person, place, and time. Mental status is at baseline.   Psychiatric:         Mood and Affect: Mood normal.         Behavior: Behavior normal.         Thought Content: Thought content normal.         Inpatient Medications:  Scheduled Medications[1]PRN Medications  PRN Medications[2]  Continuous  Medications:  Continuous Medications[3]  LABS AND IMAGING:     Labs:  Results from last 7 days   Lab Units 07/07/25  0545 07/06/25  0541 07/05/25  1824   WBC AUTO x10*3/uL 8.7 5.1 7.7   RBC AUTO x10*6/uL 3.62* 3.32* 3.42*   HEMOGLOBIN g/dL 8.5* 7.9* 8.1*   HEMATOCRIT % 29.5* 27.3* 28.3*   MCV fL 82 82 83   MCH pg 23.5* 23.8* 23.7*   MCHC g/dL 28.8* 28.9* 28.6*   RDW % 17.2* 17.3* 17.2*   PLATELETS AUTO x10*3/uL 276 212 215     Results from last 7 days   Lab Units 07/07/25  0545 07/06/25  0541 07/05/25  1824   SODIUM mmol/L 137 135* 135*   POTASSIUM mmol/L 4.0 4.3 4.2   CHLORIDE mmol/L 102 105 107   CO2 mmol/L 29 25 22   BUN mg/dL 40* 31* 32*   CREATININE mg/dL 2.49* 2.28* 2.20*   GLUCOSE mg/dL 105* 243* 158*   PROTEIN TOTAL g/dL 6.6 6.7 6.6   CALCIUM mg/dL 8.4* 8.6 8.4*   BILIRUBIN TOTAL mg/dL 0.4 0.3 0.4   ALK PHOS U/L 85 89 91   AST U/L 9 8* 9   ALT U/L 7* 7* 8*     Results from last 7 days   Lab Units 07/07/25  0545 07/06/25  0541   MAGNESIUM mg/dL 1.96 2.13     Results from last 7 days   Lab Units 07/06/25  1816 07/05/25  1946/25  1824   TROPHS ng/L 22* 24* 25*     Imaging:  ECG 12 lead  Ventricular-paced rhythm  Abnormal ECG  When compared with ECG of 05-JUL-2025 19:55, (unconfirmed)  No significant change was found  Confirmed by Patrick Subramanian ([id number]) on 7/7/2025 10:24:49 AM             [1] apixaban, 5 mg, oral, BID  cholecalciferol, 50 mcg, oral, Daily  cyanocobalamin, 1,000 mcg, oral, Daily  empagliflozin, 10 mg, oral, Daily  ferrous sulfate, 1 tablet, oral, BID  tiotropium, 2 puff, inhalation, Daily   And  fluticasone furoate-vilanteroL, 1 puff, inhalation, Daily  furosemide, 20 mg, intravenous, BID  insulin lispro, 0-5 Units, subcutaneous, TID AC  metoprolol succinate XL, 25 mg, oral, Daily  oxygen, , inhalation, Continuous - Inhalation  sacubitriL-valsartan, 1 tablet, oral, BID     [2] PRN medications: acetaminophen **OR** acetaminophen **OR** acetaminophen, benzocaine-menthol, dextrose, dextrose,  glucagon, glucagon, ipratropium-albuteroL, ondansetron **OR** ondansetron, traMADol  [3]

## 2025-07-08 VITALS
BODY MASS INDEX: 29.2 KG/M2 | DIASTOLIC BLOOD PRESSURE: 62 MMHG | OXYGEN SATURATION: 100 % | TEMPERATURE: 97.2 F | SYSTOLIC BLOOD PRESSURE: 106 MMHG | WEIGHT: 181.66 LBS | RESPIRATION RATE: 18 BRPM | HEART RATE: 79 BPM | HEIGHT: 66 IN

## 2025-07-08 LAB
ALBUMIN SERPL BCP-MCNC: 3 G/DL (ref 3.4–5)
ANION GAP SERPL CALC-SCNC: 12 MMOL/L (ref 10–20)
BUN SERPL-MCNC: 44 MG/DL (ref 6–23)
CALCIUM SERPL-MCNC: 8.3 MG/DL (ref 8.6–10.3)
CHLORIDE SERPL-SCNC: 99 MMOL/L (ref 98–107)
CO2 SERPL-SCNC: 30 MMOL/L (ref 21–32)
CREAT SERPL-MCNC: 2.67 MG/DL (ref 0.5–1.3)
EGFRCR SERPLBLD CKD-EPI 2021: 24 ML/MIN/1.73M*2
ERYTHROCYTE [DISTWIDTH] IN BLOOD BY AUTOMATED COUNT: 17.2 % (ref 11.5–14.5)
GLUCOSE BLD MANUAL STRIP-MCNC: 99 MG/DL (ref 74–99)
GLUCOSE SERPL-MCNC: 156 MG/DL (ref 74–99)
HCT VFR BLD AUTO: 30.2 % (ref 41–52)
HGB BLD-MCNC: 8.8 G/DL (ref 13.5–17.5)
HOLD SPECIMEN: NORMAL
MAGNESIUM SERPL-MCNC: 1.83 MG/DL (ref 1.6–2.4)
MCH RBC QN AUTO: 23.5 PG (ref 26–34)
MCHC RBC AUTO-ENTMCNC: 29.1 G/DL (ref 32–36)
MCV RBC AUTO: 81 FL (ref 80–100)
NRBC BLD-RTO: 0 /100 WBCS (ref 0–0)
PHOSPHATE SERPL-MCNC: 4.4 MG/DL (ref 2.5–4.9)
PLATELET # BLD AUTO: 303 X10*3/UL (ref 150–450)
POTASSIUM SERPL-SCNC: 3.9 MMOL/L (ref 3.5–5.3)
RBC # BLD AUTO: 3.75 X10*6/UL (ref 4.5–5.9)
SODIUM SERPL-SCNC: 137 MMOL/L (ref 136–145)
WBC # BLD AUTO: 8.3 X10*3/UL (ref 4.4–11.3)

## 2025-07-08 PROCEDURE — 85027 COMPLETE CBC AUTOMATED: CPT | Performed by: STUDENT IN AN ORGANIZED HEALTH CARE EDUCATION/TRAINING PROGRAM

## 2025-07-08 PROCEDURE — 2500000005 HC RX 250 GENERAL PHARMACY W/O HCPCS: Performed by: NURSE PRACTITIONER

## 2025-07-08 PROCEDURE — 94660 CPAP INITIATION&MGMT: CPT

## 2025-07-08 PROCEDURE — 99239 HOSP IP/OBS DSCHRG MGMT >30: CPT | Performed by: HOSPITALIST

## 2025-07-08 PROCEDURE — 82947 ASSAY GLUCOSE BLOOD QUANT: CPT

## 2025-07-08 PROCEDURE — 36415 COLL VENOUS BLD VENIPUNCTURE: CPT | Performed by: STUDENT IN AN ORGANIZED HEALTH CARE EDUCATION/TRAINING PROGRAM

## 2025-07-08 PROCEDURE — 2500000004 HC RX 250 GENERAL PHARMACY W/ HCPCS (ALT 636 FOR OP/ED): Performed by: NURSE PRACTITIONER

## 2025-07-08 PROCEDURE — 83735 ASSAY OF MAGNESIUM: CPT | Performed by: STUDENT IN AN ORGANIZED HEALTH CARE EDUCATION/TRAINING PROGRAM

## 2025-07-08 PROCEDURE — 80069 RENAL FUNCTION PANEL: CPT | Performed by: STUDENT IN AN ORGANIZED HEALTH CARE EDUCATION/TRAINING PROGRAM

## 2025-07-08 PROCEDURE — 2500000001 HC RX 250 WO HCPCS SELF ADMINISTERED DRUGS (ALT 637 FOR MEDICARE OP): Performed by: STUDENT IN AN ORGANIZED HEALTH CARE EDUCATION/TRAINING PROGRAM

## 2025-07-08 RX ORDER — FUROSEMIDE 20 MG/1
20 TABLET ORAL DAILY
Qty: 30 TABLET | Refills: 1 | Status: SHIPPED | OUTPATIENT
Start: 2025-07-08 | End: 2025-08-07

## 2025-07-08 RX ORDER — METOPROLOL SUCCINATE 25 MG/1
25 TABLET, EXTENDED RELEASE ORAL DAILY
Qty: 30 TABLET | Refills: 1 | Status: SHIPPED | OUTPATIENT
Start: 2025-07-08

## 2025-07-08 RX ORDER — FLUCONAZOLE 100 MG/1
150 TABLET ORAL ONCE
Status: DISCONTINUED | OUTPATIENT
Start: 2025-07-08 | End: 2025-07-08

## 2025-07-08 RX ORDER — IPRATROPIUM BROMIDE AND ALBUTEROL SULFATE 2.5; .5 MG/3ML; MG/3ML
3 SOLUTION RESPIRATORY (INHALATION) EVERY 4 HOURS PRN
Qty: 180 ML | Refills: 11 | Status: SHIPPED | OUTPATIENT
Start: 2025-07-08

## 2025-07-08 RX ADMIN — TRAMADOL HYDROCHLORIDE 50 MG: 50 TABLET, FILM COATED ORAL at 01:58

## 2025-07-08 RX ADMIN — TRAMADOL HYDROCHLORIDE 50 MG: 50 TABLET, FILM COATED ORAL at 10:40

## 2025-07-08 RX ADMIN — Medication 3 L/MIN: at 08:19

## 2025-07-08 RX ADMIN — Medication 50 MCG: at 08:26

## 2025-07-08 RX ADMIN — FERROUS SULFATE TAB 325 MG (65 MG ELEMENTAL FE) 1 TABLET: 325 (65 FE) TAB at 08:26

## 2025-07-08 RX ADMIN — APIXABAN 5 MG: 5 TABLET, FILM COATED ORAL at 08:26

## 2025-07-08 RX ADMIN — METOPROLOL SUCCINATE 25 MG: 25 TABLET, EXTENDED RELEASE ORAL at 08:26

## 2025-07-08 RX ADMIN — EMPAGLIFLOZIN 10 MG: 10 TABLET, FILM COATED ORAL at 08:26

## 2025-07-08 RX ADMIN — FLUTICASONE FUROATE AND VILANTEROL TRIFENATATE 1 PUFF: 200; 25 POWDER RESPIRATORY (INHALATION) at 06:10

## 2025-07-08 RX ADMIN — TIOTROPIUM BROMIDE INHALATION SPRAY 2 PUFF: 3.12 SPRAY, METERED RESPIRATORY (INHALATION) at 06:11

## 2025-07-08 RX ADMIN — FUROSEMIDE 20 MG: 10 INJECTION, SOLUTION INTRAMUSCULAR; INTRAVENOUS at 08:26

## 2025-07-08 RX ADMIN — Medication 1000 MCG: at 08:26

## 2025-07-08 ASSESSMENT — PAIN DESCRIPTION - ORIENTATION: ORIENTATION: RIGHT;LEFT

## 2025-07-08 ASSESSMENT — PAIN - FUNCTIONAL ASSESSMENT
PAIN_FUNCTIONAL_ASSESSMENT: 0-10
PAIN_FUNCTIONAL_ASSESSMENT: 0-10

## 2025-07-08 ASSESSMENT — PAIN SCALES - GENERAL
PAINLEVEL_OUTOF10: 7
PAINLEVEL_OUTOF10: 0 - NO PAIN

## 2025-07-08 ASSESSMENT — PAIN DESCRIPTION - LOCATION: LOCATION: LEG

## 2025-07-08 ASSESSMENT — PAIN DESCRIPTION - DESCRIPTORS: DESCRIPTORS: ACHING;SORE

## 2025-07-08 NOTE — SIGNIFICANT EVENT
07/08/25 0757   Non-Invasive Ventilation   Mode - Non-Invasive Auto titrating   NIV ON/OFF Off     Upon entrance into the room, I observed patient off of CPAP and on 4LNC, current saturation 98%

## 2025-07-08 NOTE — CARE PLAN
The patient's goals for the shift include      The clinical goals for the shift include pt will remain HDS with decreased SOB throughout shift

## 2025-07-08 NOTE — DISCHARGE SUMMARY
Discharge Diagnosis  Shortness of breath, LINDA on CKD stage 3     Discharge Meds     Your medication list        CONTINUE taking these medications        Instructions Last Dose Given Next Dose Due   apixaban 5 mg tablet  Commonly known as: Elirosa Lassiterztri Aerosphere 160-9-4.8 mcg/actuation HFA aerosol inhaler  Generic drug: budesonide-glycopyr-formoterol           cholecalciferol 25 mcg (1,000 units) capsule  Commonly known as: Vitamin D-3           cyanocobalamin 1,000 mcg tablet  Commonly known as: Vitamin B-12           ferrous sulfate 325 mg (65 mg elemental) tablet           furosemide 20 mg tablet  Commonly known as: Lasix      Take 1 tablet (20 mg) by mouth once daily.       ipratropium-albuteroL 0.5-2.5 mg/3 mL nebulizer solution  Commonly known as: Duo-Neb      Take 3 mL by nebulization every 4 hours if needed for wheezing.       methocarbamol 500 mg tablet  Commonly known as: Robaxin           metoprolol succinate XL 25 mg 24 hr tablet  Commonly known as: Toprol-XL      Take 1 tablet (25 mg) by mouth once daily. Do not crush or chew.       nitroglycerin 0.4 mg SL tablet  Commonly known as: Nitrostat           traMADol 50 mg tablet  Commonly known as: Ultram                  STOP taking these medications      metFORMIN 500 mg tablet  Commonly known as: Glucophage                  Where to Get Your Medications        These medications were sent to [id number] - [place], [place] - [address] [place] [address]      Phone: [phone]   furosemide 20 mg tablet  ipratropium-albuteroL 0.5-2.5 mg/3 mL nebulizer solution  metoprolol succinate XL 25 mg 24 hr tablet         Test Results Pending At Discharge  Pending Labs       No current pending labs.          Lab Results   Component Value Date    HGBA1C 5.7 (H) 07/06/2025       Hospital Course 79 year old male admited with acute on chronic systolic CHF exacerbation    -improved with IV lasix, transition to PO  today  -repeat echo showed normal EF  -follow up with [facility] cardiology outpatient    Hx of Chronic respiratory failure: maintain 3L,     DMII: A1c 5.7 discussed with daughter will hold off on resuming metformin for now    LINDA on CKD stag 3: is fluid status has improved, stop IV lasix today, resume oral tomorrow, good urine output  -nephrology referral outpatient.   -entresto started here, but will hold off on discharge, can reassess outpatient.   -repeat BMP in one week     PAF: on eliquis    Chronic anemia: H/H stable     [facility]. Discharged home in stable condition. Greater than 30 minutes of clinical time spent caring for this patient.       Pertinent Physical Exam At Time of Discharge  Gen: NAD  HEENT: EOM, MMM  CV: RRR, no murmurs rubs or gallops  Resp: coarse rhonchi b/l   Abdomen: soft, NT,+BS  LE: No edema    Edward Scruggs MD

## 2025-07-08 NOTE — NURSING NOTE
AVS reviewed with patient and verbalized understanding. All belongings sent with patient. IV removed cath intact. Tele off

## 2025-07-10 ENCOUNTER — PATIENT OUTREACH (OUTPATIENT)
Dept: CARE COORDINATION | Age: 80
End: 2025-07-10
Payer: MEDICARE

## 2025-07-11 ENCOUNTER — PATIENT OUTREACH (OUTPATIENT)
Dept: CARE COORDINATION | Age: 80
End: 2025-07-11
Payer: MEDICARE

## 2025-07-14 NOTE — DOCUMENTATION CLARIFICATION NOTE
"    PATIENT:               AGATHA GOLDMAN  ACCT #:                  [id number]  MRN:                       [id number]  :                       1945  ADMIT DATE:       2025 5:58 PM  DISCH DATE:        2025 2:30 PM  RESPONDING PROVIDER #:        [id number]          PROVIDER RESPONSE TEXT:    Chronic Hypoxemic Respiratory Failure    CDI QUERY TEXT:    Clarification    Instruction:    Based on your assessment of the patient and the clinical information, please provide the requested documentation by clicking on the appropriate radio button and enter any additional information if prompted.    Question: Is there a diagnosis indicative of the clinical information    When answering this query, please exercise your independent professional judgment. The fact that a question is being asked, does not imply that any particular answer is desired or expected.    The patient's clinical indicators include:  Clinical Information: Pt presents w/ c/o sob    Clinical Indicators:    Per the H/P dated 25 - \"significant past medical history of COPD - o2 dependent\"    Per the medicine dpn dated 25 - \"CXR w pulm vasc congestion and mod pleural effusions - not in acute distress on exam - requiring 3L NC (uses 2L at home)\"    Treatment: O2 via nc    Risk Factors: COPD, former smoker  Options provided:  -- Chronic Hypoxemic Respiratory Failure  -- Chronic Hypercapnic Respiratory Failure  -- Chronic Hypoxemic and Hypercapnic Respiratory Failure  -- Other - I will add my own diagnosis  -- Refer to Clinical Documentation Reviewer    Query created by: Poncho Alcaraz on 2025 8:07 AM      Electronically signed by:  EVER KEARNEY DO 2025 10:45 AM          "

## 2025-07-15 DIAGNOSIS — N17.9 AKI (ACUTE KIDNEY INJURY): ICD-10-CM

## 2025-07-29 LAB
ATRIAL RATE: 70 BPM
ATRIAL RATE: 73 BPM
Q ONSET: 186 MS
Q ONSET: 186 MS
QRS COUNT: 13 BEATS
QRS COUNT: 13 BEATS
QRS DURATION: 204 MS
QRS DURATION: 206 MS
QT INTERVAL: 466 MS
QT INTERVAL: 470 MS
QTC CALCULATION(BAZETT): 537 MS
QTC CALCULATION(BAZETT): 542 MS
QTC FREDERICIA: 513 MS
QTC FREDERICIA: 517 MS
R AXIS: -40 DEGREES
R AXIS: -48 DEGREES
T AXIS: 101 DEGREES
T AXIS: 105 DEGREES
T OFFSET: 419 MS
T OFFSET: 421 MS
VENTRICULAR RATE: 80 BPM
VENTRICULAR RATE: 80 BPM

## 2025-07-31 ENCOUNTER — HOSPITAL ENCOUNTER (EMERGENCY)
Facility: HOSPITAL | Age: 80
Discharge: HOME | End: 2025-07-31
Attending: STUDENT IN AN ORGANIZED HEALTH CARE EDUCATION/TRAINING PROGRAM
Payer: MEDICARE

## 2025-07-31 VITALS
HEART RATE: 80 BPM | HEIGHT: 65 IN | WEIGHT: 184 LBS | TEMPERATURE: 97 F | OXYGEN SATURATION: 97 % | BODY MASS INDEX: 30.66 KG/M2 | DIASTOLIC BLOOD PRESSURE: 63 MMHG | SYSTOLIC BLOOD PRESSURE: 119 MMHG | RESPIRATION RATE: 18 BRPM

## 2025-07-31 DIAGNOSIS — H11.32 SUBCONJUNCTIVAL HEMORRHAGE OF LEFT EYE: Primary | ICD-10-CM

## 2025-07-31 PROCEDURE — 99281 EMR DPT VST MAYX REQ PHY/QHP: CPT | Performed by: STUDENT IN AN ORGANIZED HEALTH CARE EDUCATION/TRAINING PROGRAM

## 2025-07-31 ASSESSMENT — PAIN SCALES - GENERAL: PAINLEVEL_OUTOF10: 0 - NO PAIN

## 2025-07-31 ASSESSMENT — PAIN - FUNCTIONAL ASSESSMENT: PAIN_FUNCTIONAL_ASSESSMENT: 0-10

## 2025-07-31 NOTE — ED PROVIDER NOTES
HPI   Chief Complaint   Patient presents with    Eye Problem     Pt has blood in lt eye that started 1 hr ago.  States he takes eliquis.  Denies pain or problems seeing out of lt eye.        Patient is a 79-year-old male presents the ED for concern for bloody appearance of his left eye.  Patient states that this for started tonight.  He denies any coughing sneezing or other abnormal movements.  He does take a blood thinner and was concerned about this.  He denies any vision changes.  He denies any pain.              Patient History   Medical History[1]  Surgical History[2]  Family History[3]  Social History[4]    Physical Exam   ED Triage Vitals [07/31/25 0326]   Temperature Heart Rate Respirations BP   36.1 °C (97 °F) 80 18 119/63      Pulse Ox Temp src Heart Rate Source Patient Position   97 % -- -- --      BP Location FiO2 (%)     -- --       Physical Exam  Vitals and nursing note reviewed.   HENT:      Head: Normocephalic and atraumatic.      Nose: Nose normal.     Eyes:      Extraocular Movements: Extraocular movements intact.      Pupils: Pupils are equal, round, and reactive to light.      Comments: Subconjunctival hemorrhage of the left eye of the inferior medial aspect.     Cardiovascular:      Rate and Rhythm: Normal rate and regular rhythm.      Heart sounds: No murmur heard.     No gallop.   Pulmonary:      Effort: Pulmonary effort is normal. No respiratory distress.      Breath sounds: Normal breath sounds. No wheezing.     Musculoskeletal:      Cervical back: Neck supple.     Skin:     General: Skin is warm and dry.     Neurological:      Mental Status: He is alert.           ED Course & MDM   Diagnoses as of 07/31/25 0338   Subconjunctival hemorrhage of left eye                 No data recorded     Fatoumata Coma Scale Score: 15 (07/31/25 0327 : Vik Villeda RN)                           Medical Decision Making  Patient is well-appearing in no distress.  He has clinically subconjunctival hemorrhage.   He has no vision changes or pain.  We discussed the plan of discharge with outpatient follow-up with ophthalmology.        Procedure  Procedures       [1]   Past Medical History:  Diagnosis Date    CHF (congestive heart failure)     COPD (chronic obstructive pulmonary disease) (Multi)     High cholesterol     MI (myocardial infarction) (Multi)     Pacemaker    [2] History reviewed. No pertinent surgical history.  [3] No family history on file.  [4]   Social History  Tobacco Use    Smoking status: Former     Types: Cigarettes     Passive exposure: Past    Smokeless tobacco: Never   Vaping Use    Vaping status: Never Used   Substance Use Topics    Alcohol use: Never    Drug use: Never        Mitch Giang DO  07/31/25 0340

## 2025-08-10 ENCOUNTER — APPOINTMENT (OUTPATIENT)
Dept: RADIOLOGY | Facility: HOSPITAL | Age: 80
DRG: 480 | End: 2025-08-10
Payer: MEDICARE

## 2025-08-10 ENCOUNTER — HOSPITAL ENCOUNTER (INPATIENT)
Facility: HOSPITAL | Age: 80
LOS: 4 days | Discharge: NURSING, REHAB OR LONG-TERM CARE | DRG: 480 | End: 2025-08-14
Attending: STUDENT IN AN ORGANIZED HEALTH CARE EDUCATION/TRAINING PROGRAM | Admitting: INTERNAL MEDICINE
Payer: MEDICARE

## 2025-08-10 ENCOUNTER — APPOINTMENT (OUTPATIENT)
Dept: CARDIOLOGY | Facility: HOSPITAL | Age: 80
DRG: 480 | End: 2025-08-10
Payer: MEDICARE

## 2025-08-10 DIAGNOSIS — I50.9 CONGESTIVE HEART FAILURE, UNSPECIFIED HF CHRONICITY, UNSPECIFIED HEART FAILURE TYPE: ICD-10-CM

## 2025-08-10 DIAGNOSIS — R33.9 URINARY RETENTION: ICD-10-CM

## 2025-08-10 DIAGNOSIS — S72.145A CLOSED NONDISPLACED INTERTROCHANTERIC FRACTURE OF LEFT FEMUR, INITIAL ENCOUNTER: Primary | ICD-10-CM
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
## 2025-08-10 DIAGNOSIS — Z95.0 PRESENCE OF PERMANENT CARDIAC PACEMAKER: ICD-10-CM

## 2025-08-10 LAB
ABO GROUP (TYPE) IN BLOOD: NORMAL
ABO GROUP (TYPE) IN BLOOD: NORMAL
ALBUMIN SERPL BCP-MCNC: 3.1 G/DL (ref 3.4–5)
ALP SERPL-CCNC: 106 U/L (ref 33–136)
ALT SERPL W P-5'-P-CCNC: 8 U/L (ref 10–52)
ANION GAP SERPL CALC-SCNC: 13 MMOL/L (ref 10–20)
ANTIBODY SCREEN: NORMAL
APTT PPP: 38 SECONDS (ref 26–36)
AST SERPL W P-5'-P-CCNC: 12 U/L (ref 9–39)
BASOPHILS # BLD AUTO: 0.04 X10*3/UL (ref 0–0.1)
BASOPHILS NFR BLD AUTO: 0.6 %
BILIRUB SERPL-MCNC: 0.5 MG/DL (ref 0–1.2)
BUN SERPL-MCNC: 56 MG/DL (ref 6–23)
CALCIUM SERPL-MCNC: 8.2 MG/DL (ref 8.6–10.3)
CHLORIDE SERPL-SCNC: 100 MMOL/L (ref 98–107)
CO2 SERPL-SCNC: 23 MMOL/L (ref 21–32)
CREAT SERPL-MCNC: 2.63 MG/DL (ref 0.5–1.3)
EGFRCR SERPLBLD CKD-EPI 2021: 24 ML/MIN/1.73M*2
EOSINOPHIL # BLD AUTO: 0.1 X10*3/UL (ref 0–0.4)
EOSINOPHIL NFR BLD AUTO: 1.5 %
ERYTHROCYTE [DISTWIDTH] IN BLOOD BY AUTOMATED COUNT: 17.7 % (ref 11.5–14.5)
GLUCOSE SERPL-MCNC: 127 MG/DL (ref 74–99)
HCT VFR BLD AUTO: 23.3 % (ref 41–52)
HGB BLD-MCNC: 7 G/DL (ref 13.5–17.5)
IMM GRANULOCYTES # BLD AUTO: 0.05 X10*3/UL (ref 0–0.5)
IMM GRANULOCYTES NFR BLD AUTO: 0.8 % (ref 0–0.9)
INR PPP: 2.6 (ref 0.9–1.1)
LYMPHOCYTES # BLD AUTO: 0.38 X10*3/UL (ref 0.8–3)
LYMPHOCYTES NFR BLD AUTO: 5.8 %
MCH RBC QN AUTO: 24 PG (ref 26–34)
MCHC RBC AUTO-ENTMCNC: 30 G/DL (ref 32–36)
MCV RBC AUTO: 80 FL (ref 80–100)
MONOCYTES # BLD AUTO: 0.58 X10*3/UL (ref 0.05–0.8)
MONOCYTES NFR BLD AUTO: 8.9 %
NEUTROPHILS # BLD AUTO: 5.36 X10*3/UL (ref 1.6–5.5)
NEUTROPHILS NFR BLD AUTO: 82.4 %
NRBC BLD-RTO: 0 /100 WBCS (ref 0–0)
PLATELET # BLD AUTO: 164 X10*3/UL (ref 150–450)
POTASSIUM SERPL-SCNC: 4.2 MMOL/L (ref 3.5–5.3)
PROT SERPL-MCNC: 6.7 G/DL (ref 6.4–8.2)
PROTHROMBIN TIME: 29 SECONDS (ref 9.8–12.4)
RBC # BLD AUTO: 2.92 X10*6/UL (ref 4.5–5.9)
RH FACTOR (ANTIGEN D): NORMAL
RH FACTOR (ANTIGEN D): NORMAL
SODIUM SERPL-SCNC: 132 MMOL/L (ref 136–145)
WBC # BLD AUTO: 6.5 X10*3/UL (ref 4.4–11.3)

## 2025-08-10 PROCEDURE — 96374 THER/PROPH/DIAG INJ IV PUSH: CPT

## 2025-08-10 PROCEDURE — 76377 3D RENDER W/INTRP POSTPROCES: CPT

## 2025-08-10 PROCEDURE — 85730 THROMBOPLASTIN TIME PARTIAL: CPT | Performed by: STUDENT IN AN ORGANIZED HEALTH CARE EDUCATION/TRAINING PROGRAM

## 2025-08-10 PROCEDURE — 99285 EMERGENCY DEPT VISIT HI MDM: CPT | Mod: 25 | Performed by: STUDENT IN AN ORGANIZED HEALTH CARE EDUCATION/TRAINING PROGRAM

## 2025-08-10 PROCEDURE — 80053 COMPREHEN METABOLIC PANEL: CPT | Performed by: STUDENT IN AN ORGANIZED HEALTH CARE EDUCATION/TRAINING PROGRAM

## 2025-08-10 PROCEDURE — 73700 CT LOWER EXTREMITY W/O DYE: CPT | Mod: LEFT SIDE | Performed by: RADIOLOGY

## 2025-08-10 PROCEDURE — 70450 CT HEAD/BRAIN W/O DYE: CPT | Performed by: RADIOLOGY

## 2025-08-10 PROCEDURE — 1200000002 HC GENERAL ROOM WITH TELEMETRY DAILY

## 2025-08-10 PROCEDURE — 86901 BLOOD TYPING SEROLOGIC RH(D): CPT | Performed by: STUDENT IN AN ORGANIZED HEALTH CARE EDUCATION/TRAINING PROGRAM

## 2025-08-10 PROCEDURE — 73502 X-RAY EXAM HIP UNI 2-3 VIEWS: CPT | Mod: LT

## 2025-08-10 PROCEDURE — 73502 X-RAY EXAM HIP UNI 2-3 VIEWS: CPT | Mod: LEFT SIDE | Performed by: RADIOLOGY

## 2025-08-10 PROCEDURE — 36415 COLL VENOUS BLD VENIPUNCTURE: CPT | Performed by: STUDENT IN AN ORGANIZED HEALTH CARE EDUCATION/TRAINING PROGRAM

## 2025-08-10 PROCEDURE — 85025 COMPLETE CBC W/AUTO DIFF WBC: CPT | Performed by: STUDENT IN AN ORGANIZED HEALTH CARE EDUCATION/TRAINING PROGRAM

## 2025-08-10 PROCEDURE — 73700 CT LOWER EXTREMITY W/O DYE: CPT | Mod: LT

## 2025-08-10 PROCEDURE — 85610 PROTHROMBIN TIME: CPT | Performed by: STUDENT IN AN ORGANIZED HEALTH CARE EDUCATION/TRAINING PROGRAM

## 2025-08-10 PROCEDURE — 76377 3D RENDER W/INTRP POSTPROCES: CPT | Mod: LEFT SIDE | Performed by: RADIOLOGY

## 2025-08-10 PROCEDURE — 70450 CT HEAD/BRAIN W/O DYE: CPT

## 2025-08-10 PROCEDURE — 72125 CT NECK SPINE W/O DYE: CPT

## 2025-08-10 PROCEDURE — 72125 CT NECK SPINE W/O DYE: CPT | Performed by: RADIOLOGY

## 2025-08-10 PROCEDURE — 93005 ELECTROCARDIOGRAM TRACING: CPT

## 2025-08-10 PROCEDURE — 99223 1ST HOSP IP/OBS HIGH 75: CPT | Performed by: INTERNAL MEDICINE

## 2025-08-10 PROCEDURE — 86923 COMPATIBILITY TEST ELECTRIC: CPT

## 2025-08-10 PROCEDURE — 96376 TX/PRO/DX INJ SAME DRUG ADON: CPT

## 2025-08-10 PROCEDURE — 2500000004 HC RX 250 GENERAL PHARMACY W/ HCPCS (ALT 636 FOR OP/ED): Mod: JZ | Performed by: STUDENT IN AN ORGANIZED HEALTH CARE EDUCATION/TRAINING PROGRAM

## 2025-08-10 PROCEDURE — 71045 X-RAY EXAM CHEST 1 VIEW: CPT

## 2025-08-10 PROCEDURE — 71045 X-RAY EXAM CHEST 1 VIEW: CPT | Performed by: RADIOLOGY

## 2025-08-10 RX ORDER — ACETAMINOPHEN 650 MG/1
650 SUPPOSITORY RECTAL EVERY 4 HOURS PRN
Status: DISCONTINUED | OUTPATIENT
Start: 2025-08-10 | End: 2025-08-14 | Stop reason: HOSPADM

## 2025-08-10 RX ORDER — ACETAMINOPHEN 325 MG/1
650 TABLET ORAL EVERY 4 HOURS PRN
Status: DISCONTINUED | OUTPATIENT
Start: 2025-08-10 | End: 2025-08-14 | Stop reason: HOSPADM

## 2025-08-10 RX ORDER — SODIUM CHLORIDE, SODIUM LACTATE, POTASSIUM CHLORIDE, CALCIUM CHLORIDE 600; 310; 30; 20 MG/100ML; MG/100ML; MG/100ML; MG/100ML
75 INJECTION, SOLUTION INTRAVENOUS CONTINUOUS
Status: ACTIVE | OUTPATIENT
Start: 2025-08-11 | End: 2025-08-12

## 2025-08-10 RX ORDER — POLYETHYLENE GLYCOL 3350 17 G/17G
17 POWDER, FOR SOLUTION ORAL DAILY
Status: DISCONTINUED | OUTPATIENT
Start: 2025-08-11 | End: 2025-08-12 | Stop reason: SDUPTHER

## 2025-08-10 RX ORDER — TALC
3 POWDER (GRAM) TOPICAL NIGHTLY PRN
Status: DISCONTINUED | OUTPATIENT
Start: 2025-08-10 | End: 2025-08-14 | Stop reason: HOSPADM

## 2025-08-10 RX ORDER — ONDANSETRON 4 MG/1
4 TABLET, FILM COATED ORAL EVERY 8 HOURS PRN
Status: DISCONTINUED | OUTPATIENT
Start: 2025-08-10 | End: 2025-08-12 | Stop reason: SDUPTHER

## 2025-08-10 RX ORDER — ACETAMINOPHEN 160 MG/5ML
650 SOLUTION ORAL EVERY 4 HOURS PRN
Status: DISCONTINUED | OUTPATIENT
Start: 2025-08-10 | End: 2025-08-14 | Stop reason: HOSPADM

## 2025-08-10 RX ORDER — ONDANSETRON HYDROCHLORIDE 2 MG/ML
4 INJECTION, SOLUTION INTRAVENOUS EVERY 8 HOURS PRN
Status: DISCONTINUED | OUTPATIENT
Start: 2025-08-10 | End: 2025-08-12 | Stop reason: SDUPTHER

## 2025-08-10 RX ADMIN — HYDROMORPHONE HYDROCHLORIDE 0.5 MG: 0.5 INJECTION, SOLUTION INTRAMUSCULAR; INTRAVENOUS; SUBCUTANEOUS at 20:28

## 2025-08-10 RX ADMIN — HYDROMORPHONE HYDROCHLORIDE 0.5 MG: 0.5 INJECTION, SOLUTION INTRAMUSCULAR; INTRAVENOUS; SUBCUTANEOUS at 22:46

## 2025-08-10 ASSESSMENT — PAIN DESCRIPTION - FREQUENCY: FREQUENCY: CONSTANT/CONTINUOUS

## 2025-08-10 ASSESSMENT — LIFESTYLE VARIABLES
HAVE PEOPLE ANNOYED YOU BY CRITICIZING YOUR DRINKING: NO
HAVE YOU EVER FELT YOU SHOULD CUT DOWN ON YOUR DRINKING: NO
EVER FELT BAD OR GUILTY ABOUT YOUR DRINKING: NO
TOTAL SCORE: 0
EVER HAD A DRINK FIRST THING IN THE MORNING TO STEADY YOUR NERVES TO GET RID OF A HANGOVER: NO

## 2025-08-10 ASSESSMENT — PAIN SCALES - GENERAL
PAINLEVEL_OUTOF10: 8
PAINLEVEL_OUTOF10: 6

## 2025-08-10 ASSESSMENT — PAIN DESCRIPTION - LOCATION: LOCATION: HIP

## 2025-08-10 ASSESSMENT — PAIN DESCRIPTION - ORIENTATION: ORIENTATION: LEFT

## 2025-08-10 ASSESSMENT — PAIN - FUNCTIONAL ASSESSMENT: PAIN_FUNCTIONAL_ASSESSMENT: 0-10

## 2025-08-11 ENCOUNTER — APPOINTMENT (OUTPATIENT)
Dept: CARDIOLOGY | Facility: HOSPITAL | Age: 80
DRG: 480 | End: 2025-08-11
Payer: MEDICARE

## 2025-08-11 ENCOUNTER — ANESTHESIA (OUTPATIENT)
Dept: OPERATING ROOM | Facility: HOSPITAL | Age: 80
DRG: 480 | End: 2025-08-11
Payer: MEDICARE

## 2025-08-11 ENCOUNTER — ANESTHESIA EVENT (OUTPATIENT)
Dept: OPERATING ROOM | Facility: HOSPITAL | Age: 80
DRG: 480 | End: 2025-08-11
Payer: MEDICARE

## 2025-08-11 PROBLEM — I73.9: Status: ACTIVE | Noted: 2022-06-30

## 2025-08-11 PROBLEM — E11.9 TYPE 2 DIABETES MELLITUS WITHOUT COMPLICATION: Status: ACTIVE | Noted: 2022-02-10

## 2025-08-11 PROBLEM — G62.9 NEUROPATHY: Status: ACTIVE | Noted: 2018-01-29

## 2025-08-11 LAB
ALBUMIN SERPL BCP-MCNC: 2.9 G/DL (ref 3.4–5)
ALP SERPL-CCNC: 96 U/L (ref 33–136)
ALT SERPL W P-5'-P-CCNC: 9 U/L (ref 10–52)
ANION GAP SERPL CALC-SCNC: 12 MMOL/L (ref 10–20)
AST SERPL W P-5'-P-CCNC: 12 U/L (ref 9–39)
BILIRUB SERPL-MCNC: 0.6 MG/DL (ref 0–1.2)
BUN SERPL-MCNC: 54 MG/DL (ref 6–23)
CALCIUM SERPL-MCNC: 8.4 MG/DL (ref 8.6–10.3)
CHLORIDE SERPL-SCNC: 99 MMOL/L (ref 98–107)
CO2 SERPL-SCNC: 25 MMOL/L (ref 21–32)
CREAT SERPL-MCNC: 2.51 MG/DL (ref 0.5–1.3)
EGFRCR SERPLBLD CKD-EPI 2021: 26 ML/MIN/1.73M*2
ERYTHROCYTE [DISTWIDTH] IN BLOOD BY AUTOMATED COUNT: 17.5 % (ref 11.5–14.5)
GLUCOSE SERPL-MCNC: 156 MG/DL (ref 74–99)
HCT VFR BLD AUTO: 24.1 % (ref 41–52)
HGB BLD-MCNC: 7 G/DL (ref 13.5–17.5)
MCH RBC QN AUTO: 23.6 PG (ref 26–34)
MCHC RBC AUTO-ENTMCNC: 29 G/DL (ref 32–36)
MCV RBC AUTO: 81 FL (ref 80–100)
NRBC BLD-RTO: 0 /100 WBCS (ref 0–0)
PLATELET # BLD AUTO: 185 X10*3/UL (ref 150–450)
POTASSIUM SERPL-SCNC: 4.4 MMOL/L (ref 3.5–5.3)
PROT SERPL-MCNC: 6.5 G/DL (ref 6.4–8.2)
RBC # BLD AUTO: 2.96 X10*6/UL (ref 4.5–5.9)
SODIUM SERPL-SCNC: 132 MMOL/L (ref 136–145)
WBC # BLD AUTO: 7 X10*3/UL (ref 4.4–11.3)

## 2025-08-11 PROCEDURE — 93005 ELECTROCARDIOGRAM TRACING: CPT

## 2025-08-11 PROCEDURE — 36415 COLL VENOUS BLD VENIPUNCTURE: CPT | Performed by: INTERNAL MEDICINE

## 2025-08-11 PROCEDURE — 93280 PM DEVICE PROGR EVAL DUAL: CPT

## 2025-08-11 PROCEDURE — 2500000001 HC RX 250 WO HCPCS SELF ADMINISTERED DRUGS (ALT 637 FOR MEDICARE OP): Performed by: STUDENT IN AN ORGANIZED HEALTH CARE EDUCATION/TRAINING PROGRAM

## 2025-08-11 PROCEDURE — 2500000004 HC RX 250 GENERAL PHARMACY W/ HCPCS (ALT 636 FOR OP/ED): Performed by: INTERNAL MEDICINE

## 2025-08-11 PROCEDURE — 84075 ASSAY ALKALINE PHOSPHATASE: CPT | Performed by: INTERNAL MEDICINE

## 2025-08-11 PROCEDURE — 2500000005 HC RX 250 GENERAL PHARMACY W/O HCPCS: Performed by: INTERNAL MEDICINE

## 2025-08-11 PROCEDURE — 99232 SBSQ HOSP IP/OBS MODERATE 35: CPT | Performed by: STUDENT IN AN ORGANIZED HEALTH CARE EDUCATION/TRAINING PROGRAM

## 2025-08-11 PROCEDURE — 85027 COMPLETE CBC AUTOMATED: CPT | Performed by: INTERNAL MEDICINE

## 2025-08-11 PROCEDURE — 93280 PM DEVICE PROGR EVAL DUAL: CPT | Performed by: INTERNAL MEDICINE

## 2025-08-11 PROCEDURE — 1200000002 HC GENERAL ROOM WITH TELEMETRY DAILY

## 2025-08-11 PROCEDURE — 99221 1ST HOSP IP/OBS SF/LOW 40: CPT | Performed by: ORTHOPAEDIC SURGERY

## 2025-08-11 RX ORDER — ASPIRIN 81 MG/1
81 TABLET ORAL DAILY
COMMUNITY

## 2025-08-11 RX ORDER — METOPROLOL SUCCINATE 25 MG/1
25 TABLET, EXTENDED RELEASE ORAL DAILY
Status: DISCONTINUED | OUTPATIENT
Start: 2025-08-11 | End: 2025-08-14 | Stop reason: HOSPADM

## 2025-08-11 RX ORDER — LANOLIN ALCOHOL/MO/W.PET/CERES
1000 CREAM (GRAM) TOPICAL DAILY
COMMUNITY

## 2025-08-11 RX ORDER — MORPHINE SULFATE 2 MG/ML
2 INJECTION, SOLUTION INTRAMUSCULAR; INTRAVENOUS
Status: DISCONTINUED | OUTPATIENT
Start: 2025-08-11 | End: 2025-08-13

## 2025-08-11 RX ORDER — ASPIRIN 81 MG/1
81 TABLET ORAL DAILY
Status: DISCONTINUED | OUTPATIENT
Start: 2025-08-11 | End: 2025-08-14 | Stop reason: HOSPADM

## 2025-08-11 RX ORDER — FERROUS SULFATE 325(65) MG
325 TABLET ORAL 2 TIMES DAILY
COMMUNITY

## 2025-08-11 RX ORDER — HYDROMORPHONE HYDROCHLORIDE 0.2 MG/ML
0.2 INJECTION INTRAMUSCULAR; INTRAVENOUS; SUBCUTANEOUS
Status: DISCONTINUED | OUTPATIENT
Start: 2025-08-11 | End: 2025-08-13

## 2025-08-11 RX ORDER — GLUCOSAM/CHONDRO/HERB 149/HYAL 750-100 MG
1 TABLET ORAL 2 TIMES DAILY
COMMUNITY

## 2025-08-11 RX ADMIN — SODIUM CHLORIDE, SODIUM LACTATE, POTASSIUM CHLORIDE, AND CALCIUM CHLORIDE 75 ML/HR: .6; .31; .03; .02 INJECTION, SOLUTION INTRAVENOUS at 02:34

## 2025-08-11 RX ADMIN — MORPHINE SULFATE 2 MG: 2 INJECTION, SOLUTION INTRAMUSCULAR; INTRAVENOUS at 20:06

## 2025-08-11 RX ADMIN — SODIUM CHLORIDE, SODIUM LACTATE, POTASSIUM CHLORIDE, AND CALCIUM CHLORIDE 75 ML/HR: .6; .31; .03; .02 INJECTION, SOLUTION INTRAVENOUS at 15:34

## 2025-08-11 RX ADMIN — MORPHINE SULFATE 2 MG: 2 INJECTION, SOLUTION INTRAMUSCULAR; INTRAVENOUS at 12:22

## 2025-08-11 RX ADMIN — MORPHINE SULFATE 2 MG: 2 INJECTION, SOLUTION INTRAMUSCULAR; INTRAVENOUS at 15:34

## 2025-08-11 RX ADMIN — MORPHINE SULFATE 2 MG: 2 INJECTION, SOLUTION INTRAMUSCULAR; INTRAVENOUS at 02:17

## 2025-08-11 RX ADMIN — METOPROLOL SUCCINATE 25 MG: 25 TABLET, EXTENDED RELEASE ORAL at 08:22

## 2025-08-11 RX ADMIN — MORPHINE SULFATE 2 MG: 2 INJECTION, SOLUTION INTRAMUSCULAR; INTRAVENOUS at 22:46

## 2025-08-11 RX ADMIN — MORPHINE SULFATE 2 MG: 2 INJECTION, SOLUTION INTRAMUSCULAR; INTRAVENOUS at 07:00

## 2025-08-11 RX ADMIN — Medication 3 MG: at 22:46

## 2025-08-11 RX ADMIN — Medication 3 MG: at 02:17

## 2025-08-11 SDOH — HEALTH STABILITY: PHYSICAL HEALTH: ON AVERAGE, HOW MANY MINUTES DO YOU ENGAGE IN EXERCISE AT THIS LEVEL?: 0 MIN

## 2025-08-11 SDOH — SOCIAL STABILITY: SOCIAL INSECURITY: WITHIN THE LAST YEAR, HAVE YOU BEEN HUMILIATED OR EMOTIONALLY ABUSED IN OTHER WAYS BY YOUR PARTNER OR EX-PARTNER?: NO

## 2025-08-11 SDOH — SOCIAL STABILITY: SOCIAL INSECURITY: WITHIN THE LAST YEAR, HAVE YOU BEEN AFRAID OF YOUR PARTNER OR EX-PARTNER?: NO

## 2025-08-11 SDOH — HEALTH STABILITY: MENTAL HEALTH: HOW MANY DRINKS CONTAINING ALCOHOL DO YOU HAVE ON A TYPICAL DAY WHEN YOU ARE DRINKING?: PATIENT DOES NOT DRINK

## 2025-08-11 SDOH — HEALTH STABILITY: MENTAL HEALTH: HOW OFTEN DO YOU HAVE A DRINK CONTAINING ALCOHOL?: NEVER

## 2025-08-11 SDOH — SOCIAL STABILITY: SOCIAL INSECURITY: DO YOU FEEL ANYONE HAS EXPLOITED OR TAKEN ADVANTAGE OF YOU FINANCIALLY OR OF YOUR PERSONAL PROPERTY?: NO

## 2025-08-11 SDOH — SOCIAL STABILITY: SOCIAL NETWORK: HOW OFTEN DO YOU ATTEND MEETINGS OF THE CLUBS OR ORGANIZATIONS YOU BELONG TO?: 1 TO 4 TIMES PER YEAR

## 2025-08-11 SDOH — SOCIAL STABILITY: SOCIAL NETWORK: HOW OFTEN DO YOU ATTEND CHURCH OR RELIGIOUS SERVICES?: 1 TO 4 TIMES PER YEAR

## 2025-08-11 SDOH — SOCIAL STABILITY: SOCIAL INSECURITY: DOES ANYONE TRY TO KEEP YOU FROM HAVING/CONTACTING OTHER FRIENDS OR DOING THINGS OUTSIDE YOUR HOME?: NO

## 2025-08-11 SDOH — ECONOMIC STABILITY: FOOD INSECURITY: WITHIN THE PAST 12 MONTHS, YOU WORRIED THAT YOUR FOOD WOULD RUN OUT BEFORE YOU GOT THE MONEY TO BUY MORE.: NEVER TRUE

## 2025-08-11 SDOH — HEALTH STABILITY: MENTAL HEALTH: HOW OFTEN DO YOU HAVE SIX OR MORE DRINKS ON ONE OCCASION?: NEVER

## 2025-08-11 SDOH — SOCIAL STABILITY: SOCIAL INSECURITY: HAS ANYONE EVER THREATENED TO HURT YOUR FAMILY OR YOUR PETS?: NO

## 2025-08-11 SDOH — ECONOMIC STABILITY: FOOD INSECURITY: HOW HARD IS IT FOR YOU TO PAY FOR THE VERY BASICS LIKE FOOD, HOUSING, MEDICAL CARE, AND HEATING?: NOT VERY HARD

## 2025-08-11 SDOH — ECONOMIC STABILITY: INCOME INSECURITY: IN THE PAST 12 MONTHS HAS THE ELECTRIC, GAS, OIL, OR WATER COMPANY THREATENED TO SHUT OFF SERVICES IN YOUR HOME?: NO

## 2025-08-11 SDOH — SOCIAL STABILITY: SOCIAL INSECURITY: DO YOU FEEL UNSAFE GOING BACK TO THE PLACE WHERE YOU ARE LIVING?: NO

## 2025-08-11 SDOH — SOCIAL STABILITY: SOCIAL NETWORK
DO YOU BELONG TO ANY CLUBS OR ORGANIZATIONS SUCH AS CHURCH GROUPS, UNIONS, FRATERNAL OR ATHLETIC GROUPS, OR SCHOOL GROUPS?: PATIENT DECLINED

## 2025-08-11 SDOH — HEALTH STABILITY: PHYSICAL HEALTH: ON AVERAGE, HOW MANY DAYS PER WEEK DO YOU ENGAGE IN MODERATE TO STRENUOUS EXERCISE (LIKE A BRISK WALK)?: 0 DAYS

## 2025-08-11 SDOH — HEALTH STABILITY: PHYSICAL HEALTH
HOW OFTEN DO YOU NEED TO HAVE SOMEONE HELP YOU WHEN YOU READ INSTRUCTIONS, PAMPHLETS, OR OTHER WRITTEN MATERIAL FROM YOUR DOCTOR OR PHARMACY?: NEVER

## 2025-08-11 SDOH — ECONOMIC STABILITY: FOOD INSECURITY: WITHIN THE PAST 12 MONTHS, THE FOOD YOU BOUGHT JUST DIDN'T LAST AND YOU DIDN'T HAVE MONEY TO GET MORE.: NEVER TRUE

## 2025-08-11 SDOH — SOCIAL STABILITY: SOCIAL INSECURITY: ARE YOU OR HAVE YOU BEEN THREATENED OR ABUSED PHYSICALLY, EMOTIONALLY, OR SEXUALLY BY ANYONE?: NO

## 2025-08-11 SDOH — SOCIAL STABILITY: SOCIAL NETWORK: HOW OFTEN DO YOU GET TOGETHER WITH FRIENDS OR RELATIVES?: PATIENT DECLINED

## 2025-08-11 SDOH — SOCIAL STABILITY: SOCIAL INSECURITY: ARE YOU MARRIED, WIDOWED, DIVORCED, SEPARATED, NEVER MARRIED, OR LIVING WITH A PARTNER?: PATIENT DECLINED

## 2025-08-11 SDOH — SOCIAL STABILITY: SOCIAL INSECURITY: HAVE YOU HAD ANY THOUGHTS OF HARMING ANYONE ELSE?: NO

## 2025-08-11 SDOH — SOCIAL STABILITY: SOCIAL INSECURITY: WERE YOU ABLE TO COMPLETE ALL THE BEHAVIORAL HEALTH SCREENINGS?: YES

## 2025-08-11 SDOH — SOCIAL STABILITY: SOCIAL INSECURITY: ABUSE: ADULT

## 2025-08-11 SDOH — SOCIAL STABILITY: SOCIAL INSECURITY: ARE THERE ANY APPARENT SIGNS OF INJURIES/BEHAVIORS THAT COULD BE RELATED TO ABUSE/NEGLECT?: NO

## 2025-08-11 SDOH — SOCIAL STABILITY: SOCIAL INSECURITY: HAVE YOU HAD THOUGHTS OF HARMING ANYONE ELSE?: NO

## 2025-08-11 ASSESSMENT — PAIN - FUNCTIONAL ASSESSMENT
PAIN_FUNCTIONAL_ASSESSMENT: 0-10

## 2025-08-11 ASSESSMENT — COGNITIVE AND FUNCTIONAL STATUS - GENERAL
HELP NEEDED FOR BATHING: A LOT
STANDING UP FROM CHAIR USING ARMS: A LOT
TURNING FROM BACK TO SIDE WHILE IN FLAT BAD: A LITTLE
PERSONAL GROOMING: A LITTLE
MOVING FROM LYING ON BACK TO SITTING ON SIDE OF FLAT BED WITH BEDRAILS: A LITTLE
MOBILITY SCORE: 14
DAILY ACTIVITIY SCORE: 19
WALKING IN HOSPITAL ROOM: A LOT
MOVING TO AND FROM BED TO CHAIR: A LOT
DRESSING REGULAR LOWER BODY CLOTHING: A LOT
TOILETING: A LITTLE
EATING MEALS: A LITTLE
CLIMB 3 TO 5 STEPS WITH RAILING: A LOT
STANDING UP FROM CHAIR USING ARMS: A LOT
TURNING FROM BACK TO SIDE WHILE IN FLAT BAD: A LITTLE
DRESSING REGULAR LOWER BODY CLOTHING: A LOT
CLIMB 3 TO 5 STEPS WITH RAILING: A LOT
MOVING FROM LYING ON BACK TO SITTING ON SIDE OF FLAT BED WITH BEDRAILS: A LITTLE
MOVING TO AND FROM BED TO CHAIR: A LOT
PERSONAL GROOMING: A LITTLE
DAILY ACTIVITIY SCORE: 16
HELP NEEDED FOR BATHING: A LOT
TURNING FROM BACK TO SIDE WHILE IN FLAT BAD: A LITTLE
TOILETING: A LITTLE
MOVING TO AND FROM BED TO CHAIR: A LOT
EATING MEALS: A LITTLE
STANDING UP FROM CHAIR USING ARMS: A LOT
MOVING FROM LYING ON BACK TO SITTING ON SIDE OF FLAT BED WITH BEDRAILS: A LITTLE
TOILETING: A LITTLE
CLIMB 3 TO 5 STEPS WITH RAILING: A LOT
DRESSING REGULAR UPPER BODY CLOTHING: A LITTLE
WALKING IN HOSPITAL ROOM: A LOT
MOBILITY SCORE: 14
DAILY ACTIVITIY SCORE: 16
PATIENT BASELINE BEDBOUND: NO
WALKING IN HOSPITAL ROOM: A LOT
DRESSING REGULAR UPPER BODY CLOTHING: A LITTLE
HELP NEEDED FOR BATHING: A LOT
MOBILITY SCORE: 14
DRESSING REGULAR LOWER BODY CLOTHING: A LOT

## 2025-08-11 ASSESSMENT — LIFESTYLE VARIABLES
HOW OFTEN DO YOU HAVE A DRINK CONTAINING ALCOHOL: NEVER
AUDIT-C TOTAL SCORE: 0
AUDIT-C TOTAL SCORE: 0
SUBSTANCE_ABUSE_PAST_12_MONTHS: NO
PRESCIPTION_ABUSE_PAST_12_MONTHS: NO
HOW OFTEN DO YOU HAVE 6 OR MORE DRINKS ON ONE OCCASION: NEVER
AUDIT-C TOTAL SCORE: 0
SKIP TO QUESTIONS 9-10: 1
HOW MANY STANDARD DRINKS CONTAINING ALCOHOL DO YOU HAVE ON A TYPICAL DAY: PATIENT DOES NOT DRINK
SKIP TO QUESTIONS 9-10: 1

## 2025-08-11 ASSESSMENT — PAIN SCALES - GENERAL
PAINLEVEL_OUTOF10: 6
PAINLEVEL_OUTOF10: 6
PAINLEVEL_OUTOF10: 7
PAINLEVEL_OUTOF10: 8
PAINLEVEL_OUTOF10: 8
PAINLEVEL_OUTOF10: 6

## 2025-08-11 ASSESSMENT — ACTIVITIES OF DAILY LIVING (ADL)
ASSISTIVE_DEVICE: EYEGLASSES;CANE
JUDGMENT_ADEQUATE_SAFELY_COMPLETE_DAILY_ACTIVITIES: YES
HEARING - LEFT EAR: HEARING AID
DRESSING YOURSELF: INDEPENDENT
LACK_OF_TRANSPORTATION: NO
GROOMING: NEEDS ASSISTANCE
LACK_OF_TRANSPORTATION: NO
FEEDING YOURSELF: INDEPENDENT
WALKS IN HOME: INDEPENDENT
HEARING - LEFT EAR: HEARING AID
PATIENT'S MEMORY ADEQUATE TO SAFELY COMPLETE DAILY ACTIVITIES?: YES
BATHING: NEEDS ASSISTANCE
ADEQUATE_TO_COMPLETE_ADL: YES
TOILETING: NEEDS ASSISTANCE
HEARING - RIGHT EAR: HEARING AID

## 2025-08-11 ASSESSMENT — PAIN DESCRIPTION - LOCATION
LOCATION: LEG
LOCATION: HIP
LOCATION: HIP

## 2025-08-11 ASSESSMENT — PAIN DESCRIPTION - DESCRIPTORS: DESCRIPTORS: ACHING;SQUEEZING

## 2025-08-11 ASSESSMENT — PAIN DESCRIPTION - ORIENTATION
ORIENTATION: LEFT

## 2025-08-11 ASSESSMENT — PATIENT HEALTH QUESTIONNAIRE - PHQ9
SUM OF ALL RESPONSES TO PHQ9 QUESTIONS 1 & 2: 0
2. FEELING DOWN, DEPRESSED OR HOPELESS: NOT AT ALL
1. LITTLE INTEREST OR PLEASURE IN DOING THINGS: NOT AT ALL

## 2025-08-12 ENCOUNTER — APPOINTMENT (OUTPATIENT)
Dept: CARDIOLOGY | Facility: HOSPITAL | Age: 80
DRG: 480 | End: 2025-08-12
Payer: MEDICARE

## 2025-08-12 ENCOUNTER — ANESTHESIA EVENT (OUTPATIENT)
Dept: OPERATING ROOM | Facility: HOSPITAL | Age: 80
DRG: 480 | End: 2025-08-12
Payer: MEDICARE

## 2025-08-12 ENCOUNTER — ANESTHESIA (OUTPATIENT)
Dept: OPERATING ROOM | Facility: HOSPITAL | Age: 80
DRG: 480 | End: 2025-08-12
Payer: MEDICARE

## 2025-08-12 ENCOUNTER — SURGERY (OUTPATIENT)
Age: 80
End: 2025-08-12
Payer: MEDICARE

## 2025-08-12 ENCOUNTER — APPOINTMENT (OUTPATIENT)
Dept: RADIOLOGY | Facility: HOSPITAL | Age: 80
DRG: 480 | End: 2025-08-12
Payer: MEDICARE

## 2025-08-12 PROBLEM — I48.91 ATRIAL FIBRILLATION (MULTI): Status: ACTIVE | Noted: 2025-08-12

## 2025-08-12 PROBLEM — I10 HTN (HYPERTENSION): Status: ACTIVE | Noted: 2025-08-12

## 2025-08-12 PROBLEM — I25.10 CAD (CORONARY ARTERY DISEASE): Status: ACTIVE | Noted: 2025-08-12

## 2025-08-12 PROBLEM — E78.5 HYPERLIPIDEMIA: Status: ACTIVE | Noted: 2025-08-12

## 2025-08-12 PROBLEM — N18.9 CHRONIC RENAL INSUFFICIENCY: Status: ACTIVE | Noted: 2025-08-12

## 2025-08-12 LAB
ANION GAP SERPL CALC-SCNC: 10 MMOL/L (ref 10–20)
ATRIAL RATE: 81 BPM
BUN SERPL-MCNC: 48 MG/DL (ref 6–23)
CALCIUM SERPL-MCNC: 8.6 MG/DL (ref 8.6–10.3)
CHLORIDE SERPL-SCNC: 101 MMOL/L (ref 98–107)
CO2 SERPL-SCNC: 26 MMOL/L (ref 21–32)
CREAT SERPL-MCNC: 2.24 MG/DL (ref 0.5–1.3)
EGFRCR SERPLBLD CKD-EPI 2021: 29 ML/MIN/1.73M*2
ERYTHROCYTE [DISTWIDTH] IN BLOOD BY AUTOMATED COUNT: 17.5 % (ref 11.5–14.5)
GLUCOSE SERPL-MCNC: 110 MG/DL (ref 74–99)
HCT VFR BLD AUTO: 26 % (ref 41–52)
HGB BLD-MCNC: 7.6 G/DL (ref 13.5–17.5)
INR PPP: 1.7 (ref 0.9–1.1)
MCH RBC QN AUTO: 24.3 PG (ref 26–34)
MCHC RBC AUTO-ENTMCNC: 29.2 G/DL (ref 32–36)
MCV RBC AUTO: 83 FL (ref 80–100)
NRBC BLD-RTO: 0 /100 WBCS (ref 0–0)
PLATELET # BLD AUTO: 260 X10*3/UL (ref 150–450)
POTASSIUM SERPL-SCNC: 4.3 MMOL/L (ref 3.5–5.3)
PROTHROMBIN TIME: 19.3 SECONDS (ref 9.8–12.4)
Q ONSET: 183 MS
QRS COUNT: 13 BEATS
QRS DURATION: 220 MS
QT INTERVAL: 514 MS
QTC CALCULATION(BAZETT): 592 MS
QTC FREDERICIA: 566 MS
R AXIS: -36 DEGREES
RBC # BLD AUTO: 3.13 X10*6/UL (ref 4.5–5.9)
SODIUM SERPL-SCNC: 133 MMOL/L (ref 136–145)
T AXIS: 112 DEGREES
T OFFSET: 440 MS
VENTRICULAR RATE: 80 BPM
WBC # BLD AUTO: 10 X10*3/UL (ref 4.4–11.3)

## 2025-08-12 PROCEDURE — 2720000007 HC OR 272 NO HCPCS: Performed by: ORTHOPAEDIC SURGERY

## 2025-08-12 PROCEDURE — 2500000004 HC RX 250 GENERAL PHARMACY W/ HCPCS (ALT 636 FOR OP/ED): Performed by: ORTHOPAEDIC SURGERY

## 2025-08-12 PROCEDURE — 2500000001 HC RX 250 WO HCPCS SELF ADMINISTERED DRUGS (ALT 637 FOR MEDICARE OP): Performed by: ORTHOPAEDIC SURGERY

## 2025-08-12 PROCEDURE — 2500000005 HC RX 250 GENERAL PHARMACY W/O HCPCS: Performed by: ORTHOPAEDIC SURGERY

## 2025-08-12 PROCEDURE — 2500000004 HC RX 250 GENERAL PHARMACY W/ HCPCS (ALT 636 FOR OP/ED): Performed by: NURSE ANESTHETIST, CERTIFIED REGISTERED

## 2025-08-12 PROCEDURE — 80048 BASIC METABOLIC PNL TOTAL CA: CPT

## 2025-08-12 PROCEDURE — 0QS706Z REPOSITION LEFT UPPER FEMUR WITH INTRAMEDULLARY INTERNAL FIXATION DEVICE, OPEN APPROACH: ICD-10-PCS | Performed by: ORTHOPAEDIC SURGERY

## 2025-08-12 PROCEDURE — 27245 TREAT THIGH FRACTURE: CPT | Performed by: PHYSICIAN ASSISTANT

## 2025-08-12 PROCEDURE — 2500000001 HC RX 250 WO HCPCS SELF ADMINISTERED DRUGS (ALT 637 FOR MEDICARE OP): Performed by: STUDENT IN AN ORGANIZED HEALTH CARE EDUCATION/TRAINING PROGRAM

## 2025-08-12 PROCEDURE — 3600000004 HC OR TIME - INITIAL BASE CHARGE - PROCEDURE LEVEL FOUR: Performed by: ORTHOPAEDIC SURGERY

## 2025-08-12 PROCEDURE — 3700000001 HC GENERAL ANESTHESIA TIME - INITIAL BASE CHARGE: Performed by: ORTHOPAEDIC SURGERY

## 2025-08-12 PROCEDURE — 7100000001 HC RECOVERY ROOM TIME - INITIAL BASE CHARGE: Performed by: ORTHOPAEDIC SURGERY

## 2025-08-12 PROCEDURE — 2500000005 HC RX 250 GENERAL PHARMACY W/O HCPCS: Performed by: NURSE ANESTHETIST, CERTIFIED REGISTERED

## 2025-08-12 PROCEDURE — 85610 PROTHROMBIN TIME: CPT

## 2025-08-12 PROCEDURE — 2780000003 HC OR 278 NO HCPCS: Performed by: ORTHOPAEDIC SURGERY

## 2025-08-12 PROCEDURE — 2500000004 HC RX 250 GENERAL PHARMACY W/ HCPCS (ALT 636 FOR OP/ED): Performed by: INTERNAL MEDICINE

## 2025-08-12 PROCEDURE — 3600000009 HC OR TIME - EACH INCREMENTAL 1 MINUTE - PROCEDURE LEVEL FOUR: Performed by: ORTHOPAEDIC SURGERY

## 2025-08-12 PROCEDURE — 93005 ELECTROCARDIOGRAM TRACING: CPT

## 2025-08-12 PROCEDURE — 7100000002 HC RECOVERY ROOM TIME - EACH INCREMENTAL 1 MINUTE: Performed by: ORTHOPAEDIC SURGERY

## 2025-08-12 PROCEDURE — 1200000002 HC GENERAL ROOM WITH TELEMETRY DAILY

## 2025-08-12 PROCEDURE — 99232 SBSQ HOSP IP/OBS MODERATE 35: CPT | Performed by: STUDENT IN AN ORGANIZED HEALTH CARE EDUCATION/TRAINING PROGRAM

## 2025-08-12 PROCEDURE — C1769 GUIDE WIRE: HCPCS | Performed by: ORTHOPAEDIC SURGERY

## 2025-08-12 PROCEDURE — C1713 ANCHOR/SCREW BN/BN,TIS/BN: HCPCS | Performed by: ORTHOPAEDIC SURGERY

## 2025-08-12 PROCEDURE — 36415 COLL VENOUS BLD VENIPUNCTURE: CPT

## 2025-08-12 PROCEDURE — 2500000004 HC RX 250 GENERAL PHARMACY W/ HCPCS (ALT 636 FOR OP/ED): Mod: JZ | Performed by: STUDENT IN AN ORGANIZED HEALTH CARE EDUCATION/TRAINING PROGRAM

## 2025-08-12 PROCEDURE — 27245 TREAT THIGH FRACTURE: CPT | Performed by: ORTHOPAEDIC SURGERY

## 2025-08-12 PROCEDURE — 85027 COMPLETE CBC AUTOMATED: CPT

## 2025-08-12 PROCEDURE — 3700000002 HC GENERAL ANESTHESIA TIME - EACH INCREMENTAL 1 MINUTE: Performed by: ORTHOPAEDIC SURGERY

## 2025-08-12 DEVICE — SCREW, LOCKING 5.0MM X 38MM TI STERILE: Type: IMPLANTABLE DEVICE | Site: HIP | Status: FUNCTIONAL

## 2025-08-12 DEVICE — NAIL, TFN ADV SHORT, 170MML, DIAMETER 10, 130 DEG: Type: IMPLANTABLE DEVICE | Site: HIP | Status: FUNCTIONAL

## 2025-08-12 DEVICE — HELICAL BLADES, TFNA FENESTRATED, 95MM: Type: IMPLANTABLE DEVICE | Site: HIP | Status: FUNCTIONAL

## 2025-08-12 RX ORDER — LABETALOL HYDROCHLORIDE 5 MG/ML
5 INJECTION, SOLUTION INTRAVENOUS ONCE AS NEEDED
Status: DISCONTINUED | OUTPATIENT
Start: 2025-08-12 | End: 2025-08-12 | Stop reason: HOSPADM

## 2025-08-12 RX ORDER — ACETAMINOPHEN 325 MG/1
650 TABLET ORAL EVERY 6 HOURS SCHEDULED
Status: DISCONTINUED | OUTPATIENT
Start: 2025-08-12 | End: 2025-08-14 | Stop reason: HOSPADM

## 2025-08-12 RX ORDER — DIPHENHYDRAMINE HYDROCHLORIDE 50 MG/ML
12.5 INJECTION, SOLUTION INTRAMUSCULAR; INTRAVENOUS ONCE AS NEEDED
Status: DISCONTINUED | OUTPATIENT
Start: 2025-08-12 | End: 2025-08-12 | Stop reason: HOSPADM

## 2025-08-12 RX ORDER — ROCURONIUM BROMIDE 10 MG/ML
INJECTION, SOLUTION INTRAVENOUS AS NEEDED
Status: DISCONTINUED | OUTPATIENT
Start: 2025-08-12 | End: 2025-08-12

## 2025-08-12 RX ORDER — FENTANYL CITRATE 50 UG/ML
INJECTION, SOLUTION INTRAMUSCULAR; INTRAVENOUS AS NEEDED
Status: DISCONTINUED | OUTPATIENT
Start: 2025-08-12 | End: 2025-08-12

## 2025-08-12 RX ORDER — SODIUM CHLORIDE, SODIUM LACTATE, POTASSIUM CHLORIDE, CALCIUM CHLORIDE 600; 310; 30; 20 MG/100ML; MG/100ML; MG/100ML; MG/100ML
50 INJECTION, SOLUTION INTRAVENOUS CONTINUOUS
Status: ACTIVE | OUTPATIENT
Start: 2025-08-12 | End: 2025-08-13

## 2025-08-12 RX ORDER — PROPOFOL 10 MG/ML
INJECTION, EMULSION INTRAVENOUS AS NEEDED
Status: DISCONTINUED | OUTPATIENT
Start: 2025-08-12 | End: 2025-08-12

## 2025-08-12 RX ORDER — CYCLOBENZAPRINE HCL 10 MG
10 TABLET ORAL 3 TIMES DAILY PRN
Status: DISCONTINUED | OUTPATIENT
Start: 2025-08-12 | End: 2025-08-14 | Stop reason: HOSPADM

## 2025-08-12 RX ORDER — SODIUM CHLORIDE, SODIUM LACTATE, POTASSIUM CHLORIDE, CALCIUM CHLORIDE 600; 310; 30; 20 MG/100ML; MG/100ML; MG/100ML; MG/100ML
100 INJECTION, SOLUTION INTRAVENOUS CONTINUOUS
Status: DISCONTINUED | OUTPATIENT
Start: 2025-08-12 | End: 2025-08-12 | Stop reason: HOSPADM

## 2025-08-12 RX ORDER — ENOXAPARIN SODIUM 100 MG/ML
30 INJECTION SUBCUTANEOUS DAILY
Status: DISCONTINUED | OUTPATIENT
Start: 2025-08-13 | End: 2025-08-12

## 2025-08-12 RX ORDER — POLYETHYLENE GLYCOL 3350 17 G/17G
17 POWDER, FOR SOLUTION ORAL DAILY
Status: DISCONTINUED | OUTPATIENT
Start: 2025-08-12 | End: 2025-08-14 | Stop reason: HOSPADM

## 2025-08-12 RX ORDER — MEPERIDINE HYDROCHLORIDE 25 MG/ML
12.5 INJECTION INTRAMUSCULAR; INTRAVENOUS; SUBCUTANEOUS EVERY 10 MIN PRN
Status: DISCONTINUED | OUTPATIENT
Start: 2025-08-12 | End: 2025-08-12 | Stop reason: HOSPADM

## 2025-08-12 RX ORDER — MORPHINE SULFATE 2 MG/ML
2 INJECTION, SOLUTION INTRAMUSCULAR; INTRAVENOUS EVERY 2 HOUR PRN
Status: DISCONTINUED | OUTPATIENT
Start: 2025-08-12 | End: 2025-08-13

## 2025-08-12 RX ORDER — CEFAZOLIN SODIUM 1 G/50ML
1 SOLUTION INTRAVENOUS ONCE
Status: DISCONTINUED | OUTPATIENT
Start: 2025-08-12 | End: 2025-08-12 | Stop reason: HOSPADM

## 2025-08-12 RX ORDER — NORETHINDRONE AND ETHINYL ESTRADIOL 0.5-0.035
KIT ORAL AS NEEDED
Status: DISCONTINUED | OUTPATIENT
Start: 2025-08-12 | End: 2025-08-12

## 2025-08-12 RX ORDER — ALBUTEROL SULFATE 0.83 MG/ML
2.5 SOLUTION RESPIRATORY (INHALATION) ONCE AS NEEDED
Status: DISCONTINUED | OUTPATIENT
Start: 2025-08-12 | End: 2025-08-12 | Stop reason: HOSPADM

## 2025-08-12 RX ORDER — FAMOTIDINE 10 MG/ML
INJECTION INTRAVENOUS AS NEEDED
Status: DISCONTINUED | OUTPATIENT
Start: 2025-08-12 | End: 2025-08-12

## 2025-08-12 RX ORDER — OXYCODONE HYDROCHLORIDE 5 MG/1
5 TABLET ORAL EVERY 4 HOURS PRN
Status: DISCONTINUED | OUTPATIENT
Start: 2025-08-12 | End: 2025-08-12 | Stop reason: SDUPTHER

## 2025-08-12 RX ORDER — NALOXONE HYDROCHLORIDE 1 MG/ML
0.2 INJECTION INTRAMUSCULAR; INTRAVENOUS; SUBCUTANEOUS EVERY 5 MIN PRN
Status: DISCONTINUED | OUTPATIENT
Start: 2025-08-12 | End: 2025-08-14 | Stop reason: HOSPADM

## 2025-08-12 RX ORDER — OXYCODONE HYDROCHLORIDE 5 MG/1
10 TABLET ORAL EVERY 4 HOURS PRN
Status: DISCONTINUED | OUTPATIENT
Start: 2025-08-12 | End: 2025-08-12 | Stop reason: SDUPTHER

## 2025-08-12 RX ORDER — CEFAZOLIN SODIUM 1 G/50ML
1 SOLUTION INTRAVENOUS ONCE
Status: COMPLETED | OUTPATIENT
Start: 2025-08-12 | End: 2025-08-12

## 2025-08-12 RX ORDER — PHENYLEPHRINE HCL IN 0.9% NACL 1 MG/10 ML
SYRINGE (ML) INTRAVENOUS AS NEEDED
Status: DISCONTINUED | OUTPATIENT
Start: 2025-08-12 | End: 2025-08-12

## 2025-08-12 RX ORDER — ONDANSETRON HYDROCHLORIDE 2 MG/ML
4 INJECTION, SOLUTION INTRAVENOUS EVERY 8 HOURS PRN
Status: DISCONTINUED | OUTPATIENT
Start: 2025-08-12 | End: 2025-08-14 | Stop reason: HOSPADM

## 2025-08-12 RX ORDER — OXYCODONE HYDROCHLORIDE 5 MG/1
10 TABLET ORAL EVERY 6 HOURS PRN
Status: DISCONTINUED | OUTPATIENT
Start: 2025-08-12 | End: 2025-08-14 | Stop reason: HOSPADM

## 2025-08-12 RX ORDER — CEFAZOLIN SODIUM 2 G/50ML
2 SOLUTION INTRAVENOUS EVERY 8 HOURS
Status: COMPLETED | OUTPATIENT
Start: 2025-08-12 | End: 2025-08-13

## 2025-08-12 RX ORDER — HYDRALAZINE HYDROCHLORIDE 20 MG/ML
5 INJECTION INTRAMUSCULAR; INTRAVENOUS EVERY 30 MIN PRN
Status: DISCONTINUED | OUTPATIENT
Start: 2025-08-12 | End: 2025-08-12 | Stop reason: HOSPADM

## 2025-08-12 RX ORDER — ONDANSETRON HYDROCHLORIDE 2 MG/ML
4 INJECTION, SOLUTION INTRAVENOUS ONCE AS NEEDED
Status: DISCONTINUED | OUTPATIENT
Start: 2025-08-12 | End: 2025-08-12 | Stop reason: HOSPADM

## 2025-08-12 RX ORDER — BISACODYL 5 MG
10 TABLET, DELAYED RELEASE (ENTERIC COATED) ORAL DAILY PRN
Status: DISCONTINUED | OUTPATIENT
Start: 2025-08-12 | End: 2025-08-14 | Stop reason: HOSPADM

## 2025-08-12 RX ORDER — PROCHLORPERAZINE 25 MG/1
25 SUPPOSITORY RECTAL EVERY 12 HOURS PRN
Status: DISCONTINUED | OUTPATIENT
Start: 2025-08-12 | End: 2025-08-14 | Stop reason: HOSPADM

## 2025-08-12 RX ORDER — ONDANSETRON 4 MG/1
4 TABLET, FILM COATED ORAL EVERY 8 HOURS PRN
Status: DISCONTINUED | OUTPATIENT
Start: 2025-08-12 | End: 2025-08-14 | Stop reason: HOSPADM

## 2025-08-12 RX ORDER — PROCHLORPERAZINE MALEATE 5 MG
10 TABLET ORAL EVERY 6 HOURS PRN
Status: DISCONTINUED | OUTPATIENT
Start: 2025-08-12 | End: 2025-08-14 | Stop reason: HOSPADM

## 2025-08-12 RX ORDER — FENTANYL CITRATE 50 UG/ML
25 INJECTION, SOLUTION INTRAMUSCULAR; INTRAVENOUS EVERY 5 MIN PRN
Status: DISCONTINUED | OUTPATIENT
Start: 2025-08-12 | End: 2025-08-12 | Stop reason: HOSPADM

## 2025-08-12 RX ORDER — OXYCODONE HYDROCHLORIDE 5 MG/1
5 TABLET ORAL EVERY 4 HOURS PRN
Status: DISCONTINUED | OUTPATIENT
Start: 2025-08-12 | End: 2025-08-14 | Stop reason: HOSPADM

## 2025-08-12 RX ORDER — PROCHLORPERAZINE EDISYLATE 5 MG/ML
10 INJECTION INTRAMUSCULAR; INTRAVENOUS EVERY 6 HOURS PRN
Status: DISCONTINUED | OUTPATIENT
Start: 2025-08-12 | End: 2025-08-14 | Stop reason: HOSPADM

## 2025-08-12 RX ADMIN — FENTANYL CITRATE 50 MCG: 50 INJECTION, SOLUTION INTRAMUSCULAR; INTRAVENOUS at 13:54

## 2025-08-12 RX ADMIN — EPHEDRINE SULFATE 10 MG: 50 INJECTION, SOLUTION INTRAVENOUS at 13:27

## 2025-08-12 RX ADMIN — MORPHINE SULFATE 2 MG: 2 INJECTION, SOLUTION INTRAMUSCULAR; INTRAVENOUS at 03:06

## 2025-08-12 RX ADMIN — OXYCODONE HYDROCHLORIDE 10 MG: 5 TABLET ORAL at 16:06

## 2025-08-12 RX ADMIN — Medication 2 DOSE: at 15:30

## 2025-08-12 RX ADMIN — FAMOTIDINE 20 MG: 10 INJECTION, SOLUTION INTRAVENOUS at 12:44

## 2025-08-12 RX ADMIN — FENTANYL CITRATE 50 MCG: 50 INJECTION, SOLUTION INTRAMUSCULAR; INTRAVENOUS at 13:46

## 2025-08-12 RX ADMIN — EPHEDRINE SULFATE 10 MG: 50 INJECTION, SOLUTION INTRAVENOUS at 13:25

## 2025-08-12 RX ADMIN — CEFAZOLIN SODIUM 1 G: 1 SOLUTION INTRAVENOUS at 12:55

## 2025-08-12 RX ADMIN — FENTANYL CITRATE 50 MCG: 50 INJECTION, SOLUTION INTRAMUSCULAR; INTRAVENOUS at 13:22

## 2025-08-12 RX ADMIN — FENTANYL CITRATE 50 MCG: 50 INJECTION, SOLUTION INTRAMUSCULAR; INTRAVENOUS at 13:50

## 2025-08-12 RX ADMIN — SODIUM CHLORIDE, SODIUM LACTATE, POTASSIUM CHLORIDE, AND CALCIUM CHLORIDE 50 ML/HR: .6; .31; .03; .02 INJECTION, SOLUTION INTRAVENOUS at 15:34

## 2025-08-12 RX ADMIN — DEXAMETHASONE SODIUM PHOSPHATE 8 MG: 4 INJECTION, SOLUTION INTRAMUSCULAR; INTRAVENOUS at 12:44

## 2025-08-12 RX ADMIN — EPHEDRINE SULFATE 10 MG: 50 INJECTION, SOLUTION INTRAVENOUS at 13:29

## 2025-08-12 RX ADMIN — Medication 200 MCG: at 12:54

## 2025-08-12 RX ADMIN — Medication 200 MCG: at 13:12

## 2025-08-12 RX ADMIN — ONDANSETRON 4 MG: 2 INJECTION, SOLUTION INTRAMUSCULAR; INTRAVENOUS at 12:44

## 2025-08-12 RX ADMIN — ACETAMINOPHEN 650 MG: 325 TABLET ORAL at 18:05

## 2025-08-12 RX ADMIN — SUGAMMADEX 200 MG: 100 INJECTION, SOLUTION INTRAVENOUS at 13:40

## 2025-08-12 RX ADMIN — Medication 200 MCG: at 13:05

## 2025-08-12 RX ADMIN — METOPROLOL SUCCINATE 25 MG: 25 TABLET, EXTENDED RELEASE ORAL at 08:30

## 2025-08-12 RX ADMIN — Medication 200 MCG: at 13:00

## 2025-08-12 RX ADMIN — MORPHINE SULFATE 2 MG: 2 INJECTION, SOLUTION INTRAMUSCULAR; INTRAVENOUS at 05:48

## 2025-08-12 RX ADMIN — ROCURONIUM BROMIDE 50 MG: 10 SOLUTION INTRAVENOUS at 12:44

## 2025-08-12 RX ADMIN — CEFAZOLIN SODIUM 1 G: 1 SOLUTION INTRAVENOUS at 13:17

## 2025-08-12 RX ADMIN — EPHEDRINE SULFATE 20 MG: 50 INJECTION, SOLUTION INTRAVENOUS at 13:33

## 2025-08-12 RX ADMIN — CEFAZOLIN SODIUM 2 G: 2 SOLUTION INTRAVENOUS at 21:05

## 2025-08-12 RX ADMIN — PROPOFOL 200 MG: 10 INJECTION, EMULSION INTRAVENOUS at 12:44

## 2025-08-12 RX ADMIN — Medication 200 MCG: at 13:07

## 2025-08-12 RX ADMIN — HYDROMORPHONE HYDROCHLORIDE 0.5 MG: 1 INJECTION, SOLUTION INTRAMUSCULAR; INTRAVENOUS; SUBCUTANEOUS at 14:31

## 2025-08-12 RX ADMIN — FENTANYL CITRATE 50 MCG: 50 INJECTION, SOLUTION INTRAMUSCULAR; INTRAVENOUS at 12:44

## 2025-08-12 RX ADMIN — MORPHINE SULFATE 2 MG: 2 INJECTION, SOLUTION INTRAMUSCULAR; INTRAVENOUS at 10:48

## 2025-08-12 SDOH — HEALTH STABILITY: MENTAL HEALTH: CURRENT SMOKER: 0

## 2025-08-12 ASSESSMENT — PAIN SCALES - GENERAL
PAINLEVEL_OUTOF10: 8
PAINLEVEL_OUTOF10: 4
PAIN_LEVEL: 0
PAINLEVEL_OUTOF10: 5 - MODERATE PAIN
PAINLEVEL_OUTOF10: 7
PAINLEVEL_OUTOF10: 8
PAINLEVEL_OUTOF10: 6
PAINLEVEL_OUTOF10: 10 - WORST POSSIBLE PAIN
PAINLEVEL_OUTOF10: 0 - NO PAIN
PAINLEVEL_OUTOF10: 7

## 2025-08-12 ASSESSMENT — PAIN - FUNCTIONAL ASSESSMENT
PAIN_FUNCTIONAL_ASSESSMENT: WONG-BAKER FACES
PAIN_FUNCTIONAL_ASSESSMENT: WONG-BAKER FACES
PAIN_FUNCTIONAL_ASSESSMENT: 0-10
PAIN_FUNCTIONAL_ASSESSMENT: WONG-BAKER FACES
PAIN_FUNCTIONAL_ASSESSMENT: 0-10
PAIN_FUNCTIONAL_ASSESSMENT: 0-10
PAIN_FUNCTIONAL_ASSESSMENT: WONG-BAKER FACES

## 2025-08-12 ASSESSMENT — COGNITIVE AND FUNCTIONAL STATUS - GENERAL
WALKING IN HOSPITAL ROOM: TOTAL
MOBILITY SCORE: 8
DAILY ACTIVITIY SCORE: 12
CLIMB 3 TO 5 STEPS WITH RAILING: TOTAL
EATING MEALS: A LOT
DRESSING REGULAR UPPER BODY CLOTHING: A LOT
TOILETING: A LOT
MOVING FROM LYING ON BACK TO SITTING ON SIDE OF FLAT BED WITH BEDRAILS: A LITTLE
DRESSING REGULAR LOWER BODY CLOTHING: A LOT
TURNING FROM BACK TO SIDE WHILE IN FLAT BAD: TOTAL
PERSONAL GROOMING: A LOT
STANDING UP FROM CHAIR USING ARMS: TOTAL
MOVING TO AND FROM BED TO CHAIR: TOTAL
HELP NEEDED FOR BATHING: A LOT

## 2025-08-12 ASSESSMENT — PAIN DESCRIPTION - LOCATION
LOCATION: HIP

## 2025-08-12 ASSESSMENT — PAIN DESCRIPTION - ORIENTATION
ORIENTATION: LEFT

## 2025-08-12 ASSESSMENT — PAIN DESCRIPTION - DESCRIPTORS
DESCRIPTORS: ACHING
DESCRIPTORS: ACHING

## 2025-08-12 ASSESSMENT — PAIN SCALES - WONG BAKER
WONGBAKER_NUMERICALRESPONSE: HURTS EVEN MORE
WONGBAKER_NUMERICALRESPONSE: HURTS WHOLE LOT

## 2025-08-13 LAB
ANION GAP SERPL CALC-SCNC: 15 MMOL/L (ref 10–20)
BUN SERPL-MCNC: 51 MG/DL (ref 6–23)
CALCIUM SERPL-MCNC: 8.3 MG/DL (ref 8.6–10.3)
CHLORIDE SERPL-SCNC: 95 MMOL/L (ref 98–107)
CO2 SERPL-SCNC: 25 MMOL/L (ref 21–32)
CREAT SERPL-MCNC: 2.6 MG/DL (ref 0.5–1.3)
EGFRCR SERPLBLD CKD-EPI 2021: 24 ML/MIN/1.73M*2
ERYTHROCYTE [DISTWIDTH] IN BLOOD BY AUTOMATED COUNT: 17.5 % (ref 11.5–14.5)
GLUCOSE SERPL-MCNC: 135 MG/DL (ref 74–99)
HCT VFR BLD AUTO: 23.2 % (ref 41–52)
HCT VFR BLD AUTO: 24.6 % (ref 41–52)
HGB BLD-MCNC: 6.6 G/DL (ref 13.5–17.5)
HGB BLD-MCNC: 7.4 G/DL (ref 13.5–17.5)
HOLD SPECIMEN: NORMAL
MCH RBC QN AUTO: 24 PG (ref 26–34)
MCHC RBC AUTO-ENTMCNC: 28.4 G/DL (ref 32–36)
MCV RBC AUTO: 84 FL (ref 80–100)
NRBC BLD-RTO: 0 /100 WBCS (ref 0–0)
PLATELET # BLD AUTO: 208 X10*3/UL (ref 150–450)
POTASSIUM SERPL-SCNC: 4.4 MMOL/L (ref 3.5–5.3)
RBC # BLD AUTO: 2.75 X10*6/UL (ref 4.5–5.9)
SODIUM SERPL-SCNC: 131 MMOL/L (ref 136–145)
WBC # BLD AUTO: 10 X10*3/UL (ref 4.4–11.3)

## 2025-08-13 PROCEDURE — P9016 RBC LEUKOCYTES REDUCED: HCPCS

## 2025-08-13 PROCEDURE — 85014 HEMATOCRIT: CPT | Performed by: STUDENT IN AN ORGANIZED HEALTH CARE EDUCATION/TRAINING PROGRAM

## 2025-08-13 PROCEDURE — 2500000004 HC RX 250 GENERAL PHARMACY W/ HCPCS (ALT 636 FOR OP/ED): Performed by: ORTHOPAEDIC SURGERY

## 2025-08-13 PROCEDURE — 2500000001 HC RX 250 WO HCPCS SELF ADMINISTERED DRUGS (ALT 637 FOR MEDICARE OP)

## 2025-08-13 PROCEDURE — 36415 COLL VENOUS BLD VENIPUNCTURE: CPT | Performed by: STUDENT IN AN ORGANIZED HEALTH CARE EDUCATION/TRAINING PROGRAM

## 2025-08-13 PROCEDURE — 2500000001 HC RX 250 WO HCPCS SELF ADMINISTERED DRUGS (ALT 637 FOR MEDICARE OP): Performed by: STUDENT IN AN ORGANIZED HEALTH CARE EDUCATION/TRAINING PROGRAM

## 2025-08-13 PROCEDURE — 2500000001 HC RX 250 WO HCPCS SELF ADMINISTERED DRUGS (ALT 637 FOR MEDICARE OP): Performed by: ORTHOPAEDIC SURGERY

## 2025-08-13 PROCEDURE — 1200000002 HC GENERAL ROOM WITH TELEMETRY DAILY

## 2025-08-13 PROCEDURE — 85027 COMPLETE CBC AUTOMATED: CPT

## 2025-08-13 PROCEDURE — 99024 POSTOP FOLLOW-UP VISIT: CPT

## 2025-08-13 PROCEDURE — 80048 BASIC METABOLIC PNL TOTAL CA: CPT

## 2025-08-13 PROCEDURE — 99233 SBSQ HOSP IP/OBS HIGH 50: CPT | Performed by: STUDENT IN AN ORGANIZED HEALTH CARE EDUCATION/TRAINING PROGRAM

## 2025-08-13 PROCEDURE — 97165 OT EVAL LOW COMPLEX 30 MIN: CPT | Mod: GO

## 2025-08-13 PROCEDURE — 97161 PT EVAL LOW COMPLEX 20 MIN: CPT | Mod: GP | Performed by: PHYSICAL THERAPIST

## 2025-08-13 PROCEDURE — 94660 CPAP INITIATION&MGMT: CPT

## 2025-08-13 PROCEDURE — 2500000002 HC RX 250 W HCPCS SELF ADMINISTERED DRUGS (ALT 637 FOR MEDICARE OP, ALT 636 FOR OP/ED): Performed by: STUDENT IN AN ORGANIZED HEALTH CARE EDUCATION/TRAINING PROGRAM

## 2025-08-13 PROCEDURE — 36415 COLL VENOUS BLD VENIPUNCTURE: CPT

## 2025-08-13 PROCEDURE — 2500000005 HC RX 250 GENERAL PHARMACY W/O HCPCS: Performed by: ORTHOPAEDIC SURGERY

## 2025-08-13 PROCEDURE — 36430 TRANSFUSION BLD/BLD COMPNT: CPT

## 2025-08-13 RX ORDER — SACUBITRIL AND VALSARTAN 24; 26 MG/1; MG/1
1 TABLET ORAL 2 TIMES DAILY
Status: DISCONTINUED | OUTPATIENT
Start: 2025-08-13 | End: 2025-08-14 | Stop reason: HOSPADM

## 2025-08-13 RX ORDER — TAMSULOSIN HYDROCHLORIDE 0.4 MG/1
0.4 CAPSULE ORAL NIGHTLY
Status: DISCONTINUED | OUTPATIENT
Start: 2025-08-13 | End: 2025-08-14 | Stop reason: HOSPADM

## 2025-08-13 RX ADMIN — ACETAMINOPHEN 650 MG: 325 TABLET ORAL at 11:45

## 2025-08-13 RX ADMIN — TAMSULOSIN HYDROCHLORIDE 0.4 MG: 0.4 CAPSULE ORAL at 20:32

## 2025-08-13 RX ADMIN — ACETAMINOPHEN 650 MG: 325 TABLET ORAL at 23:35

## 2025-08-13 RX ADMIN — EMPAGLIFLOZIN 10 MG: 10 TABLET, FILM COATED ORAL at 08:29

## 2025-08-13 RX ADMIN — METOPROLOL SUCCINATE 25 MG: 25 TABLET, EXTENDED RELEASE ORAL at 11:45

## 2025-08-13 RX ADMIN — ASPIRIN 81 MG: 81 TABLET, COATED ORAL at 08:28

## 2025-08-13 RX ADMIN — OXYCODONE HYDROCHLORIDE 10 MG: 5 TABLET ORAL at 03:13

## 2025-08-13 RX ADMIN — POLYETHYLENE GLYCOL 3350 17 G: 17 POWDER, FOR SOLUTION ORAL at 08:29

## 2025-08-13 RX ADMIN — ACETAMINOPHEN 650 MG: 325 TABLET ORAL at 17:51

## 2025-08-13 RX ADMIN — APIXABAN 2.5 MG: 5 TABLET, FILM COATED ORAL at 08:28

## 2025-08-13 RX ADMIN — CEFAZOLIN SODIUM 2 G: 2 SOLUTION INTRAVENOUS at 05:26

## 2025-08-13 RX ADMIN — OXYCODONE HYDROCHLORIDE 10 MG: 5 TABLET ORAL at 11:45

## 2025-08-13 RX ADMIN — ACETAMINOPHEN 650 MG: 325 TABLET ORAL at 05:25

## 2025-08-13 RX ADMIN — Medication 3 MG: at 20:31

## 2025-08-13 RX ADMIN — APIXABAN 2.5 MG: 5 TABLET, FILM COATED ORAL at 20:32

## 2025-08-13 RX ADMIN — OXYCODONE HYDROCHLORIDE 10 MG: 5 TABLET ORAL at 20:31

## 2025-08-13 ASSESSMENT — COGNITIVE AND FUNCTIONAL STATUS - GENERAL
MOBILITY SCORE: 10
TOILETING: TOTAL
DAILY ACTIVITIY SCORE: 14
PERSONAL GROOMING: A LOT
DRESSING REGULAR LOWER BODY CLOTHING: A LOT
MOBILITY SCORE: 11
MOVING TO AND FROM BED TO CHAIR: A LOT
MOVING FROM LYING ON BACK TO SITTING ON SIDE OF FLAT BED WITH BEDRAILS: A LITTLE
DRESSING REGULAR UPPER BODY CLOTHING: A LOT
DRESSING REGULAR UPPER BODY CLOTHING: A LOT
WALKING IN HOSPITAL ROOM: TOTAL
TOILETING: A LOT
MOVING TO AND FROM BED TO CHAIR: A LOT
HELP NEEDED FOR BATHING: A LOT
CLIMB 3 TO 5 STEPS WITH RAILING: TOTAL
WALKING IN HOSPITAL ROOM: TOTAL
DAILY ACTIVITIY SCORE: 11
TURNING FROM BACK TO SIDE WHILE IN FLAT BAD: A LOT
STANDING UP FROM CHAIR USING ARMS: A LOT
HELP NEEDED FOR BATHING: A LOT
MOVING FROM LYING ON BACK TO SITTING ON SIDE OF FLAT BED WITH BEDRAILS: A LOT
CLIMB 3 TO 5 STEPS WITH RAILING: TOTAL
PERSONAL GROOMING: A LOT
STANDING UP FROM CHAIR USING ARMS: A LOT
TURNING FROM BACK TO SIDE WHILE IN FLAT BAD: A LOT
DRESSING REGULAR LOWER BODY CLOTHING: TOTAL
EATING MEALS: A LITTLE

## 2025-08-13 ASSESSMENT — PAIN - FUNCTIONAL ASSESSMENT
PAIN_FUNCTIONAL_ASSESSMENT: 0-10

## 2025-08-13 ASSESSMENT — PAIN SCALES - WONG BAKER: WONGBAKER_NUMERICALRESPONSE: NO HURT

## 2025-08-13 ASSESSMENT — PAIN DESCRIPTION - ORIENTATION: ORIENTATION: LEFT

## 2025-08-13 ASSESSMENT — PAIN SCALES - GENERAL
PAINLEVEL_OUTOF10: 0 - NO PAIN
PAINLEVEL_OUTOF10: 6
PAINLEVEL_OUTOF10: 7
PAINLEVEL_OUTOF10: 7
PAINLEVEL_OUTOF10: 8

## 2025-08-13 ASSESSMENT — PAIN DESCRIPTION - LOCATION: LOCATION: LEG

## 2025-08-13 ASSESSMENT — ACTIVITIES OF DAILY LIVING (ADL): BATHING_ASSISTANCE: MAXIMAL

## 2025-08-14 VITALS
TEMPERATURE: 97.2 F | BODY MASS INDEX: 29.57 KG/M2 | DIASTOLIC BLOOD PRESSURE: 57 MMHG | HEIGHT: 66 IN | OXYGEN SATURATION: 100 % | RESPIRATION RATE: 18 BRPM | HEART RATE: 80 BPM | WEIGHT: 184 LBS | SYSTOLIC BLOOD PRESSURE: 109 MMHG

## 2025-08-14 LAB
ANION GAP SERPL CALC-SCNC: 15 MMOL/L (ref 10–20)
BLOOD EXPIRATION DATE: NORMAL
BUN SERPL-MCNC: 51 MG/DL (ref 6–23)
CALCIUM SERPL-MCNC: 8.2 MG/DL (ref 8.6–10.3)
CHLORIDE SERPL-SCNC: 99 MMOL/L (ref 98–107)
CO2 SERPL-SCNC: 22 MMOL/L (ref 21–32)
CREAT SERPL-MCNC: 2.74 MG/DL (ref 0.5–1.3)
DISPENSE STATUS: NORMAL
EGFRCR SERPLBLD CKD-EPI 2021: 23 ML/MIN/1.73M*2
ERYTHROCYTE [DISTWIDTH] IN BLOOD BY AUTOMATED COUNT: 17.2 % (ref 11.5–14.5)
GLUCOSE SERPL-MCNC: 117 MG/DL (ref 74–99)
HCT VFR BLD AUTO: 25.5 % (ref 41–52)
HGB BLD-MCNC: 7.6 G/DL (ref 13.5–17.5)
HOLD SPECIMEN: NORMAL
MCH RBC QN AUTO: 24.7 PG (ref 26–34)
MCHC RBC AUTO-ENTMCNC: 29.8 G/DL (ref 32–36)
MCV RBC AUTO: 83 FL (ref 80–100)
NRBC BLD-RTO: 0 /100 WBCS (ref 0–0)
PLATELET # BLD AUTO: 191 X10*3/UL (ref 150–450)
POTASSIUM SERPL-SCNC: 4.5 MMOL/L (ref 3.5–5.3)
PRODUCT BLOOD TYPE: NORMAL
PRODUCT CODE: NORMAL
RBC # BLD AUTO: 3.08 X10*6/UL (ref 4.5–5.9)
SODIUM SERPL-SCNC: 131 MMOL/L (ref 136–145)
UNIT ABO: NORMAL
UNIT NUMBER: NORMAL
UNIT RH: NORMAL
UNIT VOLUME: 350
WBC # BLD AUTO: 7.4 X10*3/UL (ref 4.4–11.3)
XM INTEP: NORMAL

## 2025-08-14 PROCEDURE — 85027 COMPLETE CBC AUTOMATED: CPT

## 2025-08-14 PROCEDURE — 2500000001 HC RX 250 WO HCPCS SELF ADMINISTERED DRUGS (ALT 637 FOR MEDICARE OP): Performed by: ORTHOPAEDIC SURGERY

## 2025-08-14 PROCEDURE — 99232 SBSQ HOSP IP/OBS MODERATE 35: CPT

## 2025-08-14 PROCEDURE — 99239 HOSP IP/OBS DSCHRG MGMT >30: CPT | Performed by: STUDENT IN AN ORGANIZED HEALTH CARE EDUCATION/TRAINING PROGRAM

## 2025-08-14 PROCEDURE — 2500000004 HC RX 250 GENERAL PHARMACY W/ HCPCS (ALT 636 FOR OP/ED): Performed by: ORTHOPAEDIC SURGERY

## 2025-08-14 PROCEDURE — 97110 THERAPEUTIC EXERCISES: CPT | Mod: GP,CQ | Performed by: PHYSICAL THERAPY ASSISTANT

## 2025-08-14 PROCEDURE — 2500000001 HC RX 250 WO HCPCS SELF ADMINISTERED DRUGS (ALT 637 FOR MEDICARE OP)

## 2025-08-14 PROCEDURE — 36415 COLL VENOUS BLD VENIPUNCTURE: CPT

## 2025-08-14 PROCEDURE — 80048 BASIC METABOLIC PNL TOTAL CA: CPT

## 2025-08-14 PROCEDURE — 2500000001 HC RX 250 WO HCPCS SELF ADMINISTERED DRUGS (ALT 637 FOR MEDICARE OP): Performed by: STUDENT IN AN ORGANIZED HEALTH CARE EDUCATION/TRAINING PROGRAM

## 2025-08-14 PROCEDURE — 94660 CPAP INITIATION&MGMT: CPT

## 2025-08-14 RX ORDER — CYCLOBENZAPRINE HCL 10 MG
5 TABLET ORAL 3 TIMES DAILY PRN
Start: 2025-08-14 | End: 2025-08-21

## 2025-08-14 RX ORDER — TAMSULOSIN HYDROCHLORIDE 0.4 MG/1
0.4 CAPSULE ORAL NIGHTLY
Start: 2025-08-14

## 2025-08-14 RX ORDER — ACETAMINOPHEN 325 MG/1
650 TABLET ORAL EVERY 6 HOURS SCHEDULED
Start: 2025-08-14 | End: 2025-08-21

## 2025-08-14 RX ORDER — OXYCODONE HYDROCHLORIDE 5 MG/1
5 TABLET ORAL EVERY 6 HOURS PRN
Qty: 28 TABLET | Refills: 0 | Status: SHIPPED | OUTPATIENT
Start: 2025-08-14 | End: 2025-08-21

## 2025-08-14 RX ADMIN — EMPAGLIFLOZIN 10 MG: 10 TABLET, FILM COATED ORAL at 08:09

## 2025-08-14 RX ADMIN — METOPROLOL SUCCINATE 25 MG: 25 TABLET, EXTENDED RELEASE ORAL at 08:09

## 2025-08-14 RX ADMIN — POLYETHYLENE GLYCOL 3350 17 G: 17 POWDER, FOR SOLUTION ORAL at 08:09

## 2025-08-14 RX ADMIN — ACETAMINOPHEN 650 MG: 325 TABLET ORAL at 11:39

## 2025-08-14 RX ADMIN — OXYCODONE HYDROCHLORIDE 10 MG: 5 TABLET ORAL at 08:09

## 2025-08-14 RX ADMIN — ASPIRIN 81 MG: 81 TABLET, COATED ORAL at 08:09

## 2025-08-14 RX ADMIN — ACETAMINOPHEN 650 MG: 325 TABLET ORAL at 05:57

## 2025-08-14 RX ADMIN — APIXABAN 2.5 MG: 5 TABLET, FILM COATED ORAL at 08:09

## 2025-08-14 ASSESSMENT — COGNITIVE AND FUNCTIONAL STATUS - GENERAL
MOVING FROM LYING ON BACK TO SITTING ON SIDE OF FLAT BED WITH BEDRAILS: A LOT
WALKING IN HOSPITAL ROOM: TOTAL
STANDING UP FROM CHAIR USING ARMS: A LOT
EATING MEALS: A LOT
CLIMB 3 TO 5 STEPS WITH RAILING: TOTAL
MOBILITY SCORE: 10
DRESSING REGULAR UPPER BODY CLOTHING: A LOT
MOBILITY SCORE: 10
WALKING IN HOSPITAL ROOM: TOTAL
PERSONAL GROOMING: A LOT
DAILY ACTIVITIY SCORE: 12
HELP NEEDED FOR BATHING: A LOT
STANDING UP FROM CHAIR USING ARMS: A LOT
MOVING TO AND FROM BED TO CHAIR: A LOT
TOILETING: A LOT
CLIMB 3 TO 5 STEPS WITH RAILING: TOTAL
MOVING FROM LYING ON BACK TO SITTING ON SIDE OF FLAT BED WITH BEDRAILS: A LOT
DRESSING REGULAR LOWER BODY CLOTHING: A LOT
MOVING TO AND FROM BED TO CHAIR: A LOT
TURNING FROM BACK TO SIDE WHILE IN FLAT BAD: A LOT
TURNING FROM BACK TO SIDE WHILE IN FLAT BAD: A LOT

## 2025-08-14 ASSESSMENT — PAIN SCALES - GENERAL
PAINLEVEL_OUTOF10: 7
PAINLEVEL_OUTOF10: 8

## 2025-08-14 ASSESSMENT — PAIN - FUNCTIONAL ASSESSMENT: PAIN_FUNCTIONAL_ASSESSMENT: 0-10

## 2025-08-15 ENCOUNTER — OFFICE VISIT (OUTPATIENT)
Dept: GERIATRIC MEDICINE | Age: 80
End: 2025-08-15

## 2025-08-15 DIAGNOSIS — M15.9 GENERALIZED OSTEOARTHRITIS: ICD-10-CM

## 2025-08-15 DIAGNOSIS — R52 PAIN: Primary | ICD-10-CM

## 2025-08-15 DIAGNOSIS — M62.81 MUSCLE WEAKNESS (GENERALIZED): ICD-10-CM

## 2025-08-15 DIAGNOSIS — Z76.0 ENCOUNTER FOR MEDICATION REFILL: ICD-10-CM

## 2025-08-15 DIAGNOSIS — J96.12 CHRONIC RESPIRATORY FAILURE WITH HYPERCAPNIA (HCC): ICD-10-CM

## 2025-08-15 DIAGNOSIS — E11.42 DIABETIC PERIPHERAL NEUROPATHY (HCC): ICD-10-CM

## 2025-08-15 DIAGNOSIS — R26.2 DIFFICULTY IN WALKING: ICD-10-CM

## 2025-08-15 DIAGNOSIS — Z87.81 STATUS POST CLOSED FRACTURE OF FEMUR: ICD-10-CM

## 2025-08-15 DIAGNOSIS — I50.43 CHF (CONGESTIVE HEART FAILURE), NYHA CLASS I, ACUTE ON CHRONIC, COMBINED (HCC): ICD-10-CM

## 2025-08-15 DIAGNOSIS — F11.99 OPIOID USE, UNSPECIFIED WITH UNSPECIFIED OPIOID-INDUCED DISORDER (HCC): ICD-10-CM

## 2025-08-15 DIAGNOSIS — J44.9 CHRONIC OBSTRUCTIVE PULMONARY DISEASE, UNSPECIFIED COPD TYPE (HCC): ICD-10-CM

## 2025-08-15 LAB
ATRIAL RATE: 78 BPM
ATRIAL RATE: 80 BPM
Q ONSET: 185 MS
Q ONSET: 207 MS
QRS COUNT: 13 BEATS
QRS COUNT: 13 BEATS
QRS DURATION: 162 MS
QRS DURATION: 204 MS
QT INTERVAL: 434 MS
QT INTERVAL: 478 MS
QTC CALCULATION(BAZETT): 500 MS
QTC CALCULATION(BAZETT): 551 MS
QTC FREDERICIA: 478 MS
QTC FREDERICIA: 526 MS
R AXIS: -60 DEGREES
R AXIS: -66 DEGREES
T AXIS: 107 DEGREES
T AXIS: 119 DEGREES
T OFFSET: 424 MS
T OFFSET: 424 MS
VENTRICULAR RATE: 80 BPM
VENTRICULAR RATE: 80 BPM

## 2025-08-15 RX ORDER — TRAMADOL HYDROCHLORIDE 50 MG/1
50 TABLET ORAL 3 TIMES DAILY
Qty: 60 TABLET | Refills: 0 | Status: SHIPPED | OUTPATIENT
Start: 2025-08-15 | End: 2025-09-14

## 2025-08-19 ENCOUNTER — OFFICE VISIT (OUTPATIENT)
Dept: GERIATRIC MEDICINE | Age: 80
End: 2025-08-19

## 2025-08-19 DIAGNOSIS — R52 PAIN: Primary | ICD-10-CM

## 2025-08-19 DIAGNOSIS — I50.43 CHF (CONGESTIVE HEART FAILURE), NYHA CLASS I, ACUTE ON CHRONIC, COMBINED (HCC): ICD-10-CM

## 2025-08-19 DIAGNOSIS — M62.81 MUSCLE WEAKNESS (GENERALIZED): ICD-10-CM

## 2025-08-19 DIAGNOSIS — Z87.81 STATUS POST CLOSED FRACTURE OF FEMUR: ICD-10-CM

## 2025-08-19 DIAGNOSIS — R26.2 DIFFICULTY IN WALKING: ICD-10-CM

## 2025-08-19 DIAGNOSIS — J44.9 CHRONIC OBSTRUCTIVE PULMONARY DISEASE, UNSPECIFIED COPD TYPE (HCC): ICD-10-CM

## 2025-08-19 RX ORDER — METOPROLOL SUCCINATE 25 MG/1
25 TABLET, EXTENDED RELEASE ORAL DAILY
COMMUNITY

## 2025-08-19 RX ORDER — OXYCODONE HYDROCHLORIDE 5 MG/1
5 TABLET ORAL EVERY 6 HOURS PRN
COMMUNITY

## 2025-08-19 RX ORDER — TAMSULOSIN HYDROCHLORIDE 0.4 MG/1
0.4 CAPSULE ORAL DAILY
COMMUNITY

## 2025-08-19 RX ORDER — ACETAMINOPHEN 325 MG/1
325 TABLET ORAL EVERY 4 HOURS PRN
COMMUNITY

## 2025-08-20 ENCOUNTER — TELEPHONE (OUTPATIENT)
Dept: ORTHOPEDIC SURGERY | Facility: CLINIC | Age: 80
End: 2025-08-20
Payer: MEDICARE

## 2025-08-20 RX ORDER — OXYCODONE HYDROCHLORIDE 5 MG/1
5 TABLET ORAL EVERY 6 HOURS PRN
Refills: 0 | Status: CANCELLED | OUTPATIENT
Start: 2025-08-20

## 2025-08-22 ENCOUNTER — OFFICE VISIT (OUTPATIENT)
Dept: GERIATRIC MEDICINE | Age: 80
End: 2025-08-22

## 2025-08-22 DIAGNOSIS — R31.9 HEMATURIA, UNSPECIFIED TYPE: Primary | ICD-10-CM

## 2025-08-25 DIAGNOSIS — S72.25XD CLOSED NONDISPLACED SUBTROCHANTERIC FRACTURE OF LEFT FEMUR WITH ROUTINE HEALING, SUBSEQUENT ENCOUNTER: Primary | ICD-10-CM

## 2025-08-25 RX ORDER — OXYCODONE HYDROCHLORIDE 5 MG/1
5 TABLET ORAL EVERY 6 HOURS PRN
Qty: 30 TABLET | Refills: 0 | Status: SHIPPED | OUTPATIENT
Start: 2025-08-25 | End: 2025-09-04

## 2025-08-27 ENCOUNTER — APPOINTMENT (OUTPATIENT)
Dept: ORTHOPEDIC SURGERY | Facility: CLINIC | Age: 80
End: 2025-08-27
Payer: MEDICARE

## 2025-08-29 ENCOUNTER — NURSING HOME VISIT (OUTPATIENT)
Dept: POST ACUTE CARE | Facility: EXTERNAL LOCATION | Age: 80
End: 2025-08-29
Payer: MEDICARE

## 2025-08-29 DIAGNOSIS — Z95.0 PACEMAKER: ICD-10-CM

## 2025-08-29 DIAGNOSIS — E78.5 HYPERLIPIDEMIA, UNSPECIFIED HYPERLIPIDEMIA TYPE: ICD-10-CM

## 2025-08-29 DIAGNOSIS — R33.9 URINARY RETENTION: ICD-10-CM

## 2025-08-29 DIAGNOSIS — E11.22 TYPE 2 DIABETES MELLITUS WITH STAGE 4 CHRONIC KIDNEY DISEASE, WITHOUT LONG-TERM CURRENT USE OF INSULIN (MULTI): ICD-10-CM

## 2025-08-29 DIAGNOSIS — E88.2 FAMILIAL MULTIPLE LIPOMATOSIS: ICD-10-CM

## 2025-08-29 DIAGNOSIS — D62 ACUTE BLOOD LOSS AS CAUSE OF POSTOPERATIVE ANEMIA: ICD-10-CM

## 2025-08-29 DIAGNOSIS — M47.812 SPONDYLOSIS OF CERVICAL REGION WITHOUT MYELOPATHY OR RADICULOPATHY: ICD-10-CM

## 2025-08-29 DIAGNOSIS — S72.145A CLOSED NONDISPLACED INTERTROCHANTERIC FRACTURE OF LEFT FEMUR, INITIAL ENCOUNTER: Primary | ICD-10-CM

## 2025-08-29 DIAGNOSIS — N18.4 TYPE 2 DIABETES MELLITUS WITH STAGE 4 CHRONIC KIDNEY DISEASE, WITHOUT LONG-TERM CURRENT USE OF INSULIN (MULTI): ICD-10-CM

## 2025-08-29 DIAGNOSIS — I25.5 ISCHEMIC CARDIOMYOPATHY: ICD-10-CM

## 2025-08-29 DIAGNOSIS — E11.42 TYPE 2 DIABETES MELLITUS WITH PERIPHERAL NEUROPATHY: ICD-10-CM

## 2025-08-29 DIAGNOSIS — I38 VALVULAR HEART DISEASE: ICD-10-CM

## 2025-08-29 DIAGNOSIS — F11.99 OPIOID USE, UNSPECIFIED WITH UNSPECIFIED OPIOID-INDUCED DISORDER: ICD-10-CM

## 2025-08-29 DIAGNOSIS — I10 HYPERTENSION, UNSPECIFIED TYPE: ICD-10-CM

## 2025-08-29 DIAGNOSIS — J42 CHRONIC BRONCHITIS, UNSPECIFIED CHRONIC BRONCHITIS TYPE (MULTI): ICD-10-CM

## 2025-08-29 DIAGNOSIS — Z97.8 FOLEY CATHETER IN PLACE: ICD-10-CM

## 2025-08-29 PROCEDURE — 99306 1ST NF CARE HIGH MDM 50: CPT | Performed by: FAMILY MEDICINE

## 2025-08-31 VITALS
DIASTOLIC BLOOD PRESSURE: 48 MMHG | SYSTOLIC BLOOD PRESSURE: 90 MMHG | TEMPERATURE: 97.6 F | HEART RATE: 80 BPM | RESPIRATION RATE: 18 BRPM | OXYGEN SATURATION: 95 %

## 2025-08-31 PROBLEM — R52 PAIN: Status: ACTIVE | Noted: 2025-08-31

## 2025-08-31 PROBLEM — R26.2 DIFFICULTY IN WALKING: Status: ACTIVE | Noted: 2025-08-31

## 2025-08-31 PROBLEM — J44.9 CHRONIC OBSTRUCTIVE PULMONARY DISEASE (HCC): Status: ACTIVE | Noted: 2023-02-10

## 2025-08-31 PROBLEM — J96.12 CHRONIC RESPIRATORY FAILURE WITH HYPERCAPNIA (HCC): Status: ACTIVE | Noted: 2025-08-31

## 2025-08-31 PROBLEM — R31.9 HEMATURIA: Status: ACTIVE | Noted: 2025-08-31

## 2025-08-31 PROBLEM — M62.81 MUSCLE WEAKNESS (GENERALIZED): Status: ACTIVE | Noted: 2025-08-31

## 2025-08-31 PROBLEM — Z87.81: Status: ACTIVE | Noted: 2025-08-31

## 2025-09-03 ENCOUNTER — TELEPHONE (OUTPATIENT)
Dept: ORTHOPEDIC SURGERY | Facility: CLINIC | Age: 80
End: 2025-09-03
Payer: MEDICARE

## (undated) DEVICE — BANDAGE, COFLEX, 6 X 5 YDS, FOAM TAN, STERILE, LF

## (undated) DEVICE — GLOVE, SURGICAL, PROTEXIS PI , 8.5, PF, LF

## (undated) DEVICE — PREP, IODOPHOR, W/ALCOHOL, DURAPREP, W/APPLICATOR, 26 CC

## (undated) DEVICE — BLADE, GEN COATED 2.75, LF

## (undated) DEVICE — DRESSING, MEPILEX BORDER, POST-OP AG, 4 X 6 IN

## (undated) DEVICE — Device

## (undated) DEVICE — MAT, FLOOR, STANDARD FLUID BARRIER, 32X44, GREEN

## (undated) DEVICE — SUTURE, VICRYL, 1, 36 IN, V-34, VIOLET

## (undated) DEVICE — DRAPE, SHEET, U, STERI DRAPE, 47 X 51 IN, DISPOSABLE, STERILE

## (undated) DEVICE — TIP, SUCTION, YANKAUER, FLEXIBLE

## (undated) DEVICE — SUTURE, VICRYL 2-0, TAPER POINT, CT-1 UNDYED 27 INCH

## (undated) DEVICE — MANIFOLD, 4 PORT NEPTUNE STANDARD

## (undated) DEVICE — DRAPE, SHEET, THREE QUARTER, FAN FOLD, 57 X 77 IN

## (undated) DEVICE — PAD, POST, PERINEAL, ADULT

## (undated) DEVICE — STAPLER, SKIN, ROTATING HEAD, PROXIMATE, WIDE, 35

## (undated) DEVICE — TOWEL PACK, STERILE, 4/PACK, BLUE

## (undated) DEVICE — DRAPE, ISOLATION, INCISE, W/POUCH, STERI DRAPE, 125 X 83 IN, DISPOSABLE, STERILE

## (undated) DEVICE — GOWN, SURGICAL, ROYAL SILK, XXL, STERILE

## (undated) DEVICE — STRAP, VELCRO, BODY, 4 X 60IN, NS

## (undated) DEVICE — LINER, BOOT, TRACTION, UNIVERSAL, DISP

## (undated) DEVICE — DRAPE SHEET, UTILITY, 26 X 15, W/ TAPE, STERILE

## (undated) DEVICE — DRILL BIT, 4.2MM 3-FLUTED QC 330MM 100MM CALB

## (undated) DEVICE — GLOVE, SURGICAL, PROTEXIS PI , 7.5, PF, LF

## (undated) DEVICE — SUTURE, VICRYL 0, TAPER POINT, CT-1 VIOLET 27 INCH

## (undated) DEVICE — DRESSING, GAUZE, PETROLATUM, STRIP, XEROFORM, 1 X 8 IN, STERILE

## (undated) DEVICE — STRAP, ARM BOARD, 32 X 1.5

## (undated) DEVICE — GUIDEWIRE, 3.2 X 400

## (undated) DEVICE — GLOVE, SURGICAL, PROTEXIS PI BLUE W/NEUTHERA, 8.0, PF, LF

## (undated) DEVICE — GLOVE, SURGICAL, PROTEXIS PI , 7.0, PF, LF

## (undated) DEVICE — GOWN, SURGICAL, ROYAL SILK, LG, STERILE

## (undated) DEVICE — CAUTERY, PENCIL, PUSH BUTTON, SMOKE EVAC, 70MM

## (undated) DEVICE — APPLICATOR, CHLORAPREP, W/ORANGE TINT, 26ML

## (undated) DEVICE — STAPLER, SKIN, PLUS, WIDE, 35

## (undated) DEVICE — GLOVE, SURGICAL, PROTEXIS PI , 8.0, PF, LF

## (undated) DEVICE — SOLUTION, IV 0.9% NACL INJECTION USP 1000ML EXCEL PLUS, BAG

## (undated) DEVICE — GLOVE, SURGICAL, PROTEXIS PI BLUE W/NEUTHERA, 7.0, PF, LF

## (undated) DEVICE — GOWN, SURGICAL, ROYAL SILK, XL, STERILE